# Patient Record
Sex: FEMALE | Race: BLACK OR AFRICAN AMERICAN | NOT HISPANIC OR LATINO | Employment: UNEMPLOYED | URBAN - METROPOLITAN AREA
[De-identification: names, ages, dates, MRNs, and addresses within clinical notes are randomized per-mention and may not be internally consistent; named-entity substitution may affect disease eponyms.]

---

## 2017-08-13 ENCOUNTER — HOSPITAL ENCOUNTER (EMERGENCY)
Facility: HOSPITAL | Age: 27
Discharge: HOME | End: 2017-08-13
Attending: EMERGENCY MEDICINE | Admitting: EMERGENCY MEDICINE

## 2017-08-13 VITALS
HEIGHT: 61 IN | WEIGHT: 176 LBS | OXYGEN SATURATION: 100 % | HEART RATE: 107 BPM | BODY MASS INDEX: 33.23 KG/M2 | TEMPERATURE: 98.3 F | SYSTOLIC BLOOD PRESSURE: 120 MMHG | DIASTOLIC BLOOD PRESSURE: 78 MMHG | RESPIRATION RATE: 22 BRPM

## 2017-08-13 DIAGNOSIS — N30.90 CYSTITIS: ICD-10-CM

## 2017-08-13 DIAGNOSIS — Z34.90 PREGNANCY, UNSPECIFIED GESTATIONAL AGE: Primary | ICD-10-CM

## 2017-08-13 LAB
B-HCG UR QL: POSITIVE
BACTERIA UR QL AUTO: ABNORMAL /HPF
BILIRUB UR QL STRIP: NEGATIVE
CLARITY UR: ABNORMAL
COD CRY URNS QL: ABNORMAL /HPF
COLOR UR: YELLOW
GLUCOSE UR STRIP-MCNC: NEGATIVE MG/DL
HGB UR QL STRIP.AUTO: NEGATIVE
HYALINE CASTS UR QL AUTO: ABNORMAL /LPF
INTERNAL NEGATIVE CONTROL: NEGATIVE
INTERNAL POSITIVE CONTROL: POSITIVE
KETONES UR QL STRIP: ABNORMAL
LEUKOCYTE ESTERASE UR QL STRIP.AUTO: ABNORMAL
Lab: ABNORMAL
NITRITE UR QL STRIP: NEGATIVE
PH UR STRIP.AUTO: 5.5 [PH] (ref 5–8)
PROT UR QL STRIP: NEGATIVE
RBC # UR: ABNORMAL /HPF
REF LAB TEST METHOD: ABNORMAL
SP GR UR STRIP: 1.03 (ref 1–1.03)
SQUAMOUS #/AREA URNS HPF: ABNORMAL /HPF
UROBILINOGEN UR QL STRIP: ABNORMAL
WBC UR QL AUTO: ABNORMAL /HPF

## 2017-08-13 PROCEDURE — 87086 URINE CULTURE/COLONY COUNT: CPT | Performed by: NURSE PRACTITIONER

## 2017-08-13 PROCEDURE — 81001 URINALYSIS AUTO W/SCOPE: CPT | Performed by: NURSE PRACTITIONER

## 2017-08-13 PROCEDURE — 99283 EMERGENCY DEPT VISIT LOW MDM: CPT

## 2017-08-13 RX ORDER — CEFDINIR 300 MG/1
300 CAPSULE ORAL 2 TIMES DAILY
Qty: 20 CAPSULE | Refills: 0 | Status: ON HOLD | OUTPATIENT
Start: 2017-08-13 | End: 2017-09-18

## 2017-08-14 NOTE — ED PROVIDER NOTES
Subjective   HPI Comments: Nauseated. Pos upt at home. 2 days later for her period. Wants a repeat test to make sure.    No abd pain. No vaginal bleeding.    Patient is a 27 y.o. female presenting with general illness.   Illness   Severity:  Mild  Progression:  Resolved  Chronicity:  New  Associated symptoms: nausea    Associated symptoms: no abdominal pain, no chest pain, no fever, no headaches and no loss of consciousness        Review of Systems   Constitutional: Negative for fever.   Cardiovascular: Negative for chest pain.   Gastrointestinal: Positive for nausea. Negative for abdominal pain.   Neurological: Negative for loss of consciousness and headaches.   All other systems reviewed and are negative.      Past Medical History:   Diagnosis Date   • Asthma    • Depression        No Known Allergies    Past Surgical History:   Procedure Laterality Date   • CYST REMOVAL     • DENTAL PROCEDURE     • KNEE SURGERY         History reviewed. No pertinent family history.    Social History     Social History   • Marital status: Single     Spouse name: N/A   • Number of children: N/A   • Years of education: N/A     Social History Main Topics   • Smoking status: Never Smoker   • Smokeless tobacco: None   • Alcohol use Yes      Comment: OCCASIONAL   • Drug use: No   • Sexual activity: Yes     Partners: Male     Other Topics Concern   • None     Social History Narrative   • None           Objective   Physical Exam   Constitutional: She is oriented to person, place, and time. She appears well-developed and well-nourished.   HENT:   Head: Normocephalic and atraumatic.   Right Ear: External ear normal.   Left Ear: External ear normal.   Nose: Nose normal.   Mouth/Throat: Oropharynx is clear and moist.   Eyes: Conjunctivae and EOM are normal. Pupils are equal, round, and reactive to light.   Neck: Normal range of motion. Neck supple.   Cardiovascular: Normal rate, regular rhythm, normal heart sounds and intact distal pulses.     Pulmonary/Chest: Effort normal and breath sounds normal.   Abdominal: Soft. Bowel sounds are normal.   Musculoskeletal: Normal range of motion.   Neurological: She is alert and oriented to person, place, and time.   Skin: Skin is warm and dry.   Psychiatric: She has a normal mood and affect. Her behavior is normal. Judgment and thought content normal.       Procedures         ED Course  ED Course   Comment By Time   Pt pain free. No vaginal bleeding. Will give obgyn fu. All thankful and agreeable. Well aware of the ss of worsening condition. MOLLY Juares 08/13 5943                  [facility]    Final diagnoses:   Pregnancy, unspecified gestational age   Cystitis            MOLLY Juares  08/13/17 2722

## 2017-08-15 LAB — BACTERIA SPEC AEROBE CULT: NORMAL

## 2017-09-18 ENCOUNTER — HOSPITAL ENCOUNTER (OUTPATIENT)
Facility: HOSPITAL | Age: 27
Setting detail: HOSPITAL OUTPATIENT SURGERY
Discharge: HOME | End: 2017-09-18
Attending: OBSTETRICS & GYNECOLOGY | Admitting: OBSTETRICS & GYNECOLOGY

## 2017-09-18 ENCOUNTER — ANESTHESIA (OUTPATIENT)
Dept: PERIOP | Facility: HOSPITAL | Age: 27
End: 2017-09-18

## 2017-09-18 ENCOUNTER — ANESTHESIA EVENT (OUTPATIENT)
Dept: PERIOP | Facility: HOSPITAL | Age: 27
End: 2017-09-18

## 2017-09-18 VITALS
HEART RATE: 94 BPM | DIASTOLIC BLOOD PRESSURE: 67 MMHG | RESPIRATION RATE: 18 BRPM | SYSTOLIC BLOOD PRESSURE: 105 MMHG | OXYGEN SATURATION: 100 % | TEMPERATURE: 97.4 F | BODY MASS INDEX: 33.23 KG/M2 | HEIGHT: 61 IN | WEIGHT: 176 LBS

## 2017-09-18 DIAGNOSIS — O02.1 MISSED AB: ICD-10-CM

## 2017-09-18 PROCEDURE — 88305 TISSUE EXAM BY PATHOLOGIST: CPT | Performed by: OBSTETRICS & GYNECOLOGY

## 2017-09-18 PROCEDURE — 25010000002 ONDANSETRON PER 1 MG: Performed by: NURSE ANESTHETIST, CERTIFIED REGISTERED

## 2017-09-18 PROCEDURE — 25010000002 HYDROMORPHONE PER 4 MG: Performed by: NURSE ANESTHETIST, CERTIFIED REGISTERED

## 2017-09-18 PROCEDURE — 25010000002 DEXAMETHASONE PER 1 MG: Performed by: NURSE ANESTHETIST, CERTIFIED REGISTERED

## 2017-09-18 PROCEDURE — 25010000002 FENTANYL CITRATE (PF) 100 MCG/2ML SOLUTION: Performed by: NURSE ANESTHETIST, CERTIFIED REGISTERED

## 2017-09-18 PROCEDURE — 25010000002 KETOROLAC TROMETHAMINE PER 15 MG: Performed by: NURSE ANESTHETIST, CERTIFIED REGISTERED

## 2017-09-18 PROCEDURE — 25010000002 PROPOFOL 10 MG/ML EMULSION: Performed by: NURSE ANESTHETIST, CERTIFIED REGISTERED

## 2017-09-18 RX ORDER — ONDANSETRON 2 MG/ML
4 INJECTION INTRAMUSCULAR; INTRAVENOUS ONCE AS NEEDED
Status: DISCONTINUED | OUTPATIENT
Start: 2017-09-18 | End: 2017-09-18 | Stop reason: HOSPADM

## 2017-09-18 RX ORDER — ONDANSETRON 2 MG/ML
4 INJECTION INTRAMUSCULAR; INTRAVENOUS EVERY 6 HOURS PRN
Status: CANCELLED | OUTPATIENT
Start: 2017-09-18

## 2017-09-18 RX ORDER — FENTANYL CITRATE 50 UG/ML
INJECTION, SOLUTION INTRAMUSCULAR; INTRAVENOUS AS NEEDED
Status: DISCONTINUED | OUTPATIENT
Start: 2017-09-18 | End: 2017-09-18 | Stop reason: SURG

## 2017-09-18 RX ORDER — DEXAMETHASONE SODIUM PHOSPHATE 4 MG/ML
INJECTION, SOLUTION INTRA-ARTICULAR; INTRALESIONAL; INTRAMUSCULAR; INTRAVENOUS; SOFT TISSUE AS NEEDED
Status: DISCONTINUED | OUTPATIENT
Start: 2017-09-18 | End: 2017-09-18 | Stop reason: SURG

## 2017-09-18 RX ORDER — PROMETHAZINE HYDROCHLORIDE 25 MG/ML
6.25 INJECTION, SOLUTION INTRAMUSCULAR; INTRAVENOUS ONCE AS NEEDED
Status: DISCONTINUED | OUTPATIENT
Start: 2017-09-18 | End: 2017-09-18 | Stop reason: HOSPADM

## 2017-09-18 RX ORDER — PROMETHAZINE HYDROCHLORIDE 25 MG/1
25 TABLET ORAL ONCE AS NEEDED
Status: DISCONTINUED | OUTPATIENT
Start: 2017-09-18 | End: 2017-09-18 | Stop reason: HOSPADM

## 2017-09-18 RX ORDER — MEPERIDINE HYDROCHLORIDE 25 MG/ML
12.5 INJECTION INTRAMUSCULAR; INTRAVENOUS; SUBCUTANEOUS
Status: DISCONTINUED | OUTPATIENT
Start: 2017-09-18 | End: 2017-09-18 | Stop reason: HOSPADM

## 2017-09-18 RX ORDER — OXYTOCIN 10 [USP'U]/ML
INJECTION, SOLUTION INTRAMUSCULAR; INTRAVENOUS AS NEEDED
Status: DISCONTINUED | OUTPATIENT
Start: 2017-09-18 | End: 2017-09-18 | Stop reason: SURG

## 2017-09-18 RX ORDER — MAGNESIUM HYDROXIDE 1200 MG/15ML
LIQUID ORAL AS NEEDED
Status: DISCONTINUED | OUTPATIENT
Start: 2017-09-18 | End: 2017-09-18 | Stop reason: HOSPADM

## 2017-09-18 RX ORDER — ONDANSETRON 2 MG/ML
INJECTION INTRAMUSCULAR; INTRAVENOUS AS NEEDED
Status: DISCONTINUED | OUTPATIENT
Start: 2017-09-18 | End: 2017-09-18 | Stop reason: SURG

## 2017-09-18 RX ORDER — FAMOTIDINE 20 MG/1
20 TABLET, FILM COATED ORAL ONCE
Status: COMPLETED | OUTPATIENT
Start: 2017-09-18 | End: 2017-09-18

## 2017-09-18 RX ORDER — ACETAMINOPHEN 325 MG/1
650 TABLET ORAL EVERY 4 HOURS PRN
Status: CANCELLED | OUTPATIENT
Start: 2017-09-18

## 2017-09-18 RX ORDER — PROMETHAZINE HYDROCHLORIDE 25 MG/1
25 SUPPOSITORY RECTAL ONCE AS NEEDED
Status: DISCONTINUED | OUTPATIENT
Start: 2017-09-18 | End: 2017-09-18 | Stop reason: HOSPADM

## 2017-09-18 RX ORDER — HYDROCODONE BITARTRATE AND ACETAMINOPHEN 5; 325 MG/1; MG/1
1 TABLET ORAL EVERY 4 HOURS PRN
Status: CANCELLED | OUTPATIENT
Start: 2017-09-18 | End: 2017-09-28

## 2017-09-18 RX ORDER — PROPOFOL 10 MG/ML
VIAL (ML) INTRAVENOUS AS NEEDED
Status: DISCONTINUED | OUTPATIENT
Start: 2017-09-18 | End: 2017-09-18 | Stop reason: SURG

## 2017-09-18 RX ORDER — IBUPROFEN 800 MG/1
800 TABLET ORAL EVERY 6 HOURS PRN
Qty: 30 TABLET | Refills: 1 | Status: SHIPPED | OUTPATIENT
Start: 2017-09-18 | End: 2017-10-18

## 2017-09-18 RX ORDER — LIDOCAINE HYDROCHLORIDE 10 MG/ML
INJECTION, SOLUTION EPIDURAL; INFILTRATION; INTRACAUDAL; PERINEURAL AS NEEDED
Status: DISCONTINUED | OUTPATIENT
Start: 2017-09-18 | End: 2017-09-18 | Stop reason: SURG

## 2017-09-18 RX ORDER — FENTANYL CITRATE 50 UG/ML
50 INJECTION, SOLUTION INTRAMUSCULAR; INTRAVENOUS
Status: DISCONTINUED | OUTPATIENT
Start: 2017-09-18 | End: 2017-09-18 | Stop reason: HOSPADM

## 2017-09-18 RX ORDER — SODIUM CHLORIDE 0.9 % (FLUSH) 0.9 %
1-10 SYRINGE (ML) INJECTION AS NEEDED
Status: DISCONTINUED | OUTPATIENT
Start: 2017-09-18 | End: 2017-09-18 | Stop reason: HOSPADM

## 2017-09-18 RX ORDER — IBUPROFEN 400 MG/1
400 TABLET ORAL EVERY 6 HOURS PRN
Status: CANCELLED | OUTPATIENT
Start: 2017-09-18

## 2017-09-18 RX ORDER — PYRAZINAMIDE 500 MG/1
1 TABLET ORAL EVERY 4 HOURS PRN
Qty: 12 TABLET | Refills: 0 | Status: SHIPPED | OUTPATIENT
Start: 2017-09-18 | End: 2018-09-18

## 2017-09-18 RX ORDER — FAMOTIDINE 10 MG/ML
20 INJECTION, SOLUTION INTRAVENOUS ONCE
Status: CANCELLED | OUTPATIENT
Start: 2017-09-18 | End: 2017-09-18

## 2017-09-18 RX ORDER — SODIUM CHLORIDE, SODIUM LACTATE, POTASSIUM CHLORIDE, CALCIUM CHLORIDE 600; 310; 30; 20 MG/100ML; MG/100ML; MG/100ML; MG/100ML
9 INJECTION, SOLUTION INTRAVENOUS CONTINUOUS
Status: DISCONTINUED | OUTPATIENT
Start: 2017-09-18 | End: 2017-09-18 | Stop reason: HOSPADM

## 2017-09-18 RX ORDER — HYDROMORPHONE HYDROCHLORIDE 1 MG/ML
0.5 INJECTION, SOLUTION INTRAMUSCULAR; INTRAVENOUS; SUBCUTANEOUS
Status: DISCONTINUED | OUTPATIENT
Start: 2017-09-18 | End: 2017-09-18 | Stop reason: HOSPADM

## 2017-09-18 RX ORDER — LIDOCAINE HYDROCHLORIDE 10 MG/ML
0.5 INJECTION, SOLUTION EPIDURAL; INFILTRATION; INTRACAUDAL; PERINEURAL ONCE AS NEEDED
Status: COMPLETED | OUTPATIENT
Start: 2017-09-18 | End: 2017-09-18

## 2017-09-18 RX ORDER — ONDANSETRON 4 MG/1
4 TABLET, FILM COATED ORAL EVERY 6 HOURS PRN
Status: CANCELLED | OUTPATIENT
Start: 2017-09-18

## 2017-09-18 RX ORDER — KETOROLAC TROMETHAMINE 30 MG/ML
INJECTION, SOLUTION INTRAMUSCULAR; INTRAVENOUS AS NEEDED
Status: DISCONTINUED | OUTPATIENT
Start: 2017-09-18 | End: 2017-09-18 | Stop reason: SURG

## 2017-09-18 RX ORDER — VENLAFAXINE 37.5 MG/1
37.5 TABLET ORAL DAILY
COMMUNITY
End: 2019-05-07

## 2017-09-18 RX ORDER — ALBUTEROL SULFATE 90 UG/1
2 AEROSOL, METERED RESPIRATORY (INHALATION) AS NEEDED
COMMUNITY
End: 2019-05-07

## 2017-09-18 RX ADMIN — SODIUM CHLORIDE, POTASSIUM CHLORIDE, SODIUM LACTATE AND CALCIUM CHLORIDE 9 ML/HR: 600; 310; 30; 20 INJECTION, SOLUTION INTRAVENOUS at 10:35

## 2017-09-18 RX ADMIN — OXYTOCIN 20 UNITS: 10 INJECTION, SOLUTION INTRAMUSCULAR; INTRAVENOUS at 13:40

## 2017-09-18 RX ADMIN — DEXAMETHASONE SODIUM PHOSPHATE 8 MG: 4 INJECTION, SOLUTION INTRAMUSCULAR; INTRAVENOUS at 13:31

## 2017-09-18 RX ADMIN — FAMOTIDINE 20 MG: 20 TABLET ORAL at 10:35

## 2017-09-18 RX ADMIN — PROPOFOL 150 MG: 10 INJECTION, EMULSION INTRAVENOUS at 13:28

## 2017-09-18 RX ADMIN — HYDROMORPHONE HYDROCHLORIDE 0.5 MG: 1 INJECTION, SOLUTION INTRAMUSCULAR; INTRAVENOUS; SUBCUTANEOUS at 14:21

## 2017-09-18 RX ADMIN — HYDROMORPHONE HYDROCHLORIDE 0.5 MG: 1 INJECTION, SOLUTION INTRAMUSCULAR; INTRAVENOUS; SUBCUTANEOUS at 14:36

## 2017-09-18 RX ADMIN — FENTANYL CITRATE 50 MCG: 50 INJECTION, SOLUTION INTRAMUSCULAR; INTRAVENOUS at 13:28

## 2017-09-18 RX ADMIN — ONDANSETRON 4 MG: 2 INJECTION INTRAMUSCULAR; INTRAVENOUS at 13:39

## 2017-09-18 RX ADMIN — FENTANYL CITRATE 50 MCG: 50 INJECTION, SOLUTION INTRAMUSCULAR; INTRAVENOUS at 13:25

## 2017-09-18 RX ADMIN — FENTANYL CITRATE 50 MCG: 50 INJECTION, SOLUTION INTRAMUSCULAR; INTRAVENOUS at 14:10

## 2017-09-18 RX ADMIN — FENTANYL CITRATE 50 MCG: 50 INJECTION, SOLUTION INTRAMUSCULAR; INTRAVENOUS at 14:00

## 2017-09-18 RX ADMIN — KETOROLAC TROMETHAMINE 30 MG: 30 INJECTION, SOLUTION INTRAMUSCULAR at 13:40

## 2017-09-18 RX ADMIN — LIDOCAINE HYDROCHLORIDE 50 MG: 10 INJECTION, SOLUTION EPIDURAL; INFILTRATION; INTRACAUDAL; PERINEURAL at 13:28

## 2017-09-18 RX ADMIN — LIDOCAINE HYDROCHLORIDE 0.2 ML: 10 INJECTION, SOLUTION EPIDURAL; INFILTRATION; INTRACAUDAL; PERINEURAL at 10:35

## 2017-09-18 NOTE — ANESTHESIA POSTPROCEDURE EVALUATION
Patient: Padma Younger    Procedure Summary     Date Anesthesia Start Anesthesia Stop Room / Location    09/18/17 1325  [facility]       Procedure Diagnosis Surgeon Provider    DILATATION AND CURETTAGE WITH SUCTION (N/A Vagina) No diagnosis on file. DO Gildardo Hoyt MD          Anesthesia Type: general  Last vitals  BP 87/47       Temp 98.1       Pulse    84   Resp     20   SpO2     99%     Post Anesthesia Care and Evaluation    Patient location during evaluation: PACU  Patient participation: complete - patient participated  Level of consciousness: obtunded/minimal responses  Pain score: 0  Pain management: adequate  Airway patency: patent  Anesthetic complications: No anesthetic complications  PONV Status: none  Cardiovascular status: hemodynamically stable and acceptable  Respiratory status: nonlabored ventilation, acceptable and nasal cannula  Hydration status: acceptable

## 2017-09-18 NOTE — ANESTHESIA PROCEDURE NOTES
Airway  Urgency: elective    Date/Time: 9/18/2017 1:28 PM  End Time:9/18/2017 1:29 PM  Airway not difficult    General Information and Staff    Patient location during procedure: OR  Anesthesiologist: RODRIGUEZ DILLON  CRNA: GAUTAM SRIVASTAVA    Indications and Patient Condition  Indications for airway management: airway protection    Preoxygenated: yes  Mask difficulty assessment: 1 - vent by mask    Final Airway Details  Final airway type: supraglottic airway      Successful airway: classic  Size 4  Cuff Pressure (cm H2O): 20  Airway Seal Pressure (cm H2O): 8    Number of attempts at approach: 1    Additional Comments  LMA placed without difficulty, ventilation with assist, equal breath sounds and symmetric chest rise and fall

## 2017-09-18 NOTE — OP NOTE
Suction D & C Operative Report      Pre-operative Diagnosis: Blighted Ovum    Post-operative Diagnosis: Same    Operation: Suction Dilatation and Curretage    Surgeon: Kelley Dodge DO    Assistants: Tech    Anesthesia: General LMA    Findings:   8 Week size uterus with moderate amount of curretings    Estimated Blood Loss: Minimal    Specimens: products of conception    Complications:  None    Disposition: PACU - hemodynamically stable.      Procedure Details    The patient was seen in the Holding Room. The risks, benefits, complications, treatment options, and expected outcomes were discussed with the patient. The patient concurred with the proposed plan, giving informed consent. The patient was identified as Padma Younger and the procedure verified as a Suction D & C. A Time Out was held and the above information confirmed.    After induction of anesthesia, the patient was draped and prepped in the usual sterile manner. A weighted speculum was placed in the vagina. The cervix was than grasped with a single tooth tenaculum.  The cervix was slightly dilated. The uterus sounded to 8 cm.  The cervix was then dilated.  An  8 mm curved suction currette was then introduced into the endometrial cavity.  The suction device was activated and rotated to clean the uterus of all products of conception.      The tenaculum was then removed and there was good hemostasis noted.  All instruments were removed from the patients vagina. The counts were correct x 2. Patient was awakened and taken to recover in stable condition.       Kelley Dodge DO  09/18/17  1:56 PM

## 2017-09-18 NOTE — INTERVAL H&P NOTE
26yo  w/ MAB @ 8w3d w/ blighted ovum, gestational sac measuring 6w4d desiring surgical management. No cramping or vaginal bleeding. No change since last seen in clinic.   PMH: asthma, anxiety  PSH: partial mensicus tear    AFVSS    Plan to proceed with suction D&C. Rhogam not indicated at pt is O+.

## 2017-09-18 NOTE — ANESTHESIA PREPROCEDURE EVALUATION
Anesthesia Evaluation     NPO Solid Status: > 8 hours  NPO Liquid Status: > 8 hours     Airway   Mallampati: II  TM distance: >3 FB  Neck ROM: full  no difficulty expected  Dental      Pulmonary - normal exam   (-) not a smokerAsthma: uses inhaler daily, lungs good today, no ER visits.  Cardiovascular     Rhythm: regular  Rate: normal    (-) hypertension, angina, murmur      Neuro/Psych  GI/Hepatic/Renal/Endo    (-) liver disease, no renal disease, diabetes    Musculoskeletal     Abdominal    Substance History      OB/GYN          Other        ROS/Med Hx Other: Miscarriage at 8 weeks per patient                            Anesthesia Plan    ASA 2     general   (GA)  intravenous induction   Anesthetic plan and risks discussed with patient.    Plan discussed with CRNA.

## 2017-09-19 LAB
CYTO UR: NORMAL
LAB AP CASE REPORT: NORMAL
LAB AP CLINICAL INFORMATION: NORMAL
Lab: NORMAL
PATH REPORT.FINAL DX SPEC: NORMAL
PATH REPORT.GROSS SPEC: NORMAL

## 2017-09-21 ENCOUNTER — APPOINTMENT (OUTPATIENT)
Dept: CT IMAGING | Facility: HOSPITAL | Age: 27
End: 2017-09-21

## 2017-09-21 ENCOUNTER — HOSPITAL ENCOUNTER (EMERGENCY)
Facility: HOSPITAL | Age: 27
Discharge: HOME | End: 2017-09-22
Attending: EMERGENCY MEDICINE | Admitting: EMERGENCY MEDICINE

## 2017-09-21 DIAGNOSIS — N30.90 CYSTITIS: ICD-10-CM

## 2017-09-21 DIAGNOSIS — R10.2 PELVIC PAIN: Primary | ICD-10-CM

## 2017-09-21 DIAGNOSIS — G89.18 POST-OP PAIN: ICD-10-CM

## 2017-09-21 LAB
ALBUMIN SERPL-MCNC: 4.2 G/DL (ref 3.2–4.8)
ALBUMIN/GLOB SERPL: 1.4 G/DL (ref 1.5–2.5)
ALP SERPL-CCNC: 98 U/L (ref 25–100)
ALT SERPL W P-5'-P-CCNC: 16 U/L (ref 7–40)
ANION GAP SERPL CALCULATED.3IONS-SCNC: 6 MMOL/L (ref 3–11)
AST SERPL-CCNC: 19 U/L (ref 0–33)
BASOPHILS # BLD AUTO: 0.02 10*3/MM3 (ref 0–0.2)
BASOPHILS NFR BLD AUTO: 0.2 % (ref 0–1)
BILIRUB SERPL-MCNC: 0.4 MG/DL (ref 0.3–1.2)
BUN BLD-MCNC: 9 MG/DL (ref 9–23)
BUN/CREAT SERPL: 11.3 (ref 7–25)
CALCIUM SPEC-SCNC: 9.4 MG/DL (ref 8.7–10.4)
CHLORIDE SERPL-SCNC: 102 MMOL/L (ref 99–109)
CO2 SERPL-SCNC: 26 MMOL/L (ref 20–31)
CREAT BLD-MCNC: 0.8 MG/DL (ref 0.6–1.3)
DEPRECATED RDW RBC AUTO: 44.6 FL (ref 37–54)
EOSINOPHIL # BLD AUTO: 0.1 10*3/MM3 (ref 0–0.3)
EOSINOPHIL NFR BLD AUTO: 1.1 % (ref 0–3)
ERYTHROCYTE [DISTWIDTH] IN BLOOD BY AUTOMATED COUNT: 13.7 % (ref 11.3–14.5)
GFR SERPL CREATININE-BSD FRML MDRD: 104 ML/MIN/1.73
GLOBULIN UR ELPH-MCNC: 3 GM/DL
GLUCOSE BLD-MCNC: 87 MG/DL (ref 70–100)
HCT VFR BLD AUTO: 36.6 % (ref 34.5–44)
HGB BLD-MCNC: 11.9 G/DL (ref 11.5–15.5)
IMM GRANULOCYTES # BLD: 0.01 10*3/MM3 (ref 0–0.03)
IMM GRANULOCYTES NFR BLD: 0.1 % (ref 0–0.6)
LIPASE SERPL-CCNC: 29 U/L (ref 6–51)
LYMPHOCYTES # BLD AUTO: 2.51 10*3/MM3 (ref 0.6–4.8)
LYMPHOCYTES NFR BLD AUTO: 28.1 % (ref 24–44)
MCH RBC QN AUTO: 28.8 PG (ref 27–31)
MCHC RBC AUTO-ENTMCNC: 32.5 G/DL (ref 32–36)
MCV RBC AUTO: 88.6 FL (ref 80–99)
MONOCYTES # BLD AUTO: 0.48 10*3/MM3 (ref 0–1)
MONOCYTES NFR BLD AUTO: 5.4 % (ref 0–12)
NEUTROPHILS # BLD AUTO: 5.81 10*3/MM3 (ref 1.5–8.3)
NEUTROPHILS NFR BLD AUTO: 65.1 % (ref 41–71)
PLATELET # BLD AUTO: 317 10*3/MM3 (ref 150–450)
PMV BLD AUTO: 9.7 FL (ref 6–12)
POTASSIUM BLD-SCNC: 3.5 MMOL/L (ref 3.5–5.5)
PROT SERPL-MCNC: 7.2 G/DL (ref 5.7–8.2)
RBC # BLD AUTO: 4.13 10*6/MM3 (ref 3.89–5.14)
SODIUM BLD-SCNC: 134 MMOL/L (ref 132–146)
WBC NRBC COR # BLD: 8.93 10*3/MM3 (ref 3.5–10.8)

## 2017-09-21 PROCEDURE — 83690 ASSAY OF LIPASE: CPT | Performed by: EMERGENCY MEDICINE

## 2017-09-21 PROCEDURE — 81001 URINALYSIS AUTO W/SCOPE: CPT | Performed by: EMERGENCY MEDICINE

## 2017-09-21 PROCEDURE — 99284 EMERGENCY DEPT VISIT MOD MDM: CPT

## 2017-09-21 PROCEDURE — 0 IOPAMIDOL 61 % SOLUTION: Performed by: EMERGENCY MEDICINE

## 2017-09-21 PROCEDURE — 74177 CT ABD & PELVIS W/CONTRAST: CPT

## 2017-09-21 PROCEDURE — 87077 CULTURE AEROBIC IDENTIFY: CPT | Performed by: EMERGENCY MEDICINE

## 2017-09-21 PROCEDURE — 25010000002 MORPHINE PER 10 MG: Performed by: EMERGENCY MEDICINE

## 2017-09-21 PROCEDURE — 87186 SC STD MICRODIL/AGAR DIL: CPT | Performed by: EMERGENCY MEDICINE

## 2017-09-21 PROCEDURE — 80053 COMPREHEN METABOLIC PANEL: CPT | Performed by: EMERGENCY MEDICINE

## 2017-09-21 PROCEDURE — 85025 COMPLETE CBC W/AUTO DIFF WBC: CPT | Performed by: EMERGENCY MEDICINE

## 2017-09-21 PROCEDURE — 25010000002 ONDANSETRON PER 1 MG: Performed by: EMERGENCY MEDICINE

## 2017-09-21 PROCEDURE — 87086 URINE CULTURE/COLONY COUNT: CPT | Performed by: EMERGENCY MEDICINE

## 2017-09-21 PROCEDURE — 96375 TX/PRO/DX INJ NEW DRUG ADDON: CPT

## 2017-09-21 RX ORDER — KETOROLAC TROMETHAMINE 15 MG/ML
7.5 INJECTION, SOLUTION INTRAMUSCULAR; INTRAVENOUS ONCE
Status: COMPLETED | OUTPATIENT
Start: 2017-09-21 | End: 2017-09-22

## 2017-09-21 RX ORDER — ONDANSETRON 2 MG/ML
4 INJECTION INTRAMUSCULAR; INTRAVENOUS ONCE
Status: COMPLETED | OUTPATIENT
Start: 2017-09-21 | End: 2017-09-21

## 2017-09-21 RX ORDER — MORPHINE SULFATE 4 MG/ML
4 INJECTION, SOLUTION INTRAMUSCULAR; INTRAVENOUS ONCE
Status: COMPLETED | OUTPATIENT
Start: 2017-09-21 | End: 2017-09-21

## 2017-09-21 RX ORDER — SODIUM CHLORIDE 0.9 % (FLUSH) 0.9 %
10 SYRINGE (ML) INJECTION AS NEEDED
Status: DISCONTINUED | OUTPATIENT
Start: 2017-09-21 | End: 2017-09-22 | Stop reason: HOSPADM

## 2017-09-21 RX ADMIN — ONDANSETRON 4 MG: 2 INJECTION INTRAMUSCULAR; INTRAVENOUS at 23:59

## 2017-09-21 RX ADMIN — MORPHINE SULFATE 4 MG: 4 INJECTION, SOLUTION INTRAMUSCULAR; INTRAVENOUS at 23:56

## 2017-09-21 RX ADMIN — IOPAMIDOL 97 ML: 612 INJECTION, SOLUTION INTRAVENOUS at 23:39

## 2017-09-22 VITALS
OXYGEN SATURATION: 100 % | SYSTOLIC BLOOD PRESSURE: 111 MMHG | DIASTOLIC BLOOD PRESSURE: 71 MMHG | BODY MASS INDEX: 33.23 KG/M2 | RESPIRATION RATE: 18 BRPM | HEIGHT: 61 IN | TEMPERATURE: 97.8 F | WEIGHT: 176 LBS | HEART RATE: 78 BPM

## 2017-09-22 LAB
BACTERIA UR QL AUTO: ABNORMAL /HPF
BILIRUB UR QL STRIP: NEGATIVE
CLARITY UR: ABNORMAL
COLOR UR: YELLOW
GLUCOSE UR STRIP-MCNC: NEGATIVE MG/DL
HGB UR QL STRIP.AUTO: NEGATIVE
HOLD SPECIMEN: NORMAL
HOLD SPECIMEN: NORMAL
HYALINE CASTS UR QL AUTO: ABNORMAL /LPF
KETONES UR QL STRIP: NEGATIVE
LEUKOCYTE ESTERASE UR QL STRIP.AUTO: ABNORMAL
NITRITE UR QL STRIP: NEGATIVE
PH UR STRIP.AUTO: <=5 [PH] (ref 5–8)
PROT UR QL STRIP: NEGATIVE
RBC # UR: ABNORMAL /HPF
REF LAB TEST METHOD: ABNORMAL
SP GR UR STRIP: 1.02 (ref 1–1.03)
SQUAMOUS #/AREA URNS HPF: ABNORMAL /HPF
UROBILINOGEN UR QL STRIP: ABNORMAL
WBC UR QL AUTO: ABNORMAL /HPF
WHOLE BLOOD HOLD SPECIMEN: NORMAL
WHOLE BLOOD HOLD SPECIMEN: NORMAL

## 2017-09-22 PROCEDURE — 25010000002 KETOROLAC TROMETHAMINE PER 15 MG: Performed by: EMERGENCY MEDICINE

## 2017-09-22 PROCEDURE — 96365 THER/PROPH/DIAG IV INF INIT: CPT

## 2017-09-22 PROCEDURE — 25010000002 CEFTRIAXONE PER 250 MG: Performed by: EMERGENCY MEDICINE

## 2017-09-22 PROCEDURE — 96375 TX/PRO/DX INJ NEW DRUG ADDON: CPT

## 2017-09-22 RX ORDER — CEFTRIAXONE SODIUM 1 G/50ML
1 INJECTION, SOLUTION INTRAVENOUS ONCE
Status: COMPLETED | OUTPATIENT
Start: 2017-09-22 | End: 2017-09-22

## 2017-09-22 RX ORDER — CEFDINIR 300 MG/1
300 CAPSULE ORAL 2 TIMES DAILY
Qty: 14 CAPSULE | Refills: 0 | OUTPATIENT
Start: 2017-09-22 | End: 2019-05-07

## 2017-09-22 RX ADMIN — CEFTRIAXONE SODIUM 1 G: 1 INJECTION, SOLUTION INTRAVENOUS at 00:59

## 2017-09-22 RX ADMIN — KETOROLAC TROMETHAMINE 7.5 MG: 15 INJECTION, SOLUTION INTRAMUSCULAR; INTRAVENOUS at 00:00

## 2017-09-22 NOTE — ED PROVIDER NOTES
Subjective   HPI Comments: Padma Younger is a 27 y.o.female who presents to the ED with c/o nausea, diarrhea after taking a laxative, and lower abdominal pain that has persisted over the last three days. She reports that she had a DNC on Monday afternoon. That evening, she began experiencing increasingly severe pain throughout her abdomen, especially in the RUQ and diffusely throughout the left side. She was seen by her OBGYN, Dr. Dodge, again on Tuesday. She denies any heavy lifting and has been following all of the post-surgical instructions. She has tried taking her prescribed pain medications without relief. Other than some spotting, she has no other acute complaints at this time including vomiting, vaginal pain, or vaginal discharge.    Patient is a 27 y.o. female presenting with abdominal pain.   History provided by:  Patient  Abdominal Pain   Pain location:  LLQ, RUQ and LUQ  Pain quality: aching    Pain radiates to:  Does not radiate  Pain severity:  Severe  Onset quality:  Gradual  Duration:  3 days  Timing:  Constant  Progression:  Unchanged  Chronicity:  New  Context comment:  DNC on Monday  Relieved by:  Nothing  Worsened by:  Nothing  Ineffective treatments: prescribed pain medications.  Associated symptoms: diarrhea and nausea    Associated symptoms: no chest pain, no chills, no cough, no fever, no shortness of breath, no sore throat and no vomiting    Diarrhea:     Severity:  Moderate    Duration:  3 days    Timing:  Intermittent    Progression:  Unchanged  Nausea:     Severity:  Moderate    Onset quality:  Gradual    Duration:  3 days    Timing:  Constant    Progression:  Unchanged      Review of Systems   Constitutional: Negative for chills and fever.   HENT: Negative for congestion, rhinorrhea, sore throat and trouble swallowing.    Respiratory: Negative for cough and shortness of breath.    Cardiovascular: Negative for chest pain.   Gastrointestinal: Positive for abdominal pain, diarrhea and  nausea. Negative for vomiting.   Musculoskeletal: Negative for back pain and neck pain.   Neurological: Negative for dizziness, weakness and headaches.   All other systems reviewed and are negative.      Past Medical History:   Diagnosis Date   • Abnormal Pap smear of cervix    • Anxiety    • Asthma    • Chlamydia contact, treated    • Depression    • Trichomonas vaginitis        No Known Allergies    Past Surgical History:   Procedure Laterality Date   • CYST REMOVAL     • D&C WITH SUCTION N/A 9/18/2017    Procedure: DILATATION AND CURETTAGE WITH SUCTION;  Surgeon: Kelley Dodge DO;  Location: [facility] OR;  Service:    • DENTAL PROCEDURE     • DILATATION AND CURETTAGE  09/18/2017   • KNEE SURGERY         History reviewed. No pertinent family history.    Social History     Social History   • Marital status: Single     Spouse name: N/A   • Number of children: N/A   • Years of education: N/A     Social History Main Topics   • Smoking status: Never Smoker   • Smokeless tobacco: Never Used   • Alcohol use Yes      Comment: OCCASIONAL   • Drug use: No   • Sexual activity: Yes     Partners: Male     Other Topics Concern   • None     Social History Narrative   • None         Objective   Physical Exam   Constitutional: She is oriented to person, place, and time. She appears well-developed and well-nourished. No distress.   HENT:   Head: Normocephalic and atraumatic.   Nose: Nose normal.   Mouth/Throat: Oropharynx is clear and moist.   Eyes: Conjunctivae are normal. No scleral icterus.   Neck: Normal range of motion. Neck supple.   Cardiovascular: Normal rate, regular rhythm and normal heart sounds.    No murmur heard.  Pulmonary/Chest: Effort normal and breath sounds normal. No respiratory distress. She has no wheezes. She has no rales.   Abdominal: Soft. Bowel sounds are normal. She exhibits no mass. There is tenderness. There is no rebound and no guarding.   Tenderness to palpation diffusely throughout the abdomen.  Tenderness to palpation of the flanks bilaterally. CVA tenderness to palpation. Abdomen was full but non-distended.   Musculoskeletal: Normal range of motion. She exhibits no edema.   Neurological: She is alert and oriented to person, place, and time.   Skin: Skin is warm and dry. No erythema.   Psychiatric: She has a normal mood and affect. Her behavior is normal.   Nursing note and vitals reviewed.      Procedures         ED Course  ED Course   Value Comment By Time   WBC, UA: (!) 0-2 (Reviewed) Sim Beal MD 09/22 0045   Leuk Esterase, UA: (!) Small (1+) (Reviewed) Sim Beal MD 09/22 0045   Appearance, UA: (!) Cloudy (Reviewed) Sim Beal MD 09/22 0045    Dr. Beal is at the bedside reevaluating the patient. Crissy Knight 09/22 0046    Patient is resting comfortably and reports feeling much better.  Abdomen is nonsurgical.  Findings consistent with mild cystitis postoperative changes.  We'll discharge her home to follow-up with Dr. Dodge.  She voices understanding and is happy with the plan.  She is to return here for any concerns. Sim Beal MD 09/22 0048     Recent Results (from the past 24 hour(s))   Comprehensive Metabolic Panel    Collection Time: 09/21/17 10:44 PM   Result Value Ref Range    Glucose 87 70 - 100 mg/dL    BUN 9 9 - 23 mg/dL    Creatinine 0.80 0.60 - 1.30 mg/dL    Sodium 134 132 - 146 mmol/L    Potassium 3.5 3.5 - 5.5 mmol/L    Chloride 102 99 - 109 mmol/L    CO2 26.0 20.0 - 31.0 mmol/L    Calcium 9.4 8.7 - 10.4 mg/dL    Total Protein 7.2 5.7 - 8.2 g/dL    Albumin 4.20 3.20 - 4.80 g/dL    ALT (SGPT) 16 7 - 40 U/L    AST (SGOT) 19 0 - 33 U/L    Alkaline Phosphatase 98 25 - 100 U/L    Total Bilirubin 0.4 0.3 - 1.2 mg/dL    eGFR  African Amer 104 >60 mL/min/1.73    Globulin 3.0 gm/dL    A/G Ratio 1.4 (L) 1.5 - 2.5 g/dL    BUN/Creatinine Ratio 11.3 7.0 - 25.0    Anion Gap 6.0 3.0 - 11.0 mmol/L   Lipase    Collection Time: 09/21/17 10:44 PM   Result  Value Ref Range    Lipase 29 6 - 51 U/L   Light Blue Top    Collection Time: 09/21/17 10:44 PM   Result Value Ref Range    Extra Tube hold for add-on    Green Top (Gel)    Collection Time: 09/21/17 10:44 PM   Result Value Ref Range    Extra Tube Hold for add-ons.    Lavender Top    Collection Time: 09/21/17 10:44 PM   Result Value Ref Range    Extra Tube hold for add-on    Gold Top - SST    Collection Time: 09/21/17 10:44 PM   Result Value Ref Range    Extra Tube Hold for add-ons.    CBC Auto Differential    Collection Time: 09/21/17 10:44 PM   Result Value Ref Range    WBC 8.93 3.50 - 10.80 10*3/mm3    RBC 4.13 3.89 - 5.14 10*6/mm3    Hemoglobin 11.9 11.5 - 15.5 g/dL    Hematocrit 36.6 34.5 - 44.0 %    MCV 88.6 80.0 - 99.0 fL    MCH 28.8 27.0 - 31.0 pg    MCHC 32.5 32.0 - 36.0 g/dL    RDW 13.7 11.3 - 14.5 %    RDW-SD 44.6 37.0 - 54.0 fl    MPV 9.7 6.0 - 12.0 fL    Platelets 317 150 - 450 10*3/mm3    Neutrophil % 65.1 41.0 - 71.0 %    Lymphocyte % 28.1 24.0 - 44.0 %    Monocyte % 5.4 0.0 - 12.0 %    Eosinophil % 1.1 0.0 - 3.0 %    Basophil % 0.2 0.0 - 1.0 %    Immature Grans % 0.1 0.0 - 0.6 %    Neutrophils, Absolute 5.81 1.50 - 8.30 10*3/mm3    Lymphocytes, Absolute 2.51 0.60 - 4.80 10*3/mm3    Monocytes, Absolute 0.48 0.00 - 1.00 10*3/mm3    Eosinophils, Absolute 0.10 0.00 - 0.30 10*3/mm3    Basophils, Absolute 0.02 0.00 - 0.20 10*3/mm3    Immature Grans, Absolute 0.01 0.00 - 0.03 10*3/mm3   Urinalysis With / Culture If Indicated    Collection Time: 09/21/17 11:58 PM   Result Value Ref Range    Color, UA Yellow Yellow, Straw    Appearance, UA Cloudy (A) Clear    pH, UA <=5.0 5.0 - 8.0    Specific Gravity, UA 1.017 1.001 - 1.030    Glucose, UA Negative Negative    Ketones, UA Negative Negative    Bilirubin, UA Negative Negative    Blood, UA Negative Negative    Protein, UA Negative Negative    Leuk Esterase, UA Small (1+) (A) Negative    Nitrite, UA Negative Negative    Urobilinogen, UA 0.2 E.U./dL 0.2 - 1.0  "E.U./dL   Urinalysis, Microscopic Only    Collection Time: 09/21/17 11:58 PM   Result Value Ref Range    RBC, UA None Seen None Seen, 0-2 /HPF    WBC, UA 0-2 (A) None Seen /HPF    Bacteria, UA None Seen None Seen, Trace /HPF    Squamous Epithelial Cells, UA 0-2 None Seen, 0-2 /HPF    Hyaline Casts, UA None Seen 0 - 6 /LPF    Methodology Automated Microscopy      Note: In addition to lab results from this visit, the labs listed above may include labs taken at another facility or during a different encounter within the last 24 hours. Please correlate lab times with ED admission and discharge times for further clarification of the services performed during this visit.    CT Abdomen Pelvis With Contrast   Final Result   Abnormal     Mild wall thickening of partially distended urinary bladder suspicious for    cystitis/UTI versus artifact due to underdistention.  Recommend correlation    with urinary analysis.         Ovarian cysts/follicles likely physiologic/normal for the patient's age.     Followup with sonography may be considered if clinically indicated.        Other nonemergent findings as described.       THIS DOCUMENT HAS BEEN ELECTRONICALLY SIGNED BY LESLIE MORENO MD        Vitals:    09/21/17 2147 09/22/17 0000   BP: 107/68 118/72   BP Location: Left arm Right arm   Patient Position: Sitting Lying   Pulse: 84 74   Resp: 24    Temp: 97.8 °F (36.6 °C)    TempSrc: Oral    SpO2: 100% 100%   Weight: 176 lb (79.8 kg)    Height: 61\" (154.9 cm)      Medications   sodium chloride 0.9 % flush 10 mL (not administered)   cefTRIAXone (ROCEPHIN) IVPB 1 g (1 g Intravenous New Bag 9/22/17 0059)   ketorolac (TORADOL) injection 7.5 mg (7.5 mg Intravenous Given 9/22/17 0000)   Morphine sulfate (PF) injection 4 mg (4 mg Intravenous Given 9/21/17 2356)   ondansetron (ZOFRAN) injection 4 mg (4 mg Intravenous Given 9/21/17 2359)   iopamidol (ISOVUE-300) 61 % injection 100 mL (97 mL Intravenous Given 9/21/17 2339)     ECG/EMG Results " (last 24 hours)     ** No results found for the last 24 hours. **                       MDM  Number of Diagnoses or Management Options     Amount and/or Complexity of Data Reviewed  Clinical lab tests: reviewed  Tests in the radiology section of CPT®: reviewed  Review and summarize past medical records: yes  Independent visualization of images, tracings, or specimens: yes        Final diagnoses:   Pelvic pain   Post-op pain   Cystitis       Documentation assistance provided by maral Knight.  Information recorded by the scribe was done at my direction and has been verified and validated by me.     Crissy Knight  09/21/17 6609       Sim Beal MD  09/22/17 2974

## 2017-09-24 LAB
BACTERIA SPEC AEROBE CULT: ABNORMAL
BACTERIA SPEC AEROBE CULT: ABNORMAL

## 2019-04-24 ENCOUNTER — TRANSCRIBE ORDERS (OUTPATIENT)
Dept: LAB | Facility: HOSPITAL | Age: 29
End: 2019-04-24

## 2019-04-24 ENCOUNTER — LAB (OUTPATIENT)
Dept: LAB | Facility: HOSPITAL | Age: 29
End: 2019-04-24

## 2019-04-24 DIAGNOSIS — Z3A.08 8 WEEKS GESTATION OF PREGNANCY: ICD-10-CM

## 2019-04-24 DIAGNOSIS — Z3A.08 8 WEEKS GESTATION OF PREGNANCY: Primary | ICD-10-CM

## 2019-04-24 DIAGNOSIS — Z34.01 ENCOUNTER FOR SUPERVISION OF NORMAL FIRST PREGNANCY IN FIRST TRIMESTER: ICD-10-CM

## 2019-04-24 LAB
ABO GROUP BLD: NORMAL
BASOPHILS # BLD AUTO: 0.03 10*3/MM3 (ref 0–0.2)
BASOPHILS NFR BLD AUTO: 0.3 % (ref 0–1.5)
BLD GP AB SCN SERPL QL: NEGATIVE
DEPRECATED RDW RBC AUTO: 43.9 FL (ref 37–54)
EOSINOPHIL # BLD AUTO: 0.02 10*3/MM3 (ref 0–0.4)
EOSINOPHIL NFR BLD AUTO: 0.2 % (ref 0.3–6.2)
ERYTHROCYTE [DISTWIDTH] IN BLOOD BY AUTOMATED COUNT: 13.2 % (ref 12.3–15.4)
GLUCOSE BLD-MCNC: 77 MG/DL (ref 65–99)
HBV SURFACE AG SERPL QL IA: NORMAL
HCT VFR BLD AUTO: 42.7 % (ref 34–46.6)
HCV AB SER DONR QL: NORMAL
HGB BLD-MCNC: 13.5 G/DL (ref 12–15.9)
HIV1+2 AB SER QL: NORMAL
IMM GRANULOCYTES # BLD AUTO: 0.02 10*3/MM3 (ref 0–0.05)
IMM GRANULOCYTES NFR BLD AUTO: 0.2 % (ref 0–0.5)
LYMPHOCYTES # BLD AUTO: 1.69 10*3/MM3 (ref 0.7–3.1)
LYMPHOCYTES NFR BLD AUTO: 19.1 % (ref 19.6–45.3)
MCH RBC QN AUTO: 28.7 PG (ref 26.6–33)
MCHC RBC AUTO-ENTMCNC: 31.6 G/DL (ref 31.5–35.7)
MCV RBC AUTO: 90.9 FL (ref 79–97)
MONOCYTES # BLD AUTO: 0.47 10*3/MM3 (ref 0.1–0.9)
MONOCYTES NFR BLD AUTO: 5.3 % (ref 5–12)
NEUTROPHILS # BLD AUTO: 6.61 10*3/MM3 (ref 1.7–7)
NEUTROPHILS NFR BLD AUTO: 74.9 % (ref 42.7–76)
NRBC BLD AUTO-RTO: 0 /100 WBC (ref 0–0.2)
PLATELET # BLD AUTO: 323 10*3/MM3 (ref 140–450)
PMV BLD AUTO: 11.4 FL (ref 6–12)
RBC # BLD AUTO: 4.7 10*6/MM3 (ref 3.77–5.28)
RH BLD: POSITIVE
WBC NRBC COR # BLD: 8.84 10*3/MM3 (ref 3.4–10.8)

## 2019-04-24 PROCEDURE — 82947 ASSAY GLUCOSE BLOOD QUANT: CPT

## 2019-04-24 PROCEDURE — 86900 BLOOD TYPING SEROLOGIC ABO: CPT

## 2019-04-24 PROCEDURE — 80081 OBSTETRIC PANEL INC HIV TSTG: CPT

## 2019-04-24 PROCEDURE — 86901 BLOOD TYPING SEROLOGIC RH(D): CPT

## 2019-04-24 PROCEDURE — 86850 RBC ANTIBODY SCREEN: CPT

## 2019-04-24 PROCEDURE — 86803 HEPATITIS C AB TEST: CPT

## 2019-04-24 PROCEDURE — 36415 COLL VENOUS BLD VENIPUNCTURE: CPT

## 2019-04-25 LAB
RPR SER QL: NORMAL
RUBV IGG SERPL IA-ACNC: POSITIVE

## 2019-04-26 ENCOUNTER — LAB (OUTPATIENT)
Dept: LAB | Facility: HOSPITAL | Age: 29
End: 2019-04-26

## 2019-04-26 DIAGNOSIS — Z3A.08 8 WEEKS GESTATION OF PREGNANCY: Primary | ICD-10-CM

## 2019-04-26 LAB
AMPHET+METHAMPHET UR QL: NEGATIVE
AMPHETAMINES UR QL: NEGATIVE
BARBITURATES UR QL SCN: NEGATIVE
BENZODIAZ UR QL SCN: NEGATIVE
BUPRENORPHINE SERPL-MCNC: NEGATIVE NG/ML
CANNABINOIDS SERPL QL: NEGATIVE
COCAINE UR QL: NEGATIVE
METHADONE UR QL SCN: NEGATIVE
OPIATES UR QL: NEGATIVE
OXYCODONE UR QL SCN: NEGATIVE
PCP UR QL SCN: NEGATIVE
PROPOXYPH UR QL: NEGATIVE
TRICYCLICS UR QL SCN: NEGATIVE

## 2019-04-26 PROCEDURE — 81001 URINALYSIS AUTO W/SCOPE: CPT

## 2019-04-26 PROCEDURE — 80306 DRUG TEST PRSMV INSTRMNT: CPT

## 2019-04-27 LAB
BACTERIA UR QL AUTO: ABNORMAL /HPF
BILIRUB UR QL STRIP: NEGATIVE
CLARITY UR: ABNORMAL
COD CRY URNS QL: ABNORMAL /HPF
COLOR UR: YELLOW
GLUCOSE UR STRIP-MCNC: NEGATIVE MG/DL
HGB UR QL STRIP.AUTO: NEGATIVE
HYALINE CASTS UR QL AUTO: ABNORMAL /LPF
KETONES UR QL STRIP: NEGATIVE
LEUKOCYTE ESTERASE UR QL STRIP.AUTO: ABNORMAL
NITRITE UR QL STRIP: NEGATIVE
PH UR STRIP.AUTO: 8 [PH] (ref 5–8)
PROT UR QL STRIP: ABNORMAL
RBC # UR: ABNORMAL /HPF
REF LAB TEST METHOD: ABNORMAL
SP GR UR STRIP: 1.03 (ref 1–1.03)
SQUAMOUS #/AREA URNS HPF: ABNORMAL /HPF
UNIDENT CRYS URNS QL MICRO: ABNORMAL /HPF
UROBILINOGEN UR QL STRIP: ABNORMAL
WBC UR QL AUTO: ABNORMAL /HPF

## 2019-05-07 ENCOUNTER — HOSPITAL ENCOUNTER (EMERGENCY)
Facility: HOSPITAL | Age: 29
Discharge: HOME | End: 2019-05-07
Attending: EMERGENCY MEDICINE | Admitting: EMERGENCY MEDICINE

## 2019-05-07 ENCOUNTER — APPOINTMENT (OUTPATIENT)
Dept: ULTRASOUND IMAGING | Facility: HOSPITAL | Age: 29
End: 2019-05-07

## 2019-05-07 VITALS
HEART RATE: 91 BPM | DIASTOLIC BLOOD PRESSURE: 62 MMHG | HEIGHT: 61 IN | RESPIRATION RATE: 16 BRPM | BODY MASS INDEX: 34.55 KG/M2 | OXYGEN SATURATION: 100 % | SYSTOLIC BLOOD PRESSURE: 111 MMHG | TEMPERATURE: 98.2 F | WEIGHT: 183 LBS

## 2019-05-07 DIAGNOSIS — N39.0 BACTERIAL UTI: ICD-10-CM

## 2019-05-07 DIAGNOSIS — N83.202 LEFT OVARIAN CYST: ICD-10-CM

## 2019-05-07 DIAGNOSIS — R10.9 LEFT SIDED ABDOMINAL PAIN: Primary | ICD-10-CM

## 2019-05-07 DIAGNOSIS — A49.9 BACTERIAL UTI: ICD-10-CM

## 2019-05-07 DIAGNOSIS — Z34.91 FIRST TRIMESTER PREGNANCY: ICD-10-CM

## 2019-05-07 LAB
ALBUMIN SERPL-MCNC: 4.4 G/DL (ref 3.5–5.2)
ALBUMIN/GLOB SERPL: 1.2 G/DL
ALP SERPL-CCNC: 66 U/L (ref 39–117)
ALT SERPL W P-5'-P-CCNC: 7 U/L (ref 1–33)
ANION GAP SERPL CALCULATED.3IONS-SCNC: 13 MMOL/L
AST SERPL-CCNC: 15 U/L (ref 1–32)
BACTERIA UR QL AUTO: ABNORMAL /HPF
BASOPHILS # BLD AUTO: 0.01 10*3/MM3 (ref 0–0.2)
BASOPHILS NFR BLD AUTO: 0.1 % (ref 0–1.5)
BILIRUB SERPL-MCNC: 0.4 MG/DL (ref 0.2–1.2)
BILIRUB UR QL STRIP: NEGATIVE
BUN BLD-MCNC: 9 MG/DL (ref 6–20)
BUN/CREAT SERPL: 14.3 (ref 7–25)
CALCIUM SPEC-SCNC: 9.8 MG/DL (ref 8.6–10.5)
CHLORIDE SERPL-SCNC: 98 MMOL/L (ref 98–107)
CLARITY UR: ABNORMAL
CO2 SERPL-SCNC: 23 MMOL/L (ref 22–29)
COLOR UR: YELLOW
CREAT BLD-MCNC: 0.63 MG/DL (ref 0.57–1)
DEPRECATED RDW RBC AUTO: 41.9 FL (ref 37–54)
EOSINOPHIL # BLD AUTO: 0.05 10*3/MM3 (ref 0–0.4)
EOSINOPHIL NFR BLD AUTO: 0.5 % (ref 0.3–6.2)
ERYTHROCYTE [DISTWIDTH] IN BLOOD BY AUTOMATED COUNT: 13 % (ref 12.3–15.4)
GFR SERPL CREATININE-BSD FRML MDRD: 134 ML/MIN/1.73
GLOBULIN UR ELPH-MCNC: 3.8 GM/DL
GLUCOSE BLD-MCNC: 79 MG/DL (ref 65–99)
GLUCOSE UR STRIP-MCNC: NEGATIVE MG/DL
HCT VFR BLD AUTO: 40.8 % (ref 34–46.6)
HGB BLD-MCNC: 13.4 G/DL (ref 12–15.9)
HGB UR QL STRIP.AUTO: NEGATIVE
HYALINE CASTS UR QL AUTO: ABNORMAL /LPF
IMM GRANULOCYTES # BLD AUTO: 0.01 10*3/MM3 (ref 0–0.05)
IMM GRANULOCYTES NFR BLD AUTO: 0.1 % (ref 0–0.5)
KETONES UR QL STRIP: NEGATIVE
LEUKOCYTE ESTERASE UR QL STRIP.AUTO: ABNORMAL
LIPASE SERPL-CCNC: 23 U/L (ref 13–60)
LYMPHOCYTES # BLD AUTO: 1.77 10*3/MM3 (ref 0.7–3.1)
LYMPHOCYTES NFR BLD AUTO: 18.2 % (ref 19.6–45.3)
MCH RBC QN AUTO: 29.3 PG (ref 26.6–33)
MCHC RBC AUTO-ENTMCNC: 32.8 G/DL (ref 31.5–35.7)
MCV RBC AUTO: 89.1 FL (ref 79–97)
MONOCYTES # BLD AUTO: 0.52 10*3/MM3 (ref 0.1–0.9)
MONOCYTES NFR BLD AUTO: 5.3 % (ref 5–12)
NEUTROPHILS # BLD AUTO: 7.38 10*3/MM3 (ref 1.7–7)
NEUTROPHILS NFR BLD AUTO: 75.9 % (ref 42.7–76)
NITRITE UR QL STRIP: NEGATIVE
PH UR STRIP.AUTO: 5.5 [PH] (ref 5–8)
PLATELET # BLD AUTO: 278 10*3/MM3 (ref 140–450)
PMV BLD AUTO: 10.2 FL (ref 6–12)
POTASSIUM BLD-SCNC: 3.8 MMOL/L (ref 3.5–5.2)
PROT SERPL-MCNC: 8.2 G/DL (ref 6–8.5)
PROT UR QL STRIP: NEGATIVE
RBC # BLD AUTO: 4.58 10*6/MM3 (ref 3.77–5.28)
RBC # UR: ABNORMAL /HPF
REF LAB TEST METHOD: ABNORMAL
SODIUM BLD-SCNC: 134 MMOL/L (ref 136–145)
SP GR UR STRIP: 1.03 (ref 1–1.03)
SQUAMOUS #/AREA URNS HPF: ABNORMAL /HPF
UROBILINOGEN UR QL STRIP: ABNORMAL
WBC NRBC COR # BLD: 9.73 10*3/MM3 (ref 3.4–10.8)
WBC UR QL AUTO: ABNORMAL /HPF

## 2019-05-07 PROCEDURE — 80053 COMPREHEN METABOLIC PANEL: CPT | Performed by: EMERGENCY MEDICINE

## 2019-05-07 PROCEDURE — 36415 COLL VENOUS BLD VENIPUNCTURE: CPT

## 2019-05-07 PROCEDURE — 87086 URINE CULTURE/COLONY COUNT: CPT | Performed by: EMERGENCY MEDICINE

## 2019-05-07 PROCEDURE — 81001 URINALYSIS AUTO W/SCOPE: CPT | Performed by: EMERGENCY MEDICINE

## 2019-05-07 PROCEDURE — 85025 COMPLETE CBC W/AUTO DIFF WBC: CPT | Performed by: EMERGENCY MEDICINE

## 2019-05-07 PROCEDURE — 76817 TRANSVAGINAL US OBSTETRIC: CPT

## 2019-05-07 PROCEDURE — 99284 EMERGENCY DEPT VISIT MOD MDM: CPT

## 2019-05-07 PROCEDURE — 83690 ASSAY OF LIPASE: CPT | Performed by: EMERGENCY MEDICINE

## 2019-05-07 RX ORDER — CEFUROXIME AXETIL 500 MG/1
500 TABLET ORAL 2 TIMES DAILY
Qty: 20 TABLET | Refills: 0 | Status: SHIPPED | OUTPATIENT
Start: 2019-05-07 | End: 2019-07-27 | Stop reason: HOSPADM

## 2019-05-08 LAB — BACTERIA SPEC AEROBE CULT: NORMAL

## 2019-05-22 ENCOUNTER — LAB REQUISITION (OUTPATIENT)
Dept: LAB | Facility: HOSPITAL | Age: 29
End: 2019-05-22

## 2019-05-22 DIAGNOSIS — Z00.00 ROUTINE GENERAL MEDICAL EXAMINATION AT A HEALTH CARE FACILITY: ICD-10-CM

## 2019-05-22 PROCEDURE — 36415 COLL VENOUS BLD VENIPUNCTURE: CPT

## 2019-07-25 ENCOUNTER — HOSPITAL ENCOUNTER (OUTPATIENT)
Facility: HOSPITAL | Age: 29
Discharge: HOME | End: 2019-07-26
Attending: OBSTETRICS & GYNECOLOGY | Admitting: OBSTETRICS & GYNECOLOGY

## 2019-07-25 LAB
BACTERIA UR QL AUTO: ABNORMAL /HPF
BILIRUB UR QL STRIP: NEGATIVE
CLARITY UR: ABNORMAL
COLOR UR: YELLOW
GLUCOSE UR STRIP-MCNC: NEGATIVE MG/DL
HGB UR QL STRIP.AUTO: NEGATIVE
HYALINE CASTS UR QL AUTO: ABNORMAL /LPF
KETONES UR QL STRIP: NEGATIVE
LEUKOCYTE ESTERASE UR QL STRIP.AUTO: ABNORMAL
NITRITE UR QL STRIP: NEGATIVE
PH UR STRIP.AUTO: 5.5 [PH] (ref 5–8)
PROT UR QL STRIP: ABNORMAL
RBC # UR: ABNORMAL /HPF
REF LAB TEST METHOD: ABNORMAL
SP GR UR STRIP: >=1.03 (ref 1–1.03)
SQUAMOUS #/AREA URNS HPF: ABNORMAL /HPF
UROBILINOGEN UR QL STRIP: ABNORMAL
WBC UR QL AUTO: ABNORMAL /HPF

## 2019-07-25 PROCEDURE — 81001 URINALYSIS AUTO W/SCOPE: CPT | Performed by: OBSTETRICS & GYNECOLOGY

## 2019-07-25 PROCEDURE — G0463 HOSPITAL OUTPT CLINIC VISIT: HCPCS

## 2019-07-25 PROCEDURE — 99203 OFFICE O/P NEW LOW 30 MIN: CPT | Performed by: OBSTETRICS & GYNECOLOGY

## 2019-07-25 RX ORDER — SODIUM CHLORIDE 0.9 % (FLUSH) 0.9 %
3 SYRINGE (ML) INJECTION EVERY 12 HOURS SCHEDULED
Status: DISCONTINUED | OUTPATIENT
Start: 2019-07-25 | End: 2019-07-26 | Stop reason: HOSPADM

## 2019-07-25 RX ORDER — BUTALBITAL, ACETAMINOPHEN AND CAFFEINE 50; 325; 40 MG/1; MG/1; MG/1
2 TABLET ORAL EVERY 4 HOURS PRN
Status: DISCONTINUED | OUTPATIENT
Start: 2019-07-25 | End: 2019-07-26 | Stop reason: HOSPADM

## 2019-07-25 RX ORDER — SODIUM CHLORIDE 0.9 % (FLUSH) 0.9 %
5 SYRINGE (ML) INJECTION AS NEEDED
Status: DISCONTINUED | OUTPATIENT
Start: 2019-07-25 | End: 2019-07-26 | Stop reason: HOSPADM

## 2019-07-25 RX ADMIN — SODIUM CHLORIDE, POTASSIUM CHLORIDE, SODIUM LACTATE AND CALCIUM CHLORIDE 1000 ML: 600; 310; 30; 20 INJECTION, SOLUTION INTRAVENOUS at 21:54

## 2019-07-25 RX ADMIN — BUTALBITAL, ACETAMINOPHEN AND CAFFEINE 2 TABLET: 50; 325; 40 TABLET ORAL at 20:50

## 2019-07-26 VITALS
HEART RATE: 78 BPM | DIASTOLIC BLOOD PRESSURE: 61 MMHG | RESPIRATION RATE: 16 BRPM | WEIGHT: 192 LBS | TEMPERATURE: 98.3 F | SYSTOLIC BLOOD PRESSURE: 112 MMHG | BODY MASS INDEX: 36.25 KG/M2 | HEIGHT: 61 IN

## 2019-07-26 NOTE — H&P
MIMI Alvarado  Obstetric History and Physical    CC:  Headache and toe cramping    HPI:    Patient is a 29 y.o. female  currently at 21w3d, who presents after calling in to the office reporting that she has had a headache, left leg cramp, and left toe pain since last .   She said she took Tylenol this morning without relief.   She has no other neurological signs symptoms. Denies vision changes and RUQ  Pain.  The leg pain does not cause any limb weakness or numbness.  She dines cramping, vaginal bleeding or vaginal leaking. She admits this is her first pregnancy to get this far and she is just nervous        The following portions of the patients history were reviewed and updated as appropriate: current medications, allergies, past medical history, past surgical history, past family history, past social history and problem list .       Prenatal Information:   Maternal Prenatal Labs  Blood Type No results found for: ABO   Rh Status No results found for: RH   Antibody Screen No results found for: ABSCRN   Gonnorhea No results found for: GCCX   Chlamydia No results found for: CLAMYDCU   RPR No results found for: RPR   Syphilis Antibody No results found for: SYPHILIS   Rubella No results found for: RUBELLAIGGIN   Hepatitis B Surface Antigen No results found for: HEPBSAG   HIV-1 Antibody No results found for: LABHIV1   Hepatitis C Antibody No results found for: HEPCAB   Rapid Urin Drug Screen No results found for: AMPMETHU, BARBITSCNUR, LABBENZSCN, LABMETHSCN, LABOPIASCN, THCURSCR, COCAINEUR, AMPHETSCREEN, PROPOXSCN, BUPRENORSCNU, METAMPSCNUR, OXYCODONESCN, TRICYCLICSCN   Group B Strep Culture No results found for: GBSANTIGEN           External Prenatal Results     Pregnancy Outside Results - Transcribed From Office Records - See Scanned Records For Details     Test Value Date Time    Hgb 13.4 g/dL 19 1711    Hct 40.8 % 19 1711    ABO O  19 1131    Rh Positive  19 1131    Antibody  Screen Negative  19 1131    Glucose Fasting GTT       Glucose Tolerance Test 1 hour       Glucose Tolerance Test 3 hour       Gonorrhea (discrete)       Chlamydia (discrete)       RPR Non-Reactive  19 1131    VDRL       Syphilis Antibody       Rubella Positive  19 1131    HBsAg Non-Reactive  19 1131    Herpes Simplex Virus PCR       Herpes Simplex VIrus Culture       HIV Non-Reactive  19 1131    Hep C RNA Quant PCR       Hep C Antibody Non-Reactive  19 1131    AFP       Group B Strep       GBS Susceptibility to Clindamycin       GBS Susceptibility to Erythromycin       Fetal Fibronectin       Genetic Testing, Maternal Blood             Drug Screening     Test Value Date Time    Urine Drug Screen       Amphetamine Screen Negative  19 1156    Barbiturate Screen Negative  19 1156    Benzodiazepine Screen Negative  19 1156    Methadone Screen Negative  19 1156    Phencyclidine Screen Negative  19 1156    Opiates Screen Negative  19 1156    THC Screen Negative  19 1156    Cocaine Screen       Propoxyphene Screen Negative  19 1156    Buprenorphine Screen Negative  19 1156    Methamphetamine Screen       Oxycodone Screen Negative  19 1156    Tricyclic Antidepressants Screen Negative  19 1156                  Past OB History:     Obstetric History       T0      L0     SAB0   TAB0   Ectopic0   Molar0   Multiple0   Live Births0       # Outcome Date GA Lbr Conrad/2nd Weight Sex Delivery Anes PTL Lv   2 Current            1 2017 9w0d   U              Past Medical History: Past Medical History:   Diagnosis Date   • Anxiety    • Asthma    • Chlamydia contact, treated    • Depression       Past Surgical History Past Surgical History:   Procedure Laterality Date   • CYST REMOVAL     • D&C WITH SUCTION N/A 2017    Procedure: DILATATION AND CURETTAGE WITH SUCTION;  Surgeon: Kelley Dodge DO;  Location: [facility] OR;   Service:    • DENTAL PROCEDURE     • DILATATION AND CURETTAGE  09/18/2017   • EYE SURGERY  12/2018    Intact Surgery   • KNEE SURGERY        Family History: History reviewed. No pertinent family history.   Social History:  reports that she has never smoked. She has never used smokeless tobacco.   reports that she does not drink alcohol.   reports that she does not use drugs.        Review of Systems  Denies fever, HA, CP, Shortness of air, muscle weakness, and rashes      Objective     Vital Signs Range for the last 24 hours  Temperature: Temp:  [97.7 °F (36.5 °C)-98.4 °F (36.9 °C)] 98.4 °F (36.9 °C)   Temp Source: Temp src: Oral   BP: BP: (102-113)/(57-59) 102/57   Pulse: Heart Rate:  [83-88] 88   Respirations: Resp:  [16] 16   SPO2:     O2 Amount (l/min):     O2 Devices     Weight: Weight:  [87.1 kg (192 lb)] 87.1 kg (192 lb)     Physical Examination: General appearance - alert, well appearing, certainly with no distress nor does she appear to be in any discomfort.   Chest - clear to auscultation, no wheezes, rales or rhonchi, symmetric air entry  Heart - S1 and S2 normal, no murmurs noted  Abdomen - soft, nontender, nondistended, no masses or organomegaly  no rebound tenderness noted  bowel sounds normal  No guarding, No RUQ pain  Extremities - pedal edema  - +1                          Fetal Heart Rate Assessment   Method: Fetal HR Assessment Method: intermittent auscultation, using Doppler   Beats/min: Fetal HR (beats/min): 140   Baseline:     Varibility:     Accels:     Decels:     Tracing Category:       Uterine Assessment   Method:     Frequency (min):  None   Ctx Count in 10 min:     Duration:     Intensity:     Intensity by IUPC:     Resting Tone:     Resting Tone by IUPC:           Laboratory Results:   Lab Results   Component Value Date    SQUAMEPIUA 3-6 (A) 07/25/2019    SPECGRAVUR >=1.030 07/25/2019    KETONESU Negative 07/25/2019    BLOODU Negative 07/25/2019    LEUKOCYTESUR Trace (A) 07/25/2019     NITRITEU Negative 07/25/2019    RBCUA 0-2 07/25/2019    WBCUA 6-12 (A) 07/25/2019    BACTERIA None Seen 07/25/2019             Assessment:  1. Intrauterine pregnancy at 21w3d weeks gestation with reassuring fetal status  2.  HA -  3.  Leg pain    Plan:  1. Reassuring fetal status  2. HA - improved with IV hydration- (she was quite dehydrated) and Fioricet  3.  Leg pain - Sciatic with no other concerning findings  4.  Given reassurance and education as to expectations of pregnancy  5.  Questions answered  6.  D/C home      Sterling Lewis MD  7/25/2019  11:49 PM

## 2019-07-26 NOTE — NURSING NOTE
Discharge instructions reviewed with pt and significant other. Pt instructed to keep next scheduled OB appointment and return to the unit for any leaking of fluid, vaginal bleeding, decreased fetal movement, and/or contractions that increase in frequency/intensity. Pt verbalized understanding with no further questions. Pt and significant other ambulated off unit.

## 2019-07-27 ENCOUNTER — HOSPITAL ENCOUNTER (OUTPATIENT)
Facility: HOSPITAL | Age: 29
Discharge: HOME | End: 2019-07-27
Attending: OBSTETRICS & GYNECOLOGY | Admitting: ADVANCED PRACTICE MIDWIFE

## 2019-07-27 VITALS
WEIGHT: 192 LBS | DIASTOLIC BLOOD PRESSURE: 57 MMHG | HEART RATE: 82 BPM | BODY MASS INDEX: 36.25 KG/M2 | TEMPERATURE: 98.2 F | RESPIRATION RATE: 16 BRPM | HEIGHT: 61 IN | SYSTOLIC BLOOD PRESSURE: 101 MMHG

## 2019-07-27 PROBLEM — O99.891 BACK PAIN AFFECTING PREGNANCY IN SECOND TRIMESTER: Status: ACTIVE | Noted: 2019-07-27

## 2019-07-27 PROBLEM — Z3A.21 21 WEEKS GESTATION OF PREGNANCY: Status: ACTIVE | Noted: 2019-07-27

## 2019-07-27 PROBLEM — M54.9 BACK PAIN AFFECTING PREGNANCY IN SECOND TRIMESTER: Status: ACTIVE | Noted: 2019-07-27

## 2019-07-27 LAB
ALBUMIN SERPL-MCNC: 3.5 G/DL (ref 3.5–5.2)
ALBUMIN/GLOB SERPL: 1.1 G/DL
ALP SERPL-CCNC: 67 U/L (ref 39–117)
ALP SERPL-CCNC: 67 U/L (ref 39–117)
ALT SERPL W P-5'-P-CCNC: 46 U/L (ref 1–33)
ALT SERPL W P-5'-P-CCNC: 51 U/L (ref 1–33)
ANION GAP SERPL CALCULATED.3IONS-SCNC: 11 MMOL/L (ref 5–15)
AST SERPL-CCNC: 39 U/L (ref 1–32)
AST SERPL-CCNC: 40 U/L (ref 1–32)
BACTERIA UR QL AUTO: ABNORMAL /HPF
BASOPHILS # BLD MANUAL: 0 10*3/MM3 (ref 0–0.2)
BASOPHILS NFR BLD AUTO: 0 % (ref 0–1.5)
BILIRUB SERPL-MCNC: 0.3 MG/DL (ref 0.2–1.2)
BILIRUB SERPL-MCNC: 0.4 MG/DL (ref 0.2–1.2)
BILIRUB UR QL STRIP: NEGATIVE
BILIRUB UR QL STRIP: NEGATIVE
BUN BLD-MCNC: 4 MG/DL (ref 6–20)
BUN/CREAT SERPL: 5.9 (ref 7–25)
CALCIUM SPEC-SCNC: 9.4 MG/DL (ref 8.6–10.5)
CHLORIDE SERPL-SCNC: 104 MMOL/L (ref 98–107)
CLARITY UR: ABNORMAL
CLARITY UR: CLEAR
CO2 SERPL-SCNC: 22 MMOL/L (ref 22–29)
COLOR UR: YELLOW
COLOR UR: YELLOW
CREAT BLD-MCNC: 0.67 MG/DL (ref 0.57–1)
CREAT BLD-MCNC: 0.68 MG/DL (ref 0.57–1)
DEPRECATED RDW RBC AUTO: 41.6 FL (ref 37–54)
EOSINOPHIL # BLD MANUAL: 0.18 10*3/MM3 (ref 0–0.4)
EOSINOPHIL NFR BLD MANUAL: 2 % (ref 0.3–6.2)
ERYTHROCYTE [DISTWIDTH] IN BLOOD BY AUTOMATED COUNT: 12.7 % (ref 12.3–15.4)
GFR SERPL CREATININE-BSD FRML MDRD: 123 ML/MIN/1.73
GLOBULIN UR ELPH-MCNC: 3.1 GM/DL
GLUCOSE BLD-MCNC: 92 MG/DL (ref 65–99)
GLUCOSE UR STRIP-MCNC: NEGATIVE MG/DL
GLUCOSE UR STRIP-MCNC: NEGATIVE MG/DL
HAV IGM SERPL QL IA: NORMAL
HBV CORE IGM SERPL QL IA: NORMAL
HBV SURFACE AG SERPL QL IA: NORMAL
HCT VFR BLD AUTO: 35.5 % (ref 34–46.6)
HCV AB SER DONR QL: NORMAL
HGB BLD-MCNC: 11.7 G/DL (ref 12–15.9)
HGB UR QL STRIP.AUTO: NEGATIVE
HGB UR QL STRIP.AUTO: NEGATIVE
HYALINE CASTS UR QL AUTO: ABNORMAL /LPF
KETONES UR QL STRIP: ABNORMAL
KETONES UR QL STRIP: NEGATIVE
LDH SERPL-CCNC: 144 U/L (ref 135–214)
LEUKOCYTE ESTERASE UR QL STRIP.AUTO: ABNORMAL
LEUKOCYTE ESTERASE UR QL STRIP.AUTO: NEGATIVE
LYMPHOCYTES # BLD MANUAL: 1.88 10*3/MM3 (ref 0.7–3.1)
LYMPHOCYTES NFR BLD MANUAL: 21 % (ref 19.6–45.3)
LYMPHOCYTES NFR BLD MANUAL: 3 % (ref 5–12)
MCH RBC QN AUTO: 29.5 PG (ref 26.6–33)
MCHC RBC AUTO-ENTMCNC: 33 G/DL (ref 31.5–35.7)
MCV RBC AUTO: 89.6 FL (ref 79–97)
MONOCYTES # BLD AUTO: 0.27 10*3/MM3 (ref 0.1–0.9)
NEUTROPHILS # BLD AUTO: 6.64 10*3/MM3 (ref 1.7–7)
NEUTROPHILS NFR BLD MANUAL: 74 % (ref 42.7–76)
NITRITE UR QL STRIP: NEGATIVE
NITRITE UR QL STRIP: NEGATIVE
PH UR STRIP.AUTO: 6 [PH] (ref 5–8)
PH UR STRIP.AUTO: 7 [PH] (ref 5–8)
PLAT MORPH BLD: NORMAL
PLATELET # BLD AUTO: 226 10*3/MM3 (ref 140–450)
PMV BLD AUTO: 10.5 FL (ref 6–12)
POTASSIUM BLD-SCNC: 3.4 MMOL/L (ref 3.5–5.2)
PROT SERPL-MCNC: 6.6 G/DL (ref 6–8.5)
PROT UR QL STRIP: NEGATIVE
PROT UR QL STRIP: NEGATIVE
RBC # BLD AUTO: 3.96 10*6/MM3 (ref 3.77–5.28)
RBC # UR: ABNORMAL /HPF
RBC MORPH BLD: NORMAL
REF LAB TEST METHOD: ABNORMAL
SODIUM BLD-SCNC: 137 MMOL/L (ref 136–145)
SP GR UR STRIP: 1.01 (ref 1–1.03)
SP GR UR STRIP: 1.02 (ref 1–1.03)
SQUAMOUS #/AREA URNS HPF: ABNORMAL /HPF
URATE SERPL-MCNC: 4.5 MG/DL (ref 2.4–5.7)
UROBILINOGEN UR QL STRIP: ABNORMAL
UROBILINOGEN UR QL STRIP: NORMAL
WBC MORPH BLD: NORMAL
WBC NRBC COR # BLD: 8.97 10*3/MM3 (ref 3.4–10.8)
WBC UR QL AUTO: ABNORMAL /HPF

## 2019-07-27 PROCEDURE — 59025 FETAL NON-STRESS TEST: CPT

## 2019-07-27 PROCEDURE — 84450 TRANSFERASE (AST) (SGOT): CPT | Performed by: ADVANCED PRACTICE MIDWIFE

## 2019-07-27 PROCEDURE — 85007 BL SMEAR W/DIFF WBC COUNT: CPT | Performed by: ADVANCED PRACTICE MIDWIFE

## 2019-07-27 PROCEDURE — 81003 URINALYSIS AUTO W/O SCOPE: CPT | Performed by: ADVANCED PRACTICE MIDWIFE

## 2019-07-27 PROCEDURE — 83615 LACTATE (LD) (LDH) ENZYME: CPT | Performed by: ADVANCED PRACTICE MIDWIFE

## 2019-07-27 PROCEDURE — 84550 ASSAY OF BLOOD/URIC ACID: CPT | Performed by: ADVANCED PRACTICE MIDWIFE

## 2019-07-27 PROCEDURE — 84075 ASSAY ALKALINE PHOSPHATASE: CPT | Performed by: ADVANCED PRACTICE MIDWIFE

## 2019-07-27 PROCEDURE — 80074 ACUTE HEPATITIS PANEL: CPT | Performed by: OBSTETRICS & GYNECOLOGY

## 2019-07-27 PROCEDURE — 82247 BILIRUBIN TOTAL: CPT | Performed by: ADVANCED PRACTICE MIDWIFE

## 2019-07-27 PROCEDURE — G0463 HOSPITAL OUTPT CLINIC VISIT: HCPCS

## 2019-07-27 PROCEDURE — 81001 URINALYSIS AUTO W/SCOPE: CPT | Performed by: ADVANCED PRACTICE MIDWIFE

## 2019-07-27 PROCEDURE — P9612 CATHETERIZE FOR URINE SPEC: HCPCS

## 2019-07-27 PROCEDURE — 82565 ASSAY OF CREATININE: CPT | Performed by: ADVANCED PRACTICE MIDWIFE

## 2019-07-27 PROCEDURE — 87340 HEPATITIS B SURFACE AG IA: CPT | Performed by: OBSTETRICS & GYNECOLOGY

## 2019-07-27 PROCEDURE — 87086 URINE CULTURE/COLONY COUNT: CPT | Performed by: ADVANCED PRACTICE MIDWIFE

## 2019-07-27 PROCEDURE — 80053 COMPREHEN METABOLIC PANEL: CPT | Performed by: ADVANCED PRACTICE MIDWIFE

## 2019-07-27 PROCEDURE — 84460 ALANINE AMINO (ALT) (SGPT): CPT | Performed by: ADVANCED PRACTICE MIDWIFE

## 2019-07-27 PROCEDURE — 85027 COMPLETE CBC AUTOMATED: CPT | Performed by: ADVANCED PRACTICE MIDWIFE

## 2019-07-27 RX ORDER — POTASSIUM CHLORIDE 750 MG/1
40 CAPSULE, EXTENDED RELEASE ORAL DAILY
Status: DISCONTINUED | OUTPATIENT
Start: 2019-07-27 | End: 2019-07-27 | Stop reason: HOSPADM

## 2019-07-27 RX ORDER — SODIUM CHLORIDE 0.9 % (FLUSH) 0.9 %
1-10 SYRINGE (ML) INJECTION AS NEEDED
Status: DISCONTINUED | OUTPATIENT
Start: 2019-07-27 | End: 2019-07-27 | Stop reason: HOSPADM

## 2019-07-27 RX ORDER — SODIUM CHLORIDE, SODIUM LACTATE, POTASSIUM CHLORIDE, CALCIUM CHLORIDE 600; 310; 30; 20 MG/100ML; MG/100ML; MG/100ML; MG/100ML
1000 INJECTION, SOLUTION INTRAVENOUS CONTINUOUS
Status: ACTIVE | OUTPATIENT
Start: 2019-07-27 | End: 2019-07-27

## 2019-07-27 RX ORDER — SODIUM CHLORIDE 0.9 % (FLUSH) 0.9 %
3 SYRINGE (ML) INJECTION EVERY 12 HOURS SCHEDULED
Status: DISCONTINUED | OUTPATIENT
Start: 2019-07-27 | End: 2019-07-27 | Stop reason: HOSPADM

## 2019-07-27 RX ADMIN — POTASSIUM CHLORIDE 40 MEQ: 750 CAPSULE, EXTENDED RELEASE ORAL at 15:02

## 2019-07-27 RX ADMIN — SODIUM CHLORIDE, POTASSIUM CHLORIDE, SODIUM LACTATE AND CALCIUM CHLORIDE 1000 ML/HR: 600; 310; 30; 20 INJECTION, SOLUTION INTRAVENOUS at 15:25

## 2019-07-27 NOTE — DISCHARGE SUMMARY
[facility]  Antepartum discharge summary      Patient: Padma Younger      MR#:[id number]  Admission  Diagnosis:   Patient Active Problem List   Diagnosis   • 21 weeks gestation of pregnancy   • Back pain affecting pregnancy in second trimester     Discharge Diagnosis: Back and leg pain affecting pregnancy.      Date of Admission: 7/27/2019  Date of Discharge:  7/27/2019        Service:  Obstetrics    Hospital Course:  Patient presented with c/o back and leg pain.  During her hospitalization, her pain improved with rest and hydration. She remained afebrile and hemodynamically stable.  On the day of discharge, she was eating, ambulating and voiding without difficulty.      Labs:    Lab Results   Component Value Date    WBC 8.97 07/27/2019    HGB 11.7 (L) 07/27/2019    HCT 35.5 07/27/2019    MCV 89.6 07/27/2019     07/27/2019    URICACID 4.5 07/27/2019    AST 40 (H) 07/27/2019    AST 39 (H) 07/27/2019    ALT 46 (H) 07/27/2019    ALT 51 (H) 07/27/2019     07/27/2019           Discharge Medications     Discharge Medications      Continue These Medications      Instructions Start Date   PRENATAL PO   Oral      sertraline 50 MG tablet  Commonly known as:  ZOLOFT   50 mg, Oral, Daily         Stop These Medications    cefuroxime 500 MG tablet  Commonly known as:  CEFTIN            Discharge Disposition:  To Home    Discharge Condition:  Stable. Probable sciatica affecting pregnancy.     Discharge Diet: Regular    Activity at Discharge: Increase hydration and rest periods. May use heating pad to back on low heat. Alternate ice packs for comfort. May take OTC Tylenol PO as needed.     Follow-up Appointments  Follow up with LW in 3-4 days.     Yamile Matson CNM  07/27/19  5:30 PM

## 2019-07-27 NOTE — H&P
[facility]  Obstetric History and Physical    Chief Complaint   Patient presents with   • Abdominal Cramping       Subjective     Patient is a 29 y.o. female  currently at 21w5d, who presents  with c/o back pain that radiates down both legs. She states she got up this morning and found that she was having difficulty walking. She states she has felt sharp pain in her groin as well. .    Her prenatal care is benign.  Her previous obstetric/gynecological history is non-contributory. .    The following portions of the patients history were reviewed and updated as appropriate: current medications, allergies, past medical history, past surgical history, past family history, past social history and problem list .       Prenatal Information:   Maternal Prenatal Labs  Blood Type No results found for: ABO   Rh Status No results found for: RH   Antibody Screen No results found for: ABSCRN   Gonnorhea No results found for: GCCX   Chlamydia No results found for: CLAMYDCU   RPR No results found for: RPR   Syphilis Antibody No results found for: SYPHILIS   Rubella No results found for: RUBELLAIGGIN   Hepatitis B Surface Antigen No results found for: HEPBSAG   HIV-1 Antibody No results found for: LABHIV1   Hepatitis C Antibody No results found for: HEPCAB   Rapid Urin Drug Screen No results found for: AMPMETHU, BARBITSCNUR, LABBENZSCN, LABMETHSCN, LABOPIASCN, THCURSCR, COCAINEUR, AMPHETSCREEN, PROPOXSCN, BUPRENORSCNU, METAMPSCNUR, OXYCODONESCN, TRICYCLICSCN   Group B Strep Culture No results found for: GBSANTIGEN                 Past OB History:       Obstetric History       T0      L0     SAB0   TAB0   Ectopic0   Molar0   Multiple0   Live Births0       # Outcome Date GA Lbr Conrad/2nd Weight Sex Delivery Anes PTL Lv   2 Current            1 AB 2017 9w0d   U              Past Medical History: Past Medical History:   Diagnosis Date   • Anxiety    • Asthma    • Chlamydia contact, treated    • Depression        Past Surgical History Past Surgical History:   Procedure Laterality Date   • CYST REMOVAL     • D&C WITH SUCTION N/A 9/18/2017    Procedure: DILATATION AND CURETTAGE WITH SUCTION;  Surgeon: Kelley Dodge DO;  Location: [facility] OR;  Service:    • DENTAL PROCEDURE     • DILATATION AND CURETTAGE  09/18/2017   • EYE SURGERY  12/2018    Intact Surgery   • KNEE SURGERY        Family History: No family history on file.   Social History:  reports that she has never smoked. She has never used smokeless tobacco.   reports that she does not drink alcohol.   reports that she does not use drugs.        REVIEW OF SYSTEMS             Reports fetal movement is present             Denies leakage of amniotic fluid.             Denies vaginal bleeding             She reports No contractions             All other systems reviewed and are negative    Objective       Vital Signs Range for the last 24 hours  Temperature: Temp:  [98.2 °F (36.8 °C)] 98.2 °F (36.8 °C)   Temp Source: Temp src: Oral   BP: BP: (108)/(57) 108/57   Pulse: Heart Rate:  [82] 82   Respirations: Resp:  [16] 16   SPO2:     O2 Amount (l/min):     O2 Devices     Weight: Weight:  [87.1 kg (192 lb)] 87.1 kg (192 lb)       Presentation:    Cervix: Exam by:     Dilation:     Effacement:     Station:         Fetal Heart Rate Assessment   Method:     Beats/min:     Baseline:     Varibility:     Accels:     Decels:     Tracing Category:      NST: NOT INDICATED     Uterine Assessment   Method:     Frequency (min):     Ctx Count in 10 min:     Duration:     Intensity:     Intensity by IUPC:     Resting Tone:     Resting Tone by IUPC:     [place] Units:             Constitutional:  Well developed, well nourished, no acute distress, well-groomed.   Respiratory:  Lungs are clear to auscultation bilaterally, normal breath sounds.   Cardiovascular:  Normal rate and rhythm, no murmurs.   Gastrointestinal:  Soft, gravid, tender upper quadrants bilaterally.  Uterus: Soft,  nontender. Fundus appropriate for dates.  Neurologic:  Alert & oriented x 3,  no focal deficits noted.   Psychiatric:  Speech and behavior appropriate.   Extremities: no cyanosis, clubbing or edema, pain elicited with Indy's sign bilaterally.  Reflexes 2+, no clonus right leg; 3+, no clonus left leg  CVA tenderness bilaterally        Labs:  Lab Results   Component Value Date    WBC 8.97 07/27/2019    HGB 11.7 (L) 07/27/2019    HCT 35.5 07/27/2019    MCV 89.6 07/27/2019     07/27/2019    URICACID 4.5 07/27/2019    AST 40 (H) 07/27/2019    AST 39 (H) 07/27/2019    ALT 46 (H) 07/27/2019    ALT 51 (H) 07/27/2019     07/27/2019               Assessment/Plan       21 weeks gestation of pregnancy    Back pain affecting pregnancy in second trimester        Assessment:  1.  Intrauterine pregnancy at 21w5d weeks gestation.    2.  Back pain  3.  Leg pain  4.  Obstetrical history significant for is non-contributory.      Plan:  1. Labs: CBC, CMP, uric acid, LDH, urinalysis, Hepatitis panel  2. Plan of care has been reviewed with patient and questions answered.  3. Risks, benefits of treatment plan have been discussed.  4. All questions have been answered.        Yamile Matson CNM  7/27/2019  2:05 PM

## 2019-07-27 NOTE — PROGRESS NOTES
Antepartum Progress Note    Patient name: Padma Younger  YOB: 1990   MRN: [id number]  Admission Date: 2019  Date of Service: 2019    Padma Younger is a 29 y.o.    at 21w5d  admitted on 2019 for back and leg pain.     Hospital day 0    Diagnoses:   Patient Active Problem List    Diagnosis   • 21 weeks gestation of pregnancy [Z3A.21]   • Back pain affecting pregnancy in second trimester [O99.89, M54.9]       Subjective:      Padma states she feels better after rest and fluids.   Patient denies headache.   Patient right upper quadrant pain.   Reports fetal movement is present.  Denies leakage of amniotic fluid.  Denies vaginal bleeding    Objective:     Vital signs:  Temp:  [98.2 °F (36.8 °C)] 98.2 °F (36.8 °C)  Heart Rate:  [82] 82  Resp:  [16] 16  BP: (101-108)/(57) 101/57    Abdomen: soft, slightly tender  Uterus: gravid, nontender  Extremities:  no edema      Medications:    potassium chloride 40 mEq Oral Daily   sodium chloride 3 mL Intravenous Q12H      sodium chloride    Labs:    Lab Results   Component Value Date    WBC 8.97 2019    HGB 11.7 (L) 2019    HCT 35.5 2019    MCV 89.6 2019     2019    URICACID 4.5 2019    AST 40 (H) 2019    AST 39 (H) 2019    ALT 46 (H) 2019    ALT 51 (H) 2019     2019           Assessment/Plan:      Padma is a 29 y.o.    at 21w5d.  1.   Patient Active Problem List    Diagnosis   • 21 weeks gestation of pregnancy [Z3A.21]   • Back pain affecting pregnancy in second trimester [O99.89, M54.9]         Leg pain affecting pregnancy in second trimester        Probable sciatica    Plan:   1. Discharge home  2.  Increase hydration  3.  Apply heat/ice to back for comfort  4.  Stretching exercises  5.  OTC Tylenol PO as needed  6.  Follow up at [facility] in 3-4 days     All questions were answered to the best of my ablity.    Yamile Matson CNM  2019  5:40 PM

## 2019-07-27 NOTE — NURSING NOTE
Discharge instructions reviewed with patient. Notified to follow up with Yamile in the office ~4 days. Patient notified to call office on Monday to make follow up appointment. Notified to return to hospital with any leaking of fluid, vaginal bleeding, or worsening symptoms.Patient instructed to drink plenty of fluids and take tylenol prn for leg pain. Patient verbalizes understanding.    1822 Patient in wheelchair off unit in stable condition.

## 2019-07-28 LAB
BACTERIA SPEC AEROBE CULT: NO GROWTH
BACTERIA SPEC AEROBE CULT: NORMAL

## 2019-08-28 ENCOUNTER — HOSPITAL ENCOUNTER (OUTPATIENT)
Facility: HOSPITAL | Age: 29
Discharge: PSYCHIATRIC OR SUBSTANCE USE FACILITY | End: 2019-08-29
Attending: ADVANCED PRACTICE MIDWIFE | Admitting: OBSTETRICS & GYNECOLOGY

## 2019-08-28 VITALS
WEIGHT: 190 LBS | HEIGHT: 61 IN | DIASTOLIC BLOOD PRESSURE: 78 MMHG | RESPIRATION RATE: 18 BRPM | SYSTOLIC BLOOD PRESSURE: 121 MMHG | BODY MASS INDEX: 35.87 KG/M2 | TEMPERATURE: 98.5 F | HEART RATE: 101 BPM

## 2019-08-28 PROBLEM — Z34.90 PREGNANCY: Status: ACTIVE | Noted: 2019-08-28

## 2019-08-28 LAB
BACTERIA UR QL AUTO: ABNORMAL /HPF
BILIRUB UR QL STRIP: NEGATIVE
CLARITY UR: ABNORMAL
COLOR UR: YELLOW
GLUCOSE UR STRIP-MCNC: NEGATIVE MG/DL
HGB UR QL STRIP.AUTO: NEGATIVE
HYALINE CASTS UR QL AUTO: ABNORMAL /LPF
KETONES UR QL STRIP: ABNORMAL
LEUKOCYTE ESTERASE UR QL STRIP.AUTO: ABNORMAL
MUCOUS THREADS URNS QL MICRO: ABNORMAL /HPF
NITRITE UR QL STRIP: NEGATIVE
PH UR STRIP.AUTO: 5.5 [PH] (ref 5–8)
PROT UR QL STRIP: ABNORMAL
RBC # UR: ABNORMAL /HPF
REF LAB TEST METHOD: ABNORMAL
SP GR UR STRIP: 1.02 (ref 1–1.03)
SQUAMOUS #/AREA URNS HPF: ABNORMAL /HPF
UROBILINOGEN UR QL STRIP: ABNORMAL
WBC UR QL AUTO: ABNORMAL /HPF

## 2019-08-28 PROCEDURE — 59025 FETAL NON-STRESS TEST: CPT

## 2019-08-28 PROCEDURE — G0463 HOSPITAL OUTPT CLINIC VISIT: HCPCS

## 2019-08-28 PROCEDURE — 81001 URINALYSIS AUTO W/SCOPE: CPT | Performed by: OBSTETRICS & GYNECOLOGY

## 2019-08-28 PROCEDURE — G0378 HOSPITAL OBSERVATION PER HR: HCPCS

## 2019-08-28 PROCEDURE — 99218 PR INITIAL OBSERVATION CARE/DAY 30 MINUTES: CPT | Performed by: OBSTETRICS & GYNECOLOGY

## 2019-08-28 RX ORDER — ACETAMINOPHEN 500 MG
1000 TABLET ORAL ONCE
Status: COMPLETED | OUTPATIENT
Start: 2019-08-28 | End: 2019-08-28

## 2019-08-28 RX ADMIN — ACETAMINOPHEN 1000 MG: 500 TABLET, FILM COATED ORAL at 15:43

## 2019-08-29 VITALS
DIASTOLIC BLOOD PRESSURE: 63 MMHG | TEMPERATURE: 98.4 F | WEIGHT: 190 LBS | RESPIRATION RATE: 18 BRPM | SYSTOLIC BLOOD PRESSURE: 103 MMHG | HEART RATE: 97 BPM | BODY MASS INDEX: 35.87 KG/M2 | HEIGHT: 61 IN

## 2019-08-29 PROBLEM — M54.30 SCIATICA: Status: ACTIVE | Noted: 2019-08-29

## 2019-08-29 PROBLEM — Z3A.21 21 WEEKS GESTATION OF PREGNANCY: Status: RESOLVED | Noted: 2019-07-27 | Resolved: 2019-08-29

## 2019-08-29 PROBLEM — Z3A.26 26 WEEKS GESTATION OF PREGNANCY: Status: ACTIVE | Noted: 2019-08-29

## 2019-08-29 PROCEDURE — 59020 FETAL CONTRACT STRESS TEST: CPT

## 2019-08-29 PROCEDURE — G0378 HOSPITAL OBSERVATION PER HR: HCPCS

## 2019-08-29 PROCEDURE — G0463 HOSPITAL OUTPT CLINIC VISIT: HCPCS

## 2019-08-29 RX ORDER — ACETAMINOPHEN 500 MG
1000 TABLET ORAL ONCE
Status: COMPLETED | OUTPATIENT
Start: 2019-08-29 | End: 2019-08-29

## 2019-08-29 RX ADMIN — ACETAMINOPHEN 1000 MG: 500 TABLET, FILM COATED ORAL at 16:19

## 2019-08-29 NOTE — CONSULTS
Continued Stay Note  [facility]     Patient Name: Padma Younger  MRN: [id number]  Today's Date: 8/29/2019    Admit Date: 8/29/2019    Discharge Plan     Row Name 08/29/19 1545       Plan    Plan  MSW available     Plan Comments  Pt brought from [place] for care here. [place] tech also with pt and will transfer her back to [place] after d/c. RN to request sitter for pt moose to moderate suicide risk according to CSSRS.         Discharge Codes    No documentation.             CURT Benavidez

## 2019-08-29 NOTE — H&P
[facility]  Obstetric History and Physical/Discharge Note    Chief Complaint   Patient presents with   • Abdominal Pain     menstrual cramps   • left leg pain     constant-hurts down to toes-toes tingle   • Back Pain     lower back-constant pain       Subjective     Patient is a 29 y.o. female  currently at 26w3d, who presents with c/o continued back and leg pain. She is brought here by a sitter from [facility] where she is being assessed and cared for for suicidal ideation. She states she has been overwhelmed with work, family, and infidelity by the FOB. She states she is no longer having thoughts of suicide. She states she was not using the resources available to her. She states she now has a plan to stay with her mother and use the support of her mom and sisters. She plans to reschedule an appointment with her therapist. She states being at work with coworkers is helping. She states they are being more considerate of her pregnancy and pain. She states her leg and back pain have improved since admission. She states she plans to leave [facility] to go to her mother's house. She states someone can be with her .     Her prenatal care is complicated by depression and anxiety.  Her previous obstetric/gynecological history is non-contributory.    The following portions of the patients history were reviewed and updated as appropriate: current medications, allergies, past medical history, past surgical history, past family history, past social history and problem list .       Prenatal Information:   Maternal Prenatal Labs  Blood Type No results found for: ABO   Rh Status No results found for: RH   Antibody Screen No results found for: ABSCRN   Gonnorhea No results found for: GCCX   Chlamydia No results found for: CLAMYDCU   RPR No results found for: RPR   Syphilis Antibody No results found for: SYPHILIS   Rubella No results found for: RUBELLAIGGIN   Hepatitis B Surface Antigen No results found for: HEPBSAG    HIV-1 Antibody No results found for: LABHIV1   Hepatitis C Antibody No results found for: HEPCAB   Rapid Urin Drug Screen No results found for: AMPMETHU, BARBITSCNUR, LABBENZSCN, LABMETHSCN, LABOPIASCN, THCURSCR, COCAINEUR, AMPHETSCREEN, PROPOXSCN, BUPRENORSCNU, METAMPSCNUR, OXYCODONESCN, TRICYCLICSCN   Group B Strep Culture No results found for: GBSANTIGEN                 Past OB History:       Obstetric History       T0      L0     SAB0   TAB0   Ectopic0   Molar0   Multiple0   Live Births0       # Outcome Date GA Lbr Conrad/2nd Weight Sex Delivery Anes PTL Lv   2 Current            1 AB 2017 9w0d   U              Past Medical History: Past Medical History:   Diagnosis Date   • Abnormal Pap smear of cervix    • Anxiety    • Asthma    • Chlamydia        • Chlamydia contact, treated    • Depression    • Gonorrhea        • Urinary tract infection       Past Surgical History Past Surgical History:   Procedure Laterality Date   • CYST REMOVAL     • D&C WITH SUCTION N/A 2017    Procedure: DILATATION AND CURETTAGE WITH SUCTION;  Surgeon: Kelley Dodge DO;  Location: [facility];  Service:    • DENTAL PROCEDURE     • DILATATION AND CURETTAGE  2017   • EYE SURGERY  2018    Intact Surgery   • KNEE SURGERY        Family History: No family history on file.   Social History:  reports that she has never smoked. She has never used smokeless tobacco.   reports that she does not drink alcohol.   reports that she does not use drugs.        REVIEW OF SYSTEMS             Reports fetal movement is normal             Denies leakage of amniotic fluid.             Denies vaginal bleeding             She reports Some cramping             All other systems reviewed and are negative    Objective       Vital Signs Range for the last 24 hours  Temperature: Temp:  [98.4 °F (36.9 °C)-98.5 °F (36.9 °C)] 98.4 °F (36.9 °C)   Temp Source: Temp src: Oral   BP: BP: (103-121)/(63-78) 103/63   Pulse: Heart Rate:   [] 97   Respirations: Resp:  [18] 18   SPO2:     O2 Amount (l/min):     O2 Devices     Weight: Weight:  [86.2 kg (190 lb)] 86.2 kg (190 lb)       Presentation:    Cervix: Exam by:  deferred   Dilation:     Effacement:     Station:         Fetal Heart Rate Assessment   Method:  external   Beats/min:     Baseline:  145   Varibility:  moderate   Accels:  present   Decels:  absent   Tracing Category:  Category 1    NST: REACTIVE     Uterine Assessment   Method:  external   Frequency (min):  none    Ctx Count in 10 min:     Duration:     Intensity:     Intensity by IUPC:     Resting Tone:  abd soft by palpation   Resting Tone by IUPC:     [place] Units:             Constitutional:  Well developed, well nourished, no acute distress, well-groomed.   Respiratory:  Resp e/e/u, normal breath sounds.   Cardiovascular:  Normal rate and rhythm, no murmurs.   Gastrointestinal:  Soft, gravid, nontender.  Uterus: Soft, nontender. Fundus appropriate for dates.  Neurologic:  Alert & oriented x 3,  no focal deficits noted.   Psychiatric:  Speech and behavior appropriate.   Extremities: no cyanosis, clubbing or edema, no evidence of DVT. Negative Indy's sign. No BLE edema.         Labs:  Lab Results   Component Value Date    WBC 8.97 07/27/2019    HGB 11.7 (L) 07/27/2019    HCT 35.5 07/27/2019    MCV 89.6 07/27/2019     07/27/2019    URICACID 4.5 07/27/2019    AST 40 (H) 07/27/2019    AST 39 (H) 07/27/2019    ALT 46 (H) 07/27/2019    ALT 51 (H) 07/27/2019     07/27/2019               Assessment/Plan       26 weeks gestation of pregnancy    Sciatica        Assessment:  1.  Intrauterine pregnancy at 26w3d weeks gestation with reactive fetal status.    2.   Back and leg pain  3.  Obstetrical history significant for is remarkable for depression and anxiety.    Plan:  1.NST   2. Pelvic floor physical therapy-outpatient  3. Increase Zoloft to 100 mg PO daily  4. Discharge to [facility]  5. Plan of care has been  reviewed with patient and questions answered.  6.  Risks, benefits of treatment plan have been discussed.  7.  All questions have been answered.        Yamile Matson CNM  8/29/2019  5:40 PM

## 2019-09-10 ENCOUNTER — LAB REQUISITION (OUTPATIENT)
Dept: LAB | Facility: HOSPITAL | Age: 29
End: 2019-09-10

## 2019-09-10 DIAGNOSIS — Z00.00 ROUTINE GENERAL MEDICAL EXAMINATION AT A HEALTH CARE FACILITY: ICD-10-CM

## 2019-09-10 PROCEDURE — 36415 COLL VENOUS BLD VENIPUNCTURE: CPT | Performed by: ADVANCED PRACTICE MIDWIFE

## 2019-11-04 ENCOUNTER — HOSPITAL ENCOUNTER (OUTPATIENT)
Facility: HOSPITAL | Age: 29
Setting detail: OBSERVATION
Discharge: HOME | End: 2019-11-04
Attending: ADVANCED PRACTICE MIDWIFE | Admitting: OBSTETRICS & GYNECOLOGY

## 2019-11-04 VITALS
RESPIRATION RATE: 16 BRPM | HEIGHT: 61 IN | WEIGHT: 182 LBS | HEART RATE: 96 BPM | SYSTOLIC BLOOD PRESSURE: 101 MMHG | DIASTOLIC BLOOD PRESSURE: 70 MMHG | BODY MASS INDEX: 34.36 KG/M2 | TEMPERATURE: 97.9 F

## 2019-11-04 PROBLEM — O47.03 PRETERM UTERINE CONTRACTIONS, ANTEPARTUM, THIRD TRIMESTER: Status: ACTIVE | Noted: 2019-11-04

## 2019-11-04 LAB
BACTERIA UR QL AUTO: ABNORMAL /HPF
BILIRUB UR QL STRIP: NEGATIVE
CLARITY UR: ABNORMAL
COD CRY URNS QL: ABNORMAL /HPF
COLOR UR: YELLOW
GLUCOSE UR STRIP-MCNC: NEGATIVE MG/DL
HGB UR QL STRIP.AUTO: NEGATIVE
HYALINE CASTS UR QL AUTO: ABNORMAL /LPF
KETONES UR QL STRIP: ABNORMAL
LEUKOCYTE ESTERASE UR QL STRIP.AUTO: ABNORMAL
MUCOUS THREADS URNS QL MICRO: ABNORMAL /HPF
NITRITE UR QL STRIP: NEGATIVE
PH UR STRIP.AUTO: 5.5 [PH] (ref 5–8)
PROT UR QL STRIP: ABNORMAL
RBC # UR: ABNORMAL /HPF
REF LAB TEST METHOD: ABNORMAL
SP GR UR STRIP: 1.03 (ref 1–1.03)
SQUAMOUS #/AREA URNS HPF: ABNORMAL /HPF
UROBILINOGEN UR QL STRIP: ABNORMAL
WBC UR QL AUTO: ABNORMAL /HPF

## 2019-11-04 PROCEDURE — 99219 PR INITIAL OBSERVATION CARE/DAY 50 MINUTES: CPT | Performed by: OBSTETRICS & GYNECOLOGY

## 2019-11-04 PROCEDURE — G0378 HOSPITAL OBSERVATION PER HR: HCPCS

## 2019-11-04 PROCEDURE — 96360 HYDRATION IV INFUSION INIT: CPT

## 2019-11-04 PROCEDURE — 59025 FETAL NON-STRESS TEST: CPT

## 2019-11-04 PROCEDURE — 59025 FETAL NON-STRESS TEST: CPT | Performed by: OBSTETRICS & GYNECOLOGY

## 2019-11-04 PROCEDURE — 81001 URINALYSIS AUTO W/SCOPE: CPT | Performed by: OBSTETRICS & GYNECOLOGY

## 2019-11-04 RX ORDER — SODIUM CHLORIDE, SODIUM LACTATE, POTASSIUM CHLORIDE, CALCIUM CHLORIDE 600; 310; 30; 20 MG/100ML; MG/100ML; MG/100ML; MG/100ML
999 INJECTION, SOLUTION INTRAVENOUS ONCE
Status: COMPLETED | OUTPATIENT
Start: 2019-11-04 | End: 2019-11-04

## 2019-11-04 RX ORDER — LIDOCAINE HYDROCHLORIDE 10 MG/ML
5 INJECTION, SOLUTION EPIDURAL; INFILTRATION; INTRACAUDAL; PERINEURAL AS NEEDED
Status: DISCONTINUED | OUTPATIENT
Start: 2019-11-04 | End: 2019-11-04 | Stop reason: HOSPADM

## 2019-11-04 RX ORDER — SODIUM CHLORIDE 0.9 % (FLUSH) 0.9 %
10 SYRINGE (ML) INJECTION AS NEEDED
Status: DISCONTINUED | OUTPATIENT
Start: 2019-11-04 | End: 2019-11-04 | Stop reason: HOSPADM

## 2019-11-04 RX ORDER — SODIUM CHLORIDE 0.9 % (FLUSH) 0.9 %
3 SYRINGE (ML) INJECTION EVERY 12 HOURS SCHEDULED
Status: DISCONTINUED | OUTPATIENT
Start: 2019-11-04 | End: 2019-11-04 | Stop reason: HOSPADM

## 2019-11-04 RX ADMIN — SODIUM CHLORIDE, POTASSIUM CHLORIDE, SODIUM LACTATE AND CALCIUM CHLORIDE 999 ML/HR: 600; 310; 30; 20 INJECTION, SOLUTION INTRAVENOUS at 05:48

## 2019-11-04 RX ADMIN — SODIUM CHLORIDE, POTASSIUM CHLORIDE, SODIUM LACTATE AND CALCIUM CHLORIDE 999 ML/HR: 600; 310; 30; 20 INJECTION, SOLUTION INTRAVENOUS at 05:07

## 2019-11-04 NOTE — H&P
"Padma Younger  1990  [id number]  [id number]    CC: contractions  HPI:  Patient is 29 y.o. female   currently at 36w0d presents with c/o uterine contractions, onset ~24 hrs ago, rates 9/10, radiates to back, denies assoc vag bleeding or SROM.  Some N.  Good FM.  BPNC to date     PMH:  Current meds: PNV, zoloft 100mg/d  Illnesses: depression, keratoconus (degen eye dz)  Surgeries: eye surg (rt and lt), rt knee arthroscopy, D and C, oral surg  Allergies: percocet (itches)    Past OB History:       Obstetric History       T0      L0     SAB0   TAB0   Ectopic0   Molar0   Multiple0   Live Births0       # Outcome Date GA Lbr Conrad/2nd Weight Sex Delivery Anes PTL Lv   2 Current            2017 9w0d   U                   SH: tob neg , EtOH neg, drugs neg  FH: heart dz neg , diabetes pos , cancer pos    General ROS: contractions, N.   All other systems reviewed and are negative.      Physical Examination: General appearance - alert, well appearing, and in no distress  Vital signs - /70 (BP Location: Left arm, Patient Position: Lying)   Pulse 96   Temp 97.9 °F (36.6 °C) (Oral)   Resp 16   Ht 154.9 cm (61\")   Wt 82.6 kg (182 lb)   BMI 34.39 kg/m²   HEENT: normocephalic, atraumatic,oropharynx clear, appearance of ears and nose normal  Neck - supple, no significant adenopathy, no thyromegaly  Lymphatics - no palpable lymphadenopathy in the neck or groin, no hepatosplenomegaly  Chest - clear to auscultation, no wheezes, rales or rhonchi, respiratory effort non-labored  Heart - normal rate, regular rhythm, no murmurs, rubs, clicks or gallops, no JVD, no lower extremity edema  Abdomen - soft, nontender, nondistended, no masses, no hepatosplenomegaly  no rebound tenderness noted, bowel sounds normal  Vaginal Exam: 1/60%/-2, no blood in vault,external genitalia normal (per RN)  Extremities - no pedal edema noted, no calf tend  Skin -warm and dry, normal coloration and turgor, no rashes, no " "suspicious skin lesions noted      Fetal monitoring: indication contractions , onset 0452 , offset 0552 , baseline 135 , mod BTB variability , multiple accels (15 X 15), no decels, q6\" contractions, interpretation reactive NST    Radiology     Assessment 1)IUP 36 weeks   2) contractions- no cervical change   3)vol depletion- likely due to decreased intake    Plan 1)observe     2)IV hydration    3)home     4)keep appt tomorrow    Sea Lorenzo MD  2019  5:54 AM                                                                    "

## 2019-11-06 LAB — EXTERNAL GROUP B STREP ANTIGEN: NEGATIVE

## 2019-11-07 ENCOUNTER — HOSPITAL ENCOUNTER (OUTPATIENT)
Facility: HOSPITAL | Age: 29
Setting detail: OBSERVATION
Discharge: HOME | End: 2019-11-07
Attending: ADVANCED PRACTICE MIDWIFE | Admitting: OBSTETRICS & GYNECOLOGY

## 2019-11-07 ENCOUNTER — HOSPITAL ENCOUNTER (OUTPATIENT)
Facility: HOSPITAL | Age: 29
Setting detail: SURGERY ADMIT
End: 2019-11-07
Attending: OBSTETRICS & GYNECOLOGY | Admitting: OBSTETRICS & GYNECOLOGY

## 2019-11-07 VITALS
BODY MASS INDEX: 35.5 KG/M2 | OXYGEN SATURATION: 97 % | HEART RATE: 90 BPM | RESPIRATION RATE: 15 BRPM | DIASTOLIC BLOOD PRESSURE: 64 MMHG | TEMPERATURE: 97.5 F | SYSTOLIC BLOOD PRESSURE: 100 MMHG | WEIGHT: 188 LBS | HEIGHT: 61 IN

## 2019-11-07 PROBLEM — Z3A.26 26 WEEKS GESTATION OF PREGNANCY: Status: RESOLVED | Noted: 2019-08-29 | Resolved: 2019-11-07

## 2019-11-07 PROBLEM — O47.03 FALSE LABOR BEFORE 37 COMPLETED WEEKS OF GESTATION IN THIRD TRIMESTER: Status: ACTIVE | Noted: 2019-11-07

## 2019-11-07 PROCEDURE — 59025 FETAL NON-STRESS TEST: CPT

## 2019-11-07 PROCEDURE — G0378 HOSPITAL OBSERVATION PER HR: HCPCS

## 2019-11-07 PROCEDURE — 59025 FETAL NON-STRESS TEST: CPT | Performed by: OBSTETRICS & GYNECOLOGY

## 2019-11-07 PROCEDURE — 99218 PR INITIAL OBSERVATION CARE/DAY 30 MINUTES: CPT | Performed by: OBSTETRICS & GYNECOLOGY

## 2019-11-07 NOTE — H&P
Padma Aquino Aren  1990  [id number]  [id number]    CC: contractions  HPI:  Patient is 29 y.o. female   currently at 36w3d presents with c/o uterine contractions, onset ~2200, intermittent, severe, radiates to back, denies assoc vag bleeding or SROM.  Good FM.  BPNC to date    PMH:  Current meds: PNV, zoloft 100mg/d  Illnesses: depression, keratoconus (degen eye dz)  Surgeries: eye surg (rt and lt), rt knee arthroscopy, D and C, oral surg  Allergies: percocet (itches)    Past OB History:       Obstetric History       T0      L0     SAB0   TAB0   Ectopic0   Molar0   Multiple0   Live Births0       # Outcome Date GA Lbr Conrad/2nd Weight Sex Delivery Anes PTL Lv   2 Current            2017 9w0d   U                   SH: tob neg , EtOH neg, drugs neg  FH: heart dz neg , diabetes pos , cancer pos    General ROS: contractions.   All other systems reviewed and are negative.      Physical Examination: General appearance - alert, well appearing, and in no distress  Vital signs - There were no vitals taken for this visit.  HEENT: normocephalic, atraumatic,oropharynx clear, appearance of ears and nose normal  Neck - supple, no significant adenopathy, no thyromegaly  Lymphatics - no palpable lymphadenopathy in the neck or groin, no hepatosplenomegaly  Chest - clear to auscultation, no wheezes, rales or rhonchi, respiratory effort non-labored  Heart - normal rate, regular rhythm, no murmurs, rubs, clicks or gallops, no JVD, no lower extremity edema  Abdomen - soft, nontender, nondistended, no masses, no hepatosplenomegaly  no rebound tenderness noted, bowel sounds normal  Vaginal Exam: 1-2/60%/-2, no blood in vault ,external genitalia normal  Extremities - no pedal edema noted, no calf tend  Skin -warm and dry, normal coloration and turgor, no rashes, no suspicious skin lesions noted      Fetal monitoring: indication contractions , onset 0710 , offset 0726 , baseline 145 , mod BTB variability , multiple  accels (15 X 15), no decels, irreg contractions, interpretation reactive NST    Radiology     Assessment 1)IUP 36 3/7 weeks   2) contractions, poss early labor    Plan 1)observe      2)po hydration    Sea Lorenzo MD  2019  7:22 AM

## 2019-11-07 NOTE — DISCHARGE SUMMARY
[facility]  Discharge summary      Patient: Padma Younger      MR#:[id number]  Admission  Diagnosis:   Patient Active Problem List   Diagnosis   • Back pain affecting pregnancy in second trimester   • Pregnancy   • Sciatica   •  uterine contractions, antepartum, third trimester   • False labor before 37 completed weeks of gestation in third trimester     Discharge Diagnosis: False labor contractions.      Date of Admission: 2019  Date of Discharge:  2019    Procedures:  NST reactive           Service:  Obstetrics    Hospital Course:  Patient arrived this morning with c/o uterine contractions. She voices positive fetal movement. She denies vaginal bleeding and leaking of amniotic fluid. She states contractions started at 2230 and continued through the night. Her cervix was unchanged after observation for 1.5 hours. She was afebrile and hemodynamically stable.  On the day of discharge, she was eating, ambulating and voiding without difficulty.      Labs:    Lab Results   Component Value Date    WBC 8.97 2019    HGB 11.7 (L) 2019    HCT 35.5 2019    MCV 89.6 2019     2019    URICACID 4.5 2019    AST 40 (H) 2019    AST 39 (H) 2019    ALT 46 (H) 2019    ALT 51 (H) 2019     2019           Discharge Medications     Discharge Medications      Changes to Medications      Instructions Start Date   sertraline 50 MG tablet  Commonly known as:  ZOLOFT  What changed:  how much to take   100 mg, Oral, Daily         Continue These Medications      Instructions Start Date   PRENATAL PO   Oral             Discharge Disposition:  To Home    Discharge Condition:  Stable    Discharge Diet: Regular    Activity at Discharge: Ad cy. Increase hydration.     Follow-up Appointments  Follow up with [facility] next week as scheduled.     Yamile Matson CNM  19  8:59 AM

## 2019-11-24 ENCOUNTER — HOSPITAL ENCOUNTER (INPATIENT)
Dept: LABOR AND DELIVERY | Facility: HOSPITAL | Age: 29
LOS: 2 days | Discharge: HOME | End: 2019-11-27
Attending: ADVANCED PRACTICE MIDWIFE | Admitting: OBSTETRICS & GYNECOLOGY

## 2019-11-24 LAB
ABO GROUP BLD: NORMAL
BLD GP AB SCN SERPL QL: NEGATIVE
DEPRECATED RDW RBC AUTO: 42.1 FL (ref 37–54)
ERYTHROCYTE [DISTWIDTH] IN BLOOD BY AUTOMATED COUNT: 13 % (ref 12.3–15.4)
HCT VFR BLD AUTO: 37.8 % (ref 34–46.6)
HGB BLD-MCNC: 12.5 G/DL (ref 12–15.9)
MCH RBC QN AUTO: 29.6 PG (ref 26.6–33)
MCHC RBC AUTO-ENTMCNC: 33.1 G/DL (ref 31.5–35.7)
MCV RBC AUTO: 89.4 FL (ref 79–97)
PLATELET # BLD AUTO: 218 10*3/MM3 (ref 140–450)
PMV BLD AUTO: 11.1 FL (ref 6–12)
RBC # BLD AUTO: 4.23 10*6/MM3 (ref 3.77–5.28)
RH BLD: POSITIVE
T&S EXPIRATION DATE: NORMAL
WBC NRBC COR # BLD: 9.23 10*3/MM3 (ref 3.4–10.8)

## 2019-11-24 PROCEDURE — 85027 COMPLETE CBC AUTOMATED: CPT | Performed by: ADVANCED PRACTICE MIDWIFE

## 2019-11-24 PROCEDURE — 59025 FETAL NON-STRESS TEST: CPT

## 2019-11-24 PROCEDURE — 86901 BLOOD TYPING SEROLOGIC RH(D): CPT | Performed by: ADVANCED PRACTICE MIDWIFE

## 2019-11-24 PROCEDURE — 36415 COLL VENOUS BLD VENIPUNCTURE: CPT | Performed by: ADVANCED PRACTICE MIDWIFE

## 2019-11-24 PROCEDURE — 86900 BLOOD TYPING SEROLOGIC ABO: CPT | Performed by: ADVANCED PRACTICE MIDWIFE

## 2019-11-24 PROCEDURE — 86850 RBC ANTIBODY SCREEN: CPT | Performed by: ADVANCED PRACTICE MIDWIFE

## 2019-11-24 RX ORDER — SODIUM CHLORIDE 0.9 % (FLUSH) 0.9 %
3 SYRINGE (ML) INJECTION EVERY 12 HOURS SCHEDULED
Status: DISCONTINUED | OUTPATIENT
Start: 2019-11-24 | End: 2019-11-25 | Stop reason: HOSPADM

## 2019-11-24 RX ORDER — SODIUM CHLORIDE 0.9 % (FLUSH) 0.9 %
10 SYRINGE (ML) INJECTION AS NEEDED
Status: CANCELLED | OUTPATIENT
Start: 2019-11-24

## 2019-11-24 RX ORDER — METHYLERGONOVINE MALEATE 0.2 MG/ML
200 INJECTION INTRAVENOUS ONCE AS NEEDED
Status: CANCELLED | OUTPATIENT
Start: 2019-11-24

## 2019-11-24 RX ORDER — SODIUM CHLORIDE 0.9 % (FLUSH) 0.9 %
3 SYRINGE (ML) INJECTION EVERY 12 HOURS SCHEDULED
Status: CANCELLED | OUTPATIENT
Start: 2019-11-24

## 2019-11-24 RX ORDER — CARBOPROST TROMETHAMINE 250 UG/ML
250 INJECTION, SOLUTION INTRAMUSCULAR AS NEEDED
Status: CANCELLED | OUTPATIENT
Start: 2019-11-24

## 2019-11-24 RX ORDER — LIDOCAINE HYDROCHLORIDE 10 MG/ML
5 INJECTION, SOLUTION EPIDURAL; INFILTRATION; INTRACAUDAL; PERINEURAL AS NEEDED
Status: DISCONTINUED | OUTPATIENT
Start: 2019-11-24 | End: 2019-11-25 | Stop reason: HOSPADM

## 2019-11-24 RX ORDER — IBUPROFEN 600 MG/1
600 TABLET ORAL EVERY 6 HOURS PRN
Status: CANCELLED | OUTPATIENT
Start: 2019-11-24

## 2019-11-24 RX ORDER — SODIUM CHLORIDE, SODIUM LACTATE, POTASSIUM CHLORIDE, CALCIUM CHLORIDE 600; 310; 30; 20 MG/100ML; MG/100ML; MG/100ML; MG/100ML
125 INJECTION, SOLUTION INTRAVENOUS CONTINUOUS
Status: DISCONTINUED | OUTPATIENT
Start: 2019-11-24 | End: 2019-11-25

## 2019-11-24 RX ORDER — OXYTOCIN-SODIUM CHLORIDE 0.9% IV SOLN 30 UNIT/500ML 30-0.9/5 UT/ML-%
2-30 SOLUTION INTRAVENOUS
Status: DISCONTINUED | OUTPATIENT
Start: 2019-11-25 | End: 2019-11-25 | Stop reason: HOSPADM

## 2019-11-24 RX ORDER — MISOPROSTOL 200 UG/1
800 TABLET ORAL AS NEEDED
Status: CANCELLED | OUTPATIENT
Start: 2019-11-24

## 2019-11-24 RX ORDER — SODIUM CHLORIDE 0.9 % (FLUSH) 0.9 %
10 SYRINGE (ML) INJECTION AS NEEDED
Status: DISCONTINUED | OUTPATIENT
Start: 2019-11-24 | End: 2019-11-25 | Stop reason: HOSPADM

## 2019-11-24 RX ORDER — OXYTOCIN-SODIUM CHLORIDE 0.9% IV SOLN 30 UNIT/500ML 30-0.9/5 UT/ML-%
2-30 SOLUTION INTRAVENOUS
Status: CANCELLED | OUTPATIENT
Start: 2019-11-25

## 2019-11-24 RX ORDER — LIDOCAINE HYDROCHLORIDE 10 MG/ML
5 INJECTION, SOLUTION EPIDURAL; INFILTRATION; INTRACAUDAL; PERINEURAL AS NEEDED
Status: CANCELLED | OUTPATIENT
Start: 2019-11-24

## 2019-11-24 RX ORDER — TERBUTALINE SULFATE 1 MG/ML
0.25 INJECTION, SOLUTION SUBCUTANEOUS AS NEEDED
Status: DISCONTINUED | OUTPATIENT
Start: 2019-11-24 | End: 2019-11-25 | Stop reason: HOSPADM

## 2019-11-24 RX ORDER — TERBUTALINE SULFATE 1 MG/ML
0.25 INJECTION, SOLUTION SUBCUTANEOUS AS NEEDED
Status: CANCELLED | OUTPATIENT
Start: 2019-11-24

## 2019-11-24 RX ADMIN — SODIUM CHLORIDE, POTASSIUM CHLORIDE, SODIUM LACTATE AND CALCIUM CHLORIDE 125 ML/HR: 600; 310; 30; 20 INJECTION, SOLUTION INTRAVENOUS at 17:38

## 2019-11-25 ENCOUNTER — ANESTHESIA (OUTPATIENT)
Dept: LABOR AND DELIVERY | Facility: HOSPITAL | Age: 29
End: 2019-11-25

## 2019-11-25 ENCOUNTER — ANESTHESIA EVENT (OUTPATIENT)
Dept: LABOR AND DELIVERY | Facility: HOSPITAL | Age: 29
End: 2019-11-25

## 2019-11-25 PROBLEM — Z3A.39 39 WEEKS GESTATION OF PREGNANCY: Status: ACTIVE | Noted: 2019-11-25

## 2019-11-25 PROBLEM — Z3A.39 39 WEEKS GESTATION OF PREGNANCY: Status: RESOLVED | Noted: 2019-11-25 | Resolved: 2019-11-25

## 2019-11-25 PROBLEM — Z34.90 PREGNANCY: Status: RESOLVED | Noted: 2019-08-28 | Resolved: 2019-11-25

## 2019-11-25 LAB
ATMOSPHERIC PRESS: ABNORMAL MM[HG]
ATMOSPHERIC PRESS: ABNORMAL MM[HG]
BASE EXCESS BLDCOA CALC-SCNC: -4.9 MMOL/L (ref 0–2)
BASE EXCESS BLDCOV CALC-SCNC: -3 MMOL/L (ref 0–2)
BDY SITE: ABNORMAL
BDY SITE: ABNORMAL
BODY TEMPERATURE: 37 C
BODY TEMPERATURE: 37 C
CO2 BLDA-SCNC: 25.1 MMOL/L (ref 22–33)
CO2 BLDA-SCNC: 26 MMOL/L (ref 22–33)
COLLECT TME SMN: ABNORMAL
HCO3 BLDCOA-SCNC: 24.1 MMOL/L (ref 16.9–20.5)
HCO3 BLDCOV-SCNC: 23.7 MMOL/L (ref 18.6–21.4)
HGB BLDA-MCNC: 14.7 G/DL (ref 14–18)
HGB BLDA-MCNC: 14.9 G/DL (ref 14–18)
HOROWITZ INDEX BLD+IHG-RTO: 21 %
HOROWITZ INDEX BLD+IHG-RTO: 21 %
MODALITY: ABNORMAL
MODALITY: ABNORMAL
NOTE: ABNORMAL
NOTE: ABNORMAL
PCO2 BLDCOA: 60.2 MMHG (ref 43.3–54.9)
PCO2 BLDCOV: 47.5 MM HG
PH BLDCOA: 7.21 PH UNITS (ref 7.22–7.3)
PH BLDCOV: 7.31 PH UNITS
PO2 BLDCOA: 23.9 MMHG (ref 11.5–43.3)
PO2 BLDCOV: 29.4 MM HG
SAO2 % BLDCOA: 49.6 %
SAO2 % BLDCOA: ABNORMAL %
SAO2 % BLDCOV: 70.2 %

## 2019-11-25 PROCEDURE — 0HQ9XZZ REPAIR PERINEUM SKIN, EXTERNAL APPROACH: ICD-10-PCS | Performed by: ADVANCED PRACTICE MIDWIFE

## 2019-11-25 PROCEDURE — C1755 CATHETER, INTRASPINAL: HCPCS | Performed by: ANESTHESIOLOGY

## 2019-11-25 PROCEDURE — 10907ZC DRAINAGE OF AMNIOTIC FLUID, THERAPEUTIC FROM PRODUCTS OF CONCEPTION, VIA NATURAL OR ARTIFICIAL OPENING: ICD-10-PCS | Performed by: ADVANCED PRACTICE MIDWIFE

## 2019-11-25 PROCEDURE — 25010000002 ROPIVACAINE PER 1 MG: Performed by: NURSE ANESTHETIST, CERTIFIED REGISTERED

## 2019-11-25 PROCEDURE — 25010000002 FENTANYL CITRATE (PF) 100 MCG/2ML SOLUTION: Performed by: NURSE ANESTHETIST, CERTIFIED REGISTERED

## 2019-11-25 PROCEDURE — 82805 BLOOD GASES W/O2 SATURATION: CPT

## 2019-11-25 PROCEDURE — 59025 FETAL NON-STRESS TEST: CPT

## 2019-11-25 PROCEDURE — 25010000002 ONDANSETRON PER 1 MG: Performed by: NURSE ANESTHETIST, CERTIFIED REGISTERED

## 2019-11-25 PROCEDURE — 59200 INSERT CERVICAL DILATOR: CPT | Performed by: OBSTETRICS & GYNECOLOGY

## 2019-11-25 PROCEDURE — 51703 INSERT BLADDER CATH COMPLEX: CPT

## 2019-11-25 PROCEDURE — C1755 CATHETER, INTRASPINAL: HCPCS

## 2019-11-25 PROCEDURE — 25010000002 BUTORPHANOL PER 1 MG: Performed by: OBSTETRICS & GYNECOLOGY

## 2019-11-25 RX ORDER — BUPIVACAINE HYDROCHLORIDE 2.5 MG/ML
INJECTION, SOLUTION EPIDURAL; INFILTRATION; INTRACAUDAL AS NEEDED
Status: DISCONTINUED | OUTPATIENT
Start: 2019-11-25 | End: 2019-11-25 | Stop reason: SURG

## 2019-11-25 RX ORDER — IBUPROFEN 600 MG/1
600 TABLET ORAL EVERY 6 HOURS PRN
Status: DISCONTINUED | OUTPATIENT
Start: 2019-11-25 | End: 2019-11-27 | Stop reason: HOSPADM

## 2019-11-25 RX ORDER — ROPIVACAINE HYDROCHLORIDE 2 MG/ML
15 INJECTION, SOLUTION EPIDURAL; INFILTRATION; PERINEURAL CONTINUOUS
Status: DISCONTINUED | OUTPATIENT
Start: 2019-11-25 | End: 2019-11-25

## 2019-11-25 RX ORDER — METOCLOPRAMIDE HYDROCHLORIDE 5 MG/ML
10 INJECTION INTRAMUSCULAR; INTRAVENOUS ONCE AS NEEDED
Status: DISCONTINUED | OUTPATIENT
Start: 2019-11-25 | End: 2019-11-25 | Stop reason: HOSPADM

## 2019-11-25 RX ORDER — OXYTOCIN-SODIUM CHLORIDE 0.9% IV SOLN 30 UNIT/500ML 30-0.9/5 UT/ML-%
650 SOLUTION INTRAVENOUS ONCE
Status: COMPLETED | OUTPATIENT
Start: 2019-11-25 | End: 2019-11-25

## 2019-11-25 RX ORDER — ONDANSETRON 2 MG/ML
4 INJECTION INTRAMUSCULAR; INTRAVENOUS ONCE AS NEEDED
Status: COMPLETED | OUTPATIENT
Start: 2019-11-25 | End: 2019-11-25

## 2019-11-25 RX ORDER — IBUPROFEN 600 MG/1
600 TABLET ORAL EVERY 6 HOURS PRN
Status: DISCONTINUED | OUTPATIENT
Start: 2019-11-25 | End: 2019-11-25 | Stop reason: HOSPADM

## 2019-11-25 RX ORDER — FAMOTIDINE 10 MG/ML
20 INJECTION, SOLUTION INTRAVENOUS ONCE AS NEEDED
Status: DISCONTINUED | OUTPATIENT
Start: 2019-11-25 | End: 2019-11-25 | Stop reason: HOSPADM

## 2019-11-25 RX ORDER — MISOPROSTOL 200 UG/1
800 TABLET ORAL AS NEEDED
Status: DISCONTINUED | OUTPATIENT
Start: 2019-11-25 | End: 2019-11-25 | Stop reason: HOSPADM

## 2019-11-25 RX ORDER — LIDOCAINE HYDROCHLORIDE AND EPINEPHRINE 15; 5 MG/ML; UG/ML
INJECTION, SOLUTION EPIDURAL AS NEEDED
Status: DISCONTINUED | OUTPATIENT
Start: 2019-11-25 | End: 2019-11-25 | Stop reason: SURG

## 2019-11-25 RX ORDER — DOCUSATE SODIUM 100 MG/1
100 CAPSULE, LIQUID FILLED ORAL 2 TIMES DAILY PRN
Status: DISCONTINUED | OUTPATIENT
Start: 2019-11-25 | End: 2019-11-27 | Stop reason: HOSPADM

## 2019-11-25 RX ORDER — METHYLERGONOVINE MALEATE 0.2 MG/ML
200 INJECTION INTRAVENOUS ONCE AS NEEDED
Status: DISCONTINUED | OUTPATIENT
Start: 2019-11-25 | End: 2019-11-25 | Stop reason: HOSPADM

## 2019-11-25 RX ORDER — DIPHENHYDRAMINE HYDROCHLORIDE 50 MG/ML
12.5 INJECTION INTRAMUSCULAR; INTRAVENOUS EVERY 8 HOURS PRN
Status: DISCONTINUED | OUTPATIENT
Start: 2019-11-25 | End: 2019-11-25 | Stop reason: HOSPADM

## 2019-11-25 RX ORDER — SODIUM CHLORIDE 0.9 % (FLUSH) 0.9 %
1-10 SYRINGE (ML) INJECTION AS NEEDED
Status: DISCONTINUED | OUTPATIENT
Start: 2019-11-25 | End: 2019-11-27 | Stop reason: HOSPADM

## 2019-11-25 RX ORDER — ONDANSETRON 2 MG/ML
4 INJECTION INTRAMUSCULAR; INTRAVENOUS EVERY 6 HOURS PRN
Status: DISCONTINUED | OUTPATIENT
Start: 2019-11-25 | End: 2019-11-27 | Stop reason: HOSPADM

## 2019-11-25 RX ORDER — EPHEDRINE SULFATE/0.9% NACL/PF 25 MG/5 ML
10 SYRINGE (ML) INTRAVENOUS
Status: DISCONTINUED | OUTPATIENT
Start: 2019-11-25 | End: 2019-11-25 | Stop reason: HOSPADM

## 2019-11-25 RX ORDER — TRISODIUM CITRATE DIHYDRATE AND CITRIC ACID MONOHYDRATE 500; 334 MG/5ML; MG/5ML
30 SOLUTION ORAL ONCE
Status: DISCONTINUED | OUTPATIENT
Start: 2019-11-25 | End: 2019-11-25 | Stop reason: HOSPADM

## 2019-11-25 RX ORDER — ROPIVACAINE HYDROCHLORIDE 5 MG/ML
INJECTION, SOLUTION EPIDURAL; INFILTRATION; PERINEURAL AS NEEDED
Status: DISCONTINUED | OUTPATIENT
Start: 2019-11-25 | End: 2019-11-25 | Stop reason: SURG

## 2019-11-25 RX ORDER — CARBOPROST TROMETHAMINE 250 UG/ML
250 INJECTION, SOLUTION INTRAMUSCULAR AS NEEDED
Status: DISCONTINUED | OUTPATIENT
Start: 2019-11-25 | End: 2019-11-25 | Stop reason: HOSPADM

## 2019-11-25 RX ORDER — FENTANYL CITRATE 50 UG/ML
INJECTION, SOLUTION INTRAMUSCULAR; INTRAVENOUS AS NEEDED
Status: DISCONTINUED | OUTPATIENT
Start: 2019-11-25 | End: 2019-11-25 | Stop reason: SURG

## 2019-11-25 RX ORDER — OXYTOCIN-SODIUM CHLORIDE 0.9% IV SOLN 30 UNIT/500ML 30-0.9/5 UT/ML-%
85 SOLUTION INTRAVENOUS ONCE
Status: COMPLETED | OUTPATIENT
Start: 2019-11-25 | End: 2019-11-25

## 2019-11-25 RX ORDER — PRENATAL VIT/IRON FUM/FOLIC AC 27MG-0.8MG
1 TABLET ORAL DAILY
Status: DISCONTINUED | OUTPATIENT
Start: 2019-11-25 | End: 2019-11-27 | Stop reason: HOSPADM

## 2019-11-25 RX ADMIN — SODIUM CHLORIDE, POTASSIUM CHLORIDE, SODIUM LACTATE AND CALCIUM CHLORIDE 1000 ML: 600; 310; 30; 20 INJECTION, SOLUTION INTRAVENOUS at 08:59

## 2019-11-25 RX ADMIN — SODIUM CHLORIDE, POTASSIUM CHLORIDE, SODIUM LACTATE AND CALCIUM CHLORIDE 125 ML/HR: 600; 310; 30; 20 INJECTION, SOLUTION INTRAVENOUS at 07:54

## 2019-11-25 RX ADMIN — BUTORPHANOL TARTRATE 2 MG: 2 INJECTION, SOLUTION INTRAMUSCULAR; INTRAVENOUS at 02:10

## 2019-11-25 RX ADMIN — OXYTOCIN 650 ML/HR: 10 INJECTION, SOLUTION INTRAMUSCULAR; INTRAVENOUS at 15:15

## 2019-11-25 RX ADMIN — FENTANYL CITRATE 100 MCG: 50 INJECTION, SOLUTION INTRAMUSCULAR; INTRAVENOUS at 09:32

## 2019-11-25 RX ADMIN — BUTORPHANOL TARTRATE 2 MG: 2 INJECTION, SOLUTION INTRAMUSCULAR; INTRAVENOUS at 04:16

## 2019-11-25 RX ADMIN — ROPIVACAINE HYDROCHLORIDE 10 ML: 5 INJECTION, SOLUTION EPIDURAL; INFILTRATION; PERINEURAL at 09:36

## 2019-11-25 RX ADMIN — IBUPROFEN 600 MG: 600 TABLET, FILM COATED ORAL at 21:04

## 2019-11-25 RX ADMIN — ROPIVACAINE HYDROCHLORIDE 7 ML: 5 INJECTION, SOLUTION EPIDURAL; INFILTRATION; PERINEURAL at 13:17

## 2019-11-25 RX ADMIN — Medication 10 MG: at 10:12

## 2019-11-25 RX ADMIN — OXYTOCIN 85 ML/HR: 10 INJECTION, SOLUTION INTRAMUSCULAR; INTRAVENOUS at 16:48

## 2019-11-25 RX ADMIN — SERTRALINE HYDROCHLORIDE 50 MG: 50 TABLET ORAL at 19:27

## 2019-11-25 RX ADMIN — SODIUM CHLORIDE, POTASSIUM CHLORIDE, SODIUM LACTATE AND CALCIUM CHLORIDE 125 ML/HR: 600; 310; 30; 20 INJECTION, SOLUTION INTRAVENOUS at 09:40

## 2019-11-25 RX ADMIN — ONDANSETRON 4 MG: 2 INJECTION INTRAMUSCULAR; INTRAVENOUS at 14:14

## 2019-11-25 RX ADMIN — LIDOCAINE HYDROCHLORIDE AND EPINEPHRINE 2 ML: 15; 5 INJECTION, SOLUTION EPIDURAL at 09:32

## 2019-11-25 RX ADMIN — OXYTOCIN 2 MILLI-UNITS/MIN: 10 INJECTION, SOLUTION INTRAMUSCULAR; INTRAVENOUS at 06:00

## 2019-11-25 RX ADMIN — PRENATAL VITAMINS-IRON FUMARATE 27 MG IRON-FOLIC ACID 0.8 MG TABLET 1 TABLET: at 19:26

## 2019-11-25 RX ADMIN — BUPIVACAINE HYDROCHLORIDE 5 ML: 2.5 INJECTION, SOLUTION EPIDURAL; INFILTRATION; INTRACAUDAL; PERINEURAL at 12:15

## 2019-11-25 RX ADMIN — ROPIVACAINE HYDROCHLORIDE 13 ML: 2 INJECTION, SOLUTION EPIDURAL; INFILTRATION at 09:37

## 2019-11-25 RX ADMIN — SODIUM CHLORIDE, POTASSIUM CHLORIDE, SODIUM LACTATE AND CALCIUM CHLORIDE 125 ML/HR: 600; 310; 30; 20 INJECTION, SOLUTION INTRAVENOUS at 00:15

## 2019-11-25 RX ADMIN — LIDOCAINE HYDROCHLORIDE AND EPINEPHRINE 3 ML: 15; 5 INJECTION, SOLUTION EPIDURAL at 09:29

## 2019-11-25 NOTE — ANESTHESIA PROCEDURE NOTES
Labor Epidural      Patient reassessed immediately prior to procedure    Patient location during procedure: floor  Performed By  Anesthesiologist: Jeferson Mart MD  CRNA: Suzan Calero CRNA  Preanesthetic Checklist  Completed: patient identified, surgical consent, pre-op evaluation, timeout performed, IV checked, risks and benefits discussed and monitors and equipment checked  Prep:  Pt Position:sitting  Sterile Tech:cap, gloves, mask and sterile barrier  Prep:DuraPrep  Monitoring:blood pressure monitoring  Epidural Block Procedure:  Approach:midline  Guidance:landmark technique and palpation technique  Location:L4-L5  Needle Type:Tuohy  Needle Gauge:17 G  Loss of Resistance Medium: air  Loss of Resistance: 5cm  Cath Depth at skin:11 cm  Paresthesia: none  Aspiration:negative  Test Dose:negative  Number of Attempts: 1  Post Assessment:  Dressing:occlusive dressing applied and secured with tape  Pt Tolerance:patient tolerated the procedure well with no apparent complications  Complications:no

## 2019-11-25 NOTE — ANESTHESIA PREPROCEDURE EVALUATION
Anesthesia Evaluation     Patient summary reviewed and Nursing notes reviewed   NPO Solid Status: > 6 hours  NPO Liquid Status: < 2 hours           Airway   Mallampati: II  TM distance: >3 FB  Neck ROM: full  Dental      Pulmonary    (+) asthma,  Cardiovascular - negative cardio ROS        Neuro/Psych- negative ROS  GI/Hepatic/Renal/Endo - negative ROS     Musculoskeletal (-) negative ROS    Abdominal    Substance History - negative use     OB/GYN    (+) Pregnant,         Other - negative ROS                       Anesthesia Plan    ASA 2     epidural       Anesthetic plan, all risks, benefits, and alternatives have been provided, discussed and informed consent has been obtained with: patient.

## 2019-11-26 LAB
BASOPHILS # BLD AUTO: 0.02 10*3/MM3 (ref 0–0.2)
BASOPHILS NFR BLD AUTO: 0.2 % (ref 0–1.5)
DEPRECATED RDW RBC AUTO: 43.4 FL (ref 37–54)
EOSINOPHIL # BLD AUTO: 0.04 10*3/MM3 (ref 0–0.4)
EOSINOPHIL NFR BLD AUTO: 0.3 % (ref 0.3–6.2)
ERYTHROCYTE [DISTWIDTH] IN BLOOD BY AUTOMATED COUNT: 13.3 % (ref 12.3–15.4)
HCT VFR BLD AUTO: 34.9 % (ref 34–46.6)
HGB BLD-MCNC: 10.9 G/DL (ref 12–15.9)
IMM GRANULOCYTES # BLD AUTO: 0.04 10*3/MM3 (ref 0–0.05)
IMM GRANULOCYTES NFR BLD AUTO: 0.3 % (ref 0–0.5)
LYMPHOCYTES # BLD AUTO: 1.97 10*3/MM3 (ref 0.7–3.1)
LYMPHOCYTES NFR BLD AUTO: 16.7 % (ref 19.6–45.3)
MCH RBC QN AUTO: 28.2 PG (ref 26.6–33)
MCHC RBC AUTO-ENTMCNC: 31.2 G/DL (ref 31.5–35.7)
MCV RBC AUTO: 90.2 FL (ref 79–97)
MONOCYTES # BLD AUTO: 0.68 10*3/MM3 (ref 0.1–0.9)
MONOCYTES NFR BLD AUTO: 5.8 % (ref 5–12)
NEUTROPHILS # BLD AUTO: 9.04 10*3/MM3 (ref 1.7–7)
NEUTROPHILS NFR BLD AUTO: 76.7 % (ref 42.7–76)
NRBC BLD AUTO-RTO: 0 /100 WBC (ref 0–0.2)
PLATELET # BLD AUTO: 186 10*3/MM3 (ref 140–450)
PMV BLD AUTO: 11.9 FL (ref 6–12)
RBC # BLD AUTO: 3.87 10*6/MM3 (ref 3.77–5.28)
WBC NRBC COR # BLD: 11.79 10*3/MM3 (ref 3.4–10.8)

## 2019-11-26 PROCEDURE — 85025 COMPLETE CBC W/AUTO DIFF WBC: CPT | Performed by: ADVANCED PRACTICE MIDWIFE

## 2019-11-26 RX ORDER — SERTRALINE HYDROCHLORIDE 100 MG/1
100 TABLET, FILM COATED ORAL DAILY
Status: DISCONTINUED | OUTPATIENT
Start: 2019-11-26 | End: 2019-11-27 | Stop reason: HOSPADM

## 2019-11-26 RX ADMIN — IBUPROFEN 600 MG: 600 TABLET, FILM COATED ORAL at 16:14

## 2019-11-26 RX ADMIN — SERTRALINE HYDROCHLORIDE 100 MG: 100 TABLET ORAL at 08:56

## 2019-11-26 RX ADMIN — IBUPROFEN 600 MG: 600 TABLET, FILM COATED ORAL at 21:53

## 2019-11-26 RX ADMIN — PRENATAL VITAMINS-IRON FUMARATE 27 MG IRON-FOLIC ACID 0.8 MG TABLET 1 TABLET: at 08:56

## 2019-11-26 NOTE — ANESTHESIA POSTPROCEDURE EVALUATION
Patient: Padma Younger    Procedure Summary     Date:  11/25/19 Room / Location:      Anesthesia Start:  0919 Anesthesia Stop:  1511    Procedure:  LABOR ANALGESIA Diagnosis:      Scheduled Providers:   Provider:  Jeferson Mart MD    Anesthesia Type:  epidural ASA Status:  2          Anesthesia Type: epidural  Last vitals  BP   99/55 (11/25/19 2317)   Temp   98.9 °F (37.2 °C) (11/25/19 2317)   Pulse   82 (11/25/19 2317)   Resp   14 (11/25/19 2317)     SpO2         Post Anesthesia Care and Evaluation    Patient location during evaluation: bedside  Patient participation: complete - patient participated  Level of consciousness: awake  Pain score: 0  Pain management: satisfactory to patient  Airway patency: patent  Anesthetic complications: No anesthetic complications  PONV Status: none  Cardiovascular status: acceptable and hemodynamically stable  Respiratory status: acceptable  Hydration status: acceptable  Post Neuraxial Block status: Motor and sensory function returned to baseline and No signs or symptoms of PDPH

## 2019-11-27 VITALS
WEIGHT: 188 LBS | RESPIRATION RATE: 20 BRPM | BODY MASS INDEX: 35.5 KG/M2 | TEMPERATURE: 97.8 F | HEIGHT: 61 IN | HEART RATE: 72 BPM | SYSTOLIC BLOOD PRESSURE: 117 MMHG | DIASTOLIC BLOOD PRESSURE: 59 MMHG

## 2019-11-27 PROBLEM — O99.891 BACK PAIN AFFECTING PREGNANCY IN SECOND TRIMESTER: Status: RESOLVED | Noted: 2019-07-27 | Resolved: 2019-11-27

## 2019-11-27 PROBLEM — O47.03 PRETERM UTERINE CONTRACTIONS, ANTEPARTUM, THIRD TRIMESTER: Status: RESOLVED | Noted: 2019-11-04 | Resolved: 2019-11-27

## 2019-11-27 PROBLEM — O47.03 FALSE LABOR BEFORE 37 COMPLETED WEEKS OF GESTATION IN THIRD TRIMESTER: Status: RESOLVED | Noted: 2019-11-07 | Resolved: 2019-11-27

## 2019-11-27 PROBLEM — M54.9 BACK PAIN AFFECTING PREGNANCY IN SECOND TRIMESTER: Status: RESOLVED | Noted: 2019-07-27 | Resolved: 2019-11-27

## 2019-11-27 PROBLEM — M54.30 SCIATICA: Status: RESOLVED | Noted: 2019-08-29 | Resolved: 2019-11-27

## 2019-11-27 RX ORDER — IBUPROFEN 600 MG/1
600 TABLET ORAL EVERY 6 HOURS PRN
Qty: 60 TABLET | Refills: 0 | Status: SHIPPED | OUTPATIENT
Start: 2019-11-27 | End: 2021-01-05

## 2019-11-27 RX ORDER — DOCUSATE SODIUM 100 MG/1
100 CAPSULE, LIQUID FILLED ORAL 2 TIMES DAILY PRN
Qty: 120 CAPSULE | Refills: 0 | Status: SHIPPED | OUTPATIENT
Start: 2019-11-27 | End: 2021-01-05

## 2019-11-27 RX ADMIN — SERTRALINE HYDROCHLORIDE 100 MG: 100 TABLET ORAL at 08:19

## 2019-11-27 RX ADMIN — IBUPROFEN 600 MG: 600 TABLET, FILM COATED ORAL at 06:16

## 2019-11-27 RX ADMIN — PRENATAL VITAMINS-IRON FUMARATE 27 MG IRON-FOLIC ACID 0.8 MG TABLET 1 TABLET: at 08:19

## 2019-12-03 NOTE — DISCHARGE SUMMARY
[facility]  Delivery Discharge Summary    Primary OB Clinician:     EDC: Estimated Date of Delivery: 19    Gestational Age:39w0d    Date of Admission: 2019    Admission Diagnosis:  labor    Discharge Diagnosis: Same    Date of Delivery: 2019   Time of Delivery: 3:09 PM     Delivered By:  Yamile Matson     Delivery Type: Vaginal, Spontaneous          Baby:@BABYNOHDR(SEX)@ infant;   Apgar:  7   @ 1 minute /   Apgar:  9   @ 5 minutes   Weight: @BABYNOHDR(BIRTHWEIGHT)@   Length: @BABYNOHDR(DELRECITE,B,22820,,,,)@    Anesthesia: Epidural      Laceration: Yes  Laceration Information  Laceration Repaired?   Perineal: 1st  Yes    Periurethral:         Labial:         Sulcus:         Vaginal: No       Cervical: No            Exam:  Normal postpartum exam    Hospital Course:  Hospital course has been stable.  Patient is tolerating po, voiding without difficulty and ready for discharge.  Please see medication reconciliation and lab report for additional details.      Follow Up:  Patient has been given a follow up appointment in our office in 6 weeks. Postpartum depression, mastitis, and lochia precautions reviewed. Pelvic rest for 6 weeks.    Discharge Date: 19; Discharge Time: 0850

## 2020-08-18 PROCEDURE — U0003 INFECTIOUS AGENT DETECTION BY NUCLEIC ACID (DNA OR RNA); SEVERE ACUTE RESPIRATORY SYNDROME CORONAVIRUS 2 (SARS-COV-2) (CORONAVIRUS DISEASE [COVID-19]), AMPLIFIED PROBE TECHNIQUE, MAKING USE OF HIGH THROUGHPUT TECHNOLOGIES AS DESCRIBED BY CMS-2020-01-R: HCPCS | Performed by: PERSONAL EMERGENCY RESPONSE ATTENDANT

## 2020-08-20 ENCOUNTER — TELEPHONE (OUTPATIENT)
Dept: URGENT CARE | Facility: CLINIC | Age: 30
End: 2020-08-20

## 2020-08-20 NOTE — TELEPHONE ENCOUNTER
----- Message from Abilio Garcia MD sent at 8/20/2020  8:18 AM EDT -----  COVID is not detected. Recommend current (10day/24hr) protocol.  Please notify the patient.-Abilio Garcia M.D.      ----- Message -----  From: Lab, Background User  Sent: 8/20/2020  12:07 AM EDT  To: [facility] Covid Results    Pt called and given negative covid result. Pt states she is feeling about the same. Advised pt that CDC recommends a 10 day home quarantine from onset of symptoms. ARMANDO

## 2021-01-05 ENCOUNTER — OFFICE VISIT (OUTPATIENT)
Dept: INTERNAL MEDICINE | Facility: CLINIC | Age: 31
End: 2021-01-05

## 2021-01-05 VITALS
HEART RATE: 74 BPM | HEIGHT: 62 IN | WEIGHT: 188 LBS | TEMPERATURE: 97.1 F | SYSTOLIC BLOOD PRESSURE: 108 MMHG | OXYGEN SATURATION: 98 % | DIASTOLIC BLOOD PRESSURE: 70 MMHG | BODY MASS INDEX: 34.6 KG/M2

## 2021-01-05 DIAGNOSIS — N83.209 CYST OF OVARY, UNSPECIFIED LATERALITY: ICD-10-CM

## 2021-01-05 DIAGNOSIS — M25.531 WRIST PAIN, ACUTE, RIGHT: Primary | ICD-10-CM

## 2021-01-05 DIAGNOSIS — N60.02 CYST (SOLITARY) OF BREAST, LEFT: ICD-10-CM

## 2021-01-05 PROCEDURE — 99213 OFFICE O/P EST LOW 20 MIN: CPT | Performed by: PHYSICIAN ASSISTANT

## 2021-01-05 RX ORDER — ALBUTEROL SULFATE 90 UG/1
2 AEROSOL, METERED RESPIRATORY (INHALATION) EVERY 4 HOURS PRN
COMMUNITY
End: 2023-01-04 | Stop reason: SDUPTHER

## 2021-01-05 NOTE — PROGRESS NOTES
Padma Younger 30 y.o. female presents today to establish care.    Establish Care, Wrist Pain (right wrist pain intermittent last 2 years.  this episoode about 2 months), Ovarian Cyst (ruptured ovarian cyst), and Mass (bump under left breast 1 week)       HPI     Right Wrist pain: has had for 2 years, worse last few months. Had injury in the past where she hurt it while she was moving heavy boxes. She has pain on the top inner part of her wrist. No swelling or redness. She would like to see a specialist for this. Has been using a wrist brace but that has not helped much.     Ovarian Cyst: has had 2 rupture, would like to see gyn for this, might be due for pap as well. Would like a referral.     Cyst under breast: has recurrent abcess there and white stuff comes out. Would like it surgically removed. Has had areas like this surgically removed in the past. It has been recurrent for several months and will shrink back down some but never completely go away. This last week it has been swelling up again and is tender.      Review of Systems   Constitutional: Negative for fatigue and fever.   Respiratory: Negative for cough and shortness of breath.    Cardiovascular: Negative for chest pain.   Gastrointestinal: Negative for abdominal pain, constipation and diarrhea.   Musculoskeletal: Positive for arthralgias. Negative for joint swelling and myalgias.   Skin: Positive for skin lesions.        Past Medical History:   Diagnosis Date   • Abnormal Pap smear of cervix    • Anxiety    • Asthma    • Chlamydia     2013   • Chlamydia contact, treated    • Depression    • Gonorrhea     2013   • Urinary tract infection         Past Surgical History:   Procedure Laterality Date   • CYST REMOVAL     • D&C WITH SUCTION N/A 9/18/2017    Procedure: DILATATION AND CURETTAGE WITH SUCTION;  Surgeon: Kelley Dodge DO;  Location: [facility];  Service:    • DENTAL PROCEDURE     • DILATATION AND CURETTAGE  09/18/2017   • EYE SURGERY  12/2018  "   Intact Surgery   • KNEE SURGERY     • VAGINAL DELIVERY      x1   • WISDOM TOOTH EXTRACTION          Family History   Problem Relation Age of Onset   • Arthritis Mother    • COPD Mother    • Liver disease Maternal Uncle    • Breast cancer Maternal Grandmother    • Diabetes Maternal Grandfather    • Breast cancer Paternal Grandmother    • Ovarian cancer Paternal Grandmother    • Diabetes Paternal Grandmother    • Heart attack Paternal Grandmother    • Hypertension Paternal Grandmother    • Stroke Paternal Grandmother    • Colon cancer Paternal Grandfather         Social History     Socioeconomic History   • Marital status: Single     Spouse name: Not on file   • Number of children: Not on file   • Years of education: Not on file   • Highest education level: Not on file   Tobacco Use   • Smoking status: Never Smoker   • Smokeless tobacco: Never Used   Substance and Sexual Activity   • Alcohol use: Yes     Alcohol/week: 2.0 standard drinks     Types: 2 Glasses of wine per week     Frequency: Never     Comment: OCCASIONAL   • Drug use: No   • Sexual activity: Yes     Partners: Male     Birth control/protection: None        Current Outpatient Medications on File Prior to Visit   Medication Sig Dispense Refill   • albuterol sulfate  (90 Base) MCG/ACT inhaler Inhale 2 puffs Every 4 (Four) Hours As Needed for Wheezing.     • sertraline (ZOLOFT) 50 MG tablet Take 2 tablets by mouth Daily. (Patient taking differently: Take 150 mg by mouth.) 90 tablet 2     No current facility-administered medications on file prior to visit.        Allergies   Allergen Reactions   • Percocet [Oxycodone-Acetaminophen] Itching        Vitals:    01/05/21 1447   BP: 108/70   Pulse: 74   Temp: 97.1 °F (36.2 °C)   SpO2: 98%   Weight: 85.3 kg (188 lb)   Height: 156.5 cm (61.6\")      Body mass index is 34.83 kg/m².     Physical Exam     Diagnoses and all orders for this visit:    1. Wrist pain, acute, right (Primary)  -     Ambulatory " Referral to Orthopedic Surgery    2. Cyst (solitary) of breast, left  -     Ambulatory Referral to General Surgery    3. Cyst of ovary, unspecified laterality  -     Ambulatory Referral to Obstetrics / Gynecology        Return for Annual. and fasting labs    Leidy Faulkner PA-C

## 2021-01-12 ENCOUNTER — TELEPHONE (OUTPATIENT)
Dept: ORTHOPEDIC SURGERY | Facility: CLINIC | Age: 31
End: 2021-01-12

## 2021-01-12 ENCOUNTER — E-VISIT (OUTPATIENT)
Dept: FAMILY MEDICINE CLINIC | Facility: TELEHEALTH | Age: 31
End: 2021-01-12

## 2021-01-12 NOTE — TELEPHONE ENCOUNTER
PT CALLING IN TO RESCHEDULE SAME DAY APPT 1/12/21 @ 120 DUE TO DAUGHTER BEING SICK..  ALREADY GOT RESCHEDULED.

## 2021-10-29 ENCOUNTER — HOSPITAL ENCOUNTER (EMERGENCY)
Facility: HOSPITAL | Age: 31
Discharge: HOME | End: 2021-10-30
Attending: STUDENT IN AN ORGANIZED HEALTH CARE EDUCATION/TRAINING PROGRAM | Admitting: STUDENT IN AN ORGANIZED HEALTH CARE EDUCATION/TRAINING PROGRAM

## 2021-10-29 ENCOUNTER — APPOINTMENT (OUTPATIENT)
Dept: GENERAL RADIOLOGY | Facility: HOSPITAL | Age: 31
End: 2021-10-29

## 2021-10-29 DIAGNOSIS — R07.89 COSTOCHONDRAL CHEST PAIN: Primary | ICD-10-CM

## 2021-10-29 DIAGNOSIS — R07.9 NONSPECIFIC CHEST PAIN: ICD-10-CM

## 2021-10-29 LAB
ALBUMIN SERPL-MCNC: 4.5 G/DL (ref 3.5–5.2)
ALBUMIN/GLOB SERPL: 1.6 G/DL
ALP SERPL-CCNC: 112 U/L (ref 39–117)
ALT SERPL W P-5'-P-CCNC: 11 U/L (ref 1–33)
ANION GAP SERPL CALCULATED.3IONS-SCNC: 13 MMOL/L (ref 5–15)
AST SERPL-CCNC: 19 U/L (ref 1–32)
BASOPHILS # BLD AUTO: 0.02 10*3/MM3 (ref 0–0.2)
BASOPHILS NFR BLD AUTO: 0.2 % (ref 0–1.5)
BILIRUB SERPL-MCNC: 0.7 MG/DL (ref 0–1.2)
BUN SERPL-MCNC: 8 MG/DL (ref 6–20)
BUN/CREAT SERPL: 8.4 (ref 7–25)
CALCIUM SPEC-SCNC: 9.3 MG/DL (ref 8.6–10.5)
CHLORIDE SERPL-SCNC: 102 MMOL/L (ref 98–107)
CO2 SERPL-SCNC: 24 MMOL/L (ref 22–29)
CREAT SERPL-MCNC: 0.95 MG/DL (ref 0.57–1)
DEPRECATED RDW RBC AUTO: 41.8 FL (ref 37–54)
EOSINOPHIL # BLD AUTO: 0.04 10*3/MM3 (ref 0–0.4)
EOSINOPHIL NFR BLD AUTO: 0.4 % (ref 0.3–6.2)
ERYTHROCYTE [DISTWIDTH] IN BLOOD BY AUTOMATED COUNT: 12.8 % (ref 12.3–15.4)
GFR SERPL CREATININE-BSD FRML MDRD: 83 ML/MIN/1.73
GLOBULIN UR ELPH-MCNC: 2.9 GM/DL
GLUCOSE SERPL-MCNC: 89 MG/DL (ref 65–99)
HCT VFR BLD AUTO: 38.3 % (ref 34–46.6)
HGB BLD-MCNC: 12.1 G/DL (ref 12–15.9)
IMM GRANULOCYTES # BLD AUTO: 0.03 10*3/MM3 (ref 0–0.05)
IMM GRANULOCYTES NFR BLD AUTO: 0.3 % (ref 0–0.5)
LIPASE SERPL-CCNC: 28 U/L (ref 13–60)
LYMPHOCYTES # BLD AUTO: 2.15 10*3/MM3 (ref 0.7–3.1)
LYMPHOCYTES NFR BLD AUTO: 22.4 % (ref 19.6–45.3)
MCH RBC QN AUTO: 28.2 PG (ref 26.6–33)
MCHC RBC AUTO-ENTMCNC: 31.6 G/DL (ref 31.5–35.7)
MCV RBC AUTO: 89.3 FL (ref 79–97)
MONOCYTES # BLD AUTO: 0.48 10*3/MM3 (ref 0.1–0.9)
MONOCYTES NFR BLD AUTO: 5 % (ref 5–12)
NEUTROPHILS NFR BLD AUTO: 6.87 10*3/MM3 (ref 1.7–7)
NEUTROPHILS NFR BLD AUTO: 71.7 % (ref 42.7–76)
NRBC BLD AUTO-RTO: 0 /100 WBC (ref 0–0.2)
NT-PROBNP SERPL-MCNC: 8.3 PG/ML (ref 0–450)
PLATELET # BLD AUTO: 273 10*3/MM3 (ref 140–450)
PMV BLD AUTO: 10.2 FL (ref 6–12)
POTASSIUM SERPL-SCNC: 3.5 MMOL/L (ref 3.5–5.2)
PROT SERPL-MCNC: 7.4 G/DL (ref 6–8.5)
RBC # BLD AUTO: 4.29 10*6/MM3 (ref 3.77–5.28)
SODIUM SERPL-SCNC: 139 MMOL/L (ref 136–145)
TROPONIN T SERPL-MCNC: <0.01 NG/ML (ref 0–0.03)
WBC # BLD AUTO: 9.59 10*3/MM3 (ref 3.4–10.8)

## 2021-10-29 PROCEDURE — 96374 THER/PROPH/DIAG INJ IV PUSH: CPT

## 2021-10-29 PROCEDURE — 83690 ASSAY OF LIPASE: CPT | Performed by: STUDENT IN AN ORGANIZED HEALTH CARE EDUCATION/TRAINING PROGRAM

## 2021-10-29 PROCEDURE — 71045 X-RAY EXAM CHEST 1 VIEW: CPT

## 2021-10-29 PROCEDURE — 93005 ELECTROCARDIOGRAM TRACING: CPT | Performed by: STUDENT IN AN ORGANIZED HEALTH CARE EDUCATION/TRAINING PROGRAM

## 2021-10-29 PROCEDURE — 99284 EMERGENCY DEPT VISIT MOD MDM: CPT

## 2021-10-29 PROCEDURE — 85379 FIBRIN DEGRADATION QUANT: CPT | Performed by: PHYSICIAN ASSISTANT

## 2021-10-29 PROCEDURE — 85025 COMPLETE CBC W/AUTO DIFF WBC: CPT | Performed by: STUDENT IN AN ORGANIZED HEALTH CARE EDUCATION/TRAINING PROGRAM

## 2021-10-29 PROCEDURE — 83880 ASSAY OF NATRIURETIC PEPTIDE: CPT | Performed by: STUDENT IN AN ORGANIZED HEALTH CARE EDUCATION/TRAINING PROGRAM

## 2021-10-29 PROCEDURE — 84484 ASSAY OF TROPONIN QUANT: CPT | Performed by: STUDENT IN AN ORGANIZED HEALTH CARE EDUCATION/TRAINING PROGRAM

## 2021-10-29 PROCEDURE — 93005 ELECTROCARDIOGRAM TRACING: CPT | Performed by: EMERGENCY MEDICINE

## 2021-10-29 PROCEDURE — 80053 COMPREHEN METABOLIC PANEL: CPT | Performed by: STUDENT IN AN ORGANIZED HEALTH CARE EDUCATION/TRAINING PROGRAM

## 2021-10-29 PROCEDURE — 96375 TX/PRO/DX INJ NEW DRUG ADDON: CPT

## 2021-10-29 RX ORDER — ASPIRIN 81 MG/1
324 TABLET, CHEWABLE ORAL ONCE
Status: COMPLETED | OUTPATIENT
Start: 2021-10-29 | End: 2021-10-30

## 2021-10-29 RX ORDER — KETOROLAC TROMETHAMINE 15 MG/ML
15 INJECTION, SOLUTION INTRAMUSCULAR; INTRAVENOUS ONCE
Status: COMPLETED | OUTPATIENT
Start: 2021-10-29 | End: 2021-10-30

## 2021-10-29 RX ORDER — SODIUM CHLORIDE 0.9 % (FLUSH) 0.9 %
10 SYRINGE (ML) INJECTION AS NEEDED
Status: DISCONTINUED | OUTPATIENT
Start: 2021-10-29 | End: 2021-10-30 | Stop reason: HOSPADM

## 2021-10-30 VITALS
SYSTOLIC BLOOD PRESSURE: 106 MMHG | TEMPERATURE: 98.4 F | HEIGHT: 61 IN | OXYGEN SATURATION: 96 % | BODY MASS INDEX: 35.5 KG/M2 | WEIGHT: 188 LBS | HEART RATE: 75 BPM | DIASTOLIC BLOOD PRESSURE: 71 MMHG | RESPIRATION RATE: 16 BRPM

## 2021-10-30 LAB
D DIMER PPP FEU-MCNC: <0.27 MCGFEU/ML (ref 0–0.56)
HOLD SPECIMEN: NORMAL
QT INTERVAL: 420 MS
QTC INTERVAL: 443 MS
TROPONIN T SERPL-MCNC: <0.01 NG/ML (ref 0–0.03)
WHOLE BLOOD HOLD SPECIMEN: NORMAL
WHOLE BLOOD HOLD SPECIMEN: NORMAL

## 2021-10-30 PROCEDURE — 84484 ASSAY OF TROPONIN QUANT: CPT | Performed by: STUDENT IN AN ORGANIZED HEALTH CARE EDUCATION/TRAINING PROGRAM

## 2021-10-30 PROCEDURE — 25010000002 MORPHINE PER 10 MG: Performed by: STUDENT IN AN ORGANIZED HEALTH CARE EDUCATION/TRAINING PROGRAM

## 2021-10-30 PROCEDURE — 93005 ELECTROCARDIOGRAM TRACING: CPT | Performed by: STUDENT IN AN ORGANIZED HEALTH CARE EDUCATION/TRAINING PROGRAM

## 2021-10-30 PROCEDURE — 25010000002 KETOROLAC TROMETHAMINE PER 15 MG: Performed by: PHYSICIAN ASSISTANT

## 2021-10-30 RX ORDER — NAPROXEN 500 MG/1
500 TABLET ORAL 2 TIMES DAILY PRN
Qty: 20 TABLET | Refills: 0 | Status: SHIPPED | OUTPATIENT
Start: 2021-10-30 | End: 2021-11-09

## 2021-10-30 RX ORDER — MORPHINE SULFATE 2 MG/ML
2 INJECTION, SOLUTION INTRAMUSCULAR; INTRAVENOUS ONCE
Status: COMPLETED | OUTPATIENT
Start: 2021-10-30 | End: 2021-10-30

## 2021-10-30 RX ADMIN — KETOROLAC TROMETHAMINE 15 MG: 15 INJECTION, SOLUTION INTRAMUSCULAR; INTRAVENOUS at 00:07

## 2021-10-30 RX ADMIN — ASPIRIN 81 MG CHEWABLE TABLET 324 MG: 81 TABLET CHEWABLE at 00:06

## 2021-10-30 RX ADMIN — SODIUM CHLORIDE 1000 ML: 9 INJECTION, SOLUTION INTRAVENOUS at 00:08

## 2021-10-30 RX ADMIN — MORPHINE SULFATE 2 MG: 2 INJECTION, SOLUTION INTRAMUSCULAR; INTRAVENOUS at 02:12

## 2021-10-31 LAB
QT INTERVAL: 386 MS
QTC INTERVAL: 450 MS

## 2022-06-01 ENCOUNTER — PATIENT ROUNDING (BHMG ONLY) (OUTPATIENT)
Dept: FAMILY MEDICINE CLINIC | Facility: CLINIC | Age: 32
End: 2022-06-01

## 2022-06-01 ENCOUNTER — OFFICE VISIT (OUTPATIENT)
Dept: FAMILY MEDICINE CLINIC | Facility: CLINIC | Age: 32
End: 2022-06-01

## 2022-06-01 VITALS
SYSTOLIC BLOOD PRESSURE: 122 MMHG | OXYGEN SATURATION: 96 % | DIASTOLIC BLOOD PRESSURE: 78 MMHG | HEIGHT: 61 IN | BODY MASS INDEX: 37.08 KG/M2 | HEART RATE: 105 BPM | WEIGHT: 196.4 LBS | TEMPERATURE: 98.6 F

## 2022-06-01 DIAGNOSIS — N63.23 MASS OF LOWER OUTER QUADRANT OF LEFT BREAST: ICD-10-CM

## 2022-06-01 DIAGNOSIS — M79.671 FOOT PAIN, BILATERAL: Primary | ICD-10-CM

## 2022-06-01 DIAGNOSIS — M79.672 FOOT PAIN, BILATERAL: Primary | ICD-10-CM

## 2022-06-01 PROCEDURE — 99213 OFFICE O/P EST LOW 20 MIN: CPT | Performed by: NURSE PRACTITIONER

## 2022-06-01 RX ORDER — BUPROPION HYDROCHLORIDE 150 MG/1
150 TABLET ORAL DAILY
COMMUNITY
End: 2022-07-05 | Stop reason: SDUPTHER

## 2022-06-01 RX ORDER — FAMOTIDINE 20 MG/1
TABLET, FILM COATED ORAL
COMMUNITY
Start: 2022-04-18

## 2022-06-01 RX ORDER — MONTELUKAST SODIUM 10 MG/1
TABLET ORAL
COMMUNITY
Start: 2022-04-18 | End: 2022-07-05 | Stop reason: SDUPTHER

## 2022-06-01 NOTE — PROGRESS NOTES
"Chief Complaint  Establish Care, Breast Mass (Left), and Foot Pain (Bilateral)    Subjective        Padma Younger presents to [facility]  Pt is here today to establish care. She has concerns of a lump on her left breast. She noticed this a few days ago. Family history of breast cancer in paternal and maternal grandmothers, both later in life.     She is also experiencing foot pain when she works out. Reports it is like she is stepping on needles. States this has been a problem for about 2 months. Reports pain is in arch of foot.    Breast Mass  This is a recurrent problem. The current episode started in the past 7 days. The problem occurs constantly. The problem has been unchanged. Nothing aggravates the symptoms. She has tried nothing for the symptoms.   Foot Pain  This is a recurrent problem. The current episode started more than 1 month ago. The problem occurs intermittently. The problem has been waxing and waning. The symptoms are aggravated by walking. She has tried nothing for the symptoms.     Objective   Vital Signs:  /78 (BP Location: Left arm, Patient Position: Sitting, Cuff Size: Adult)   Pulse 105   Temp 98.6 °F (37 °C)   Ht 154.9 cm (60.98\")   Wt 89.1 kg (196 lb 6.4 oz)   SpO2 96%   BMI 37.13 kg/m²           Physical Exam  Vitals reviewed. Exam conducted with a chaperone present.   Constitutional:       Appearance: Normal appearance.   HENT:      Nose: Nose normal.      Mouth/Throat:      Mouth: Mucous membranes are moist.   Cardiovascular:      Rate and Rhythm: Normal rate and regular rhythm.      Heart sounds: Normal heart sounds.   Pulmonary:      Effort: Pulmonary effort is normal.      Breath sounds: Normal breath sounds.   Chest:      Chest wall: Mass present.   Breasts:      Left: No swelling, inverted nipple, nipple discharge, skin change or tenderness.        Comments: Left breast at 6 o'clock.  Musculoskeletal:         General: Normal range of motion. "   Skin:     General: Skin is warm.   Neurological:      Mental Status: She is alert and oriented to person, place, and time.   Psychiatric:         Mood and Affect: Mood normal.         Behavior: Behavior normal.        Result Review :                Assessment and Plan   Diagnoses and all orders for this visit:    1. Foot pain, bilateral (Primary)  -     Ambulatory Referral to Podiatry    2. Mass of lower outer quadrant of left breast  -     Mammo Diagnostic Digital Tomosynthesis Left With CAD; Future  -     US Breast Left Complete; Future             Follow Up   No follow-ups on file.  Patient was given instructions and counseling regarding her condition or for health maintenance advice. Please see specific information pulled into the AVS if appropriate.

## 2022-07-05 ENCOUNTER — OFFICE VISIT (OUTPATIENT)
Dept: FAMILY MEDICINE CLINIC | Facility: CLINIC | Age: 32
End: 2022-07-05

## 2022-07-05 VITALS
HEART RATE: 90 BPM | DIASTOLIC BLOOD PRESSURE: 74 MMHG | SYSTOLIC BLOOD PRESSURE: 122 MMHG | BODY MASS INDEX: 36.06 KG/M2 | OXYGEN SATURATION: 98 % | WEIGHT: 191 LBS | HEIGHT: 61 IN

## 2022-07-05 DIAGNOSIS — F41.1 GAD (GENERALIZED ANXIETY DISORDER): ICD-10-CM

## 2022-07-05 DIAGNOSIS — J30.2 SEASONAL ALLERGIES: Primary | ICD-10-CM

## 2022-07-05 PROCEDURE — 99213 OFFICE O/P EST LOW 20 MIN: CPT | Performed by: NURSE PRACTITIONER

## 2022-07-05 RX ORDER — CLINDAMYCIN PHOSPHATE 10 MG/G
1 GEL TOPICAL 2 TIMES DAILY
COMMUNITY

## 2022-07-05 RX ORDER — BUPROPION HYDROCHLORIDE 150 MG/1
150 TABLET ORAL DAILY
Qty: 30 TABLET | Refills: 1 | Status: SHIPPED | OUTPATIENT
Start: 2022-07-05 | End: 2022-08-11 | Stop reason: SDUPTHER

## 2022-07-05 RX ORDER — MONTELUKAST SODIUM 10 MG/1
10 TABLET ORAL NIGHTLY
Qty: 30 TABLET | Refills: 3 | Status: SHIPPED | OUTPATIENT
Start: 2022-07-05 | End: 2023-02-27

## 2022-07-05 RX ORDER — ASCORBIC ACID 500 MG
1000 TABLET ORAL DAILY
COMMUNITY
End: 2023-01-04 | Stop reason: SDUPTHER

## 2022-07-05 RX ORDER — CETIRIZINE HYDROCHLORIDE 10 MG/1
10 TABLET ORAL DAILY
Qty: 30 TABLET | Refills: 3 | Status: SHIPPED | OUTPATIENT
Start: 2022-07-05 | End: 2023-02-27

## 2022-07-05 RX ORDER — TRAZODONE HYDROCHLORIDE 50 MG/1
TABLET ORAL
Status: CANCELLED | OUTPATIENT
Start: 2022-07-05

## 2022-07-05 NOTE — PROGRESS NOTES
"Chief Complaint  Depression (Pt is needing refill in Wellbutrin) and Insomnia (Pt is needing a refill on Trazdone. )    Subjective        Padma Aren presents to [facility]  Pt is here today for medication refills. She is having trouble falling asleep since she stopped taking her trazodone. She stopped taking trazodone about 2 months ago. She is still taking Wellbutrin. She is going to try melatonin. We will also do a behavioral health referral to help manage her meds.     Objective   Vital Signs:  /74   Pulse 90   Ht 154.9 cm (60.98\")   Wt 86.6 kg (191 lb)   SpO2 98%   BMI 36.11 kg/m²   Estimated body mass index is 36.11 kg/m² as calculated from the following:    Height as of this encounter: 154.9 cm (60.98\").    Weight as of this encounter: 86.6 kg (191 lb).        Physical Exam  Vitals reviewed.   Constitutional:       Appearance: Normal appearance.   HENT:      Nose: Nose normal.      Mouth/Throat:      Mouth: Mucous membranes are moist.   Cardiovascular:      Rate and Rhythm: Normal rate and regular rhythm.      Heart sounds: Normal heart sounds.   Pulmonary:      Effort: Pulmonary effort is normal.      Breath sounds: Normal breath sounds.      Comments: Mild cough present on exam.  Skin:     General: Skin is warm.   Neurological:      Mental Status: She is alert and oriented to person, place, and time.   Psychiatric:         Mood and Affect: Mood normal.         Behavior: Behavior normal.        Result Review :                Assessment and Plan   Diagnoses and all orders for this visit:    1. Seasonal allergies (Primary)  -     montelukast (SINGULAIR) 10 MG tablet; Take 1 tablet by mouth Every Night for 120 days.  Dispense: 30 tablet; Refill: 3  -     cetirizine (zyrTEC) 10 MG tablet; Take 1 tablet by mouth Daily for 120 days.  Dispense: 30 tablet; Refill: 3    2. NATHANIEL (generalized anxiety disorder)  -     buPROPion XL (WELLBUTRIN XL) 150 MG 24 hr tablet; Take 1 tablet " by mouth Daily for 60 days.  Dispense: 30 tablet; Refill: 1  -     Ambulatory Referral to Behavioral Health           Follow Up   No follow-ups on file.  Patient was given instructions and counseling regarding her condition or for health maintenance advice. Please see specific information pulled into the AVS if appropriate.

## 2022-07-07 ENCOUNTER — APPOINTMENT (OUTPATIENT)
Dept: MAMMOGRAPHY | Facility: HOSPITAL | Age: 32
End: 2022-07-07

## 2022-07-08 ENCOUNTER — HOSPITAL ENCOUNTER (OUTPATIENT)
Dept: ULTRASOUND IMAGING | Facility: HOSPITAL | Age: 32
Discharge: HOME | End: 2022-07-08

## 2022-07-08 ENCOUNTER — HOSPITAL ENCOUNTER (OUTPATIENT)
Dept: MAMMOGRAPHY | Facility: HOSPITAL | Age: 32
Discharge: HOME | End: 2022-07-08

## 2022-07-08 DIAGNOSIS — N63.23 MASS OF LOWER OUTER QUADRANT OF LEFT BREAST: ICD-10-CM

## 2022-07-08 PROCEDURE — 76642 ULTRASOUND BREAST LIMITED: CPT

## 2022-07-08 PROCEDURE — 77066 DX MAMMO INCL CAD BI: CPT

## 2022-07-08 PROCEDURE — G0279 TOMOSYNTHESIS, MAMMO: HCPCS

## 2022-07-08 PROCEDURE — 77066 DX MAMMO INCL CAD BI: CPT | Performed by: RADIOLOGY

## 2022-07-08 PROCEDURE — 77062 BREAST TOMOSYNTHESIS BI: CPT | Performed by: RADIOLOGY

## 2022-07-08 PROCEDURE — 76642 ULTRASOUND BREAST LIMITED: CPT | Performed by: RADIOLOGY

## 2022-08-11 ENCOUNTER — TELEMEDICINE (OUTPATIENT)
Dept: PSYCHIATRY | Facility: CLINIC | Age: 32
End: 2022-08-11

## 2022-08-11 DIAGNOSIS — F51.04 PSYCHOPHYSIOLOGICAL INSOMNIA: Primary | ICD-10-CM

## 2022-08-11 DIAGNOSIS — F41.1 GAD (GENERALIZED ANXIETY DISORDER): ICD-10-CM

## 2022-08-11 PROCEDURE — 90792 PSYCH DIAG EVAL W/MED SRVCS: CPT | Performed by: NURSE PRACTITIONER

## 2022-08-11 RX ORDER — TRAZODONE HYDROCHLORIDE 50 MG/1
TABLET ORAL
Qty: 60 TABLET | Refills: 6 | Status: SHIPPED | OUTPATIENT
Start: 2022-08-11

## 2022-08-11 RX ORDER — BUPROPION HYDROCHLORIDE 150 MG/1
150 TABLET ORAL DAILY
Qty: 30 TABLET | Refills: 6 | Status: SHIPPED | OUTPATIENT
Start: 2022-08-11 | End: 2023-01-04 | Stop reason: SDUPTHER

## 2022-08-11 NOTE — PROGRESS NOTES
Patient Name: Padma Younger  MRN: [id number]   :  1990     Referring Physician: Lanette Murillo APRN    This provider is located at the [facility] (through [facility]), [address], [address] using a secure [employer] Video Visit through [employer]. Patient is being seen remotely via telehealth at their home address in [place], and stated they are in a secure environment for this session. The patient's condition being diagnosed/treated is appropriate for telemedicine. The provider identified herself as well as her credentials.   The patient, and/or patients guardian, consent to be seen remotely, and when consent is given they understand that the consent allows for patient identifiable information to be sent to a third party as needed.   They may refuse to be seen remotely at any time. The electronic data is encrypted and password protected, and the patient and/or guardian has been advised of the potential risks to privacy not withstanding such measures.    You have chosen to receive care through a telehealth visit.  Do you consent to use a video/audio connection for your medical care today? Yes    Chief Complaint:     ICD-10-CM ICD-9-CM   1. Psychophysiological insomnia  F51.04 307.42   2. NATHANIEL (generalized anxiety disorder)  F41.1 300.02       HPI:   Padma Younger is a 32 y.o. female who is here today for initial evaluation of Anxiety  and Insomnia .  Patient recently got a new primary care provider as she changed jobs.  Patient was previously on trazodone and has been without this for a few months.  States this helps her sleep quite effectively.  Patient has been under a lot of stress as she was unemployed and has a 2-year-old child.  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  Had COVID last month so was not taking her Wellbutrin as she was very sick.  Now it is getting back into her system and she  thinks getting back into a regular routine with work and better sleep her mood will get better as well.  Also loves to walk and exercise as a good stress reliever.    Past Medical History:   Past Medical History:   Diagnosis Date   • Abnormal Pap smear of cervix    • Anxiety    • Asthma    • Chlamydia     2013   • Chlamydia contact, treated    • Depression    • Gonorrhea     2013   • Urinary tract infection    • Visual impairment        Past Surgical History:   Past Surgical History:   Procedure Laterality Date   • CYST REMOVAL     • D & C WITH SUCTION N/A 9/18/2017    Procedure: DILATATION AND CURETTAGE WITH SUCTION;  Surgeon: Kelley Dodge DO;  Location: [facility];  Service:    • DENTAL PROCEDURE     • DILATATION AND CURETTAGE  09/18/2017   • EYE SURGERY  12/2018    Intact Surgery   • KNEE SURGERY     • VAGINAL DELIVERY      x1   • WISDOM TOOTH EXTRACTION         Social History:   Social History     Socioeconomic History   • Marital status: Single   Tobacco Use   • Smoking status: Never Smoker   • Smokeless tobacco: Never Used   Vaping Use   • Vaping Use: Never used   Substance and Sexual Activity   • Alcohol use: Yes     Alcohol/week: 2.0 standard drinks     Types: 2 Glasses of wine per week     Comment: OCCASIONAL   • Drug use: No   • Sexual activity: Yes     Partners: Male     Birth control/protection: Abstinence, Condom       Family History:  Family History   Problem Relation Age of Onset   • Arthritis Mother    • COPD Mother    • Liver disease Maternal Uncle    • Breast cancer Maternal Grandmother    • Cancer Maternal Grandmother    • Diabetes Maternal Grandfather    • Cancer Maternal Grandfather    • Breast cancer Paternal Grandmother    • Ovarian cancer Paternal Grandmother    • Diabetes Paternal Grandmother    • Heart attack Paternal Grandmother    • Hypertension Paternal Grandmother    • Stroke Paternal Grandmother    • Cancer Paternal Grandmother    • Colon cancer Paternal Grandfather         Allergy:  Allergies   Allergen Reactions   • Percocet [Oxycodone-Acetaminophen] Itching       Current Medications:   Current Outpatient Medications   Medication Sig Dispense Refill   • buPROPion XL (WELLBUTRIN XL) 150 MG 24 hr tablet Take 1 tablet by mouth Daily. 30 tablet 6   • traZODone (DESYREL) 50 MG tablet 1-2 po q hs prn insomnia 60 tablet 6   • albuterol sulfate  (90 Base) MCG/ACT inhaler Inhale 2 puffs Every 4 (Four) Hours As Needed for Wheezing.     • ascorbic acid (VITAMIN C) 500 MG tablet Take 1,000 mg by mouth Daily.     • cetirizine (zyrTEC) 10 MG tablet Take 1 tablet by mouth Daily for 120 days. 30 tablet 3   • clindamycin (CLINDAGEL) 1 % gel Apply 1 application topically to the appropriate area as directed 2 (Two) Times a Day.     • famotidine (PEPCID) 20 MG tablet      • montelukast (SINGULAIR) 10 MG tablet Take 1 tablet by mouth Every Night for 120 days. 30 tablet 3   • vitamin D3 125 MCG (5000 UT) capsule capsule Take 5,000 Units by mouth Daily.       No current facility-administered medications for this visit.       Lab Results:   No visits with results within 3 Month(s) from this visit.   Latest known visit with results is:   Admission on 10/29/2021, Discharged on 10/30/2021   Component Date Value Ref Range Status   • QT Interval 10/29/2021 420  ms Final   • QTC Interval 10/29/2021 443  ms Final   • Troponin T 10/29/2021 <0.010  0.000 - 0.030 ng/mL Final   • Troponin T 10/30/2021 <0.010  0.000 - 0.030 ng/mL Final   • Glucose 10/29/2021 89  65 - 99 mg/dL Final   • BUN 10/29/2021 8  6 - 20 mg/dL Final   • Creatinine 10/29/2021 0.95  0.57 - 1.00 mg/dL Final   • Sodium 10/29/2021 139  136 - 145 mmol/L Final   • Potassium 10/29/2021 3.5  3.5 - 5.2 mmol/L Final    Slight hemolysis detected by analyzer. Results may be affected.   • Chloride 10/29/2021 102  98 - 107 mmol/L Final   • CO2 10/29/2021 24.0  22.0 - 29.0 mmol/L Final   • Calcium 10/29/2021 9.3  8.6 - 10.5 mg/dL Final   • Total  Protein 10/29/2021 7.4  6.0 - 8.5 g/dL Final   • Albumin 10/29/2021 4.50  3.50 - 5.20 g/dL Final   • ALT (SGPT) 10/29/2021 11  1 - 33 U/L Final   • AST (SGOT) 10/29/2021 19  1 - 32 U/L Final   • Alkaline Phosphatase 10/29/2021 112  39 - 117 U/L Final   • Total Bilirubin 10/29/2021 0.7  0.0 - 1.2 mg/dL Final   • eGFR   Amer 10/29/2021 83  >60 mL/min/1.73 Final   • Globulin 10/29/2021 2.9  gm/dL Final    Calculated Result   • A/G Ratio 10/29/2021 1.6  g/dL Final   • BUN/Creatinine Ratio 10/29/2021 8.4  7.0 - 25.0 Final   • Anion Gap 10/29/2021 13.0  5.0 - 15.0 mmol/L Final   • Lipase 10/29/2021 28  13 - 60 U/L Final   • proBNP 10/29/2021 8.3  0.0 - 450.0 pg/mL Final   • Extra Tube 10/29/2021 Hold for add-ons.   Final    Auto resulted.   • Extra Tube 10/29/2021 hold for add-on   Final    Auto resulted   • Extra Tube 10/29/2021 Hold for add-ons.   Final    Auto resulted.   • Extra Tube 10/29/2021 Hold for add-ons.   Final    Auto resulted.   • Extra Tube 10/29/2021 hold for add-on   Final    Auto resulted   • WBC 10/29/2021 9.59  3.40 - 10.80 10*3/mm3 Final   • RBC 10/29/2021 4.29  3.77 - 5.28 10*6/mm3 Final   • Hemoglobin 10/29/2021 12.1  12.0 - 15.9 g/dL Final   • Hematocrit 10/29/2021 38.3  34.0 - 46.6 % Final   • MCV 10/29/2021 89.3  79.0 - 97.0 fL Final   • MCH 10/29/2021 28.2  26.6 - 33.0 pg Final   • MCHC 10/29/2021 31.6  31.5 - 35.7 g/dL Final   • RDW 10/29/2021 12.8  12.3 - 15.4 % Final   • RDW-SD 10/29/2021 41.8  37.0 - 54.0 fl Final   • MPV 10/29/2021 10.2  6.0 - 12.0 fL Final   • Platelets 10/29/2021 273  140 - 450 10*3/mm3 Final   • Neutrophil % 10/29/2021 71.7  42.7 - 76.0 % Final   • Lymphocyte % 10/29/2021 22.4  19.6 - 45.3 % Final   • Monocyte % 10/29/2021 5.0  5.0 - 12.0 % Final   • Eosinophil % 10/29/2021 0.4  0.3 - 6.2 % Final   • Basophil % 10/29/2021 0.2  0.0 - 1.5 % Final   • Immature Grans % 10/29/2021 0.3  0.0 - 0.5 % Final   • Neutrophils, Absolute 10/29/2021 6.87  1.70 - 7.00 10*3/mm3  Final   • Lymphocytes, Absolute 10/29/2021 2.15  0.70 - 3.10 10*3/mm3 Final   • Monocytes, Absolute 10/29/2021 0.48  0.10 - 0.90 10*3/mm3 Final   • Eosinophils, Absolute 10/29/2021 0.04  0.00 - 0.40 10*3/mm3 Final   • Basophils, Absolute 10/29/2021 0.02  0.00 - 0.20 10*3/mm3 Final   • Immature Grans, Absolute 10/29/2021 0.03  0.00 - 0.05 10*3/mm3 Final   • nRBC 10/29/2021 0.0  0.0 - 0.2 /100 WBC Final   • D-Dimer, Quantitative 10/29/2021 <0.27  0.00 - 0.56 MCGFEU/mL Final   • QT Interval 10/30/2021 386  ms Final   • QTC Interval 10/30/2021 450  ms Final       Review of Symptoms:   Review of Systems   Constitutional: Negative for activity change, appetite change, fatigue, unexpected weight gain and unexpected weight loss.   Respiratory: Negative for shortness of breath and wheezing.    Gastrointestinal: Negative for constipation, diarrhea, nausea and vomiting.   Musculoskeletal: Negative for gait problem.   Skin: Negative for dry skin and rash.   Neurological: Negative for dizziness, speech difficulty, weakness, light-headedness, headache, memory problem and confusion.   Psychiatric/Behavioral: Positive for sleep disturbance and stress. Negative for agitation, behavioral problems, decreased concentration, dysphoric mood, hallucinations, self-injury, suicidal ideas, negative for hyperactivity and depressed mood. The patient is not nervous/anxious.        Physical Exam:   Physical Exam  Vitals and nursing note reviewed.   Constitutional:       General: She is not in acute distress.     Appearance: She is well-developed. She is not diaphoretic.   HENT:      Head: Normocephalic and atraumatic.   Eyes:      Conjunctiva/sclera: Conjunctivae normal.   Pulmonary:      Effort: Pulmonary effort is normal. No respiratory distress.   Musculoskeletal:         General: Normal range of motion.      Cervical back: Full passive range of motion without pain and normal range of motion.   Neurological:      Mental Status: She is alert  and oriented to person, place, and time.   Psychiatric:         Mood and Affect: Mood is not anxious or depressed. Affect is not labile, blunt, angry or inappropriate.         Speech: Speech is not rapid and pressured or tangential.         Behavior: Behavior normal. Behavior is not agitated, slowed, aggressive, withdrawn, hyperactive or combative. Behavior is cooperative.         Thought Content: Thought content normal. Thought content is not paranoid or delusional. Thought content does not include homicidal or suicidal ideation. Thought content does not include homicidal or suicidal plan.         Judgment: Judgment normal.       not currently breastfeeding.  There is no height or weight on file to calculate BMI.     Mental Status Exam:   Appearance: appropriate  Hygiene:   good  Cooperation:  Cooperative  Eye Contact:  Good  Psychomotor Behavior:  Appropriate  Mood:within normal limits  Affect:  Appropriate  Hopelessness: Denies  Speech:  Normal  Thought Process:  Goal directed  Thought Content:  Normal  Suicidal:  None  Homicidal:  None  Hallucinations:  None  Delusion:  None  Memory:  Intact  Orientation:  Person, Place, Time and Situation  Reliability:  good  Insight:  Good  Judgement:  Good  Impulse Control:  Good  Physical/Medical Issues:  No     PHQ-9 Depression Screening  Little interest or pleasure in doing things? (P) 1   Feeling down, depressed, or hopeless? (P) 3   Trouble falling or staying asleep, or sleeping too much? (P) 3   Feeling tired or having little energy? (P) 2   Poor appetite or overeating? (P) 2   Feeling bad about yourself - or that you are a failure or have let yourself or your family down? (P) 3   Trouble concentrating on things, such as reading the newspaper or watching television? (P) 1   Moving or speaking so slowly that other people could have noticed? Or the opposite - being so fidgety or restless that you have been moving around a lot more than usual? (P) 0   Thoughts that you  would be better off dead, or of hurting yourself in some way? (P) 0   PHQ-9 Total Score (P) 15   If you checked off any problems, how difficult have these problems made it for you to do your work, take care of things at home, or get along with other people? (P) Somewhat difficult      Reviewed CRISTIN # [id number]    Assessment/Plan:   Diagnoses and all orders for this visit:    1. Psychophysiological insomnia (Primary)  -     traZODone (DESYREL) 50 MG tablet; 1-2 po q hs prn insomnia  Dispense: 60 tablet; Refill: 6    2. NATHANIEL (generalized anxiety disorder)  -     buPROPion XL (WELLBUTRIN XL) 150 MG 24 hr tablet; Take 1 tablet by mouth Daily.  Dispense: 30 tablet; Refill: 6    Patient feels once she restarts her trazodone and gets back into a good routine she feels that will help her mood immensely.  We will continue her medications as previously ordered.    A psychological evaluation was conducted in order to assess past and current level of functioning. Areas assessed included, but were not limited to: perception of social support, perception of ability to face and deal with challenges in life (positive functioning), anxiety symptoms, depressive symptoms, perspective on beliefs/belief system, coping skills for stress, intelligence level,  Therapeutic rapport was established. Interventions conducted today were geared towards incorporating medication management along with support for continued therapy. Education was also provided as to the med management with this provider and what to expect in subsequent sessions.    We discussed risks, benefits,goals and side effects of the above medication and the patient was agreeable with the plan.Patient was educated on the importance of compliance with treatment and follow-up appointments. Patient is aware to contact the [facility] with any worsening of symptoms. To call for questions or concerns and return early if necessary. Patent is agreeable to go to the Emergency  Department or call 911 should they begin SI/HI.     Part of this note may be an electronic transcription/translation of spoken language to printed text using the Dragon Dictation System.    Return in about 4 weeks (around 9/8/2022) for Follow Up 30 min, Video visit.    Rocío Alegre, APRN

## 2022-12-28 ENCOUNTER — READMISSION MANAGEMENT (OUTPATIENT)
Dept: CALL CENTER | Facility: HOSPITAL | Age: 32
End: 2022-12-28

## 2022-12-28 ENCOUNTER — TELEPHONE (OUTPATIENT)
Dept: FAMILY MEDICINE CLINIC | Facility: CLINIC | Age: 32
End: 2022-12-28

## 2022-12-28 NOTE — TELEPHONE ENCOUNTER
Caller: Padma Younger    Relationship to patient: Self    Best call back number: [phone]    New or established patient?  [] New  [x] Established    Date of discharge: 12/28/2022    Facility discharged from: [facility]     Diagnosis/Symptoms: ACUTE COUGH AND ACUTE BRONCHIAL SPASEM    Length of stay (If applicable): A FEW HOURS

## 2022-12-28 NOTE — OUTREACH NOTE
Prep Survey    Flowsheet Row Responses   [facility] facility patient discharged from? Non-[facility]   Is LACE score < 7 ? Non- Discharge   Eligibility [facility]   Date of Discharge 12/28/22   Discharge Disposition Home or Self Care   Discharge diagnosis SOA   Does the patient have one of the following disease processes/diagnoses(primary or secondary)? Other   Prep survey completed? Yes          KHUSHBU ZULETA - Registered Nurse

## 2022-12-29 ENCOUNTER — TRANSITIONAL CARE MANAGEMENT TELEPHONE ENCOUNTER (OUTPATIENT)
Dept: CALL CENTER | Facility: HOSPITAL | Age: 32
End: 2022-12-29

## 2022-12-29 NOTE — OUTREACH NOTE
Call Center TCM Note    Flowsheet Row Responses   [facility] patient discharged from? Non-   Does the patient have one of the following disease processes/diagnoses(primary or secondary)? Other   TCM attempt successful? Yes   Call start time 1541   Call end time 1543   Discharge diagnosis SOA   Meds reviewed with patient/caregiver? Yes   Is the patient having any side effects they believe may be caused by any medication additions or changes? No   Does the patient have all medications ordered at discharge? Yes   Is the patient taking all medications as directed (includes completed medication regime)? Yes   Medication comments prednisone, and ABT and inhaler   Comments HOSP DC FU appt 1/4/23 @ 10:15 am.    Does the patient have an appointment with their PCP within 7 days of discharge? Yes   Has home health visited the patient within 72 hours of discharge? N/A   Psychosocial issues? No   Did the patient receive a copy of their discharge instructions? Yes   Nursing interventions Reviewed instructions with patient   What is the patient's perception of their health status since discharge? Improving   Is the patient/caregiver able to teach back signs and symptoms related to disease process for when to call PCP? Yes   Is the patient/caregiver able to teach back signs and symptoms related to disease process for when to call 911? Yes   Is the patient/caregiver able to teach back the hierarchy of who to call/visit for symptoms/problems? PCP, Specialist, Home health nurse, Urgent Care, ED, 911 Yes   TCM call completed? Yes   Wrap up additional comments Pt reports she is doing better, Her caough has improved.    Call end time 1543          Jacqueline Diane RN    12/29/2022, 15:45 EST

## 2022-12-29 NOTE — OUTREACH NOTE
Call Center TCM Note    Flowsheet Row Responses   [facility] patient discharged from? Non-  [[place]]   Does the patient have one of the following disease processes/diagnoses(primary or secondary)? Other   TCM attempt successful? No  [verbal release for Odessa Levi--sister, Lucy Montez--sister and Tasha Kwok--sister]   Unsuccessful attempts Attempt 1   Call Status Left message  [left message per verbal release consent]          Aurora Earl RN    12/29/2022, 13:05 EST

## 2023-01-04 ENCOUNTER — OFFICE VISIT (OUTPATIENT)
Dept: FAMILY MEDICINE CLINIC | Facility: CLINIC | Age: 33
End: 2023-01-04
Payer: MEDICAID

## 2023-01-04 VITALS
HEART RATE: 90 BPM | HEIGHT: 61 IN | WEIGHT: 187.4 LBS | BODY MASS INDEX: 35.38 KG/M2 | SYSTOLIC BLOOD PRESSURE: 122 MMHG | DIASTOLIC BLOOD PRESSURE: 84 MMHG | OXYGEN SATURATION: 99 %

## 2023-01-04 DIAGNOSIS — R63.1 INCREASED THIRST: Primary | ICD-10-CM

## 2023-01-04 DIAGNOSIS — R05.1 ACUTE COUGH: ICD-10-CM

## 2023-01-04 DIAGNOSIS — F41.1 GAD (GENERALIZED ANXIETY DISORDER): ICD-10-CM

## 2023-01-04 DIAGNOSIS — R11.10 VOMITING, UNSPECIFIED VOMITING TYPE, UNSPECIFIED WHETHER NAUSEA PRESENT: ICD-10-CM

## 2023-01-04 LAB — GLUCOSE BLDC GLUCOMTR-MCNC: 88 MG/DL (ref 70–130)

## 2023-01-04 PROCEDURE — 99213 OFFICE O/P EST LOW 20 MIN: CPT | Performed by: NURSE PRACTITIONER

## 2023-01-04 PROCEDURE — 1159F MED LIST DOCD IN RCRD: CPT | Performed by: NURSE PRACTITIONER

## 2023-01-04 PROCEDURE — 1160F RVW MEDS BY RX/DR IN RCRD: CPT | Performed by: NURSE PRACTITIONER

## 2023-01-04 PROCEDURE — 82962 GLUCOSE BLOOD TEST: CPT | Performed by: NURSE PRACTITIONER

## 2023-01-04 RX ORDER — ASCORBIC ACID 500 MG
1000 TABLET ORAL DAILY
Qty: 60 TABLET | Refills: 1 | Status: SHIPPED | OUTPATIENT
Start: 2023-01-04 | End: 2023-03-09

## 2023-01-04 RX ORDER — ALBUTEROL SULFATE 90 UG/1
2 AEROSOL, METERED RESPIRATORY (INHALATION) EVERY 4 HOURS PRN
Qty: 18 G | Refills: 0 | Status: SHIPPED | OUTPATIENT
Start: 2023-01-04

## 2023-01-04 RX ORDER — BUPROPION HYDROCHLORIDE 150 MG/1
150 TABLET ORAL DAILY
Qty: 30 TABLET | Refills: 5 | Status: SHIPPED | OUTPATIENT
Start: 2023-01-04

## 2023-01-04 NOTE — PROGRESS NOTES
Chief Complaint  Vomiting    Subjective        Padma Aren presents to [facility]  History of Present Illness  Pt is here today for a follow up from ER visit. She was seen in ER 12/26 and diagnosed with cough and bronchitis. She states she is feeling better. She has experienced increased thirst the last couple of days. She had episode of vomiting 2 days ago and reports that she has been extremely thirsty since then. She was given steroids at ER; finished those 2 days ago. She was vomiting after taking doxycycline; stopped taking it on day 6.   Vomiting   This is a new problem. The current episode started in the past 7 days. The problem has been rapidly improving. There has been no fever. Pertinent negatives include no abdominal pain, coughing, diarrhea or fever.     Objective   Vital Signs:  /84   Pulse 90   Ht 154.9 cm (60.98\")   Wt 85 kg (187 lb 6.4 oz)   SpO2 99%   BMI 35.43 kg/m²   Estimated body mass index is 35.43 kg/m² as calculated from the following:    Height as of this encounter: 154.9 cm (60.98\").    Weight as of this encounter: 85 kg (187 lb 6.4 oz).          Physical Exam  Vitals reviewed.   Constitutional:       Appearance: Normal appearance.   HENT:      Nose: Nose normal.      Mouth/Throat:      Mouth: Mucous membranes are moist.      Pharynx: Oropharynx is clear.   Eyes:      Conjunctiva/sclera: Conjunctivae normal.   Cardiovascular:      Rate and Rhythm: Normal rate and regular rhythm.      Heart sounds: Normal heart sounds.   Pulmonary:      Effort: Pulmonary effort is normal.      Breath sounds: Normal breath sounds.   Abdominal:      Tenderness: There is no abdominal tenderness.   Musculoskeletal:         General: Normal range of motion.      Cervical back: Normal range of motion and neck supple.   Skin:     General: Skin is warm.   Neurological:      Mental Status: She is alert and oriented to person, place, and time.   Psychiatric:         Mood and  Affect: Mood normal.         Behavior: Behavior normal.         Thought Content: Thought content normal.        Result Review :                Assessment and Plan   Diagnoses and all orders for this visit:    1. Increased thirst (Primary)  -     POC Glucose    2. Vomiting, unspecified vomiting type, unspecified whether nausea present    3. NATHANIEL (generalized anxiety disorder)  -     buPROPion XL (WELLBUTRIN XL) 150 MG 24 hr tablet; Take 1 tablet by mouth Daily.  Dispense: 30 tablet; Refill: 5    4. Acute cough  -     albuterol sulfate  (90 Base) MCG/ACT inhaler; Inhale 2 puffs Every 4 (Four) Hours As Needed for Wheezing.  Dispense: 18 g; Refill: 0    Other orders  -     ascorbic acid (VITAMIN C) 500 MG tablet; Take 2 tablets by mouth Daily.  Dispense: 60 tablet; Refill: 1             Follow Up   No follow-ups on file.  Patient was given instructions and counseling regarding her condition or for health maintenance advice. Please see specific information pulled into the AVS if appropriate.

## 2023-02-22 ENCOUNTER — LAB (OUTPATIENT)
Dept: LAB | Facility: HOSPITAL | Age: 33
End: 2023-02-22
Payer: MEDICAID

## 2023-02-22 PROCEDURE — 88304 TISSUE EXAM BY PATHOLOGIST: CPT

## 2023-02-25 DIAGNOSIS — J30.2 SEASONAL ALLERGIES: ICD-10-CM

## 2023-02-27 RX ORDER — CETIRIZINE HYDROCHLORIDE 10 MG/1
TABLET ORAL
Qty: 30 TABLET | Refills: 5 | Status: SHIPPED | OUTPATIENT
Start: 2023-02-27

## 2023-02-27 RX ORDER — MONTELUKAST SODIUM 10 MG/1
TABLET ORAL
Qty: 30 TABLET | Refills: 5 | Status: SHIPPED | OUTPATIENT
Start: 2023-02-27

## 2023-02-27 NOTE — TELEPHONE ENCOUNTER
Rx Refill Note  Requested Prescriptions     Pending Prescriptions Disp Refills   • montelukast (SINGULAIR) 10 MG tablet [Pharmacy Med Name: MONTELUKAST SOD 10 MG TABLET] 30 tablet 3     Sig: TAKE ONE TABLET BY MOUTH EVERY NIGHT  DAYS   • cetirizine (zyrTEC) 10 MG tablet [Pharmacy Med Name: CETIRIZINE HCL 10 MG TABLET] 30 tablet 3     Sig: TAKE ONE TABLET BY MOUTH DAILY      Last office visit with prescribing clinician: 1/4/2023   Last telemedicine visit with prescribing clinician: Visit date not found   Next office visit with prescribing clinician: Visit date not found                         Would you like a call back once the refill request has been completed: [] Yes [] No    If the office needs to give you a call back, can they leave a voicemail: [] Yes [] No    Amarilys Washington MA  02/27/23, 09:28 EST

## 2023-03-09 RX ORDER — ASCORBIC ACID 500 MG
TABLET ORAL
Qty: 60 TABLET | Refills: 1 | Status: SHIPPED | OUTPATIENT
Start: 2023-03-09

## 2023-03-09 NOTE — TELEPHONE ENCOUNTER
Rx Refill Note  Requested Prescriptions     Pending Prescriptions Disp Refills   • vitamin C (ASCORBIC ACID) 500 MG tablet [Pharmacy Med Name: VITAMIN C 500 MG TABLET] 60 tablet 1     Sig: TAKE TWO TABLETS BY MOUTH DAILY      Last office visit with prescribing clinician: 1/4/2023   Last telemedicine visit with prescribing clinician: Visit date not found   Next office visit with prescribing clinician: Visit date not found                         Would you like a call back once the refill request has been completed: [] Yes [] No    If the office needs to give you a call back, can they leave a voicemail: [] Yes [] No    April KEN Naqvi  03/09/23, 08:55 EST

## 2023-04-27 ENCOUNTER — OFFICE VISIT (OUTPATIENT)
Dept: FAMILY MEDICINE CLINIC | Facility: CLINIC | Age: 33
End: 2023-04-27
Payer: MEDICAID

## 2023-04-27 VITALS
BODY MASS INDEX: 36.21 KG/M2 | WEIGHT: 191.8 LBS | OXYGEN SATURATION: 97 % | SYSTOLIC BLOOD PRESSURE: 120 MMHG | HEIGHT: 61 IN | DIASTOLIC BLOOD PRESSURE: 82 MMHG | HEART RATE: 93 BPM

## 2023-04-27 DIAGNOSIS — N64.4 BREAST PAIN, LEFT: Primary | ICD-10-CM

## 2023-04-27 NOTE — PROGRESS NOTES
"Answers for HPI/ROS submitted by the patient on 4/26/2023  Please describe your symptoms.: Nipple pain in my left breast., Feels like a needle is sliding through it.  Have you had these symptoms before?: No  How long have you been having these symptoms?: 1-4 days  Please describe any probable cause for these symptoms. : I had a mass removed from my left breast in February  What is the primary reason for your visit?: Other    Chief Complaint  Breast Problem (Pt states that she had breast surgery in February and now she states that she is having sharp pain in her left nipple. )    Subjective        Padma Younger presents to [facility]  History of Present Illness  Pt is here today with concerns of nipple pain. She had surgery on the left breast in February. She states she noticed a lump on her left breast about 2 weeks ago. The lump is gone now, but she has started having nipple pain since then. She is experiencing darkening of the skin on the left breast; states left breast has burning sensation. Pain in the left breast has been severe enough that patient is not able to lay on her stomach.     Objective   Vital Signs:  /82   Pulse 93   Ht 154.9 cm (60.98\")   Wt 87 kg (191 lb 12.8 oz)   SpO2 97%   BMI 36.26 kg/m²   Estimated body mass index is 36.26 kg/m² as calculated from the following:    Height as of this encounter: 154.9 cm (60.98\").    Weight as of this encounter: 87 kg (191 lb 12.8 oz).           Physical Exam  Vitals reviewed. Exam conducted with a chaperone present.   Constitutional:       Appearance: Normal appearance.   HENT:      Nose: Nose normal.      Mouth/Throat:      Mouth: Mucous membranes are moist.      Pharynx: Oropharynx is clear.   Eyes:      Conjunctiva/sclera: Conjunctivae normal.   Cardiovascular:      Rate and Rhythm: Normal rate and regular rhythm.      Heart sounds: Normal heart sounds.   Pulmonary:      Effort: Pulmonary effort is normal.      Breath " sounds: Normal breath sounds.   Chest:      Chest wall: Tenderness present.   Breasts:     Left: Tenderness present. No inverted nipple or nipple discharge.      Comments: Generalized tenderness of left breast; worse on outer perimeter of breast.   Musculoskeletal:         General: Normal range of motion.      Cervical back: Normal range of motion.   Skin:     General: Skin is warm.   Neurological:      Mental Status: She is alert and oriented to person, place, and time.   Psychiatric:         Mood and Affect: Mood normal.         Behavior: Behavior normal.         Thought Content: Thought content normal.        Result Review :                 Assessment and Plan   Diagnoses and all orders for this visit:    1. Breast pain, left (Primary)  -     Mammo Diagnostic Digital Tomosynthesis Left With CAD; Future             Follow Up   No follow-ups on file.  Patient was given instructions and counseling regarding her condition or for health maintenance advice. Please see specific information pulled into the AVS if appropriate.

## 2023-05-19 ENCOUNTER — HOSPITAL ENCOUNTER (OUTPATIENT)
Dept: GENERAL RADIOLOGY | Facility: HOSPITAL | Age: 33
Discharge: HOME | End: 2023-05-19
Payer: COMMERCIAL

## 2023-05-19 ENCOUNTER — OFFICE VISIT (OUTPATIENT)
Dept: FAMILY MEDICINE CLINIC | Facility: CLINIC | Age: 33
End: 2023-05-19
Payer: COMMERCIAL

## 2023-05-19 VITALS
HEART RATE: 91 BPM | DIASTOLIC BLOOD PRESSURE: 68 MMHG | WEIGHT: 187 LBS | OXYGEN SATURATION: 97 % | BODY MASS INDEX: 35.3 KG/M2 | HEIGHT: 61 IN | SYSTOLIC BLOOD PRESSURE: 110 MMHG

## 2023-05-19 DIAGNOSIS — M25.512 ACUTE PAIN OF LEFT SHOULDER: ICD-10-CM

## 2023-05-19 DIAGNOSIS — M25.512 ACUTE PAIN OF LEFT SHOULDER: Primary | ICD-10-CM

## 2023-05-19 PROCEDURE — 73030 X-RAY EXAM OF SHOULDER: CPT

## 2023-05-19 RX ORDER — PREDNISONE 20 MG/1
40 TABLET ORAL DAILY
Qty: 6 TABLET | Refills: 0 | Status: SHIPPED | OUTPATIENT
Start: 2023-05-19 | End: 2023-05-22

## 2023-05-19 NOTE — PROGRESS NOTES
"Chief Complaint  Shoulder Pain (Left shoulder still painful burning sensation. Hx of cyst removal)    Subjective        Padma Aren presents to [facility]  History of Present Illness  Pt is here today for concerns of left shoulder pain that radiates to the left elbow. She has been taking tylenol, ibuprofen, aspirin for pain. She got minimal relief from ibuprofen. Shoulder pain started a couple of months ago. Pain is described as throbbing in the shoulder with hot pain that radiates down to the elbow. Pt reports she does carry a heavy bag. Since experiencing shoulder pain she has started alternating shoulders.     Objective   Vital Signs:  /68   Pulse 91   Ht 154.9 cm (60.98\")   Wt 84.8 kg (187 lb)   SpO2 97%   BMI 35.36 kg/m²   Estimated body mass index is 35.36 kg/m² as calculated from the following:    Height as of this encounter: 154.9 cm (60.98\").    Weight as of this encounter: 84.8 kg (187 lb).           Physical Exam  Vitals reviewed.   Constitutional:       Appearance: Normal appearance.   HENT:      Nose: Nose normal.      Mouth/Throat:      Pharynx: Oropharynx is clear.   Eyes:      Conjunctiva/sclera: Conjunctivae normal.   Cardiovascular:      Rate and Rhythm: Normal rate and regular rhythm.      Heart sounds: Normal heart sounds.   Pulmonary:      Effort: Pulmonary effort is normal.      Breath sounds: Normal breath sounds.   Musculoskeletal:         General: Tenderness present.      Cervical back: Normal range of motion.   Skin:     General: Skin is warm.   Neurological:      Mental Status: She is alert and oriented to person, place, and time.   Psychiatric:         Mood and Affect: Mood normal.         Behavior: Behavior normal.         Thought Content: Thought content normal.        Result Review :                 Assessment and Plan   Diagnoses and all orders for this visit:    1. Acute pain of left shoulder (Primary)  -     Ambulatory Referral to Physical " Therapy Evaluate and treat  -     XR Shoulder 2+ View Left; Future  -     predniSONE (DELTASONE) 20 MG tablet; Take 2 tablets by mouth Daily for 3 days.  Dispense: 6 tablet; Refill: 0             Follow Up   No follow-ups on file.  Patient was given instructions and counseling regarding her condition or for health maintenance advice. Please see specific information pulled into the AVS if appropriate.

## 2023-07-25 RX ORDER — ASCORBIC ACID 500 MG
TABLET ORAL
Qty: 60 TABLET | Refills: 1 | Status: SHIPPED | OUTPATIENT
Start: 2023-07-25

## 2023-07-25 NOTE — TELEPHONE ENCOUNTER
Rx Refill Note  Requested Prescriptions     Pending Prescriptions Disp Refills    vitamin C (ASCORBIC ACID) 500 MG tablet [Pharmacy Med Name: VITAMIN C 500 MG TABLET] 60 tablet 1     Sig: TAKE TWO TABLETS BY MOUTH DAILY      Last office visit with prescribing clinician: 5/19/2023   Last telemedicine visit with prescribing clinician: Visit date not found   Next office visit with prescribing clinician: Visit date not found                         Would you like a call back once the refill request has been completed: [] Yes [] No    If the office needs to give you a call back, can they leave a voicemail: [] Yes [] No    Amarilys Washington MA  07/25/23, 13:10 EDT

## 2023-08-01 ENCOUNTER — TRANSCRIBE ORDERS (OUTPATIENT)
Dept: LAB | Facility: HOSPITAL | Age: 33
End: 2023-08-01
Payer: COMMERCIAL

## 2023-08-01 ENCOUNTER — LAB (OUTPATIENT)
Dept: LAB | Facility: HOSPITAL | Age: 33
End: 2023-08-01
Payer: COMMERCIAL

## 2023-08-01 DIAGNOSIS — N92.6 IRREGULAR MENSTRUAL CYCLE: Primary | ICD-10-CM

## 2023-08-01 LAB
HCG INTACT+B SERPL-ACNC: 9869 MIU/ML
PROGEST SERPL-MCNC: 19.9 NG/ML

## 2023-08-01 PROCEDURE — 36415 COLL VENOUS BLD VENIPUNCTURE: CPT | Performed by: ADVANCED PRACTICE MIDWIFE

## 2023-08-01 PROCEDURE — 84702 CHORIONIC GONADOTROPIN TEST: CPT | Performed by: ADVANCED PRACTICE MIDWIFE

## 2023-08-01 PROCEDURE — 84144 ASSAY OF PROGESTERONE: CPT | Performed by: ADVANCED PRACTICE MIDWIFE

## 2023-08-15 ENCOUNTER — HOSPITAL ENCOUNTER (EMERGENCY)
Facility: HOSPITAL | Age: 33
Discharge: HOME | End: 2023-08-15
Attending: EMERGENCY MEDICINE
Payer: COMMERCIAL

## 2023-08-15 ENCOUNTER — APPOINTMENT (OUTPATIENT)
Dept: GENERAL RADIOLOGY | Facility: HOSPITAL | Age: 33
End: 2023-08-15
Payer: COMMERCIAL

## 2023-08-15 VITALS
RESPIRATION RATE: 17 BRPM | HEART RATE: 83 BPM | TEMPERATURE: 98.4 F | WEIGHT: 193 LBS | DIASTOLIC BLOOD PRESSURE: 71 MMHG | SYSTOLIC BLOOD PRESSURE: 118 MMHG | OXYGEN SATURATION: 96 % | BODY MASS INDEX: 36.44 KG/M2 | HEIGHT: 61 IN

## 2023-08-15 DIAGNOSIS — R07.89 ACUTE CHEST WALL PAIN: Primary | ICD-10-CM

## 2023-08-15 DIAGNOSIS — Z34.90 PREGNANCY, UNSPECIFIED GESTATIONAL AGE: ICD-10-CM

## 2023-08-15 LAB
ALBUMIN SERPL-MCNC: 4.1 G/DL (ref 3.5–5.2)
ALBUMIN/GLOB SERPL: 1.2 G/DL
ALP SERPL-CCNC: 73 U/L (ref 39–117)
ALT SERPL W P-5'-P-CCNC: 8 U/L (ref 1–33)
ANION GAP SERPL CALCULATED.3IONS-SCNC: 10 MMOL/L (ref 5–15)
AST SERPL-CCNC: 15 U/L (ref 1–32)
BASOPHILS # BLD AUTO: 0.03 10*3/MM3 (ref 0–0.2)
BASOPHILS NFR BLD AUTO: 0.3 % (ref 0–1.5)
BILIRUB SERPL-MCNC: 0.3 MG/DL (ref 0–1.2)
BUN SERPL-MCNC: 10 MG/DL (ref 6–20)
BUN/CREAT SERPL: 11.5 (ref 7–25)
CALCIUM SPEC-SCNC: 9.8 MG/DL (ref 8.6–10.5)
CHLORIDE SERPL-SCNC: 103 MMOL/L (ref 98–107)
CO2 SERPL-SCNC: 25 MMOL/L (ref 22–29)
CREAT SERPL-MCNC: 0.87 MG/DL (ref 0.57–1)
D DIMER PPP FEU-MCNC: <0.27 MCGFEU/ML (ref 0–0.5)
DEPRECATED RDW RBC AUTO: 41.1 FL (ref 37–54)
EGFRCR SERPLBLD CKD-EPI 2021: 89.8 ML/MIN/1.73
EOSINOPHIL # BLD AUTO: 0.04 10*3/MM3 (ref 0–0.4)
EOSINOPHIL NFR BLD AUTO: 0.4 % (ref 0.3–6.2)
ERYTHROCYTE [DISTWIDTH] IN BLOOD BY AUTOMATED COUNT: 12.3 % (ref 12.3–15.4)
FLUAV RNA RESP QL NAA+PROBE: NOT DETECTED
FLUBV RNA RESP QL NAA+PROBE: NOT DETECTED
GLOBULIN UR ELPH-MCNC: 3.3 GM/DL
GLUCOSE SERPL-MCNC: 86 MG/DL (ref 65–99)
HCT VFR BLD AUTO: 39.6 % (ref 34–46.6)
HGB BLD-MCNC: 12.8 G/DL (ref 12–15.9)
IMM GRANULOCYTES # BLD AUTO: 0.03 10*3/MM3 (ref 0–0.05)
IMM GRANULOCYTES NFR BLD AUTO: 0.3 % (ref 0–0.5)
LYMPHOCYTES # BLD AUTO: 2.25 10*3/MM3 (ref 0.7–3.1)
LYMPHOCYTES NFR BLD AUTO: 24.4 % (ref 19.6–45.3)
MCH RBC QN AUTO: 29.4 PG (ref 26.6–33)
MCHC RBC AUTO-ENTMCNC: 32.3 G/DL (ref 31.5–35.7)
MCV RBC AUTO: 90.8 FL (ref 79–97)
MONOCYTES # BLD AUTO: 0.4 10*3/MM3 (ref 0.1–0.9)
MONOCYTES NFR BLD AUTO: 4.3 % (ref 5–12)
NEUTROPHILS NFR BLD AUTO: 6.48 10*3/MM3 (ref 1.7–7)
NEUTROPHILS NFR BLD AUTO: 70.3 % (ref 42.7–76)
NRBC BLD AUTO-RTO: 0 /100 WBC (ref 0–0.2)
NT-PROBNP SERPL-MCNC: <36 PG/ML (ref 0–450)
PLATELET # BLD AUTO: 290 10*3/MM3 (ref 140–450)
PMV BLD AUTO: 9.9 FL (ref 6–12)
POTASSIUM SERPL-SCNC: 4 MMOL/L (ref 3.5–5.2)
PROT SERPL-MCNC: 7.4 G/DL (ref 6–8.5)
RBC # BLD AUTO: 4.36 10*6/MM3 (ref 3.77–5.28)
SARS-COV-2 RNA RESP QL NAA+PROBE: NOT DETECTED
SODIUM SERPL-SCNC: 138 MMOL/L (ref 136–145)
TROPONIN T SERPL HS-MCNC: <6 NG/L
WBC NRBC COR # BLD: 9.23 10*3/MM3 (ref 3.4–10.8)

## 2023-08-15 PROCEDURE — 85025 COMPLETE CBC W/AUTO DIFF WBC: CPT | Performed by: EMERGENCY MEDICINE

## 2023-08-15 PROCEDURE — 93005 ELECTROCARDIOGRAM TRACING: CPT

## 2023-08-15 PROCEDURE — 99284 EMERGENCY DEPT VISIT MOD MDM: CPT

## 2023-08-15 PROCEDURE — 83880 ASSAY OF NATRIURETIC PEPTIDE: CPT | Performed by: EMERGENCY MEDICINE

## 2023-08-15 PROCEDURE — 36415 COLL VENOUS BLD VENIPUNCTURE: CPT

## 2023-08-15 PROCEDURE — 80053 COMPREHEN METABOLIC PANEL: CPT | Performed by: EMERGENCY MEDICINE

## 2023-08-15 PROCEDURE — 84484 ASSAY OF TROPONIN QUANT: CPT | Performed by: EMERGENCY MEDICINE

## 2023-08-15 PROCEDURE — 87636 SARSCOV2 & INF A&B AMP PRB: CPT | Performed by: EMERGENCY MEDICINE

## 2023-08-15 PROCEDURE — 85379 FIBRIN DEGRADATION QUANT: CPT | Performed by: EMERGENCY MEDICINE

## 2023-08-15 PROCEDURE — 93005 ELECTROCARDIOGRAM TRACING: CPT | Performed by: EMERGENCY MEDICINE

## 2023-08-15 PROCEDURE — 71046 X-RAY EXAM CHEST 2 VIEWS: CPT

## 2023-08-15 NOTE — ED PROVIDER NOTES
[place]    EMERGENCY DEPARTMENT ENCOUNTER      Pt Name: Padma Younger  MRN: [id number]  YOB: 1990  Date of evaluation: 8/15/2023  Provider: Damian Madrid MD    CHIEF COMPLAINT       Chief Complaint   Patient presents with    Dizziness         HISTORY OF PRESENT ILLNESS   Padma Younger is a 33 y.o. female who presents to the emergency department with complaint of cold symtpoms on Thursday. Symptoms improved and then Saturday evdning began having mid back pain w/ dizziness. No chest pain, shortness of breath.       Nursing notes were reviewed.    REVIEW OF SYSTEMS     ROS:  A chief complaint appropriate review of systems was completed and is negative except as noted in the HPI.      PAST MEDICAL HISTORY     Past Medical History:   Diagnosis Date    Abnormal Pap smear of cervix     Anxiety     Asthma     Chlamydia     2013    Chlamydia contact, treated     Depression     Gonorrhea     2013    Urinary tract infection     Visual impairment          SURGICAL HISTORY       Past Surgical History:   Procedure Laterality Date    CYST REMOVAL      D & C WITH SUCTION N/A 9/18/2017    Procedure: DILATATION AND CURETTAGE WITH SUCTION;  Surgeon: Kelley Dodge DO;  Location: [facility];  Service:     DENTAL PROCEDURE      DILATATION AND CURETTAGE  09/18/2017    EYE SURGERY  12/2018    Intact Surgery    KNEE SURGERY      VAGINAL DELIVERY      x1    WISDOM TOOTH EXTRACTION           CURRENT MEDICATIONS     No current facility-administered medications for this encounter.    Current Outpatient Medications:     albuterol sulfate  (90 Base) MCG/ACT inhaler, Inhale 2 puffs Every 4 (Four) Hours As Needed for Wheezing., Disp: 18 g, Rfl: 0    buPROPion XL (WELLBUTRIN XL) 150 MG 24 hr tablet, Take 1 tablet by mouth Daily., Disp: 30 tablet, Rfl: 5    famotidine (PEPCID) 20 MG tablet, , Disp: , Rfl:     Loratadine (Claritin) 10 MG capsule, Take 1 capsule by mouth Daily., Disp: , Rfl:     montelukast (SINGULAIR) 10 MG tablet,  "TAKE ONE TABLET BY MOUTH ONCE NIGHTLY, Disp: 90 tablet, Rfl: 0    vitamin C (ASCORBIC ACID) 500 MG tablet, TAKE TWO TABLETS BY MOUTH DAILY, Disp: 60 tablet, Rfl: 1    vitamin D3 125 MCG (5000 UT) capsule capsule, Take 1 capsule by mouth Daily., Disp: 30 capsule, Rfl: 3    ALLERGIES     Percocet [oxycodone-acetaminophen]    FAMILY HISTORY       Family History   Problem Relation Age of Onset    Arthritis Mother     COPD Mother     Liver disease Maternal Uncle     Breast cancer Maternal Grandmother     Cancer Maternal Grandmother     Diabetes Maternal Grandfather     Cancer Maternal Grandfather     Breast cancer Paternal Grandmother     Ovarian cancer Paternal Grandmother     Diabetes Paternal Grandmother     Heart attack Paternal Grandmother     Hypertension Paternal Grandmother     Stroke Paternal Grandmother     Cancer Paternal Grandmother     Colon cancer Paternal Grandfather           SOCIAL HISTORY       Social History     Socioeconomic History    Marital status: Single   Tobacco Use    Smoking status: Never    Smokeless tobacco: Never   Vaping Use    Vaping Use: Never used   Substance and Sexual Activity    Alcohol use: Yes     Alcohol/week: 2.0 standard drinks     Types: 2 Glasses of wine per week     Comment: OCCASIONAL    Drug use: No    Sexual activity: Yes     Partners: Male     Birth control/protection: Condom, Abstinence         PHYSICAL EXAM    (up to 7 for level 4, 8 or more for level 5)     Vitals:    08/15/23 1231   BP: 118/71   BP Location: Left arm   Patient Position: Sitting   Pulse: 83   Resp: 17   Temp: 98.4 øF (36.9 øC)   TempSrc: Oral   SpO2: 96%   Weight: 87.5 kg (193 lb)   Height: 154.9 cm (61\")       General: Awake, alert, no acute distress.  HEENT: Conjunctivae normal.  Neck: Trachea midline.  Cardiac: Heart regular rate, rhythm, no murmurs, rubs, or gallops  Lungs: Lungs are clear to auscultation, there is no wheezing, rhonchi, or rales. There is no use of accessory muscles.  Chest wall: " There is no tenderness to palpation over the chest wall or over ribs  Abdomen: Abdomen is soft, nontender, nondistended. There are no firm or pulsatile masses, no rebound rigidity or guarding.   Musculoskeletal: No deformity.  Neuro: Alert and oriented x 4.  Dermatology: Skin is warm and dry  Psych: Mentation is grossly normal, cognition is grossly normal. Affect is appropriate.        DIAGNOSTIC RESULTS     EKG: All EKGs are interpreted by the Emergency Department Physician who either signs or Co-signs this chart in the absence of a cardiologist.    ECG 12 Lead Other; MID BACK PAIN/  DIZZINESS   Final Result   Test Reason : Other~   Blood Pressure :   */*   mmHG   Vent. Rate :  84 BPM     Atrial Rate :  84 BPM      P-R Int : 150 ms          QRS Dur :  74 ms       QT Int : 354 ms       P-R-T Axes :  34  20   1 degrees      QTc Int : 418 ms      Normal sinus rhythm   Normal ECG   When compared with ECG of 30-OCT-2021 01:23,   No significant change was found   Confirmed by ISH CINTRON ([id number]) on 8/16/2023 11:18:24 PM      Referred By:            Confirmed By: ISH CINTRON            RADIOLOGY:   [x] Radiologist's Report Reviewed:  XR Chest 2 View   Final Result   No evidence of acute disease in the chest.         Electronically Signed: Perry Sawyer MD     8/15/2023 1:26 PM EDT     Workstation ID: [id number]          I ordered and independently reviewed the above noted radiographic studies.        LABS:    I have reviewed and interpreted all of the currently available lab results from this visit (if applicable):  Results for orders placed or performed in visit on 08/01/23   hCG, Quantitative, Pregnancy    Specimen: Blood   Result Value Ref Range    HCG Quantitative 9,869.00 mIU/mL   Progesterone    Specimen: Blood   Result Value Ref Range    Progesterone 19.90 ng/mL        If labs were ordered, I independently reviewed the results and considered them in treating the patient.      EMERGENCY DEPARTMENT COURSE and  DIFFERENTIAL DIAGNOSIS/MDM:   Vitals:  AS OF 10:50 EDT    BP - 118/71  HR - 83  TEMP - 98.4 øF (36.9 øC) (Oral)  O2 SATS - 96%        Discussion below represents my analysis of pertinent findings related to patient's condition, differential diagnosis, treatment plan and final disposition.      Differential diagnosis:  The differential diagnosis associated with the patient's presentation includes: thoracic strain, pna, ptx, pe      Independent interpretations (ECG/rhythm strip/X-ray/US/CT scan): I independently interpreted the pt cardiac monitor and cxr which shows no infiltrate and NSR      Care significantly affected by Social Determinants of Health (housing and economic circumstances, unemployment)    [] Yes     [x] No   If yes, Patient's care significantly limited by  Social Determinants of Health including:    [] Inadequate housing    [] Low income    [] Alcoholism and drug addiction in family    [] Problems related to primary support group    [] Unemployment    [] Problems related to employment    [] Other Social Determinants of Health:       I had a discussion with the patient/family regarding diagnosis, diagnostic results, treatment plan, and medications.  The patient/family indicated understanding of these instructions.  I spent adequate time at the bedside preceding discharge necessary to personally discuss the aftercare instructions, giving patient education, providing explanations of the results of our evaluations/findings, and my decision making to assure that the patient/family understand the plan of care.  Time was allotted to answer questions at that time and throughout the ED course.  Emphasis was placed on timely follow-up after discharge.  I also discussed the potential for the development of an acute emergent condition requiring further evaluation, admission, or even surgical intervention. I discussed that we found nothing during the visit today indicating the need for further workup, admission,  or the presence of an unstable medical condition.  I encouraged the patient to return to the emergency department immediately for ANY concerns, worsening, new complaints, or if symptoms persist and unable to seek follow-up in a timely fashion.  The patient/family expressed understanding and agreement with this plan.  The patient will follow-up with their PCP in 1-2 days for reevaluation.           PROCEDURES:  Procedures    CRITICAL CARE TIME        FINAL IMPRESSION      1. Acute chest wall pain    2. Pregnancy, unspecified gestational age          DISPOSITION/PLAN     ED Disposition       ED Disposition   Discharge    Condition   Stable    Comment   --                 Comment: Please note this report has been produced using speech recognition software.      Damian Madrid MD  Attending Emergency Physician             Damian Madrid MD  08/30/23 [id number]

## 2023-08-16 LAB
QT INTERVAL: 354 MS
QTC INTERVAL: 418 MS

## 2023-08-22 ENCOUNTER — LAB (OUTPATIENT)
Age: 33
End: 2023-08-22
Payer: COMMERCIAL

## 2023-08-22 ENCOUNTER — OFFICE VISIT (OUTPATIENT)
Age: 33
End: 2023-08-22
Payer: COMMERCIAL

## 2023-08-22 VITALS
WEIGHT: 198 LBS | TEMPERATURE: 97.3 F | DIASTOLIC BLOOD PRESSURE: 78 MMHG | HEART RATE: 100 BPM | SYSTOLIC BLOOD PRESSURE: 130 MMHG | BODY MASS INDEX: 37.41 KG/M2 | OXYGEN SATURATION: 99 %

## 2023-08-22 DIAGNOSIS — R05.1 ACUTE COUGH: ICD-10-CM

## 2023-08-22 DIAGNOSIS — Z11.3 ROUTINE SCREENING FOR STI (SEXUALLY TRANSMITTED INFECTION): ICD-10-CM

## 2023-08-22 DIAGNOSIS — O21.0 MORNING SICKNESS: ICD-10-CM

## 2023-08-22 DIAGNOSIS — Z86.39 HISTORY OF VITAMIN D DEFICIENCY: Primary | ICD-10-CM

## 2023-08-22 LAB — HCV AB SER DONR QL: NORMAL

## 2023-08-22 PROCEDURE — 86803 HEPATITIS C AB TEST: CPT | Performed by: NURSE PRACTITIONER

## 2023-08-22 PROCEDURE — 87340 HEPATITIS B SURFACE AG IA: CPT | Performed by: NURSE PRACTITIONER

## 2023-08-22 PROCEDURE — 86695 HERPES SIMPLEX TYPE 1 TEST: CPT | Performed by: NURSE PRACTITIONER

## 2023-08-22 PROCEDURE — 86704 HEP B CORE ANTIBODY TOTAL: CPT | Performed by: NURSE PRACTITIONER

## 2023-08-22 PROCEDURE — 86696 HERPES SIMPLEX TYPE 2 TEST: CPT | Performed by: NURSE PRACTITIONER

## 2023-08-22 PROCEDURE — 86706 HEP B SURFACE ANTIBODY: CPT | Performed by: NURSE PRACTITIONER

## 2023-08-22 PROCEDURE — G0432 EIA HIV-1/HIV-2 SCREEN: HCPCS | Performed by: NURSE PRACTITIONER

## 2023-08-22 PROCEDURE — 87591 N.GONORRHOEAE DNA AMP PROB: CPT | Performed by: NURSE PRACTITIONER

## 2023-08-22 PROCEDURE — 87491 CHLMYD TRACH DNA AMP PROBE: CPT | Performed by: NURSE PRACTITIONER

## 2023-08-22 PROCEDURE — 87661 TRICHOMONAS VAGINALIS AMPLIF: CPT | Performed by: NURSE PRACTITIONER

## 2023-08-22 PROCEDURE — 99214 OFFICE O/P EST MOD 30 MIN: CPT | Performed by: NURSE PRACTITIONER

## 2023-08-22 RX ORDER — ALBUTEROL SULFATE 90 UG/1
2 AEROSOL, METERED RESPIRATORY (INHALATION) EVERY 4 HOURS PRN
Qty: 18 G | Refills: 0 | Status: SHIPPED | OUTPATIENT
Start: 2023-08-22

## 2023-08-22 RX ORDER — LORATADINE 10 MG/1
10 CAPSULE, LIQUID FILLED ORAL DAILY
COMMUNITY

## 2023-08-22 NOTE — PROGRESS NOTES
"Chief Complaint  Nausea (8 weeks pregnant )    Subjective        Padma Aren presents to [facility]  History of Present Illness  Pt is here today with concerns of nausea due to pregnancy. Nausea is lasting all day. She is eating when she gets up in the morning. She states she is having to eat every 2 hours and is eating full meals that often. Hunger and nausea have both been extreme. We discussed adding ginger chews or ginger ale to help with nausea. She will start the morning with saltine crackers. She also has a lot of stress at this time, which may be contributing to her severity of symptoms. She will contact her OB and see if they are able to get her in sooner due to severity of symptoms.     Objective   Vital Signs:  /78 (BP Location: Right arm, Patient Position: Sitting, Cuff Size: Adult)   Pulse 100   Temp 97.3 øF (36.3 øC) (Temporal)   Wt 89.8 kg (198 lb)   SpO2 99%   BMI 37.41 kg/mý   Estimated body mass index is 37.41 kg/mý as calculated from the following:    Height as of 8/15/23: 154.9 cm (61\").    Weight as of this encounter: 89.8 kg (198 lb).             Physical Exam  Vitals reviewed.   Constitutional:       Appearance: Normal appearance.   HENT:      Nose: Nose normal.      Mouth/Throat:      Pharynx: Oropharynx is clear.   Eyes:      Conjunctiva/sclera: Conjunctivae normal.   Cardiovascular:      Rate and Rhythm: Normal rate and regular rhythm.      Heart sounds: Normal heart sounds.   Pulmonary:      Effort: Pulmonary effort is normal.      Breath sounds: Normal breath sounds.   Musculoskeletal:         General: Normal range of motion.      Cervical back: Normal range of motion.   Skin:     General: Skin is warm.   Neurological:      Mental Status: She is alert and oriented to person, place, and time.   Psychiatric:         Mood and Affect: Mood normal.         Behavior: Behavior normal.         Thought Content: Thought content normal.      Result Review " :                   Assessment and Plan   Diagnoses and all orders for this visit:    1. History of vitamin D deficiency (Primary)  -     vitamin D3 125 MCG (5000 UT) capsule capsule; Take 1 capsule by mouth Daily.  Dispense: 30 capsule; Refill: 3    2. Acute cough  -     albuterol sulfate  (90 Base) MCG/ACT inhaler; Inhale 2 puffs Every 4 (Four) Hours As Needed for Wheezing.  Dispense: 18 g; Refill: 0    3. Routine screening for STI (sexually transmitted infection)  -     Chlamydia trachomatis, Neisseria gonorrhoeae, Trichomonas vaginalis, PCR - Urine, Urine, Clean Catch; Future  -     HIV-1 / O / 2 Ag / Antibody 4th Generation; Future  -     Hepatitis C Antibody; Future  -     Hepatitis B Virus (HBV) Screening and Diagnosis; Future  -     HSV 1 & 2 - Specific Antibody, IgG; Future    4. Morning sickness     - Diet modification; ginger chews. Follow up with OB.          Follow Up   No follow-ups on file.  Patient was given instructions and counseling regarding her condition or for health maintenance advice. Please see specific information pulled into the AVS if appropriate.

## 2023-08-23 LAB — HIV 1+2 AB+HIV1 P24 AG SERPL QL IA: NORMAL

## 2023-08-24 LAB
C TRACH RRNA SPEC QL NAA+PROBE: NEGATIVE
HBV CORE AB SERPL QL IA: NEGATIVE
HBV SURFACE AB SER QL: REACTIVE
HBV SURFACE AG SERPL QL IA: NEGATIVE
HSV1 IGG SER IA-ACNC: 44.4 INDEX (ref 0–0.9)
HSV2 IGG SER IA-ACNC: <0.91 INDEX (ref 0–0.9)
IMP & REVIEW OF LAB RESULTS: NORMAL
LABORATORY COMMENT REPORT: NORMAL
N GONORRHOEA RRNA SPEC QL NAA+PROBE: NEGATIVE
T VAGINALIS RRNA SPEC QL NAA+PROBE: NEGATIVE

## 2023-08-25 DIAGNOSIS — J30.2 SEASONAL ALLERGIES: ICD-10-CM

## 2023-08-25 RX ORDER — MONTELUKAST SODIUM 10 MG/1
TABLET ORAL
Qty: 90 TABLET | Refills: 0 | Status: SHIPPED | OUTPATIENT
Start: 2023-08-25

## 2023-08-25 NOTE — TELEPHONE ENCOUNTER
Rx Refill Note  Requested Prescriptions     Pending Prescriptions Disp Refills    montelukast (SINGULAIR) 10 MG tablet [Pharmacy Med Name: MONTELUKAST SOD 10 MG TABLET] 90 tablet      Sig: TAKE ONE TABLET BY MOUTH ONCE NIGHTLY      Last office visit with prescribing clinician: 8/22/2023   Last telemedicine visit with prescribing clinician: Visit date not found   Next office visit with prescribing clinician: Visit date not found                         Would you like a call back once the refill request has been completed: [] Yes [] No    If the office needs to give you a call back, can they leave a voicemail: [] Yes [] No    Amarilys Washington MA  08/25/23, 13:38 EDT

## 2023-09-07 ENCOUNTER — HOSPITAL ENCOUNTER (EMERGENCY)
Facility: HOSPITAL | Age: 33
Discharge: HOME | End: 2023-09-07
Attending: EMERGENCY MEDICINE
Payer: COMMERCIAL

## 2023-09-07 VITALS
TEMPERATURE: 98.1 F | OXYGEN SATURATION: 98 % | RESPIRATION RATE: 16 BRPM | HEIGHT: 61 IN | HEART RATE: 96 BPM | SYSTOLIC BLOOD PRESSURE: 111 MMHG | WEIGHT: 198 LBS | BODY MASS INDEX: 37.38 KG/M2 | DIASTOLIC BLOOD PRESSURE: 65 MMHG

## 2023-09-07 DIAGNOSIS — K92.0 HEMATEMESIS WITH NAUSEA: Primary | ICD-10-CM

## 2023-09-07 DIAGNOSIS — Z34.91 FIRST TRIMESTER PREGNANCY: ICD-10-CM

## 2023-09-07 DIAGNOSIS — E86.9 VOLUME DEPLETION: ICD-10-CM

## 2023-09-07 LAB
ANION GAP SERPL CALCULATED.3IONS-SCNC: 10 MMOL/L (ref 5–15)
BACTERIA UR QL AUTO: ABNORMAL /HPF
BASOPHILS # BLD AUTO: 0.02 10*3/MM3 (ref 0–0.2)
BASOPHILS NFR BLD AUTO: 0.2 % (ref 0–1.5)
BILIRUB UR QL STRIP: NEGATIVE
BILIRUB UR QL STRIP: NEGATIVE
BUN SERPL-MCNC: 8 MG/DL (ref 6–20)
BUN/CREAT SERPL: 9.9 (ref 7–25)
CALCIUM SPEC-SCNC: 9.5 MG/DL (ref 8.6–10.5)
CHLORIDE SERPL-SCNC: 105 MMOL/L (ref 98–107)
CLARITY UR: ABNORMAL
CLARITY UR: CLEAR
CO2 SERPL-SCNC: 21 MMOL/L (ref 22–29)
COLOR UR: YELLOW
COLOR UR: YELLOW
CREAT SERPL-MCNC: 0.81 MG/DL (ref 0.57–1)
DEPRECATED RDW RBC AUTO: 39.4 FL (ref 37–54)
EGFRCR SERPLBLD CKD-EPI 2021: 97.8 ML/MIN/1.73
EOSINOPHIL # BLD AUTO: 0.03 10*3/MM3 (ref 0–0.4)
EOSINOPHIL NFR BLD AUTO: 0.4 % (ref 0.3–6.2)
ERYTHROCYTE [DISTWIDTH] IN BLOOD BY AUTOMATED COUNT: 12.2 % (ref 12.3–15.4)
GLUCOSE SERPL-MCNC: 103 MG/DL (ref 65–99)
GLUCOSE UR STRIP-MCNC: NEGATIVE MG/DL
GLUCOSE UR STRIP-MCNC: NEGATIVE MG/DL
HCT VFR BLD AUTO: 37.9 % (ref 34–46.6)
HGB BLD-MCNC: 12.4 G/DL (ref 12–15.9)
HGB UR QL STRIP.AUTO: NEGATIVE
HGB UR QL STRIP.AUTO: NEGATIVE
HYALINE CASTS UR QL AUTO: ABNORMAL /LPF
IMM GRANULOCYTES # BLD AUTO: 0.04 10*3/MM3 (ref 0–0.05)
IMM GRANULOCYTES NFR BLD AUTO: 0.5 % (ref 0–0.5)
KETONES UR QL STRIP: ABNORMAL
KETONES UR QL STRIP: ABNORMAL
LEUKOCYTE ESTERASE UR QL STRIP.AUTO: ABNORMAL
LEUKOCYTE ESTERASE UR QL STRIP.AUTO: NEGATIVE
LYMPHOCYTES # BLD AUTO: 1.35 10*3/MM3 (ref 0.7–3.1)
LYMPHOCYTES NFR BLD AUTO: 16.3 % (ref 19.6–45.3)
MCH RBC QN AUTO: 29 PG (ref 26.6–33)
MCHC RBC AUTO-ENTMCNC: 32.7 G/DL (ref 31.5–35.7)
MCV RBC AUTO: 88.8 FL (ref 79–97)
MONOCYTES # BLD AUTO: 0.35 10*3/MM3 (ref 0.1–0.9)
MONOCYTES NFR BLD AUTO: 4.2 % (ref 5–12)
NEUTROPHILS NFR BLD AUTO: 6.47 10*3/MM3 (ref 1.7–7)
NEUTROPHILS NFR BLD AUTO: 78.4 % (ref 42.7–76)
NITRITE UR QL STRIP: NEGATIVE
NITRITE UR QL STRIP: NEGATIVE
NRBC BLD AUTO-RTO: 0 /100 WBC (ref 0–0.2)
PH UR STRIP.AUTO: 5.5 [PH] (ref 5–8)
PH UR STRIP.AUTO: 7 [PH] (ref 5–8)
PLATELET # BLD AUTO: 272 10*3/MM3 (ref 140–450)
PMV BLD AUTO: 10.1 FL (ref 6–12)
POTASSIUM SERPL-SCNC: 3.8 MMOL/L (ref 3.5–5.2)
PROT UR QL STRIP: ABNORMAL
PROT UR QL STRIP: NEGATIVE
RBC # BLD AUTO: 4.27 10*6/MM3 (ref 3.77–5.28)
RBC # UR STRIP: ABNORMAL /HPF
REF LAB TEST METHOD: ABNORMAL
SODIUM SERPL-SCNC: 136 MMOL/L (ref 136–145)
SP GR UR STRIP: 1.02 (ref 1–1.03)
SP GR UR STRIP: 1.03 (ref 1–1.03)
SQUAMOUS #/AREA URNS HPF: ABNORMAL /HPF
UROBILINOGEN UR QL STRIP: ABNORMAL
UROBILINOGEN UR QL STRIP: ABNORMAL
WBC # UR STRIP: ABNORMAL /HPF
WBC NRBC COR # BLD: 8.26 10*3/MM3 (ref 3.4–10.8)

## 2023-09-07 PROCEDURE — 87086 URINE CULTURE/COLONY COUNT: CPT | Performed by: EMERGENCY MEDICINE

## 2023-09-07 PROCEDURE — 81003 URINALYSIS AUTO W/O SCOPE: CPT | Performed by: EMERGENCY MEDICINE

## 2023-09-07 PROCEDURE — 80048 BASIC METABOLIC PNL TOTAL CA: CPT | Performed by: EMERGENCY MEDICINE

## 2023-09-07 PROCEDURE — 99283 EMERGENCY DEPT VISIT LOW MDM: CPT

## 2023-09-07 PROCEDURE — 96361 HYDRATE IV INFUSION ADD-ON: CPT

## 2023-09-07 PROCEDURE — 85025 COMPLETE CBC W/AUTO DIFF WBC: CPT | Performed by: EMERGENCY MEDICINE

## 2023-09-07 PROCEDURE — 25010000002 ONDANSETRON PER 1 MG: Performed by: EMERGENCY MEDICINE

## 2023-09-07 PROCEDURE — 81001 URINALYSIS AUTO W/SCOPE: CPT | Performed by: EMERGENCY MEDICINE

## 2023-09-07 PROCEDURE — 96374 THER/PROPH/DIAG INJ IV PUSH: CPT

## 2023-09-07 PROCEDURE — 96375 TX/PRO/DX INJ NEW DRUG ADDON: CPT

## 2023-09-07 PROCEDURE — P9612 CATHETERIZE FOR URINE SPEC: HCPCS

## 2023-09-07 RX ORDER — PANTOPRAZOLE SODIUM 40 MG/10ML
40 INJECTION, POWDER, LYOPHILIZED, FOR SOLUTION INTRAVENOUS ONCE
Status: COMPLETED | OUTPATIENT
Start: 2023-09-07 | End: 2023-09-07

## 2023-09-07 RX ORDER — ONDANSETRON 2 MG/ML
4 INJECTION INTRAMUSCULAR; INTRAVENOUS ONCE
Status: COMPLETED | OUTPATIENT
Start: 2023-09-07 | End: 2023-09-07

## 2023-09-07 RX ADMIN — PANTOPRAZOLE SODIUM 40 MG: 40 INJECTION, POWDER, FOR SOLUTION INTRAVENOUS at 08:53

## 2023-09-07 RX ADMIN — ONDANSETRON 4 MG: 2 INJECTION INTRAMUSCULAR; INTRAVENOUS at 08:52

## 2023-09-07 RX ADMIN — SODIUM CHLORIDE 2000 ML: 9 INJECTION, SOLUTION INTRAVENOUS at 08:51

## 2023-09-07 NOTE — Clinical Note
[facility] EMERGENCY DEPARTMENT  [address]  [facility] [address]  Phone: [phone]    Padma Younger was seen and treated in our emergency department on 9/7/2023.  She may return to work on 09/09/2023.         Thank you for choosing [facility].    Regino Nielson MD

## 2023-09-07 NOTE — DISCHARGE INSTRUCTIONS
Begin taking Prilosec daily.  For nausea I would recommend vitamin B6 which is also called pyridoxine, this is available over-the-counter.  You may also use Unisom which is available over-the-counter.  Return if you have worsening of the bleeding in your vomit, or any other concerns.  Call your obstetrician to arrange close follow-up.

## 2023-09-07 NOTE — ED PROVIDER NOTES
Subjective   History of Present Illness  Mrs. Younger presents with hematemesis.  She tells me she vomits every day due to her pregnancy.  This morning there was dark red blood in it.  She tells me it seems like a lot of blood.  She denies abdominal pain.  She does acknowledge feeling weak and dizzy although tells me that has been a chronic condition throughout her pregnancy.  She denies any vaginal bleeding or perceived problems with her pregnancy.  She is currently not taking anything for the nausea.  She has had problems with bad heartburn in the past and has been on Pepcid but tells me she has gone off of that.    Review of Systems    Past Medical History:   Diagnosis Date    Abnormal Pap smear of cervix     Anxiety     Asthma     Chlamydia     2013    Chlamydia contact, treated     Depression     Gonorrhea     2013    Urinary tract infection     Visual impairment        Allergies   Allergen Reactions    Percocet [Oxycodone-Acetaminophen] Itching       Past Surgical History:   Procedure Laterality Date    CYST REMOVAL      D & C WITH SUCTION N/A 9/18/2017    Procedure: DILATATION AND CURETTAGE WITH SUCTION;  Surgeon: Kelley Dodge DO;  Location: [facility];  Service:     DENTAL PROCEDURE      DILATATION AND CURETTAGE  09/18/2017    EYE SURGERY  12/2018    Intact Surgery    KNEE SURGERY      VAGINAL DELIVERY      x1    WISDOM TOOTH EXTRACTION         Family History   Problem Relation Age of Onset    Arthritis Mother     COPD Mother     Liver disease Maternal Uncle     Breast cancer Maternal Grandmother     Cancer Maternal Grandmother     Diabetes Maternal Grandfather     Cancer Maternal Grandfather     Breast cancer Paternal Grandmother     Ovarian cancer Paternal Grandmother     Diabetes Paternal Grandmother     Heart attack Paternal Grandmother     Hypertension Paternal Grandmother     Stroke Paternal Grandmother     Cancer Paternal Grandmother     Colon cancer Paternal Grandfather        Social History      Socioeconomic History    Marital status: Single   Tobacco Use    Smoking status: Never    Smokeless tobacco: Never   Vaping Use    Vaping Use: Never used   Substance and Sexual Activity    Alcohol use: Yes     Alcohol/week: 2.0 standard drinks     Types: 2 Glasses of wine per week     Comment: OCCASIONAL    Drug use: No    Sexual activity: Yes     Partners: Male     Birth control/protection: Condom, Abstinence           Objective   Physical Exam  Vitals and nursing note reviewed.   Constitutional:       General: She is not in acute distress.     Appearance: Normal appearance.   HENT:      Head: Normocephalic and atraumatic.      Nose: Nose normal. No congestion or rhinorrhea.   Eyes:      General: No scleral icterus.     Conjunctiva/sclera: Conjunctivae normal.   Neck:      Comments: No JVD   Cardiovascular:      Rate and Rhythm: Normal rate and regular rhythm.      Heart sounds: No murmur heard.    No friction rub.   Pulmonary:      Effort: Pulmonary effort is normal.      Breath sounds: Normal breath sounds. No wheezing or rales.   Abdominal:      General: Bowel sounds are normal.      Palpations: Abdomen is soft. There is mass.      Tenderness: There is no abdominal tenderness. There is no guarding or rebound.   Musculoskeletal:         General: No tenderness.      Cervical back: Normal range of motion and neck supple.      Right lower leg: No edema.      Left lower leg: No edema.   Skin:     General: Skin is warm and dry.      Coloration: Skin is not pale.      Findings: No erythema.   Neurological:      General: No focal deficit present.      Mental Status: She is alert and oriented to person, place, and time.      Motor: No weakness.      Coordination: Coordination normal.   Psychiatric:         Mood and Affect: Mood normal.         Behavior: Behavior normal.         Thought Content: Thought content normal.       Procedures           ED Course  ED Course as of 09/07/23 1448   Thu Sep 07, 2023   1100  Urinalysis shows pyuria and bacteriuria but also appears to be a contaminated specimen.  We will get a catheterized specimen. [DT]   1102 She feels better.  She is almost done with fluids.  I spoke with her about the urinalysis and the need for catheterized specimen.  I talked to her about using pyridoxine and dicyclomine for nausea. [DT]   1216 She continues to be comfortable.  Cath urine is negative. [DT]      ED Course User Index  [DT] Regino Nielson MD                                           Medical Decision Making  Please see course notes.  Initial differential diagnosis would include bleeding peptic ulcer, GI bleeding from other source.  I ordered and interpreted multiple labs showing no evidence of significant blood loss, pancreatitis, or other acute derangement.  I gave IV fluids and IV medications.  Had multiple repeat evaluations.    Problems Addressed:  First trimester pregnancy: chronic illness or injury  Hematemesis with nausea: complicated acute illness or injury that poses a threat to life or bodily functions  Volume depletion: complicated acute illness or injury    Amount and/or Complexity of Data Reviewed  Labs: ordered.    Risk  Prescription drug management.        Final diagnoses:   First trimester pregnancy   Hematemesis with nausea   Volume depletion       ED Disposition  ED Disposition       ED Disposition   Discharge    Condition   Stable    Comment   --               Lanette Gregory, APRN  [address] [place] [address]  [phone]               Medication List        Stop      famotidine 20 MG tablet  Commonly known as: Regino Odell MD  09/07/23 4196

## 2023-09-09 LAB — BACTERIA SPEC AEROBE CULT: NO GROWTH

## 2023-09-14 ENCOUNTER — HOSPITAL ENCOUNTER (EMERGENCY)
Facility: HOSPITAL | Age: 33
Discharge: HOME | End: 2023-09-14
Attending: EMERGENCY MEDICINE
Payer: COMMERCIAL

## 2023-09-14 ENCOUNTER — APPOINTMENT (OUTPATIENT)
Dept: GENERAL RADIOLOGY | Facility: HOSPITAL | Age: 33
End: 2023-09-14
Payer: COMMERCIAL

## 2023-09-14 VITALS
TEMPERATURE: 97.8 F | DIASTOLIC BLOOD PRESSURE: 64 MMHG | HEART RATE: 83 BPM | WEIGHT: 197 LBS | OXYGEN SATURATION: 99 % | RESPIRATION RATE: 18 BRPM | SYSTOLIC BLOOD PRESSURE: 126 MMHG | BODY MASS INDEX: 37.19 KG/M2 | HEIGHT: 61 IN

## 2023-09-14 DIAGNOSIS — Z87.09 HISTORY OF ASTHMA: ICD-10-CM

## 2023-09-14 DIAGNOSIS — R07.89 CHEST WALL PAIN: Primary | ICD-10-CM

## 2023-09-14 DIAGNOSIS — Z86.59 HISTORY OF ANXIETY: ICD-10-CM

## 2023-09-14 DIAGNOSIS — Z86.59 HISTORY OF DEPRESSION: ICD-10-CM

## 2023-09-14 LAB
ALBUMIN SERPL-MCNC: 3.8 G/DL (ref 3.5–5.2)
ALBUMIN/GLOB SERPL: 1.1 G/DL
ALP SERPL-CCNC: 61 U/L (ref 39–117)
ALT SERPL W P-5'-P-CCNC: 7 U/L (ref 1–33)
ANION GAP SERPL CALCULATED.3IONS-SCNC: 13 MMOL/L (ref 5–15)
AST SERPL-CCNC: 19 U/L (ref 1–32)
BASOPHILS # BLD AUTO: 0.03 10*3/MM3 (ref 0–0.2)
BASOPHILS NFR BLD AUTO: 0.3 % (ref 0–1.5)
BILIRUB SERPL-MCNC: 0.5 MG/DL (ref 0–1.2)
BUN SERPL-MCNC: 8 MG/DL (ref 6–20)
BUN/CREAT SERPL: 10.4 (ref 7–25)
CALCIUM SPEC-SCNC: 9.4 MG/DL (ref 8.6–10.5)
CHLORIDE SERPL-SCNC: 103 MMOL/L (ref 98–107)
CO2 SERPL-SCNC: 21 MMOL/L (ref 22–29)
CREAT SERPL-MCNC: 0.77 MG/DL (ref 0.57–1)
DEPRECATED RDW RBC AUTO: 39.6 FL (ref 37–54)
EGFRCR SERPLBLD CKD-EPI 2021: 104 ML/MIN/1.73
EOSINOPHIL # BLD AUTO: 0.02 10*3/MM3 (ref 0–0.4)
EOSINOPHIL NFR BLD AUTO: 0.2 % (ref 0.3–6.2)
ERYTHROCYTE [DISTWIDTH] IN BLOOD BY AUTOMATED COUNT: 12.3 % (ref 12.3–15.4)
GEN 5 2HR TROPONIN T REFLEX: <6 NG/L
GLOBULIN UR ELPH-MCNC: 3.4 GM/DL
GLUCOSE SERPL-MCNC: 93 MG/DL (ref 65–99)
HCT VFR BLD AUTO: 36.9 % (ref 34–46.6)
HGB BLD-MCNC: 12.2 G/DL (ref 12–15.9)
HOLD SPECIMEN: NORMAL
IMM GRANULOCYTES # BLD AUTO: 0.05 10*3/MM3 (ref 0–0.05)
IMM GRANULOCYTES NFR BLD AUTO: 0.5 % (ref 0–0.5)
LIPASE SERPL-CCNC: 20 U/L (ref 13–60)
LYMPHOCYTES # BLD AUTO: 2.36 10*3/MM3 (ref 0.7–3.1)
LYMPHOCYTES NFR BLD AUTO: 21.9 % (ref 19.6–45.3)
MCH RBC QN AUTO: 29.3 PG (ref 26.6–33)
MCHC RBC AUTO-ENTMCNC: 33.1 G/DL (ref 31.5–35.7)
MCV RBC AUTO: 88.7 FL (ref 79–97)
MONOCYTES # BLD AUTO: 0.52 10*3/MM3 (ref 0.1–0.9)
MONOCYTES NFR BLD AUTO: 4.8 % (ref 5–12)
NEUTROPHILS NFR BLD AUTO: 7.81 10*3/MM3 (ref 1.7–7)
NEUTROPHILS NFR BLD AUTO: 72.3 % (ref 42.7–76)
NRBC BLD AUTO-RTO: 0 /100 WBC (ref 0–0.2)
NT-PROBNP SERPL-MCNC: <36 PG/ML (ref 0–450)
PLATELET # BLD AUTO: 277 10*3/MM3 (ref 140–450)
PMV BLD AUTO: 9.9 FL (ref 6–12)
POTASSIUM SERPL-SCNC: 3.8 MMOL/L (ref 3.5–5.2)
PROT SERPL-MCNC: 7.2 G/DL (ref 6–8.5)
QT INTERVAL: 340 MS
QT INTERVAL: 380 MS
QTC INTERVAL: 429 MS
QTC INTERVAL: 430 MS
RBC # BLD AUTO: 4.16 10*6/MM3 (ref 3.77–5.28)
SODIUM SERPL-SCNC: 137 MMOL/L (ref 136–145)
TROPONIN T DELTA: NORMAL
TROPONIN T SERPL HS-MCNC: <6 NG/L
WBC NRBC COR # BLD: 10.79 10*3/MM3 (ref 3.4–10.8)
WHOLE BLOOD HOLD COAG: NORMAL
WHOLE BLOOD HOLD SPECIMEN: NORMAL

## 2023-09-14 PROCEDURE — 36415 COLL VENOUS BLD VENIPUNCTURE: CPT

## 2023-09-14 PROCEDURE — 83690 ASSAY OF LIPASE: CPT

## 2023-09-14 PROCEDURE — 85025 COMPLETE CBC W/AUTO DIFF WBC: CPT

## 2023-09-14 PROCEDURE — 93005 ELECTROCARDIOGRAM TRACING: CPT

## 2023-09-14 PROCEDURE — 96374 THER/PROPH/DIAG INJ IV PUSH: CPT

## 2023-09-14 PROCEDURE — 84484 ASSAY OF TROPONIN QUANT: CPT | Performed by: EMERGENCY MEDICINE

## 2023-09-14 PROCEDURE — 83880 ASSAY OF NATRIURETIC PEPTIDE: CPT

## 2023-09-14 PROCEDURE — 25010000002 ONDANSETRON PER 1 MG: Performed by: PHYSICIAN ASSISTANT

## 2023-09-14 PROCEDURE — 93005 ELECTROCARDIOGRAM TRACING: CPT | Performed by: EMERGENCY MEDICINE

## 2023-09-14 PROCEDURE — 84484 ASSAY OF TROPONIN QUANT: CPT

## 2023-09-14 PROCEDURE — 80053 COMPREHEN METABOLIC PANEL: CPT

## 2023-09-14 PROCEDURE — 99283 EMERGENCY DEPT VISIT LOW MDM: CPT

## 2023-09-14 RX ORDER — ACETAMINOPHEN 500 MG
1000 TABLET ORAL ONCE
Status: COMPLETED | OUTPATIENT
Start: 2023-09-14 | End: 2023-09-14

## 2023-09-14 RX ORDER — SODIUM CHLORIDE 0.9 % (FLUSH) 0.9 %
10 SYRINGE (ML) INJECTION AS NEEDED
Status: DISCONTINUED | OUTPATIENT
Start: 2023-09-14 | End: 2023-09-14 | Stop reason: HOSPADM

## 2023-09-14 RX ORDER — ONDANSETRON 2 MG/ML
4 INJECTION INTRAMUSCULAR; INTRAVENOUS ONCE
Status: COMPLETED | OUTPATIENT
Start: 2023-09-14 | End: 2023-09-14

## 2023-09-14 RX ADMIN — ACETAMINOPHEN 1000 MG: 500 TABLET ORAL at 03:20

## 2023-09-14 RX ADMIN — ONDANSETRON 4 MG: 2 INJECTION INTRAMUSCULAR; INTRAVENOUS at 03:31

## 2023-09-14 RX ADMIN — SODIUM CHLORIDE 500 ML: 9 INJECTION, SOLUTION INTRAVENOUS at 03:32

## 2023-09-14 NOTE — DISCHARGE INSTRUCTIONS
Symptomatic care is recommended. Take all medications as prescribed and instructed. Follow up with OBGYN and primary care as directed or return to Emergency Department with worsening of symptoms.

## 2023-09-19 ENCOUNTER — LAB (OUTPATIENT)
Dept: LAB | Facility: HOSPITAL | Age: 33
End: 2023-09-19
Payer: COMMERCIAL

## 2023-09-19 ENCOUNTER — TRANSCRIBE ORDERS (OUTPATIENT)
Dept: LAB | Facility: HOSPITAL | Age: 33
End: 2023-09-19
Payer: COMMERCIAL

## 2023-09-19 DIAGNOSIS — Z34.90 PREGNANCY, UNSPECIFIED GESTATIONAL AGE: ICD-10-CM

## 2023-09-19 DIAGNOSIS — Z3A.12 12 WEEKS GESTATION OF PREGNANCY: Primary | ICD-10-CM

## 2023-09-19 DIAGNOSIS — F41.1 GAD (GENERALIZED ANXIETY DISORDER): ICD-10-CM

## 2023-09-19 DIAGNOSIS — Z3A.12 12 WEEKS GESTATION OF PREGNANCY: ICD-10-CM

## 2023-09-19 LAB
ABO GROUP BLD: NORMAL
AMPHET+METHAMPHET UR QL: NEGATIVE
AMPHETAMINES UR QL: NEGATIVE
BARBITURATES UR QL SCN: NEGATIVE
BENZODIAZ UR QL SCN: NEGATIVE
BLD GP AB SCN SERPL QL: NEGATIVE
BUPRENORPHINE SERPL-MCNC: NEGATIVE NG/ML
CANNABINOIDS SERPL QL: NEGATIVE
COCAINE UR QL: NEGATIVE
DEPRECATED RDW RBC AUTO: 39.1 FL (ref 37–54)
ERYTHROCYTE [DISTWIDTH] IN BLOOD BY AUTOMATED COUNT: 12.5 % (ref 12.3–15.4)
FENTANYL UR-MCNC: NEGATIVE NG/ML
HCT VFR BLD AUTO: 36.5 % (ref 34–46.6)
HGB BLD-MCNC: 12.4 G/DL (ref 12–15.9)
MCH RBC QN AUTO: 29.5 PG (ref 26.6–33)
MCHC RBC AUTO-ENTMCNC: 34 G/DL (ref 31.5–35.7)
MCV RBC AUTO: 86.9 FL (ref 79–97)
METHADONE UR QL SCN: NEGATIVE
OPIATES UR QL: NEGATIVE
OXYCODONE UR QL SCN: NEGATIVE
PCP UR QL SCN: NEGATIVE
PLATELET # BLD AUTO: 293 10*3/MM3 (ref 140–450)
PMV BLD AUTO: 10.8 FL (ref 6–12)
PROPOXYPH UR QL: NEGATIVE
RBC # BLD AUTO: 4.2 10*6/MM3 (ref 3.77–5.28)
RH BLD: POSITIVE
TRICYCLICS UR QL SCN: NEGATIVE
WBC NRBC COR # BLD: 9.42 10*3/MM3 (ref 3.4–10.8)

## 2023-09-19 PROCEDURE — 87340 HEPATITIS B SURFACE AG IA: CPT

## 2023-09-19 PROCEDURE — 87086 URINE CULTURE/COLONY COUNT: CPT

## 2023-09-19 PROCEDURE — 36415 COLL VENOUS BLD VENIPUNCTURE: CPT

## 2023-09-19 PROCEDURE — 86900 BLOOD TYPING SEROLOGIC ABO: CPT

## 2023-09-19 PROCEDURE — 83036 HEMOGLOBIN GLYCOSYLATED A1C: CPT

## 2023-09-19 PROCEDURE — 85027 COMPLETE CBC AUTOMATED: CPT

## 2023-09-19 PROCEDURE — 86803 HEPATITIS C AB TEST: CPT

## 2023-09-19 PROCEDURE — 86901 BLOOD TYPING SEROLOGIC RH(D): CPT

## 2023-09-19 PROCEDURE — G0432 EIA HIV-1/HIV-2 SCREEN: HCPCS

## 2023-09-19 PROCEDURE — 86850 RBC ANTIBODY SCREEN: CPT

## 2023-09-19 PROCEDURE — 82947 ASSAY GLUCOSE BLOOD QUANT: CPT

## 2023-09-19 PROCEDURE — 86780 TREPONEMA PALLIDUM: CPT

## 2023-09-19 PROCEDURE — 80307 DRUG TEST PRSMV CHEM ANLYZR: CPT

## 2023-09-19 PROCEDURE — 86762 RUBELLA ANTIBODY: CPT

## 2023-09-19 RX ORDER — BUPROPION HYDROCHLORIDE 150 MG/1
TABLET ORAL
Qty: 60 TABLET | OUTPATIENT
Start: 2023-09-19

## 2023-09-19 NOTE — TELEPHONE ENCOUNTER
Called pt, no room in voicemail box    OK FOR HUB TO RELAY MESSAGE AND DOCUMENT  Pt requesting buPROPion XL. Pt will need to schedule an appointment.       Pt needs appt. Can be telehealth.- Lanette Gregory, APRN

## 2023-09-19 NOTE — TELEPHONE ENCOUNTER
Rx Refill Note  Requested Prescriptions     Pending Prescriptions Disp Refills    buPROPion XL (WELLBUTRIN XL) 150 MG 24 hr tablet [Pharmacy Med Name: buPROPion HCL  MG TABLET] 60 tablet      Sig: TAKE ONE TABLET BY MOUTH DAILY      Last office visit with prescribing clinician: 8/22/2023   Last telemedicine visit with prescribing clinician: Visit date not found   Next office visit with prescribing clinician: Visit date not found                         Would you like a call back once the refill request has been completed: [] Yes [] No    If the office needs to give you a call back, can they leave a voicemail: [] Yes [] No    Amarilys Washington MA  09/19/23, 10:57 EDT

## 2023-09-20 LAB
BACTERIA SPEC AEROBE CULT: NORMAL
GLUCOSE SERPL-MCNC: 94 MG/DL (ref 65–99)
HBA1C MFR BLD: 5 % (ref 4.8–5.6)
HBV SURFACE AG SERPL QL IA: NORMAL
HCV AB SER DONR QL: NORMAL
HIV 1+2 AB+HIV1 P24 AG SERPL QL IA: NORMAL
RUBV IGG SERPL IA-ACNC: 3.09 INDEX
TREPONEMA PALLIDUM IGG+IGM AB [PRESENCE] IN SERUM OR PLASMA BY IMMUNOASSAY: NON REACTIVE

## 2023-09-20 NOTE — TELEPHONE ENCOUNTER
Lanette Gregory, APRNYesterday (11:29 AM)     SC  Pt needs appt. Can be telehealth.      Called patient no answer left vm for return call    OK FOR HUB TO RELAY MESSAGE AND SCHEDULE APPOINTMENT

## 2023-09-21 ENCOUNTER — TELEMEDICINE (OUTPATIENT)
Age: 33
End: 2023-09-21
Payer: COMMERCIAL

## 2023-09-21 DIAGNOSIS — F41.1 GAD (GENERALIZED ANXIETY DISORDER): ICD-10-CM

## 2023-09-21 RX ORDER — BUPROPION HYDROCHLORIDE 150 MG/1
150 TABLET ORAL DAILY
Qty: 30 TABLET | Refills: 5 | Status: SHIPPED | OUTPATIENT
Start: 2023-09-21

## 2023-09-21 NOTE — PROGRESS NOTES
"Chief Complaint  Anxiety    Subjective         Padma Aren presents to [facility]  History of Present Illness  Pt is here today for refill of Wellbutrin. She states the medication is working ok for her. She is expecting and has talked to her OB about being on the medication during pregnancy. OB is ok with her taking. I will send refills and she will follow up with me after her initial post-partum period for her annual exam.     Anxiety        Objective   Vital Signs:   There were no vitals taken for this visit.    Estimated body mass index is 37.22 kg/m² as calculated from the following:    Height as of 9/14/23: 154.9 cm (61\").    Weight as of 9/14/23: 89.4 kg (197 lb).     Physical Exam   Pulmonary/Chest: Effort normal.   Musculoskeletal: Normal range of motion.   Neurological: She is alert.   Psychiatric: She has a normal mood and affect.   Result Review :                 Assessment and Plan    Diagnoses and all orders for this visit:    1. NATHANIEL (generalized anxiety disorder)  -     buPROPion XL (WELLBUTRIN XL) 150 MG 24 hr tablet; Take 1 tablet by mouth Daily.  Dispense: 30 tablet; Refill: 5        Follow Up   Return in about 9 months (around 6/21/2024) for Annual.  Patient was given instructions and counseling regarding her condition or for health maintenance advice. Please see specific information pulled into the AVS if appropriate.     Mode of Visit: Video  Location of patient: other: Work  Location of provider: [facility]  You have chosen to receive care through a telehealth visit.  The patient has signed the video visit consent form.  The visit included audio and video interaction. No technical issues occurred during this visit.   "

## 2023-09-21 NOTE — PROGRESS NOTES
"Chief Complaint  No chief complaint on file.    Subjective    {Problem List  Visit Diagnosis   Encounters  Notes  Medications  Labs  Result Review Imaging  Media :23}    Padma Younger presents to [facility]  History of Present Illness  Pt is here today for refill of Wellbutrin. She states the medication is working ok for her. She is expecting and has talked to her OB about being on the medication during pregnancy.     Objective   Vital Signs:  There were no vitals taken for this visit.  Estimated body mass index is 37.22 kg/m² as calculated from the following:    Height as of 9/14/23: 154.9 cm (61\").    Weight as of 9/14/23: 89.4 kg (197 lb).       {Class 2 Severe Obesity (BMI >=35 and <=39.9. (Optional):06224}      Physical Exam   Result Review :{Labs  Result Review  Imaging  Med Tab  Media  Procedures  :23}  {The following data was reviewed by (Optional):07132}  {Ambulatory Labs (Optional):53248}  {Data reviewed (Optional):36528:::1}             Assessment and Plan {CC Problem List  Visit Diagnosis   ROS  Review (Popup)  Health Maintenance  Quality  BestPractice  Medications  SmartSets  SnapShot Encounters  Media :23}  Diagnoses and all orders for this visit:    1. NATHANIEL (generalized anxiety disorder)  -     buPROPion XL (WELLBUTRIN XL) 150 MG 24 hr tablet; Take 1 tablet by mouth Daily.  Dispense: 30 tablet; Refill: 5           {Time Spent (Optional):33093}  Follow Up {Instructions Charge Capture  Follow-up Communications :23}  Return in about 9 months (around 6/21/2024) for Annual.  Patient was given instructions and counseling regarding her condition or for health maintenance advice. Please see specific information pulled into the AVS if appropriate.         "

## 2023-09-25 LAB — REF LAB TEST RESULTS: NORMAL

## 2023-10-21 ENCOUNTER — APPOINTMENT (OUTPATIENT)
Dept: ULTRASOUND IMAGING | Facility: HOSPITAL | Age: 33
End: 2023-10-21
Payer: MEDICAID

## 2023-10-21 ENCOUNTER — APPOINTMENT (OUTPATIENT)
Dept: GENERAL RADIOLOGY | Facility: HOSPITAL | Age: 33
End: 2023-10-21
Payer: MEDICAID

## 2023-10-21 ENCOUNTER — APPOINTMENT (OUTPATIENT)
Dept: CT IMAGING | Facility: HOSPITAL | Age: 33
End: 2023-10-21
Payer: MEDICAID

## 2023-10-21 ENCOUNTER — HOSPITAL ENCOUNTER (INPATIENT)
Facility: HOSPITAL | Age: 33
LOS: 2 days | Discharge: HOME | End: 2023-10-23
Attending: STUDENT IN AN ORGANIZED HEALTH CARE EDUCATION/TRAINING PROGRAM | Admitting: STUDENT IN AN ORGANIZED HEALTH CARE EDUCATION/TRAINING PROGRAM
Payer: MEDICAID

## 2023-10-21 DIAGNOSIS — K80.20 GALLSTONES: ICD-10-CM

## 2023-10-21 DIAGNOSIS — R10.10 UPPER ABDOMINAL PAIN: Primary | ICD-10-CM

## 2023-10-21 DIAGNOSIS — Z3A.17 17 WEEKS GESTATION OF PREGNANCY: ICD-10-CM

## 2023-10-21 DIAGNOSIS — R11.2 NAUSEA AND VOMITING, UNSPECIFIED VOMITING TYPE: ICD-10-CM

## 2023-10-21 PROBLEM — O21.9 NAUSEA AND VOMITING IN PREGNANCY: Status: ACTIVE | Noted: 2023-10-21

## 2023-10-21 LAB
ALBUMIN SERPL-MCNC: 3.6 G/DL (ref 3.5–5.2)
ALBUMIN/GLOB SERPL: 1 G/DL
ALP SERPL-CCNC: 71 U/L (ref 39–117)
ALT SERPL W P-5'-P-CCNC: 11 U/L (ref 1–33)
ANION GAP SERPL CALCULATED.3IONS-SCNC: 11 MMOL/L (ref 5–15)
AST SERPL-CCNC: 17 U/L (ref 1–32)
BACTERIA UR QL AUTO: ABNORMAL /HPF
BASOPHILS # BLD AUTO: 0.02 10*3/MM3 (ref 0–0.2)
BASOPHILS NFR BLD AUTO: 0.2 % (ref 0–1.5)
BILIRUB SERPL-MCNC: 0.5 MG/DL (ref 0–1.2)
BILIRUB UR QL STRIP: NEGATIVE
BUN SERPL-MCNC: 6 MG/DL (ref 6–20)
BUN/CREAT SERPL: 8.2 (ref 7–25)
CALCIUM SPEC-SCNC: 9.4 MG/DL (ref 8.6–10.5)
CHLORIDE SERPL-SCNC: 104 MMOL/L (ref 98–107)
CLARITY UR: ABNORMAL
CO2 SERPL-SCNC: 22 MMOL/L (ref 22–29)
COLOR UR: YELLOW
CREAT SERPL-MCNC: 0.73 MG/DL (ref 0.57–1)
D-LACTATE SERPL-SCNC: 1.5 MMOL/L (ref 0.5–2)
DEPRECATED RDW RBC AUTO: 41 FL (ref 37–54)
DEPRECATED RDW RBC AUTO: 41 FL (ref 37–54)
EGFRCR SERPLBLD CKD-EPI 2021: 110.8 ML/MIN/1.73
EOSINOPHIL # BLD AUTO: 0.01 10*3/MM3 (ref 0–0.4)
EOSINOPHIL NFR BLD AUTO: 0.1 % (ref 0.3–6.2)
ERYTHROCYTE [DISTWIDTH] IN BLOOD BY AUTOMATED COUNT: 12.7 % (ref 12.3–15.4)
ERYTHROCYTE [DISTWIDTH] IN BLOOD BY AUTOMATED COUNT: 12.7 % (ref 12.3–15.4)
GLOBULIN UR ELPH-MCNC: 3.6 GM/DL
GLUCOSE SERPL-MCNC: 104 MG/DL (ref 65–99)
GLUCOSE UR STRIP-MCNC: NEGATIVE MG/DL
HCT VFR BLD AUTO: 33.5 % (ref 34–46.6)
HCT VFR BLD AUTO: 37.7 % (ref 34–46.6)
HGB BLD-MCNC: 11.4 G/DL (ref 12–15.9)
HGB BLD-MCNC: 12.7 G/DL (ref 12–15.9)
HGB UR QL STRIP.AUTO: NEGATIVE
HYALINE CASTS UR QL AUTO: ABNORMAL /LPF
IMM GRANULOCYTES # BLD AUTO: 0.04 10*3/MM3 (ref 0–0.05)
IMM GRANULOCYTES NFR BLD AUTO: 0.4 % (ref 0–0.5)
KETONES UR QL STRIP: ABNORMAL
LEUKOCYTE ESTERASE UR QL STRIP.AUTO: ABNORMAL
LIPASE SERPL-CCNC: 18 U/L (ref 13–60)
LYMPHOCYTES # BLD AUTO: 0.52 10*3/MM3 (ref 0.7–3.1)
LYMPHOCYTES NFR BLD AUTO: 4.7 % (ref 19.6–45.3)
MCH RBC QN AUTO: 30.1 PG (ref 26.6–33)
MCH RBC QN AUTO: 30.1 PG (ref 26.6–33)
MCHC RBC AUTO-ENTMCNC: 33.7 G/DL (ref 31.5–35.7)
MCHC RBC AUTO-ENTMCNC: 34 G/DL (ref 31.5–35.7)
MCV RBC AUTO: 88.4 FL (ref 79–97)
MCV RBC AUTO: 89.3 FL (ref 79–97)
MONOCYTES # BLD AUTO: 0.27 10*3/MM3 (ref 0.1–0.9)
MONOCYTES NFR BLD AUTO: 2.4 % (ref 5–12)
NEUTROPHILS NFR BLD AUTO: 10.17 10*3/MM3 (ref 1.7–7)
NEUTROPHILS NFR BLD AUTO: 92.2 % (ref 42.7–76)
NITRITE UR QL STRIP: NEGATIVE
NRBC BLD AUTO-RTO: 0 /100 WBC (ref 0–0.2)
PH UR STRIP.AUTO: 6.5 [PH] (ref 5–8)
PLATELET # BLD AUTO: 203 10*3/MM3 (ref 140–450)
PLATELET # BLD AUTO: 271 10*3/MM3 (ref 140–450)
PMV BLD AUTO: 10.1 FL (ref 6–12)
PMV BLD AUTO: 10.2 FL (ref 6–12)
POTASSIUM SERPL-SCNC: 4 MMOL/L (ref 3.5–5.2)
PROCALCITONIN SERPL-MCNC: 0.22 NG/ML (ref 0–0.25)
PROT SERPL-MCNC: 7.2 G/DL (ref 6–8.5)
PROT UR QL STRIP: ABNORMAL
RBC # BLD AUTO: 3.79 10*6/MM3 (ref 3.77–5.28)
RBC # BLD AUTO: 4.22 10*6/MM3 (ref 3.77–5.28)
RBC # UR STRIP: ABNORMAL /HPF
REF LAB TEST METHOD: ABNORMAL
SODIUM SERPL-SCNC: 137 MMOL/L (ref 136–145)
SP GR UR STRIP: 1.03 (ref 1–1.03)
SQUAMOUS #/AREA URNS HPF: ABNORMAL /HPF
UROBILINOGEN UR QL STRIP: ABNORMAL
WBC # UR STRIP: ABNORMAL /HPF
WBC NRBC COR # BLD: 11.03 10*3/MM3 (ref 3.4–10.8)
WBC NRBC COR # BLD: 6.76 10*3/MM3 (ref 3.4–10.8)

## 2023-10-21 PROCEDURE — 25010000002 ONDANSETRON PER 1 MG: Performed by: NURSE PRACTITIONER

## 2023-10-21 PROCEDURE — 80053 COMPREHEN METABOLIC PANEL: CPT | Performed by: NURSE PRACTITIONER

## 2023-10-21 PROCEDURE — 71045 X-RAY EXAM CHEST 1 VIEW: CPT

## 2023-10-21 PROCEDURE — 83605 ASSAY OF LACTIC ACID: CPT | Performed by: STUDENT IN AN ORGANIZED HEALTH CARE EDUCATION/TRAINING PROGRAM

## 2023-10-21 PROCEDURE — 85025 COMPLETE CBC W/AUTO DIFF WBC: CPT | Performed by: NURSE PRACTITIONER

## 2023-10-21 PROCEDURE — 99284 EMERGENCY DEPT VISIT MOD MDM: CPT

## 2023-10-21 PROCEDURE — 84145 PROCALCITONIN (PCT): CPT | Performed by: STUDENT IN AN ORGANIZED HEALTH CARE EDUCATION/TRAINING PROGRAM

## 2023-10-21 PROCEDURE — 99222 1ST HOSP IP/OBS MODERATE 55: CPT | Performed by: OBSTETRICS & GYNECOLOGY

## 2023-10-21 PROCEDURE — 76705 ECHO EXAM OF ABDOMEN: CPT

## 2023-10-21 PROCEDURE — 81001 URINALYSIS AUTO W/SCOPE: CPT | Performed by: NURSE PRACTITIONER

## 2023-10-21 PROCEDURE — 25010000002 ONDANSETRON PER 1 MG: Performed by: OBSTETRICS & GYNECOLOGY

## 2023-10-21 PROCEDURE — 25010000002 MORPHINE PER 10 MG: Performed by: OBSTETRICS & GYNECOLOGY

## 2023-10-21 PROCEDURE — 74177 CT ABD & PELVIS W/CONTRAST: CPT

## 2023-10-21 PROCEDURE — 87040 BLOOD CULTURE FOR BACTERIA: CPT | Performed by: STUDENT IN AN ORGANIZED HEALTH CARE EDUCATION/TRAINING PROGRAM

## 2023-10-21 PROCEDURE — 25810000003 SODIUM CHLORIDE 0.9 % SOLUTION: Performed by: NURSE PRACTITIONER

## 2023-10-21 PROCEDURE — 85027 COMPLETE CBC AUTOMATED: CPT | Performed by: STUDENT IN AN ORGANIZED HEALTH CARE EDUCATION/TRAINING PROGRAM

## 2023-10-21 PROCEDURE — 25510000001 IOPAMIDOL PER 1 ML: Performed by: ADVANCED PRACTICE MIDWIFE

## 2023-10-21 PROCEDURE — 83690 ASSAY OF LIPASE: CPT | Performed by: NURSE PRACTITIONER

## 2023-10-21 PROCEDURE — 71275 CT ANGIOGRAPHY CHEST: CPT

## 2023-10-21 RX ORDER — LIDOCAINE HYDROCHLORIDE 10 MG/ML
0.5 INJECTION, SOLUTION EPIDURAL; INFILTRATION; INTRACAUDAL; PERINEURAL ONCE AS NEEDED
Status: DISCONTINUED | OUTPATIENT
Start: 2023-10-21 | End: 2023-10-23 | Stop reason: HOSPADM

## 2023-10-21 RX ORDER — ACETAMINOPHEN 325 MG/1
650 TABLET ORAL EVERY 6 HOURS PRN
Status: DISCONTINUED | OUTPATIENT
Start: 2023-10-21 | End: 2023-10-21

## 2023-10-21 RX ORDER — DOCUSATE SODIUM 100 MG/1
100 CAPSULE, LIQUID FILLED ORAL 2 TIMES DAILY PRN
Status: DISCONTINUED | OUTPATIENT
Start: 2023-10-21 | End: 2023-10-23 | Stop reason: HOSPADM

## 2023-10-21 RX ORDER — ONDANSETRON 2 MG/ML
4 INJECTION INTRAMUSCULAR; INTRAVENOUS ONCE
Status: COMPLETED | OUTPATIENT
Start: 2023-10-21 | End: 2023-10-21

## 2023-10-21 RX ORDER — FAMOTIDINE 10 MG/ML
20 INJECTION, SOLUTION INTRAVENOUS 2 TIMES DAILY
Status: DISCONTINUED | OUTPATIENT
Start: 2023-10-21 | End: 2023-10-23 | Stop reason: HOSPADM

## 2023-10-21 RX ORDER — ACETAMINOPHEN 325 MG/1
650 TABLET ORAL EVERY 4 HOURS PRN
Status: DISCONTINUED | OUTPATIENT
Start: 2023-10-21 | End: 2023-10-23

## 2023-10-21 RX ORDER — DEXTROSE AND SODIUM CHLORIDE 5; .45 G/100ML; G/100ML
100 INJECTION, SOLUTION INTRAVENOUS CONTINUOUS
Status: DISCONTINUED | OUTPATIENT
Start: 2023-10-21 | End: 2023-10-23 | Stop reason: HOSPADM

## 2023-10-21 RX ORDER — ACETAMINOPHEN 500 MG
500 TABLET ORAL ONCE
Status: COMPLETED | OUTPATIENT
Start: 2023-10-21 | End: 2023-10-21

## 2023-10-21 RX ORDER — MORPHINE SULFATE 2 MG/ML
2 INJECTION, SOLUTION INTRAMUSCULAR; INTRAVENOUS
Status: DISCONTINUED | OUTPATIENT
Start: 2023-10-21 | End: 2023-10-23

## 2023-10-21 RX ORDER — HYDROXYZINE HYDROCHLORIDE 25 MG/1
50 TABLET, FILM COATED ORAL NIGHTLY PRN
Status: DISCONTINUED | OUTPATIENT
Start: 2023-10-21 | End: 2023-10-23 | Stop reason: HOSPADM

## 2023-10-21 RX ORDER — ONDANSETRON 2 MG/ML
4 INJECTION INTRAMUSCULAR; INTRAVENOUS EVERY 8 HOURS PRN
Status: DISCONTINUED | OUTPATIENT
Start: 2023-10-21 | End: 2023-10-23 | Stop reason: HOSPADM

## 2023-10-21 RX ORDER — SODIUM CHLORIDE 0.9 % (FLUSH) 0.9 %
10 SYRINGE (ML) INJECTION AS NEEDED
Status: DISCONTINUED | OUTPATIENT
Start: 2023-10-21 | End: 2023-10-23 | Stop reason: HOSPADM

## 2023-10-21 RX ORDER — SODIUM CHLORIDE 9 MG/ML
40 INJECTION, SOLUTION INTRAVENOUS AS NEEDED
Status: DISCONTINUED | OUTPATIENT
Start: 2023-10-21 | End: 2023-10-23 | Stop reason: HOSPADM

## 2023-10-21 RX ORDER — SODIUM CHLORIDE 0.9 % (FLUSH) 0.9 %
10 SYRINGE (ML) INJECTION EVERY 12 HOURS SCHEDULED
Status: DISCONTINUED | OUTPATIENT
Start: 2023-10-21 | End: 2023-10-23 | Stop reason: HOSPADM

## 2023-10-21 RX ORDER — NALOXONE HCL 0.4 MG/ML
0.4 VIAL (ML) INJECTION
Status: DISCONTINUED | OUTPATIENT
Start: 2023-10-21 | End: 2023-10-23

## 2023-10-21 RX ORDER — BISACODYL 10 MG
10 SUPPOSITORY, RECTAL RECTAL DAILY PRN
Status: DISCONTINUED | OUTPATIENT
Start: 2023-10-21 | End: 2023-10-23 | Stop reason: HOSPADM

## 2023-10-21 RX ORDER — ONDANSETRON 4 MG/1
8 TABLET, FILM COATED ORAL EVERY 8 HOURS PRN
Status: DISCONTINUED | OUTPATIENT
Start: 2023-10-21 | End: 2023-10-23 | Stop reason: HOSPADM

## 2023-10-21 RX ORDER — ACETAMINOPHEN 500 MG
1000 TABLET ORAL ONCE
Status: COMPLETED | OUTPATIENT
Start: 2023-10-21 | End: 2023-10-21

## 2023-10-21 RX ADMIN — MORPHINE SULFATE 2 MG: 2 INJECTION, SOLUTION INTRAMUSCULAR; INTRAVENOUS at 23:28

## 2023-10-21 RX ADMIN — IOPAMIDOL 85 ML: 755 INJECTION, SOLUTION INTRAVENOUS at 23:20

## 2023-10-21 RX ADMIN — SODIUM CHLORIDE 1000 ML: 9 INJECTION, SOLUTION INTRAVENOUS at 11:02

## 2023-10-21 RX ADMIN — ONDANSETRON 4 MG: 2 INJECTION INTRAMUSCULAR; INTRAVENOUS at 15:38

## 2023-10-21 RX ADMIN — SODIUM CHLORIDE 1000 ML: 9 INJECTION, SOLUTION INTRAVENOUS at 07:37

## 2023-10-21 RX ADMIN — MORPHINE SULFATE 2 MG: 2 INJECTION, SOLUTION INTRAMUSCULAR; INTRAVENOUS at 20:02

## 2023-10-21 RX ADMIN — DEXTROSE AND SODIUM CHLORIDE 100 ML/HR: 5; 450 INJECTION, SOLUTION INTRAVENOUS at 15:38

## 2023-10-21 RX ADMIN — MORPHINE SULFATE 2 MG: 2 INJECTION, SOLUTION INTRAMUSCULAR; INTRAVENOUS at 15:38

## 2023-10-21 RX ADMIN — FAMOTIDINE 20 MG: 10 INJECTION INTRAVENOUS at 23:27

## 2023-10-21 RX ADMIN — ACETAMINOPHEN 500 MG: 500 TABLET ORAL at 09:35

## 2023-10-21 RX ADMIN — ONDANSETRON 4 MG: 2 INJECTION INTRAMUSCULAR; INTRAVENOUS at 07:39

## 2023-10-21 RX ADMIN — ACETAMINOPHEN 1000 MG: 500 TABLET ORAL at 20:43

## 2023-10-21 NOTE — ED PROVIDER NOTES
Subjective   History of Present Illness  Pleasant patient who presents to the ER with nausea and vomiting sudden onset around midnight.  She was just getting out of the shower and had a fairly normal day when she started developing vomiting.  She has some upper abdominal discomfort.  She denies any vaginal bleeding or diarrhea.  She is around 17 weeks pregnant.  This is her third pregnancy with 1 living child.  She denies any chest pain difficulty breathing fever chills.  She tells me she has had nausea and vomiting the majority of her pregnancy.  She has not had any nausea medications but is okay with taking Zofran.        Review of Systems    Past Medical History:   Diagnosis Date    Abnormal Pap smear of cervix     Anxiety     Asthma     Chlamydia     2013    Chlamydia contact, treated     Depression     Gonorrhea     2013    Urinary tract infection     Visual impairment        Allergies   Allergen Reactions    Oxycodone Itching       Past Surgical History:   Procedure Laterality Date    CYST REMOVAL      D & C WITH SUCTION N/A 9/18/2017    Procedure: DILATATION AND CURETTAGE WITH SUCTION;  Surgeon: Kelley Dodge DO;  Location: [facility];  Service:     DENTAL PROCEDURE      DILATATION AND CURETTAGE  09/18/2017    EYE SURGERY  12/2018    Intact Surgery    KNEE SURGERY      VAGINAL DELIVERY      x1    WISDOM TOOTH EXTRACTION         Family History   Problem Relation Age of Onset    Arthritis Mother     COPD Mother     Liver disease Maternal Uncle     Breast cancer Maternal Grandmother     Cancer Maternal Grandmother     Diabetes Maternal Grandfather     Cancer Maternal Grandfather     Breast cancer Paternal Grandmother     Ovarian cancer Paternal Grandmother     Diabetes Paternal Grandmother     Heart attack Paternal Grandmother     Hypertension Paternal Grandmother     Stroke Paternal Grandmother     Cancer Paternal Grandmother     Colon cancer Paternal Grandfather        Social History     Socioeconomic History     Marital status: Single   Tobacco Use    Smoking status: Never    Smokeless tobacco: Never   Vaping Use    Vaping Use: Never used   Substance and Sexual Activity    Alcohol use: Yes     Alcohol/week: 2.0 standard drinks of alcohol     Types: 2 Glasses of wine per week     Comment: OCCASIONAL    Drug use: No    Sexual activity: Yes     Partners: Male     Birth control/protection: Condom, Abstinence           Objective   Physical Exam  Constitutional:       Appearance: She is well-developed.   HENT:      Head: Normocephalic and atraumatic.      Right Ear: External ear normal.      Left Ear: External ear normal.      Nose: Nose normal.   Eyes:      Conjunctiva/sclera: Conjunctivae normal.      Pupils: Pupils are equal, round, and reactive to light.   Cardiovascular:      Rate and Rhythm: Normal rate and regular rhythm.      Heart sounds: Normal heart sounds.   Pulmonary:      Effort: Pulmonary effort is normal.      Breath sounds: Normal breath sounds.   Abdominal:      General: Bowel sounds are normal.      Palpations: Abdomen is soft.   Musculoskeletal:         General: Normal range of motion.      Cervical back: Normal range of motion and neck supple.   Skin:     General: Skin is warm and dry.   Neurological:      Mental Status: She is alert and oriented to person, place, and time.   Psychiatric:         Behavior: Behavior normal.         Thought Content: Thought content normal.         Judgment: Judgment normal.         Procedures           ED Course  ED Course as of 10/21/23 1318   Sat Oct 21, 2023   0732 I did discuss with her the medication Zofran.  She has no concerns with taking this.  Getting lab work as well as fluids is reasonable.  Differential includes hyperemesis related to pregnancy.  Gallbladder dysfunction.  Gastritis ulcers etc.  She is not toxic.  There is no chest pain there is no difficulty breathing. [JM]   1044 Patient still with some upper abdominal discomfort and low back pain.  I did use  the bedside ultrasound and we are able to visualize the baby as well as heart activity and movement.  Patient still feeling uncomfortable.  I will speak to the on-call OB/GYN. [JM]   6728 Dr. Hayes paged to discuss [JM]   5436 I spoke with Dr. Hayes. She recommends getting a right upper quadrant ultrasound.  After that is completed may send the patient to triage for further evaluation.  Reports we do not need to wait for the official reading. [JM]      ED Course User Index  [JM] Tanner Ponce APRN           US Gallbladder   Final Result   Impression:   1.Cholelithiasis. No evidence of acute cholecystitis based on ultrasound imaging.            Electronically Signed: Regino Ross MD     10/21/2023 11:41 AM CDT     Workstation ID: [id number]                                          Medical Decision Making  Problems Addressed:  17 weeks gestation of pregnancy: complicated acute illness or injury  Gallstones: complicated acute illness or injury  Nausea and vomiting, unspecified vomiting type: complicated acute illness or injury  Upper abdominal pain: complicated acute illness or injury    Amount and/or Complexity of Data Reviewed  Labs: ordered.  Radiology: ordered.    Risk  OTC drugs.  Prescription drug management.        Final diagnoses:   Upper abdominal pain   17 weeks gestation of pregnancy   Nausea and vomiting, unspecified vomiting type   Gallstones       ED Disposition  ED Disposition       ED Disposition   Send to L&D    Condition   --    Comment   Admitting Physician: CARLOS MANUEL HAYES [[id number]]   Unit: [facility] DELIVERY [[id number]]                 No follow-up provider specified.       Medication List      No changes were made to your prescriptions during this visit.            Tanner Ponce, MOLLY  10/21/23 3734

## 2023-10-21 NOTE — H&P
"[facility]  Obstetric History and Physical    Chief Complaint   Patient presents with    Abdominal Pain    Vomiting       Subjective     Patient is a 33 y.o. female  currently at 16w4d, who presents on referral from the emergency room where she was documented to have right upper quadrant abdominal pain related to cholelithiasis without evidence of cholecystitis.  Patient says she began to have symptoms last evening at midnight and has had persistent nausea/vomiting since.  Evaluation in the emergency room confirmed cholelithiasis without evidence of cholecystitis based on gallbladder ultrasound.  Lipase is normal showing no evidence of pancreatitis.  She was treated with 2 L of IV fluid and still persistent having pain.  She will be admitted for hydration and pain control.    Her prenatal care is otherwise benign to date. Her previous obstetric/gynecological history is notable for 1 prior term vaginal delivery and 1 first trimester spontaneous  requiring D&C..    The following portions of the patients history were reviewed and updated as appropriate: current medications, allergies, past medical history, past surgical history, past family history, past social history, and problem list .        Prenatal Information:   Maternal Prenatal Labs  Blood Type No results found for: \"ABO\"   Rh Status No results found for: \"RH\"   Antibody Screen No results found for: \"ABSCRN\"   Gonnorhea No results found for: \"GCCX\"   Chlamydia No results found for: \"CLAMYDCU\"   RPR No results found for: \"RPR\"   Syphilis Antibody No results found for: \"SYPHILIS\"   Rubella No results found for: \"RUBELLAIGGIN\"   Hepatitis B Surface Antigen No results found for: \"HEPBSAG\"   HIV-1 Antibody No results found for: \"LABHIV1\"   Hepatitis C Antibody No results found for: \"HEPCAB\"   Rapid Urin Drug Screen No results found for: \"AMPMETHU\", \"BARBITSCNUR\", \"LABBENZSCN\", \"LABMETHSCN\", \"LABOPIASCN\", \"THCURSCR\", \"COCAINEUR\", \"AMPHETSCREEN\", " "\"PROPOXSCN\", \"BUPRENORSCNU\", \"METAMPSCNUR\", \"OXYCODONESCN\", \"TRICYCLICSCN\"   Group B Strep Culture No results found for: \"GBSANTIGEN\"           External Prenatal Results       Pregnancy Outside Results - Transcribed From Office Records - See Scanned Records For Details       Test Value Date Time    ABO  O  09/19/23 1448    Rh  Positive  09/19/23 1448    Antibody Screen  Negative  09/19/23 1448    Varicella IgG       Rubella  3.09 index 09/19/23 1448    Hgb  12.7 g/dL 10/21/23 0731       12.4 g/dL 09/19/23 1448       12.2 g/dL 09/14/23 0234       12.4 g/dL 09/07/23 0839       12.8 g/dL 08/15/23 1347    Hct  37.7 % 10/21/23 0731       36.5 % 09/19/23 1448       36.9 % 09/14/23 0234       37.9 % 09/07/23 0839       39.6 % 08/15/23 1347    Glucose Fasting GTT       Glucose Tolerance Test 1 hour       Glucose Tolerance Test 3 hour       Gonorrhea (discrete)  Negative  08/22/23 1416    Chlamydia (discrete)  Negative  08/22/23 1416    RPR  Non-Reactive  04/24/19 1131    VDRL       Syphilis Antibody       HBsAg  Non-Reactive  09/19/23 1448       Negative  08/22/23 1422    Herpes Simplex Virus PCR       Herpes Simplex VIrus Culture       HIV  Non-Reactive  09/19/23 1448       Non-Reactive  08/22/23 1422    Hep C RNA Quant PCR       Hep C Antibody  Non-Reactive  09/19/23 1448       Non-Reactive  08/22/23 1422    AFP       Group B Strep ^ Negative  11/06/19     GBS Susceptibility to Clindamycin       GBS Susceptibility to Erythromycin       Fetal Fibronectin       Genetic Testing, Maternal Blood                 Drug Screening       Test Value Date Time    Urine Drug Screen       Amphetamine Screen  Negative  09/19/23 1448    Barbiturate Screen  Negative  09/19/23 1448    Benzodiazepine Screen  Negative  09/19/23 1448    Methadone Screen  Negative  09/19/23 1448    Phencyclidine Screen  Negative  09/19/23 1448    Opiates Screen  Negative  09/19/23 1448    THC Screen  Negative  09/19/23 1448    Cocaine Screen       Propoxyphene " Screen  Negative  23 1448    Buprenorphine Screen  Negative  23 1448    Methamphetamine Screen       Oxycodone Screen  Negative  23 1448    Tricyclic Antidepressants Screen  Negative  23 1448              Legend    ^: Historical                              Past OB History:       OB History    Para Term  AB Living   3 1 1 0 1 1   SAB IAB Ectopic Molar Multiple Live Births   0 0 0 0 0 1      # Outcome Date GA Lbr Conrad/2nd Weight Sex Delivery Anes PTL Lv   3 Current            2 Term 19 39w0d 12:24 :45 2745 g (6 lb 0.8 oz) F Vag-Spont EPI N DAILY      Name: LINDSEY BRO      Apgar1: 7  Apgar5: 9   1 AB 2017 9w0d   U          Birth Comments: D&C       Past Medical History:  Past Medical History:   Diagnosis Date    Abnormal Pap smear of cervix     Anxiety     Asthma     Chlamydia         Chlamydia contact, treated     Depression     Gonorrhea         Urinary tract infection     Visual impairment       Past Surgical History Past Surgical History:   Procedure Laterality Date    CYST REMOVAL      D & C WITH SUCTION N/A 2017    Procedure: DILATATION AND CURETTAGE WITH SUCTION;  Surgeon: Kelley Dodge DO;  Location: [facility] OR;  Service:     DENTAL PROCEDURE      DILATATION AND CURETTAGE  2017    EYE SURGERY  2018    Intact Surgery    KNEE SURGERY      VAGINAL DELIVERY      x1    WISDOM TOOTH EXTRACTION        Family History: Family History   Problem Relation Age of Onset    Arthritis Mother     COPD Mother     Liver disease Maternal Uncle     Breast cancer Maternal Grandmother     Cancer Maternal Grandmother     Diabetes Maternal Grandfather     Cancer Maternal Grandfather     Breast cancer Paternal Grandmother     Ovarian cancer Paternal Grandmother     Diabetes Paternal Grandmother     Heart attack Paternal Grandmother     Hypertension Paternal Grandmother     Stroke Paternal Grandmother     Cancer Paternal Grandmother     Colon cancer Paternal  Grandfather       Social History:  reports that she has never smoked. She has never used smokeless tobacco.   reports current alcohol use of about 2.0 standard drinks of alcohol per week.   reports no history of drug use.   Allergies: Oxycodone  Current Medications:          No current facility-administered medications on file prior to encounter.     Current Outpatient Medications on File Prior to Encounter   Medication Sig Dispense Refill    albuterol sulfate  (90 Base) MCG/ACT inhaler Inhale 2 puffs Every 4 (Four) Hours As Needed for Wheezing. 18 g 0    buPROPion XL (WELLBUTRIN XL) 150 MG 24 hr tablet Take 1 tablet by mouth Daily. 30 tablet 5    Loratadine (Claritin) 10 MG capsule Take 1 capsule by mouth Daily.      montelukast (SINGULAIR) 10 MG tablet TAKE ONE TABLET BY MOUTH ONCE NIGHTLY 90 tablet 0    vitamin C (ASCORBIC ACID) 500 MG tablet TAKE TWO TABLETS BY MOUTH DAILY 60 tablet 1    vitamin D3 125 MCG (5000 UT) capsule capsule Take 1 capsule by mouth Daily. 30 capsule 3       General ROS: Pertinent items are noted in HPI, all other systems reviewed and negative    Objective       Vital Signs Range for the last 24 hours  Temperature: Temp:  [97.3 °F (36.3 °C)-98.7 °F (37.1 °C)] 98.7 °F (37.1 °C)   Temp Source: Temp src: Oral   BP: BP: ()/(51-79) 107/66   Pulse: Heart Rate:  [105-128] 126   Respirations: Resp:  [16] 16   SPO2: SpO2:  [95 %-100 %] 100 %   O2 Amount (l/min):     O2 Devices Device (Oxygen Therapy): room air   Weight: Weight:  [89.4 kg (197 lb)] 89.4 kg (197 lb)     Physical Examination: General appearance - overweight, in mild to moderate distress, and ill-appearing  Mental status - alert, oriented to person, place, and time, normal mood, behavior, speech, dress, motor activity, and thought processes  Eyes - pupils equal and reactive, extraocular eye movements intact, sclera anicteric  Neck - supple, no significant adenopathy, thyroid exam: thyroid is normal in size without nodules  or tenderness  Chest - clear to auscultation, no wheezes, rales or rhonchi, symmetric air entry  Heart - normal rate, regular rhythm, normal S1, S2, no murmurs, rubs, clicks or gallops  Abdomen-tenderness reported in patient's right upper quadrant.  The abdomen remains soft without guarding or rebound.  Gravid uterus.  Neurological - alert, oriented, normal speech, no focal findings or movement disorder noted, DTR's normal and symmetric  Extremities - no pedal edema noted    Fetal Heart Rate Assessment: Fetal heart tones confirmed with Doppler.      Laboratory Results:   Lab Results   Component Value Date    ALKPHOS 71 10/21/2023    ALT 11 10/21/2023    AST 17 10/21/2023    CREATININE 0.73 10/21/2023    BILITOT 0.5 10/21/2023     07/27/2019    URICACID 4.5 07/27/2019       WBC   Date Value Ref Range Status   10/21/2023 11.03 (H) 3.40 - 10.80 10*3/mm3 Final     RBC   Date Value Ref Range Status   10/21/2023 4.22 3.77 - 5.28 10*6/mm3 Final     Hemoglobin   Date Value Ref Range Status   10/21/2023 12.7 12.0 - 15.9 g/dL Final     Hematocrit   Date Value Ref Range Status   10/21/2023 37.7 34.0 - 46.6 % Final     MCV   Date Value Ref Range Status   10/21/2023 89.3 79.0 - 97.0 fL Final     MCH   Date Value Ref Range Status   10/21/2023 30.1 26.6 - 33.0 pg Final     MCHC   Date Value Ref Range Status   10/21/2023 33.7 31.5 - 35.7 g/dL Final     RDW   Date Value Ref Range Status   10/21/2023 12.7 12.3 - 15.4 % Final     RDW-SD   Date Value Ref Range Status   10/21/2023 41.0 37.0 - 54.0 fl Final     MPV   Date Value Ref Range Status   10/21/2023 10.1 6.0 - 12.0 fL Final     Platelets   Date Value Ref Range Status   10/21/2023 271 140 - 450 10*3/mm3 Final     Neutrophil %   Date Value Ref Range Status   10/21/2023 92.2 (H) 42.7 - 76.0 % Final     Lymphocyte %   Date Value Ref Range Status   10/21/2023 4.7 (L) 19.6 - 45.3 % Final     Monocyte %   Date Value Ref Range Status   10/21/2023 2.4 (L) 5.0 - 12.0 % Final      Eosinophil %   Date Value Ref Range Status   10/21/2023 0.1 (L) 0.3 - 6.2 % Final     Basophil %   Date Value Ref Range Status   10/21/2023 0.2 0.0 - 1.5 % Final     Immature Grans %   Date Value Ref Range Status   10/21/2023 0.4 0.0 - 0.5 % Final     Neutrophils, Absolute   Date Value Ref Range Status   10/21/2023 10.17 (H) 1.70 - 7.00 10*3/mm3 Final     Lymphocytes, Absolute   Date Value Ref Range Status   10/21/2023 0.52 (L) 0.70 - 3.10 10*3/mm3 Final     Monocytes, Absolute   Date Value Ref Range Status   10/21/2023 0.27 0.10 - 0.90 10*3/mm3 Final     Eosinophils, Absolute   Date Value Ref Range Status   10/21/2023 0.01 0.00 - 0.40 10*3/mm3 Final     Basophils, Absolute   Date Value Ref Range Status   10/21/2023 0.02 0.00 - 0.20 10*3/mm3 Final     Immature Grans, Absolute   Date Value Ref Range Status   10/21/2023 0.04 0.00 - 0.05 10*3/mm3 Final     nRBC   Date Value Ref Range Status   10/21/2023 0.0 0.0 - 0.2 /100 WBC Final             Brief Urine Lab Results  (Last result in the past 365 days)        Color   Clarity   Blood   Leuk Est   Nitrite   Protein   CREAT   Urine HCG        10/21/23 0931 Yellow   Cloudy   Negative   Small (1+)   Negative   30 mg/dL (1+)                     Radiology Review: Ultrasound confirms cholelithiasis without evidence of cholecystitis.  Other Studies: CMP shows no electrolyte disturbances.  Lipase is normal.  Only minimal elevation of WBC.  Marked ketonuria noted.    Assessment & Plan       Nausea and vomiting in pregnancy        Assessment:  Cholelithiasis.  Right upper quadrant pain related to #1.  Persistent nausea/vomiting.    Plan:  Admit for continued hydration, parenteral antiemetics and pain control.     Total time spent today with Padma  was 30-39 minutes (level 4).  Of this time, > 50% was spent face-to-face time coordinating care, answering her questions and counseling regarding pathophysiology of her presenting problem along with plans for any diagnostic work-up and  "treatment.        Elmo Sea \"Gricelda\" PETE Bacon MD  10/21/2023  15:18 EDT    "

## 2023-10-21 NOTE — Clinical Note
Admitting Physician: CARLOS MANUEL NGO [[id number]]   Unit: [facility] LABOR DELIVERY [[id number]]

## 2023-10-21 NOTE — LETTER
October 23, 2023     Patient: Padma Younger   YOB: 1990   Date of Visit: 10/21/2023       To Whom It May Concern:    Padma Younger was admitted to our antepartum unit from 10/21/23 to 10/23/23.           Sincerely,        No name on file    CC:   No Recipients

## 2023-10-22 LAB
BACTERIA UR QL AUTO: ABNORMAL /HPF
BASOPHILS # BLD AUTO: 0 10*3/MM3 (ref 0–0.2)
BASOPHILS NFR BLD AUTO: 0 % (ref 0–1.5)
BILIRUB UR QL STRIP: NEGATIVE
BILIRUB UR QL STRIP: NEGATIVE
CLARITY UR: CLEAR
CLARITY UR: CLEAR
COLOR UR: YELLOW
COLOR UR: YELLOW
DEPRECATED RDW RBC AUTO: 41 FL (ref 37–54)
EOSINOPHIL # BLD AUTO: 0.02 10*3/MM3 (ref 0–0.4)
EOSINOPHIL NFR BLD AUTO: 0.5 % (ref 0.3–6.2)
ERYTHROCYTE [DISTWIDTH] IN BLOOD BY AUTOMATED COUNT: 12.8 % (ref 12.3–15.4)
GLUCOSE UR STRIP-MCNC: NEGATIVE MG/DL
GLUCOSE UR STRIP-MCNC: NEGATIVE MG/DL
HCT VFR BLD AUTO: 30.9 % (ref 34–46.6)
HGB BLD-MCNC: 10.1 G/DL (ref 12–15.9)
HGB UR QL STRIP.AUTO: NEGATIVE
HGB UR QL STRIP.AUTO: NEGATIVE
HYALINE CASTS UR QL AUTO: ABNORMAL /LPF
IMM GRANULOCYTES # BLD AUTO: 0.01 10*3/MM3 (ref 0–0.05)
IMM GRANULOCYTES NFR BLD AUTO: 0.3 % (ref 0–0.5)
KETONES UR QL STRIP: NEGATIVE
KETONES UR QL STRIP: NEGATIVE
LEUKOCYTE ESTERASE UR QL STRIP.AUTO: ABNORMAL
LEUKOCYTE ESTERASE UR QL STRIP.AUTO: NEGATIVE
LYMPHOCYTES # BLD AUTO: 0.6 10*3/MM3 (ref 0.7–3.1)
LYMPHOCYTES NFR BLD AUTO: 16 % (ref 19.6–45.3)
MCH RBC QN AUTO: 29 PG (ref 26.6–33)
MCHC RBC AUTO-ENTMCNC: 32.7 G/DL (ref 31.5–35.7)
MCV RBC AUTO: 88.8 FL (ref 79–97)
MONOCYTES # BLD AUTO: 0.28 10*3/MM3 (ref 0.1–0.9)
MONOCYTES NFR BLD AUTO: 7.5 % (ref 5–12)
NEUTROPHILS NFR BLD AUTO: 2.84 10*3/MM3 (ref 1.7–7)
NEUTROPHILS NFR BLD AUTO: 75.7 % (ref 42.7–76)
NITRITE UR QL STRIP: NEGATIVE
NITRITE UR QL STRIP: NEGATIVE
NRBC BLD AUTO-RTO: 0 /100 WBC (ref 0–0.2)
PH UR STRIP.AUTO: 6.5 [PH] (ref 5–8)
PH UR STRIP.AUTO: 7 [PH] (ref 5–8)
PLATELET # BLD AUTO: 178 10*3/MM3 (ref 140–450)
PMV BLD AUTO: 10.1 FL (ref 6–12)
PROT UR QL STRIP: NEGATIVE
PROT UR QL STRIP: NEGATIVE
RBC # BLD AUTO: 3.48 10*6/MM3 (ref 3.77–5.28)
RBC # UR STRIP: ABNORMAL /HPF
REF LAB TEST METHOD: ABNORMAL
SP GR UR STRIP: 1.01 (ref 1–1.03)
SP GR UR STRIP: 1.01 (ref 1–1.03)
SQUAMOUS #/AREA URNS HPF: ABNORMAL /HPF
UROBILINOGEN UR QL STRIP: ABNORMAL
UROBILINOGEN UR QL STRIP: NORMAL
WBC # UR STRIP: ABNORMAL /HPF
WBC NRBC COR # BLD: 3.75 10*3/MM3 (ref 3.4–10.8)

## 2023-10-22 PROCEDURE — 25010000002 MORPHINE PER 10 MG: Performed by: STUDENT IN AN ORGANIZED HEALTH CARE EDUCATION/TRAINING PROGRAM

## 2023-10-22 PROCEDURE — 81003 URINALYSIS AUTO W/O SCOPE: CPT | Performed by: STUDENT IN AN ORGANIZED HEALTH CARE EDUCATION/TRAINING PROGRAM

## 2023-10-22 PROCEDURE — 25810000003 LACTATED RINGERS PER 1000 ML: Performed by: STUDENT IN AN ORGANIZED HEALTH CARE EDUCATION/TRAINING PROGRAM

## 2023-10-22 PROCEDURE — 85025 COMPLETE CBC W/AUTO DIFF WBC: CPT | Performed by: STUDENT IN AN ORGANIZED HEALTH CARE EDUCATION/TRAINING PROGRAM

## 2023-10-22 PROCEDURE — 81001 URINALYSIS AUTO W/SCOPE: CPT | Performed by: STUDENT IN AN ORGANIZED HEALTH CARE EDUCATION/TRAINING PROGRAM

## 2023-10-22 PROCEDURE — 25010000002 ONDANSETRON PER 1 MG: Performed by: OBSTETRICS & GYNECOLOGY

## 2023-10-22 PROCEDURE — 25010000002 MORPHINE PER 10 MG: Performed by: OBSTETRICS & GYNECOLOGY

## 2023-10-22 RX ORDER — HYDROXYZINE HYDROCHLORIDE 25 MG/1
25 TABLET, FILM COATED ORAL ONCE AS NEEDED
Status: DISCONTINUED | OUTPATIENT
Start: 2023-10-22 | End: 2023-10-22

## 2023-10-22 RX ORDER — SODIUM CHLORIDE, SODIUM LACTATE, POTASSIUM CHLORIDE, CALCIUM CHLORIDE 600; 310; 30; 20 MG/100ML; MG/100ML; MG/100ML; MG/100ML
100 INJECTION, SOLUTION INTRAVENOUS CONTINUOUS
Status: DISCONTINUED | OUTPATIENT
Start: 2023-10-22 | End: 2023-10-23 | Stop reason: HOSPADM

## 2023-10-22 RX ORDER — KETOROLAC TROMETHAMINE 30 MG/ML
30 INJECTION, SOLUTION INTRAMUSCULAR; INTRAVENOUS ONCE AS NEEDED
Status: DISCONTINUED | OUTPATIENT
Start: 2023-10-22 | End: 2023-10-22

## 2023-10-22 RX ADMIN — ONDANSETRON 4 MG: 2 INJECTION INTRAMUSCULAR; INTRAVENOUS at 08:23

## 2023-10-22 RX ADMIN — MORPHINE SULFATE 2 MG: 2 INJECTION, SOLUTION INTRAMUSCULAR; INTRAVENOUS at 14:29

## 2023-10-22 RX ADMIN — FAMOTIDINE 20 MG: 10 INJECTION INTRAVENOUS at 08:23

## 2023-10-22 RX ADMIN — Medication 10 ML: at 10:30

## 2023-10-22 RX ADMIN — MORPHINE SULFATE 2 MG: 2 INJECTION, SOLUTION INTRAMUSCULAR; INTRAVENOUS at 02:33

## 2023-10-22 RX ADMIN — HYDROXYZINE HYDROCHLORIDE 50 MG: 25 TABLET, FILM COATED ORAL at 21:28

## 2023-10-22 RX ADMIN — Medication 10 ML: at 14:30

## 2023-10-22 RX ADMIN — MORPHINE SULFATE 2 MG: 2 INJECTION, SOLUTION INTRAMUSCULAR; INTRAVENOUS at 08:23

## 2023-10-22 RX ADMIN — SODIUM CHLORIDE, POTASSIUM CHLORIDE, SODIUM LACTATE AND CALCIUM CHLORIDE 100 ML/HR: 600; 310; 30; 20 INJECTION, SOLUTION INTRAVENOUS at 12:30

## 2023-10-22 RX ADMIN — Medication 10 ML: at 20:00

## 2023-10-22 RX ADMIN — FAMOTIDINE 20 MG: 10 INJECTION INTRAVENOUS at 21:22

## 2023-10-22 RX ADMIN — MORPHINE SULFATE 2 MG: 2 INJECTION, SOLUTION INTRAMUSCULAR; INTRAVENOUS at 20:00

## 2023-10-22 RX ADMIN — DEXTROSE AND SODIUM CHLORIDE 100 ML/HR: 5; 450 INJECTION, SOLUTION INTRAVENOUS at 02:32

## 2023-10-22 RX ADMIN — SODIUM CHLORIDE, POTASSIUM CHLORIDE, SODIUM LACTATE AND CALCIUM CHLORIDE 100 ML/HR: 600; 310; 30; 20 INJECTION, SOLUTION INTRAVENOUS at 21:23

## 2023-10-22 RX ADMIN — MORPHINE SULFATE 2 MG: 2 INJECTION, SOLUTION INTRAMUSCULAR; INTRAVENOUS at 10:30

## 2023-10-22 RX ADMIN — ACETAMINOPHEN 650 MG: 325 TABLET ORAL at 18:22

## 2023-10-22 RX ADMIN — Medication 10 ML: at 21:23

## 2023-10-22 NOTE — CONSULTS
General Surgery Consultation Note    Date of Service: 10/22/2023  Padma Younger  [id number]  1990      Referring Provider: Yamile Matson CNM    Location of Consult: Inpatient     Reason for Consultation: Cholelithiasis       History of Present Illness:  I am seeing, Padma Younger, in consultation for Yamile Matson CNM regarding cholelithiasis 33-year-old young lady with her second pregnancy whom is approximately 17 weeks presents with a single episode of severe nausea and vomiting last evening then developed epigastric pain radiating through to her back.  She has had persistent discomfort since coming to the hospital.  She has had no symptoms similar to this in the past.  Her vomitus has been nonbilious and nonbloody.  She is passing flatus.  She admits to fever and chills.  Otherwise there are no significant modifying factors nor associated symptoms.     Problems Addressed this Visit    None  Visit Diagnoses       Upper abdominal pain    -  Primary    17 weeks gestation of pregnancy        Nausea and vomiting, unspecified vomiting type        Gallstones              Diagnoses         Codes Comments    Upper abdominal pain    -  Primary ICD-10-CM: R10.10  ICD-9-CM: 789.09     17 weeks gestation of pregnancy     ICD-10-CM: Z3A.17  ICD-9-CM: V22.2     Nausea and vomiting, unspecified vomiting type     ICD-10-CM: R11.2  ICD-9-CM: 787.01     Gallstones     ICD-10-CM: K80.20  ICD-9-CM: 574.20             Past Medical History:   Diagnosis Date    Abnormal Pap smear of cervix     Anxiety     Asthma     Chlamydia     2013    Chlamydia contact, treated     Depression     Gonorrhea     2013    Urinary tract infection     Visual impairment        Past Surgical History:    CYST REMOVAL    D & C WITH SUCTION    Procedure: DILATATION AND CURETTAGE WITH SUCTION;  Surgeon: Kelley Dodge DO;  Location: [facility] OR;  Service:     DENTAL PROCEDURE    DILATATION AND CURETTAGE    EYE SURGERY    Intact Surgery    KNEE SURGERY     VAGINAL DELIVERY    x1    WISDOM TOOTH EXTRACTION       Allergies   Allergen Reactions    Oxycodone Itching       No current facility-administered medications on file prior to encounter.     Current Outpatient Medications on File Prior to Encounter   Medication Sig Dispense Refill    albuterol sulfate  (90 Base) MCG/ACT inhaler Inhale 2 puffs Every 4 (Four) Hours As Needed for Wheezing. 18 g 0    buPROPion XL (WELLBUTRIN XL) 150 MG 24 hr tablet Take 1 tablet by mouth Daily. 30 tablet 5    Loratadine (Claritin) 10 MG capsule Take 1 capsule by mouth Daily.      montelukast (SINGULAIR) 10 MG tablet TAKE ONE TABLET BY MOUTH ONCE NIGHTLY 90 tablet 0    vitamin C (ASCORBIC ACID) 500 MG tablet TAKE TWO TABLETS BY MOUTH DAILY 60 tablet 1    vitamin D3 125 MCG (5000 UT) capsule capsule Take 1 capsule by mouth Daily. 30 capsule 3         Current Facility-Administered Medications:     acetaminophen (TYLENOL) tablet 650 mg, 650 mg, Oral, Q4H PRN, Elmo Bacon III, MD    bisacodyl (DULCOLAX) suppository 10 mg, 10 mg, Rectal, Daily PRN, Elmo Bacon III, MD    dextrose 5 % and sodium chloride 0.45 % infusion, 100 mL/hr, Intravenous, Continuous, Elmo Bacon III, MD, Last Rate: 100 mL/hr at 10/22/23 0232, 100 mL/hr at 10/22/23 0232    docusate sodium (COLACE) capsule 100 mg, 100 mg, Oral, BID PRN, Elmo Bacon III, MD    famotidine (PEPCID) injection 20 mg, 20 mg, Intravenous, BID, Elmo Bacon III, MD, 20 mg at 10/22/23 0823    hydrOXYzine (ATARAX) tablet 50 mg, 50 mg, Oral, Nightly PRN, Elmo Bacon III, MD    ketorolac (TORADOL) injection 30 mg, 30 mg, Intravenous, Once PRN, Janet Hayes DO    lidocaine PF 1% (XYLOCAINE) injection 0.5 mL, 0.5 mL, Intradermal, Once PRN, Elmo Bacon III, MD    morphine injection 2 mg, 2 mg, Intravenous, Q2H PRN, 2 mg at 10/22/23 1030 **AND** naloxone (NARCAN) injection 0.4 mg, 0.4 mg, Intravenous, Q5 Min PRN, Elmo Bacon III, MD     ondansetron (ZOFRAN) tablet 8 mg, 8 mg, Oral, Q8H PRN **OR** ondansetron (ZOFRAN) injection 4 mg, 4 mg, Intravenous, Q8H PRN, Elmo Bacon III, MD, 4 mg at 10/22/23 0823    [COMPLETED] Insert Peripheral IV, , , Once **AND** sodium chloride 0.9 % flush 10 mL, 10 mL, Intravenous, PRN, Tanner Ponce APRN    sodium chloride 0.9 % flush 10 mL, 10 mL, Intravenous, Q12H, Elmo Bacon III, MD    sodium chloride 0.9 % flush 10 mL, 10 mL, Intravenous, PRN, Elmo Bacon III, MD, 10 mL at 10/22/23 1030    sodium chloride 0.9 % infusion 40 mL, 40 mL, Intravenous, PRN, Elmo Bacon III, MD    Family History   Problem Relation Age of Onset    Arthritis Mother     COPD Mother     Liver disease Maternal Uncle     Breast cancer Maternal Grandmother     Cancer Maternal Grandmother     Diabetes Maternal Grandfather     Cancer Maternal Grandfather     Breast cancer Paternal Grandmother     Ovarian cancer Paternal Grandmother     Diabetes Paternal Grandmother     Heart attack Paternal Grandmother     Hypertension Paternal Grandmother     Stroke Paternal Grandmother     Cancer Paternal Grandmother     Colon cancer Paternal Grandfather      Social History     Socioeconomic History    Marital status: Single   Tobacco Use    Smoking status: Never    Smokeless tobacco: Never   Vaping Use    Vaping Use: Never used   Substance and Sexual Activity    Alcohol use: Yes     Alcohol/week: 2.0 standard drinks of alcohol     Types: 2 Glasses of wine per week     Comment: OCCASIONAL    Drug use: No    Sexual activity: Yes     Partners: Male     Birth control/protection: Condom, Abstinence       Review of Systems:  Review of Systems   Constitutional:  Positive for appetite change, chills and fever.   HENT:  Negative for dental problem, hearing loss and postnasal drip.    Eyes:  Negative for photophobia and visual disturbance.   Respiratory:  Negative for cough and chest tightness.    Cardiovascular:  Negative for leg swelling.  "  Gastrointestinal:  Positive for abdominal pain, nausea and vomiting.   Endocrine: Negative for polyphagia and polyuria.   Genitourinary:  Negative for difficulty urinating, frequency and pelvic pain.   Musculoskeletal:  Positive for back pain. Negative for myalgias.   Skin:  Negative for rash.   Allergic/Immunologic: Negative for immunocompromised state.   Neurological:  Negative for seizures and headaches.   Hematological:  Negative for adenopathy.   Psychiatric/Behavioral:  Negative for behavioral problems, hallucinations and self-injury.      Otherwise the 12 point review of systems is negative.    /58 (BP Location: Right arm, Patient Position: Sitting)   Pulse 111   Temp 98.4 °F (36.9 °C) (Oral)   Resp 14   Ht 154.9 cm (61\")   Wt 89.4 kg (197 lb)   LMP  (LMP Unknown)   SpO2 96%   Breastfeeding No   BMI 37.22 kg/m²   Body mass index is 37.22 kg/m².    General: Laying in bed pleasantly conversant  HEENT: PER, no icterus, normal sclerae  Cardiac: regular rhythm, tachycardic,  no audible rubs  Pulmonary: bilateral breath sounds, nonlabored  Abdominal: Soft, gravid, tenderness to deep palpation in the epigastrium  Neurologic: awake, alert, no obvious focal deficits  Extremities: warm, no edema  Skin: no obvious rashes nor worrisome lesions seen     CBC  Results from last 7 days   Lab Units 10/22/23  0947   WBC 10*3/mm3 3.75   HEMOGLOBIN g/dL 10.1*   HEMATOCRIT % 30.9*   PLATELETS 10*3/mm3 178       CMP  Results from last 7 days   Lab Units 10/21/23  0731   SODIUM mmol/L 137   POTASSIUM mmol/L 4.0   CHLORIDE mmol/L 104   CO2 mmol/L 22.0   BUN mg/dL 6   CREATININE mg/dL 0.73   CALCIUM mg/dL 9.4   BILIRUBIN mg/dL 0.5   ALK PHOS U/L 71   ALT (SGPT) U/L 11   AST (SGOT) U/L 17   GLUCOSE mg/dL 104*       Radiology  Imaging Results (Last 72 Hours)       Procedure Component Value Units Date/Time    CT Abdomen Pelvis With Contrast [[id number]] Collected: 10/22/23 0025     Updated: 10/22/23 0035    Narrative:   "    CT ABDOMEN PELVIS W CONTRAST    Date of Exam: 10/21/2023 11:09 PM EDT    Indication: Abdominal pain, acute (Ped 0-17y).    Comparison: CT abdomen pelvis dated September 21, 2017    Technique: Axial CT images were obtained of the abdomen and pelvis following the uneventful intravenous administration of 85 mL Isovue-370. Reconstructed coronal and sagittal images were also obtained. Automated exposure control and iterative   construction methods were used.    Findings:  Lung Bases:     The visualized lung bases and lower mediastinal structures are unremarkable.    Liver:  Liver is normal in size and CT density. No focal lesions.    Biliary/Gallbladder:    The gallbladder is normal without evidence of radiopaque stones. The biliary tree is nondilated.    Spleen:  Spleen is normal in size and CT density.    Pancreas:    Pancreas is normal. There is no evidence of pancreatic mass or peripancreatic fluid.    Kidneys:    Kidneys are normal in size. There are no stones or hydronephrosis.    Adrenals:    Adrenal glands are unremarkable.    Retroperitoneal/Lymph Nodes/Vasculature:    No retroperitoneal adenopathy is identified.    Gastrointestinal/Mesentery:    The bowel loops are non-dilated without wall thickening or mass. The appendix appears within normal limits. No evidence of obstruction. No free air. No mesenteric fluid collections identified.    Bladder:    The bladder is normal.    Genital:     There is a gravid uterus with a posterior placenta and bony structures within the uterus consistent with a fetus.          Bony Structures:     Visualized bony structures are consistent with the patient's age.        Impression:      Impression:  Gravid uterus. Normal appendix. No acute abdominal or pelvic abnormality.        Electronically Signed: Elkin Arenas MD    10/22/2023 12:32 AM EDT    Workstation ID: [id number]    CT Angiogram Chest [[id number]] Collected: 10/22/23 0022     Updated: 10/22/23 0028    Narrative:      CT  ANGIOGRAM CHEST    Date of Exam: 10/21/2023 11:09 PM EDT    Indication: Chest pain, nonspecific  Pulmonary embolism (PE) suspected, pregnant.    Comparison: None available.    Technique: CTA of the chest was performed before and after the uneventful intravenous administration of 85 mL Isovue-370. Reconstructed coronal and sagittal images were also obtained. In addition, a 3-D volume rendered image was created for   interpretation. Automated exposure control and iterative reconstruction methods were used.      Findings:    Pulmonary arteries: Adequate opacification of the pulmonary arteries. No evidence of acute pulmonary embolism.    Lungs and Pleura: There is bilateral basilar atelectasis. There are no pleural fluid collections.    Mediastinum/Francia: No mediastinal or hilar lymphadenopathy.    Lymph nodes: No axillary or supraclavicular adenopathy.    Cardiovascular: The cardiac chambers are within normal limits. The pericardium is normal. The aorta and its arch branch vessels are unremarkable.       Upper Abdomen: The upper abdominal contents are unremarkable.          Bones and Soft Tissue: No suspicious osseous lesion.        Impression:      Impression:  No evidence of pulmonary embolic disease. Mild bilateral basilar atelectasis.        Electronically Signed: Eklin Arenas MD    10/22/2023 12:25 AM EDT    Workstation ID: [id number]    XR Chest 1 View [[id number]] Collected: 10/21/23 2211     Updated: 10/21/23 2215    Narrative:      XR CHEST 1 VW    Date of Exam: 10/21/2023 9:24 PM EDT    Indication: tachycardia and fever    Comparison: Chest radiograph August 15, 2023    Findings:  There are no airspace consolidations. No pleural fluid. No pneumothorax. The pulmonary vasculature appears within normal limits. The cardiac and mediastinal silhouette appear unremarkable. No acute osseous abnormality identified.      Impression:      Impression:  No active disease      Electronically Signed: Elkin Arensa MD     10/21/2023 10:12 PM EDT    Workstation ID: [id number]    US Gallbladder [[id number]] Collected: 10/21/23 1240     Updated: 10/21/23 124    Narrative:      US GALLBLADDER    Date of Exam: 10/21/2023 10:39 AM CDT    Indication: ruq pain..    Comparison: CT abdomen pelvis 2017    Technique: Grayscale and color Doppler ultrasound evaluation of the right upper quadrant was performed.      Findings:  LIVER:  The liver appears normal in echogenicity.No focal lesions identified. Normal flow is present within the hepatic vasculature.     GALLBLADDER: There are a few stones/sludge within the gallbladder. No gallbladder wall thickening or para cholecystic fluid. No sonographic Faulkner sign reported. Common bile duct is normal in caliber.    PANCREAS:  Visualized portions are unremarkable.    AORTA/IVC:  Visualized portions are unremarkable.    RIGHT KIDNEY:  Normal in size with no focal lesions. No evidence of nephrolithiasis or hydronephrosis.          Impression:      Impression:  1.Cholelithiasis. No evidence of acute cholecystitis based on ultrasound imaging.        Electronically Signed: Regino Ross MD    10/21/2023 11:41 AM CDT    Workstation ID: [id number]              Assessment:  17-week intrauterine pregnancy  Cholelithiasis    Plan:  She has had a single episode of nausea vomiting followed by abdominal pain.  She is tachycardic and has had low-grade temperature.  Her white blood cell count is normal, her liver function test upon presentation were within normal limits, her CT imaging demonstrates no inflammatory changes around the gallbladder nor biliary ductal dilatation.  The ultrasound demonstrates a few stones and sludge within the gallbladder but no wall thickening, fluid, negative sonographic Faulkner sign, and a normal common bile duct.  She is not on any antibiotic coverage.  I discussed the risks and benefits associated with cholecystectomy including, but not limited to:  labor, fetal death, bleeding,  infection, injury to adjacent viscera (duodenum, common bile duct, etc.), retained stones, need for ERCP, an open operation in general, bile leak, biloma formation, chronic pain, incisional hernia formation, and medical issues from a cardiopulmonary deep venous thrombosis standpoint. All questions were answered, they understand.  I am going to repeat her liver function tests and ultrasound in the morning.  Will follow along.      Ilya Pereira MD  10/22/23  11:58 EDT

## 2023-10-22 NOTE — PROGRESS NOTES
[facility]  Antepartum Progress Note    Chief Complaint: Abdominal pain, N/V    Subjective     Patient is a 32yo  female at 16w4d admitted to [facility] from ED after initially presenting for abdominal pain and N/V. Abdominal ultrasound revealed cholelithiasis without evidence of cholecystitis. Workup negative for pancreatitis.     Contacted due to patient fever of 100.6. Nursing report of elevated room temperature. Patient did not report signs of fever, chills, nausea, vomiting, shortness of breath. Abdominal pain has been intermittently controlled with medical management. Reported as no worsening. Denies other symptoms including cough, congestion, chest pain. She denied leakage of fluid, vaginal bleeding. Nursing reports that she has been resting well.       Objective     Vital Signs Range for the last 24 hours  Temp:  [97.3 °F (36.3 °C)-100.6 °F (38.1 °C)] 100.6 °F (38.1 °C)   BP: ()/(45-79) 106/58   Heart Rate:  [105-129] 129   Resp:  [16] 16   SpO2:  [95 %-100 %] 100 %       Device (Oxygen Therapy): room air     Fetal Heart Rate Assessment   Method: Fetal HR Assessment Method: intermittent auscultation, using Doppler   Beats/min: Fetal HR (beats/min): 126   Baseline: Fetal HR Baseline: normal range   Varibility:     Accels:     Decels:     Tracing Category:       Uterine Assessment   Method: Method:  (pt denies ctx lof and bleeding)   Frequency (min):     Ctx Count in 10 min:     Duration:     Intensity:     Intensity by [facility]:     Resting Tone:     Resting Tone by [facility]:            Intake/Output last 24 hours:      Intake/Output Summary (Last 24 hours) at 10/21/2023 2117  Last data filed at 10/21/2023 1338  Gross per 24 hour   Intake 2000 ml   Output --   Net 2000 ml       Intake/Output this shift:    No intake/output data recorded.        Laboratory Results  WBC   Date Value Ref Range Status   10/21/2023 6.76 3.40 - 10.80 10*3/mm3 Final     RBC   Date Value Ref Range Status   10/21/2023 3.79 3.77 - 5.28  10*6/mm3 Final     Hemoglobin   Date Value Ref Range Status   10/21/2023 11.4 (L) 12.0 - 15.9 g/dL Final     Hematocrit   Date Value Ref Range Status   10/21/2023 33.5 (L) 34.0 - 46.6 % Final     MCV   Date Value Ref Range Status   10/21/2023 88.4 79.0 - 97.0 fL Final     MCH   Date Value Ref Range Status   10/21/2023 30.1 26.6 - 33.0 pg Final     MCHC   Date Value Ref Range Status   10/21/2023 34.0 31.5 - 35.7 g/dL Final     RDW   Date Value Ref Range Status   10/21/2023 12.7 12.3 - 15.4 % Final     RDW-SD   Date Value Ref Range Status   10/21/2023 41.0 37.0 - 54.0 fl Final     MPV   Date Value Ref Range Status   10/21/2023 10.2 6.0 - 12.0 fL Final     Platelets   Date Value Ref Range Status   10/21/2023 203 140 - 450 10*3/mm3 Final     Neutrophil %   Date Value Ref Range Status   10/21/2023 92.2 (H) 42.7 - 76.0 % Final     Lymphocyte %   Date Value Ref Range Status   10/21/2023 4.7 (L) 19.6 - 45.3 % Final     Monocyte %   Date Value Ref Range Status   10/21/2023 2.4 (L) 5.0 - 12.0 % Final     Eosinophil %   Date Value Ref Range Status   10/21/2023 0.1 (L) 0.3 - 6.2 % Final     Basophil %   Date Value Ref Range Status   10/21/2023 0.2 0.0 - 1.5 % Final     Immature Grans %   Date Value Ref Range Status   10/21/2023 0.4 0.0 - 0.5 % Final     Neutrophils, Absolute   Date Value Ref Range Status   10/21/2023 10.17 (H) 1.70 - 7.00 10*3/mm3 Final     Lymphocytes, Absolute   Date Value Ref Range Status   10/21/2023 0.52 (L) 0.70 - 3.10 10*3/mm3 Final     Monocytes, Absolute   Date Value Ref Range Status   10/21/2023 0.27 0.10 - 0.90 10*3/mm3 Final     Eosinophils, Absolute   Date Value Ref Range Status   10/21/2023 0.01 0.00 - 0.40 10*3/mm3 Final     Basophils, Absolute   Date Value Ref Range Status   10/21/2023 0.02 0.00 - 0.20 10*3/mm3 Final     Immature Grans, Absolute   Date Value Ref Range Status   10/21/2023 0.04 0.00 - 0.05 10*3/mm3 Final     nRBC   Date Value Ref Range Status   10/21/2023 0.0 0.0 - 0.2 /100 WBC  Final         Assessment/Plan  IUP @ 16w4d  Cholelithiasis  Fever of Unknown Origin  Tachycardia     1. Patient has been managed on [facility] with IVF and pain control-improved with tylenol and PRN morphine  2. Nursing report of temp 100.6. Patients vitals noted as tachycardic in 120sbpm with hypotension 80/50s  3. Stat CBC ordered. WBC 11.03>6.76  4. No reported signs or new onset of symptoms indicating infectious etiology  5. Will order lactate, procalcitonin, CXR, blood cultures due to patient symptoms and new onset elevation in temperature  6. Plan for more frequent BP and temp monitoring   7. Pulse ox persistently 97-98%.   8. Advised nursing on decreasing temperature of patient room  9. Will continue to monitor closely      Janet Hayes DO  10/21/2023  21:17 EDT

## 2023-10-22 NOTE — PROGRESS NOTES
Christ[facility]  Antepartum Progress Note    Chief Complaint: Abdominal pain, N/V    Subjective     Patient is a 32yo  female at 16w5d admitted to [facility] from ED after initially presenting for abdominal pain and N/V. Abdominal ultrasound revealed cholelithiasis without evidence of cholecystitis. Workup negative for pancreatitis. Patient experience acute exacerbation of abdominal pain with new onset chest pain and lightheadedness. CT angiogram of chest unremarkable and CT abdomen negative for appendicitis or acute process.     Patient reports this am that she is doing better and that pain is better controlled on current regimen. States that she continues to experience intermittent exacerbation of abdominal pain focalized to RUQ but denies severe. Tolerating bland diet. Reports nausea but denies vomiting overnight. Denies recurrence of chest pain, shortness of breath, and lightheadedness. Denies fever, chills. Denies OB associated complaints including leakage of fluid, vaginal bleeding, contractions.     Objective     Vital Signs Range for the last 24 hours  Temp:  [98 °F (36.7 °C)-100.8 °F (38.2 °C)] 98.4 °F (36.9 °C)   BP: ()/(45-68) 108/58   Heart Rate:  [105-131] 111   Resp:  [14-16] 14   SpO2:  [94 %-100 %] 96 %       Device (Oxygen Therapy): room air      Physical Examination  Constitutional: A&Ox3. Resting  HEENT: Normocephalic, atraumatic.  Neurological: Negative for sensory or motor deficit  Cardiovascular: tachycardic; negative for murmur, rubs, gallops  Respiratory: Clear to auscultation bilaterally; negative for rales, crackles or wheezes  Abdominal: moderately tender to deep palpation in RUQ, minimally tender in other quadrant. No guarding or rebound. No CVA tenderness  Extremities: negative for edema and tenderness, no cords, masses or swelling  Skin: normal turgor; negative for rash or lesions  Psychiatric: normal affect, normal thought process, good insight, good judgment       Fetal Heart Rate  Assessment   Method: Fetal HR Assessment Method: intermittent auscultation, using Doppler   Beats/min: Fetal HR (beats/min): 126   Baseline: Fetal HR Baseline: normal range   Varibility:     Accels:     Decels:     Tracing Category:       Uterine Assessment   Method: Method:  (pt denies ctx lof and bleeding)   Frequency (min):     Ctx Count in 10 min:     Duration:     Intensity:     Intensity by IUPC:     Resting Tone: Uterine Resting Tone: soft by palpation   Resting Tone by IUPC:            Intake/Output last 24 hours:      Intake/Output Summary (Last 24 hours) at 10/22/2023 0906  Last data filed at 10/21/2023 1338  Gross per 24 hour   Intake 2000 ml   Output --   Net 2000 ml       Intake/Output this shift:    No intake/output data recorded.        Laboratory Results  WBC   Date Value Ref Range Status   10/21/2023 6.76 3.40 - 10.80 10*3/mm3 Final     RBC   Date Value Ref Range Status   10/21/2023 3.79 3.77 - 5.28 10*6/mm3 Final     Hemoglobin   Date Value Ref Range Status   10/21/2023 11.4 (L) 12.0 - 15.9 g/dL Final     Hematocrit   Date Value Ref Range Status   10/21/2023 33.5 (L) 34.0 - 46.6 % Final     MCV   Date Value Ref Range Status   10/21/2023 88.4 79.0 - 97.0 fL Final     MCH   Date Value Ref Range Status   10/21/2023 30.1 26.6 - 33.0 pg Final     MCHC   Date Value Ref Range Status   10/21/2023 34.0 31.5 - 35.7 g/dL Final     RDW   Date Value Ref Range Status   10/21/2023 12.7 12.3 - 15.4 % Final     RDW-SD   Date Value Ref Range Status   10/21/2023 41.0 37.0 - 54.0 fl Final     MPV   Date Value Ref Range Status   10/21/2023 10.2 6.0 - 12.0 fL Final     Platelets   Date Value Ref Range Status   10/21/2023 203 140 - 450 10*3/mm3 Final     Neutrophil %   Date Value Ref Range Status   10/21/2023 92.2 (H) 42.7 - 76.0 % Final     Lymphocyte %   Date Value Ref Range Status   10/21/2023 4.7 (L) 19.6 - 45.3 % Final     Monocyte %   Date Value Ref Range Status   10/21/2023 2.4 (L) 5.0 - 12.0 % Final      Eosinophil %   Date Value Ref Range Status   10/21/2023 0.1 (L) 0.3 - 6.2 % Final     Basophil %   Date Value Ref Range Status   10/21/2023 0.2 0.0 - 1.5 % Final     Immature Grans %   Date Value Ref Range Status   10/21/2023 0.4 0.0 - 0.5 % Final     Neutrophils, Absolute   Date Value Ref Range Status   10/21/2023 10.17 (H) 1.70 - 7.00 10*3/mm3 Final     Lymphocytes, Absolute   Date Value Ref Range Status   10/21/2023 0.52 (L) 0.70 - 3.10 10*3/mm3 Final     Monocytes, Absolute   Date Value Ref Range Status   10/21/2023 0.27 0.10 - 0.90 10*3/mm3 Final     Eosinophils, Absolute   Date Value Ref Range Status   10/21/2023 0.01 0.00 - 0.40 10*3/mm3 Final     Basophils, Absolute   Date Value Ref Range Status   10/21/2023 0.02 0.00 - 0.20 10*3/mm3 Final     Immature Grans, Absolute   Date Value Ref Range Status   10/21/2023 0.04 0.00 - 0.05 10*3/mm3 Final     nRBC   Date Value Ref Range Status   10/21/2023 0.0 0.0 - 0.2 /100 WBC Final         Lab 10/21/23  2120   LACTATE 1.5     Procalitonin Results:      Lab 10/21/23  2120   PROCALCITONIN 0.22     XR Chest 1 View    Result Date: 10/21/2023  XR CHEST 1 VW Date of Exam: 10/21/2023 9:24 PM EDT Indication: tachycardia and fever Comparison: Chest radiograph August 15, 2023 Findings: There are no airspace consolidations. No pleural fluid. No pneumothorax. The pulmonary vasculature appears within normal limits. The cardiac and mediastinal silhouette appear unremarkable. No acute osseous abnormality identified.     Impression: Impression: No active disease Electronically Signed: Elkin Arenas MD  10/21/2023 10:12 PM EDT  Workstation ID: [id number]      CT Abdomen Pelvis With Contrast    Result Date: 10/22/2023  CT ABDOMEN PELVIS W CONTRAST Date of Exam: 10/21/2023 11:09 PM EDT Indication: Abdominal pain, acute (Ped 0-17y). Comparison: CT abdomen pelvis dated September 21, 2017 Technique: Axial CT images were obtained of the abdomen and pelvis following the uneventful  intravenous administration of 85 mL Isovue-370. Reconstructed coronal and sagittal images were also obtained. Automated exposure control and iterative construction methods were used. Findings: Lung Bases:   The visualized lung bases and lower mediastinal structures are unremarkable. Liver: Liver is normal in size and CT density. No focal lesions. Biliary/Gallbladder:  The gallbladder is normal without evidence of radiopaque stones. The biliary tree is nondilated. Spleen: Spleen is normal in size and CT density. Pancreas:  Pancreas is normal. There is no evidence of pancreatic mass or peripancreatic fluid. Kidneys:  Kidneys are normal in size. There are no stones or hydronephrosis. Adrenals:  Adrenal glands are unremarkable. Retroperitoneal/Lymph Nodes/Vasculature:  No retroperitoneal adenopathy is identified. Gastrointestinal/Mesentery:  The bowel loops are non-dilated without wall thickening or mass. The appendix appears within normal limits. No evidence of obstruction. No free air. No mesenteric fluid collections identified. Bladder:  The bladder is normal. Genital:   There is a gravid uterus with a posterior placenta and bony structures within the uterus consistent with a fetus.       Bony Structures:   Visualized bony structures are consistent with the patient's age.     Impression: Impression: Gravid uterus. Normal appendix. No acute abdominal or pelvic abnormality. Electronically Signed: Elkin Arenas MD  10/22/2023 12:32 AM EDT  Workstation ID: [id number]       Assessment/Plan  IUP @ 16w4d  Cholelithiasis  Fever of Unknown Origin  Tachycardia  New onset chest pain-resolved     1. Patient has been managed on APU with IVF and pain control for cholelithiasis   -Intermittent exacerbation of symptoms   -Current control with tylenol and IV morphine as needed for severe pain   -Pepcid and Zofran for symptomatic control   -IV Toradol PRN for short course if other method ineffective   2. New onset chest pain and  shortness of breath 10/21 - since resolved    -CT angiogram of chest negative for PE and otherwise unremarkable  -Discussed possibility of onset due to pain vs anxiety   3. New onset of fever of 100.6, 100.8 and tachycardia with hypotension evening 10/21 prompted sepsis protocol workup   -Lactate and pro calcitonin within normal.   -CBC ordered. WBC 11.03>6.76.   -Blood cultures obtained and pending  -Afebrile overnight  -Repeat urinalysis with culture due to dirty catch in ED. No symptoms of UTI or CVA tenderness/flank pain  -Discussed with Internal Medicine and planning on consultation  5. CXR: negative for active disease   6. No reported obstetric related symptoms   -Fetal doppler daily and PRN  7. Plan for general surgery consult today  8. Contact provider with any acute exacerbations      Janet Hayes DO  10/22/2023  09:06 EDT

## 2023-10-22 NOTE — PLAN OF CARE
Goal Outcome Evaluation:  Plan of Care Reviewed With: patient        Progress: improving  S/p r/o gall bladder attack, expected management for gall bladder

## 2023-10-22 NOTE — CONSULTS
[facility] Medicine Services  CONSULT NOTE      Patient Name: Padma Younger  : 1990  MRN: [id number]    Primary Care Physician: Lanette Gregory APRN  Provider requesting consultation: No ref. provider found    Subjective   Subjective     Reason for Consultation:  Fever of unknown origin, abdominal pain in pregnancy    HPI:  Padma Younger is a 33 y.o. female with past medical history significant for asthma, anxiety, depression, and abnormal Pap who is 16 weeks pregnant.  She presented to the ED on 10/21/2023 due to right upper quadrant abdominal pain.  Patient's symptoms started the night prior to admission with abdominal pain and persistent nausea/vomiting.  Ultrasound of the gallbladder in the ED revealed cholelithiasis with no evidence of acute cholecystitis.  Chest x-ray revealed no active disease.  CT angiogram of the chest was negative for PE with evidence of mild bilateral basilar atelectasis.  CT of the abdomen/pelvis revealed gravid uterus with normal appendix and no acute abdominal or pelvic abnormality.  She developed low-grade fever, tachycardia, and hypotension on the evening of 10/21/2023.  She denies previous history of similar abdominal pain.  No history of pancreatitis.  She denies any bright red or dark black stool.  Last bowel movement on 10/21 noted to be small formed stool.  Infectious work-up was started by OB/GYN and is thus far unremarkable.  General surgeon Dr. Pereira was consulted and is following.  Hospital medicine was consulted to assist with medical management.    The patient was seen resting in bed in no apparent distress.  Nursing notes reviewed.  Patient continues to have right upper quadrant abdominal pain that radiates to her right flank.  She has been requiring IV pain medication for this.  She does endorse shortness of breath with deep inspiration due to pain.  She denies any dysuria, urinary frequency, urinary urgency, cough, or diarrhea.  The  patient expresses desire to proceed with cholecystectomy if warranted as she does not want to continue to have to deal with this pain.    Personal History     Past Medical History:   Diagnosis Date    Abnormal Pap smear of cervix     Anxiety     Asthma     Chlamydia     2013    Chlamydia contact, treated     Depression     Gonorrhea     2013    Urinary tract infection     Visual impairment        Past Surgical History:   Procedure Laterality Date    CYST REMOVAL      D & C WITH SUCTION N/A 9/18/2017    Procedure: DILATATION AND CURETTAGE WITH SUCTION;  Surgeon: Kelley Dodge DO;  Location: [facility];  Service:     DENTAL PROCEDURE      DILATATION AND CURETTAGE  09/18/2017    EYE SURGERY  12/2018    Intact Surgery    KNEE SURGERY      VAGINAL DELIVERY      x1    WISDOM TOOTH EXTRACTION         Family History:  family history includes Arthritis in her mother; Breast cancer in her maternal grandmother and paternal grandmother; COPD in her mother; Cancer in her maternal grandfather, maternal grandmother, and paternal grandmother; Colon cancer in her paternal grandfather; Diabetes in her maternal grandfather and paternal grandmother; Heart attack in her paternal grandmother; Hypertension in her paternal grandmother; Liver disease in her maternal uncle; Ovarian cancer in her paternal grandmother; Stroke in her paternal grandmother. Otherwise pertinent FHx was reviewed and unremarkable.     Social History:  reports that she has never smoked. She has never used smokeless tobacco. She reports current alcohol use of about 2.0 standard drinks of alcohol per week. She reports that she does not use drugs.    Medications:  Loratadine, albuterol sulfate HFA, buPROPion XL, montelukast, vitamin C, and vitamin D3    Scheduled Meds:famotidine, 20 mg, Intravenous, BID  sodium chloride, 10 mL, Intravenous, Q12H      Continuous Infusions:dextrose 5 % and sodium chloride 0.45 %, 100 mL/hr, Last Rate: 100 mL/hr (10/22/23 0232)  lactated  ringers, 100 mL/hr, Last Rate: 100 mL/hr (10/22/23 1230)      PRN Meds:.  acetaminophen    bisacodyl    docusate sodium    hydrOXYzine    ketorolac    lidocaine PF 1%    Morphine **AND** naloxone    ondansetron **OR** ondansetron    [COMPLETED] Insert Peripheral IV **AND** sodium chloride    sodium chloride    sodium chloride    Allergies   Allergen Reactions    Oxycodone Itching       Objective   Objective     Vital Signs:   Temp:  [97.7 °F (36.5 °C)-100.8 °F (38.2 °C)] 97.7 °F (36.5 °C)  Heart Rate:  [105-131] 108  Resp:  [14-16] 16  BP: ()/(45-68) 103/56    Physical Exam  Constitutional: Awake, alert, appears to be in pain   Eyes: PERRLA, sclerae anicteric, no conjunctival injection  HENT: NCAT, mucous membranes moist  Neck: Supple, no thyromegaly, no lymphadenopathy, trachea midline  Respiratory: Clear to auscultation bilaterally, nonlabored respirations on RA   Cardiovascular: RRR, no murmurs, rubs, or gallops, palpable pedal pulses bilaterally  Gastrointestinal: Positive bowel sounds, soft, RUQ tender to palpation, no rebound tenderness  Musculoskeletal: No bilateral ankle edema, no clubbing or cyanosis to extremities  Psychiatric: Appropriate affect, cooperative  Neurologic: Oriented x 3, moves all extremities, speech clear  Skin: warm, dry, no visible rash    Result Review:  I have personally reviewed the results from the time of admission and agree with these findings:  [x]  Laboratory  [x]  Radiology  [x]  EKG/Telemetry   []  Pathology  [x]  Old records  []  Other:  Most notable findings include:    LAB RESULTS:      Lab 10/22/23  0947 10/21/23  2120 10/21/23  2043 10/21/23  0731   WBC 3.75  --  6.76 11.03*   HEMOGLOBIN 10.1*  --  11.4* 12.7   HEMATOCRIT 30.9*  --  33.5* 37.7   PLATELETS 178  --  203 271   NEUTROS ABS 2.84  --   --  10.17*   IMMATURE GRANS (ABS) 0.01  --   --  0.04   LYMPHS ABS 0.60*  --   --  0.52*   MONOS ABS 0.28  --   --  0.27   EOS ABS 0.02  --   --  0.01   MCV 88.8  --  88.4  89.3   PROCALCITONIN  --  0.22  --   --    LACTATE  --  1.5  --   --          Lab 10/21/23  0731   SODIUM 137   POTASSIUM 4.0   CHLORIDE 104   CO2 22.0   ANION GAP 11.0   BUN 6   CREATININE 0.73   EGFR 110.8   GLUCOSE 104*   CALCIUM 9.4         Lab 10/21/23  0731   TOTAL PROTEIN 7.2   ALBUMIN 3.6   GLOBULIN 3.6   ALT (SGPT) 11   AST (SGOT) 17   BILIRUBIN 0.5   ALK PHOS 71   LIPASE 18                     Brief Urine Lab Results  (Last result in the past 365 days)        Color   Clarity   Blood   Leuk Est   Nitrite   Protein   CREAT   Urine HCG        10/22/23 1458 Yellow   Clear   Negative   Small (1+)   Negative   Negative                 Microbiology Results (last 10 days)       ** No results found for the last 240 hours. **            CT Abdomen Pelvis With Contrast    Result Date: 10/22/2023  CT ABDOMEN PELVIS W CONTRAST Date of Exam: 10/21/2023 11:09 PM EDT Indication: Abdominal pain, acute (Ped 0-17y). Comparison: CT abdomen pelvis dated September 21, 2017 Technique: Axial CT images were obtained of the abdomen and pelvis following the uneventful intravenous administration of 85 mL Isovue-370. Reconstructed coronal and sagittal images were also obtained. Automated exposure control and iterative construction methods were used. Findings: Lung Bases:   The visualized lung bases and lower mediastinal structures are unremarkable. Liver: Liver is normal in size and CT density. No focal lesions. Biliary/Gallbladder:  The gallbladder is normal without evidence of radiopaque stones. The biliary tree is nondilated. Spleen: Spleen is normal in size and CT density. Pancreas:  Pancreas is normal. There is no evidence of pancreatic mass or peripancreatic fluid. Kidneys:  Kidneys are normal in size. There are no stones or hydronephrosis. Adrenals:  Adrenal glands are unremarkable. Retroperitoneal/Lymph Nodes/Vasculature:  No retroperitoneal adenopathy is identified. Gastrointestinal/Mesentery:  The bowel loops are non-dilated  without wall thickening or mass. The appendix appears within normal limits. No evidence of obstruction. No free air. No mesenteric fluid collections identified. Bladder:  The bladder is normal. Genital:   There is a gravid uterus with a posterior placenta and bony structures within the uterus consistent with a fetus.       Bony Structures:   Visualized bony structures are consistent with the patient's age.     Impression: Impression: Gravid uterus. Normal appendix. No acute abdominal or pelvic abnormality. Electronically Signed: Elkin Arenas MD  10/22/2023 12:32 AM EDT  Workstation ID: [id number]    CT Angiogram Chest    Result Date: 10/22/2023  CT ANGIOGRAM CHEST Date of Exam: 10/21/2023 11:09 PM EDT Indication: Chest pain, nonspecific Pulmonary embolism (PE) suspected, pregnant. Comparison: None available. Technique: CTA of the chest was performed before and after the uneventful intravenous administration of 85 mL Isovue-370. Reconstructed coronal and sagittal images were also obtained. In addition, a 3-D volume rendered image was created for interpretation. Automated exposure control and iterative reconstruction methods were used. Findings: Pulmonary arteries: Adequate opacification of the pulmonary arteries. No evidence of acute pulmonary embolism. Lungs and Pleura: There is bilateral basilar atelectasis. There are no pleural fluid collections. Mediastinum/Francia: No mediastinal or hilar lymphadenopathy. Lymph nodes: No axillary or supraclavicular adenopathy. Cardiovascular: The cardiac chambers are within normal limits. The pericardium is normal. The aorta and its arch branch vessels are unremarkable.   Upper Abdomen: The upper abdominal contents are unremarkable.      Bones and Soft Tissue: No suspicious osseous lesion.     Impression: Impression: No evidence of pulmonary embolic disease. Mild bilateral basilar atelectasis. Electronically Signed: Elkin Arenas MD  10/22/2023 12:25 AM EDT  Workstation ID:  [id number]    XR Chest 1 View    Result Date: 10/21/2023  XR CHEST 1 VW Date of Exam: 10/21/2023 9:24 PM EDT Indication: tachycardia and fever Comparison: Chest radiograph August 15, 2023 Findings: There are no airspace consolidations. No pleural fluid. No pneumothorax. The pulmonary vasculature appears within normal limits. The cardiac and mediastinal silhouette appear unremarkable. No acute osseous abnormality identified.     Impression: Impression: No active disease Electronically Signed: Elkin Arenas MD  10/21/2023 10:12 PM EDT  Workstation ID: [id number]    US Gallbladder    Result Date: 10/21/2023  US GALLBLADDER Date of Exam: 10/21/2023 10:39 AM CDT Indication: ruq pain.. Comparison: CT abdomen pelvis 9/21/2017 Technique: Grayscale and color Doppler ultrasound evaluation of the right upper quadrant was performed. Findings: LIVER:  The liver appears normal in echogenicity.No focal lesions identified. Normal flow is present within the hepatic vasculature. GALLBLADDER: There are a few stones/sludge within the gallbladder. No gallbladder wall thickening or para cholecystic fluid. No sonographic Faulkner sign reported. Common bile duct is normal in caliber. PANCREAS:  Visualized portions are unremarkable. AORTA/IVC:  Visualized portions are unremarkable. RIGHT KIDNEY:  Normal in size with no focal lesions. No evidence of nephrolithiasis or hydronephrosis.     Impression: Impression: 1.Cholelithiasis. No evidence of acute cholecystitis based on ultrasound imaging. Electronically Signed: Regino Ross MD  10/21/2023 11:41 AM CDT  Workstation ID: [id number]         Assessment & Plan   Assessment & Plan     Active Hospital Problems    Diagnosis  POA    **Nausea and vomiting in pregnancy [O21.9]  Yes      Resolved Hospital Problems   No resolved problems to display.     Padma Younger is a 33 y.o. female with past medical history significant for asthma, anxiety, depression, and abnormal Pap who is 16 weeks pregnant.  She presented  to the ED on 10/21/2023 due to right upper quadrant abdominal pain.  Patient's symptoms started the night prior to admission with abdominal pain and persistent nausea/vomiting.  Ultrasound of the gallbladder in the ED revealed cholelithiasis with no evidence of acute cholecystitis.  Chest x-ray revealed no active disease.  CT angiogram of the chest was negative for PE with evidence of mild bilateral basilar atelectasis.  CT of the abdomen/pelvis revealed gravid uterus with normal appendix and no acute abdominal or pelvic abnormality.  She developed low-grade fever, tachycardia, and hypotension on the evening of 10/21/2023.  She denies previous history of similar abdominal pain.  No history of pancreatitis.  She denies any bright red or dark black stool.  Last bowel movement on 10/21 noted to be small formed stool.  Infectious work-up was started by OB/GYN and is thus far unremarkable.  General surgeon Dr. Pereira was consulted and is following.  Hospital medicine was consulted to assist with medical management.    Cholelithiasis   Abdominal pain   Nausea/vomiting  -CT abd/pelvis revealed gravid uterus with normal appendix and no acute abdominal or pelvic abnormality.  -Gallbladder ultrasound revealed cholelithiasis with no evidence of acute cholecystitis  -s/p 2L NS bolus in ED   -Continue IVF  -Tylenol, Toradol, morphine as needed for pain  -Zofran as needed for nausea  --General surgeon Dr. Pereira following  -Plan for repeat LFTs and GB US in AM   -AM labs    Fever unknown origin  Leukocytosis, resolved   -Developed low-grade fever up to 100.8 on evening of 10/21  -WBC 11.03 on presentation, improved to 3.75 this AM   -Procal WNL, Lactate WNL  -Chest x-ray revealed no active disease  -CT abdomen/pelvis revealed no acute abnormality  -CTA negative for PE with mild bilateral basilar atelectasis  -Follow blood cultures  -Initial UA with squamous cells concerning for contamination  -Repeat UA pending  -Encourage use  of incentive spirometer d/t atelectasis   -Currently afebrile    Tachycardia  --130s since admission  -CTA negative for PE  -check EKG now    Intrauterine Pregnancy at 16 weeks   -Management per OB/Gyn    Thank you for allowing [facility] Medicine Service to provide consultative care for your patient, we will continue to follow while clinically appropriate.    Nacho Burleson, APRN  10/22/23

## 2023-10-22 NOTE — PROGRESS NOTES
[facility]  Antepartum Progress Note    Chief Complaint: Abdominal pain, N/V    Subjective     Patient is a 34yo  female at 16w4d admitted to [facility] from ED after initially presenting for abdominal pain and N/V. Abdominal ultrasound revealed cholelithiasis without evidence of cholecystitis. Workup negative for pancreatitis.     Patient reported new onset acute abdominal pain and lower chest pain later in evening. Also noted shortness of air and lightheadedness. CT angiogram of chest and abdomen performed.     Following return from CT patient reporting in some improvement in symptoms. States that she feels abdominal pain has lessened. Reports that she is fatigued and desires to rest. Note no continue shortness of breath    Objective     Vital Signs Range for the last 24 hours  Temp:  [97.3 °F (36.3 °C)-100.8 °F (38.2 °C)] 98 °F (36.7 °C)   BP: ()/(45-79) 116/68   Heart Rate:  [105-131] 112   Resp:  [16] 16   SpO2:  [94 %-100 %] 96 %       Device (Oxygen Therapy): room air      Physical Examination  Constitutional: A&Ox3. Resting  HEENT: Normocephalic, atraumatic.  Neurological: Negative for sensory or motor deficit  Cardiovascular: tachycardic; negative for murmur, rubs, gallops  Respiratory: Clear to auscultation bilaterally; negative for rales, crackles or wheezes  Abdominal: moderately tender to palpation in RUQ,   Extremities: negative for edema and tenderness, no cords, masses or swelling  Skin: warm to touch, diaphoretic, normal turgor; negative for rash or lesions  Psychiatric: normal affect, normal thought process, good insight, good judgment       Fetal Heart Rate Assessment   Method: Fetal HR Assessment Method: intermittent auscultation, using Doppler   Beats/min: Fetal HR (beats/min): 126   Baseline: Fetal HR Baseline: normal range   Varibility:     Accels:     Decels:     Tracing Category:       Uterine Assessment   Method: Method:  (pt denies ctx lof and bleeding)   Frequency (min):     Ctx  Count in 10 min:     Duration:     Intensity:     Intensity by IUPC:     Resting Tone:     Resting Tone by IU[facility]:            Intake/Output last 24 hours:      Intake/Output Summary (Last 24 hours) at 10/22/2023 0045  Last data filed at 10/21/2023 1338  Gross per 24 hour   Intake 2000 ml   Output --   Net 2000 ml       Intake/Output this shift:    No intake/output data recorded.        Laboratory Results  WBC   Date Value Ref Range Status   10/21/2023 6.76 3.40 - 10.80 10*3/mm3 Final     RBC   Date Value Ref Range Status   10/21/2023 3.79 3.77 - 5.28 10*6/mm3 Final     Hemoglobin   Date Value Ref Range Status   10/21/2023 11.4 (L) 12.0 - 15.9 g/dL Final     Hematocrit   Date Value Ref Range Status   10/21/2023 33.5 (L) 34.0 - 46.6 % Final     MCV   Date Value Ref Range Status   10/21/2023 88.4 79.0 - 97.0 fL Final     MCH   Date Value Ref Range Status   10/21/2023 30.1 26.6 - 33.0 pg Final     MCHC   Date Value Ref Range Status   10/21/2023 34.0 31.5 - 35.7 g/dL Final     RDW   Date Value Ref Range Status   10/21/2023 12.7 12.3 - 15.4 % Final     RDW-SD   Date Value Ref Range Status   10/21/2023 41.0 37.0 - 54.0 fl Final     MPV   Date Value Ref Range Status   10/21/2023 10.2 6.0 - 12.0 fL Final     Platelets   Date Value Ref Range Status   10/21/2023 203 140 - 450 10*3/mm3 Final     Neutrophil %   Date Value Ref Range Status   10/21/2023 92.2 (H) 42.7 - 76.0 % Final     Lymphocyte %   Date Value Ref Range Status   10/21/2023 4.7 (L) 19.6 - 45.3 % Final     Monocyte %   Date Value Ref Range Status   10/21/2023 2.4 (L) 5.0 - 12.0 % Final     Eosinophil %   Date Value Ref Range Status   10/21/2023 0.1 (L) 0.3 - 6.2 % Final     Basophil %   Date Value Ref Range Status   10/21/2023 0.2 0.0 - 1.5 % Final     Immature Grans %   Date Value Ref Range Status   10/21/2023 0.4 0.0 - 0.5 % Final     Neutrophils, Absolute   Date Value Ref Range Status   10/21/2023 10.17 (H) 1.70 - 7.00 10*3/mm3 Final     Lymphocytes, Absolute    Date Value Ref Range Status   10/21/2023 0.52 (L) 0.70 - 3.10 10*3/mm3 Final     Monocytes, Absolute   Date Value Ref Range Status   10/21/2023 0.27 0.10 - 0.90 10*3/mm3 Final     Eosinophils, Absolute   Date Value Ref Range Status   10/21/2023 0.01 0.00 - 0.40 10*3/mm3 Final     Basophils, Absolute   Date Value Ref Range Status   10/21/2023 0.02 0.00 - 0.20 10*3/mm3 Final     Immature Grans, Absolute   Date Value Ref Range Status   10/21/2023 0.04 0.00 - 0.05 10*3/mm3 Final     nRBC   Date Value Ref Range Status   10/21/2023 0.0 0.0 - 0.2 /100 WBC Final         Lab 10/21/23  2120   LACTATE 1.5     Procalitonin Results:      Lab 10/21/23  2120   PROCALCITONIN 0.22     XR Chest 1 View    Result Date: 10/21/2023  XR CHEST 1 VW Date of Exam: 10/21/2023 9:24 PM EDT Indication: tachycardia and fever Comparison: Chest radiograph August 15, 2023 Findings: There are no airspace consolidations. No pleural fluid. No pneumothorax. The pulmonary vasculature appears within normal limits. The cardiac and mediastinal silhouette appear unremarkable. No acute osseous abnormality identified.     Impression: Impression: No active disease Electronically Signed: Elkin Arenas MD  10/21/2023 10:12 PM EDT  Workstation ID: [id number]      CT Abdomen Pelvis With Contrast    Result Date: 10/22/2023  CT ABDOMEN PELVIS W CONTRAST Date of Exam: 10/21/2023 11:09 PM EDT Indication: Abdominal pain, acute (Ped 0-17y). Comparison: CT abdomen pelvis dated September 21, 2017 Technique: Axial CT images were obtained of the abdomen and pelvis following the uneventful intravenous administration of 85 mL Isovue-370. Reconstructed coronal and sagittal images were also obtained. Automated exposure control and iterative construction methods were used. Findings: Lung Bases:   The visualized lung bases and lower mediastinal structures are unremarkable. Liver: Liver is normal in size and CT density. No focal lesions. Biliary/Gallbladder:  The gallbladder is  normal without evidence of radiopaque stones. The biliary tree is nondilated. Spleen: Spleen is normal in size and CT density. Pancreas:  Pancreas is normal. There is no evidence of pancreatic mass or peripancreatic fluid. Kidneys:  Kidneys are normal in size. There are no stones or hydronephrosis. Adrenals:  Adrenal glands are unremarkable. Retroperitoneal/Lymph Nodes/Vasculature:  No retroperitoneal adenopathy is identified. Gastrointestinal/Mesentery:  The bowel loops are non-dilated without wall thickening or mass. The appendix appears within normal limits. No evidence of obstruction. No free air. No mesenteric fluid collections identified. Bladder:  The bladder is normal. Genital:   There is a gravid uterus with a posterior placenta and bony structures within the uterus consistent with a fetus.       Bony Structures:   Visualized bony structures are consistent with the patient's age.     Impression: Impression: Gravid uterus. Normal appendix. No acute abdominal or pelvic abnormality. Electronically Signed: Elkin Arenas MD  10/22/2023 12:32 AM EDT  Workstation ID: [id number]       Assessment/Plan  IUP @ 16w4d  Cholelithiasis  Fever of Unknown Origin  Tachycardia  New onset chest pain     1. Patient has been managed on [facility] with IVF and pain control for cholelithiasis  2. New onset chest pain and shortness of breath with increased abdominal pain prompting CT angiogram of chest which was negative for PE and CT abdomen which was negative for acute process including appendicitis  3. New onset of fever of 100.6, 100.8 and tachycardia prompted sepsis protocol workup - lactate and pro calcitonin within normal. CBC ordered. WBC 11.03>6.76. Blood cultures obtained and pending  5. CXR: negative for active disease   6. No reported obstetric related symptoms  7. Plan for general surgery consult in am for evaluation and recommendations for chololithiasis/expectations in second trimester of pregnancy  8. Continue current pain  and bowel regimen with tylenol, zofran, morphine if need. If recurrence of severe pain one time dose of IV Toradol PRN placed due to second trimester and low risk with limited dosing. Hydroxizine for rest and anxiety ordered as needed  9. Contact provider with any acute exacerbations or concern overnight  10. Plan for revaluation in am      Janet Hayes DO  10/22/2023  00:45 EDT

## 2023-10-22 NOTE — PROGRESS NOTES
[facility]  Antepartum Progress Note    Chief Complaint: Abdominal pain, N/V    Subjective     Patient is a 32yo  female at 16w4d admitted to [facility] from ED after initially presenting for abdominal pain and N/V. Abdominal ultrasound revealed cholelithiasis without evidence of cholecystitis. Workup negative for pancreatitis.     Patient reports increased abdominal pain and new onset substernal chest pain. She notes that pain started over the past hour. She reports increased difficulty taking a deep breath. She reports lightheadedness. States that pain radiates through and to middle of back. States that abdominal pain and distension has also intensified. She reports that pain is primary located in RUQ but also present in right lower quadrant. Reports nausea but no vomiting. Denies vaginal bleeding, leakage of fluid. Patient is tearful.      Objective     Vital Signs Range for the last 24 hours  Temp:  [97.3 °F (36.3 °C)-100.8 °F (38.2 °C)] 98.1 °F (36.7 °C)   BP: ()/(45-79) 106/58   Heart Rate:  [105-131] 116   Resp:  [16] 16   SpO2:  [94 %-100 %] 94 %       Device (Oxygen Therapy): room air      Physical Examination  Constitutional: A&Ox3. Distressed. Tearful in pain  HEENT: Normocephalic, atraumatic.  Neurological: Negative for sensory or motor deficit  Cardiovascular: tachycardic; negative for murmur, rubs, gallops  Respiratory: Clear to auscultation bilaterally; negative for rales, crackles or wheezes  Abdominal: moderately tender to palpation in all abdominal quadrants, positive Emory signs, increased tenderness to palpation in RLQ, distended  Extremities: negative for edema and tenderness, no cords, masses or swelling  Skin: warm to touch, diaphoretic, normal turgor; negative for rash or lesions  Psychiatric: normal affect, normal thought process, good insight, good judgment       Fetal Heart Rate Assessment   Method: Fetal HR Assessment Method: intermittent auscultation, using Doppler   Beats/min:  Fetal HR (beats/min): 126   Baseline: Fetal HR Baseline: normal range   Varibility:     Accels:     Decels:     Tracing Category:       Uterine Assessment   Method: Method:  (pt denies ctx lof and bleeding)   Frequency (min):     Ctx Count in 10 min:     Duration:     Intensity:     Intensity by IUPC:     Resting Tone:     Resting Tone by IU[facility]:            Intake/Output last 24 hours:      Intake/Output Summary (Last 24 hours) at 10/21/2023 2244  Last data filed at 10/21/2023 1338  Gross per 24 hour   Intake 2000 ml   Output --   Net 2000 ml       Intake/Output this shift:    No intake/output data recorded.        Laboratory Results  WBC   Date Value Ref Range Status   10/21/2023 6.76 3.40 - 10.80 10*3/mm3 Final     RBC   Date Value Ref Range Status   10/21/2023 3.79 3.77 - 5.28 10*6/mm3 Final     Hemoglobin   Date Value Ref Range Status   10/21/2023 11.4 (L) 12.0 - 15.9 g/dL Final     Hematocrit   Date Value Ref Range Status   10/21/2023 33.5 (L) 34.0 - 46.6 % Final     MCV   Date Value Ref Range Status   10/21/2023 88.4 79.0 - 97.0 fL Final     MCH   Date Value Ref Range Status   10/21/2023 30.1 26.6 - 33.0 pg Final     MCHC   Date Value Ref Range Status   10/21/2023 34.0 31.5 - 35.7 g/dL Final     RDW   Date Value Ref Range Status   10/21/2023 12.7 12.3 - 15.4 % Final     RDW-SD   Date Value Ref Range Status   10/21/2023 41.0 37.0 - 54.0 fl Final     MPV   Date Value Ref Range Status   10/21/2023 10.2 6.0 - 12.0 fL Final     Platelets   Date Value Ref Range Status   10/21/2023 203 140 - 450 10*3/mm3 Final     Neutrophil %   Date Value Ref Range Status   10/21/2023 92.2 (H) 42.7 - 76.0 % Final     Lymphocyte %   Date Value Ref Range Status   10/21/2023 4.7 (L) 19.6 - 45.3 % Final     Monocyte %   Date Value Ref Range Status   10/21/2023 2.4 (L) 5.0 - 12.0 % Final     Eosinophil %   Date Value Ref Range Status   10/21/2023 0.1 (L) 0.3 - 6.2 % Final     Basophil %   Date Value Ref Range Status   10/21/2023 0.2 0.0 -  1.5 % Final     Immature Grans %   Date Value Ref Range Status   10/21/2023 0.4 0.0 - 0.5 % Final     Neutrophils, Absolute   Date Value Ref Range Status   10/21/2023 10.17 (H) 1.70 - 7.00 10*3/mm3 Final     Lymphocytes, Absolute   Date Value Ref Range Status   10/21/2023 0.52 (L) 0.70 - 3.10 10*3/mm3 Final     Monocytes, Absolute   Date Value Ref Range Status   10/21/2023 0.27 0.10 - 0.90 10*3/mm3 Final     Eosinophils, Absolute   Date Value Ref Range Status   10/21/2023 0.01 0.00 - 0.40 10*3/mm3 Final     Basophils, Absolute   Date Value Ref Range Status   10/21/2023 0.02 0.00 - 0.20 10*3/mm3 Final     Immature Grans, Absolute   Date Value Ref Range Status   10/21/2023 0.04 0.00 - 0.05 10*3/mm3 Final     nRBC   Date Value Ref Range Status   10/21/2023 0.0 0.0 - 0.2 /100 WBC Final         Lab 10/21/23  2120   LACTATE 1.5     Procalitonin Results:      Lab 10/21/23  2120   PROCALCITONIN 0.22     XR Chest 1 View    Result Date: 10/21/2023  XR CHEST 1 VW Date of Exam: 10/21/2023 9:24 PM EDT Indication: tachycardia and fever Comparison: Chest radiograph August 15, 2023 Findings: There are no airspace consolidations. No pleural fluid. No pneumothorax. The pulmonary vasculature appears within normal limits. The cardiac and mediastinal silhouette appear unremarkable. No acute osseous abnormality identified.     Impression: Impression: No active disease Electronically Signed: Elkin Arenas MD  10/21/2023 10:12 PM EDT  Workstation ID: [id number]        Assessment/Plan  IUP @ 16w4d  Cholelithiasis  Fever of Unknown Origin  Tachycardia  New onset chest pain     1. Patient has been managed on [facility] with IVF and pain control for cholelithiasis  2. New onset chest pain and shortness of breath. Abdominal tenderness intensified from earlier exam and reported difficulty taking deep breath. Reported radiation of pain to mid back. Lungs clear.  3. Stat CT angiogram of chest and Stat CT of abdomen and pelvis ordered  4. New onset of fever  of 100.6 and tachycardia prompted sepsis protocol workup - lactate and pro calcitonin within normal. CBC ordered. WBC 11.03>6.76. Blood cultures obtained and pending  5. CXR: negative for active disease   6. No reported obstetric related symptoms  7. Consulted with Dr. Lorenzo, agreed with plan of care      Janet Hayes DO  10/21/2023  22:44 EDT

## 2023-10-23 ENCOUNTER — APPOINTMENT (OUTPATIENT)
Dept: ULTRASOUND IMAGING | Facility: HOSPITAL | Age: 33
End: 2023-10-23
Payer: MEDICAID

## 2023-10-23 VITALS
HEIGHT: 61 IN | RESPIRATION RATE: 16 BRPM | SYSTOLIC BLOOD PRESSURE: 110 MMHG | WEIGHT: 197 LBS | BODY MASS INDEX: 37.19 KG/M2 | HEART RATE: 97 BPM | TEMPERATURE: 97.9 F | DIASTOLIC BLOOD PRESSURE: 54 MMHG | OXYGEN SATURATION: 97 %

## 2023-10-23 PROBLEM — O26.899 ABDOMINAL PAIN IN PREGNANCY: Status: ACTIVE | Noted: 2023-10-23

## 2023-10-23 PROBLEM — R10.9 ABDOMINAL PAIN IN PREGNANCY: Status: ACTIVE | Noted: 2023-10-23

## 2023-10-23 LAB
ALBUMIN SERPL-MCNC: 3.1 G/DL (ref 3.5–5.2)
ALP SERPL-CCNC: 51 U/L (ref 39–117)
ALT SERPL W P-5'-P-CCNC: 11 U/L (ref 1–33)
AST SERPL-CCNC: 14 U/L (ref 1–32)
BASOPHILS # BLD AUTO: 0.01 10*3/MM3 (ref 0–0.2)
BASOPHILS NFR BLD AUTO: 0.2 % (ref 0–1.5)
BILIRUB CONJ SERPL-MCNC: <0.2 MG/DL (ref 0–0.3)
BILIRUB INDIRECT SERPL-MCNC: ABNORMAL MG/DL
BILIRUB SERPL-MCNC: 0.3 MG/DL (ref 0–1.2)
DEPRECATED RDW RBC AUTO: 42.6 FL (ref 37–54)
EOSINOPHIL # BLD AUTO: 0.12 10*3/MM3 (ref 0–0.4)
EOSINOPHIL NFR BLD AUTO: 2.6 % (ref 0.3–6.2)
ERYTHROCYTE [DISTWIDTH] IN BLOOD BY AUTOMATED COUNT: 13.1 % (ref 12.3–15.4)
HCT VFR BLD AUTO: 31.8 % (ref 34–46.6)
HGB BLD-MCNC: 10.5 G/DL (ref 12–15.9)
IMM GRANULOCYTES # BLD AUTO: 0.01 10*3/MM3 (ref 0–0.05)
IMM GRANULOCYTES NFR BLD AUTO: 0.2 % (ref 0–0.5)
LYMPHOCYTES # BLD AUTO: 1.12 10*3/MM3 (ref 0.7–3.1)
LYMPHOCYTES NFR BLD AUTO: 24.5 % (ref 19.6–45.3)
MCH RBC QN AUTO: 29.8 PG (ref 26.6–33)
MCHC RBC AUTO-ENTMCNC: 33 G/DL (ref 31.5–35.7)
MCV RBC AUTO: 90.3 FL (ref 79–97)
MONOCYTES # BLD AUTO: 0.29 10*3/MM3 (ref 0.1–0.9)
MONOCYTES NFR BLD AUTO: 6.3 % (ref 5–12)
NEUTROPHILS NFR BLD AUTO: 3.02 10*3/MM3 (ref 1.7–7)
NEUTROPHILS NFR BLD AUTO: 66.2 % (ref 42.7–76)
NRBC BLD AUTO-RTO: 0 /100 WBC (ref 0–0.2)
PLATELET # BLD AUTO: 197 10*3/MM3 (ref 140–450)
PMV BLD AUTO: 10.2 FL (ref 6–12)
PROT SERPL-MCNC: 5.5 G/DL (ref 6–8.5)
RBC # BLD AUTO: 3.52 10*6/MM3 (ref 3.77–5.28)
WBC NRBC COR # BLD: 4.57 10*3/MM3 (ref 3.4–10.8)

## 2023-10-23 PROCEDURE — 80076 HEPATIC FUNCTION PANEL: CPT | Performed by: SURGERY

## 2023-10-23 PROCEDURE — 76705 ECHO EXAM OF ABDOMEN: CPT

## 2023-10-23 PROCEDURE — 85025 COMPLETE CBC W/AUTO DIFF WBC: CPT | Performed by: SURGERY

## 2023-10-23 PROCEDURE — 25010000002 MORPHINE PER 10 MG: Performed by: STUDENT IN AN ORGANIZED HEALTH CARE EDUCATION/TRAINING PROGRAM

## 2023-10-23 PROCEDURE — 25810000003 LACTATED RINGERS PER 1000 ML: Performed by: STUDENT IN AN ORGANIZED HEALTH CARE EDUCATION/TRAINING PROGRAM

## 2023-10-23 RX ORDER — FAMOTIDINE 20 MG/1
20 TABLET, FILM COATED ORAL DAILY
Qty: 30 TABLET | Refills: 3 | Status: SHIPPED | OUTPATIENT
Start: 2023-10-23 | End: 2024-10-22

## 2023-10-23 RX ORDER — OXYCODONE HYDROCHLORIDE 5 MG/1
5 TABLET ORAL EVERY 6 HOURS PRN
Status: DISCONTINUED | OUTPATIENT
Start: 2023-10-23 | End: 2023-10-23 | Stop reason: HOSPADM

## 2023-10-23 RX ORDER — OXYCODONE HYDROCHLORIDE 5 MG/1
5 TABLET ORAL EVERY 6 HOURS PRN
Qty: 10 TABLET | Refills: 0 | Status: SHIPPED | OUTPATIENT
Start: 2023-10-23 | End: 2023-11-02

## 2023-10-23 RX ORDER — PSEUDOEPHEDRINE HCL 30 MG
100 TABLET ORAL 2 TIMES DAILY PRN
Qty: 30 CAPSULE | Refills: 2 | Status: SHIPPED | OUTPATIENT
Start: 2023-10-23

## 2023-10-23 RX ORDER — ACETAMINOPHEN 325 MG/1
650 TABLET ORAL EVERY 4 HOURS
Status: DISCONTINUED | OUTPATIENT
Start: 2023-10-23 | End: 2023-10-23 | Stop reason: HOSPADM

## 2023-10-23 RX ORDER — URSODIOL 300 MG/1
300 CAPSULE ORAL 3 TIMES DAILY
Qty: 90 CAPSULE | Refills: 2 | Status: SHIPPED | OUTPATIENT
Start: 2023-10-23

## 2023-10-23 RX ORDER — ACETAMINOPHEN 325 MG/1
650 TABLET ORAL EVERY 4 HOURS
Qty: 90 TABLET | Refills: 2 | Status: SHIPPED | OUTPATIENT
Start: 2023-10-23

## 2023-10-23 RX ADMIN — MORPHINE SULFATE 2 MG: 2 INJECTION, SOLUTION INTRAMUSCULAR; INTRAVENOUS at 05:47

## 2023-10-23 RX ADMIN — OXYCODONE HYDROCHLORIDE 5 MG: 5 TABLET ORAL at 15:20

## 2023-10-23 RX ADMIN — ACETAMINOPHEN 650 MG: 325 TABLET ORAL at 16:52

## 2023-10-23 RX ADMIN — MORPHINE SULFATE 2 MG: 2 INJECTION, SOLUTION INTRAMUSCULAR; INTRAVENOUS at 03:45

## 2023-10-23 RX ADMIN — ACETAMINOPHEN 650 MG: 325 TABLET ORAL at 08:32

## 2023-10-23 RX ADMIN — SODIUM CHLORIDE, POTASSIUM CHLORIDE, SODIUM LACTATE AND CALCIUM CHLORIDE 100 ML/HR: 600; 310; 30; 20 INJECTION, SOLUTION INTRAVENOUS at 07:39

## 2023-10-23 RX ADMIN — OXYCODONE HYDROCHLORIDE 5 MG: 5 TABLET ORAL at 08:32

## 2023-10-23 RX ADMIN — Medication 10 ML: at 08:14

## 2023-10-23 RX ADMIN — ACETAMINOPHEN 650 MG: 325 TABLET ORAL at 12:20

## 2023-10-23 RX ADMIN — FAMOTIDINE 20 MG: 10 INJECTION INTRAVENOUS at 08:14

## 2023-10-23 NOTE — PAYOR COMM NOTE
"Padma Younger (33 y.o. Female)     Ref#[id number]    Medical Inpatient Admission.    From:Elisabet Bhandari LPN, Utilization Review  Phone #[phone]  Fax #[phone]        Date of Birth   1990    Social Security Number       Address   [address] [place] [address]    Home Phone   [phone]    MRN   [id number]       [employer]ian   Shinto    Marital Status   Single                            Admission Date   10/21/23    Admission Type   Emergency    Admitting Provider   Janet Hayes,     Attending Provider   Yamile Matson CNM    Department, Room/Bed   [facility] ANTEPARTUM, N329/1       Discharge Date       Discharge Disposition       Discharge Destination                                 Attending Provider: Yamile Matson CNM    Allergies: Oxycodone    Isolation: None   Infection: None   Code Status: CPR    Ht: 154.9 cm (61\")   Wt: 89.4 kg (197 lb)    Admission Cmt: None   Principal Problem: Nausea and vomiting in pregnancy [O21.9]                   Active Insurance as of 10/21/2023       Primary Coverage       Payor Plan Insurance Group Employer/Plan Group    [place] [id number]       Payor Plan Address Payor Plan Phone Number Payor Plan Fax Number Effective Dates    [employer] [address] [address] [phone]  6/1/2022 - None Entered    [place] [address]         Subscriber Name Subscriber Birth Date Member ID       PADMA YOUNGER 1990 [id number]                     Emergency Contacts        (Rel.) Home Phone Work Phone Mobile Phone    Lorraine Levi (Sister) [phone] -- --              Insurance Information                  [place] Phone: [phone]    Subscriber: Aren Padma Subscriber#: [id number]    Group#: [id number] Precert#: --             History & Physical        Elmo Bacon III, MD at 10/21/23 [address] [place] [address]  Obstetric History and Physical    Chief Complaint " "  Patient presents with    Abdominal Pain    Vomiting       Subjective    Patient is a 33 y.o. female  currently at 16w4d, who presents on referral from the emergency room where she was documented to have right upper quadrant abdominal pain related to cholelithiasis without evidence of cholecystitis.  Patient says she began to have symptoms last evening at midnight and has had persistent nausea/vomiting since.  Evaluation in the emergency room confirmed cholelithiasis without evidence of cholecystitis based on gallbladder ultrasound.  Lipase is normal showing no evidence of pancreatitis.  She was treated with 2 L of IV fluid and still persistent having pain.  She will be admitted for hydration and pain control.    Her prenatal care is otherwise benign to date. Her previous obstetric/gynecological history is notable for 1 prior term vaginal delivery and 1 first trimester spontaneous  requiring D&C..    The following portions of the patients history were reviewed and updated as appropriate: current medications, allergies, past medical history, past surgical history, past family history, past social history, and problem list .        Prenatal Information:   Maternal Prenatal Labs  Blood Type No results found for: \"ABO\"   Rh Status No results found for: \"RH\"   Antibody Screen No results found for: \"ABSCRN\"   Gonnorhea No results found for: \"GCCX\"   Chlamydia No results found for: \"CLAMYDCU\"   RPR No results found for: \"RPR\"   Syphilis Antibody No results found for: \"SYPHILIS\"   Rubella No results found for: \"RUBELLAIGGIN\"   Hepatitis B Surface Antigen No results found for: \"HEPBSAG\"   HIV-1 Antibody No results found for: \"LABHIV1\"   Hepatitis C Antibody No results found for: \"HEPCAB\"   Rapid Urin Drug Screen No results found for: \"AMPMETHU\", \"BARBITSCNUR\", \"LABBENZSCN\", \"LABMETHSCN\", \"LABOPIASCN\", \"THCURSCR\", \"COCAINEUR\", \"AMPHETSCREEN\", \"PROPOXSCN\", \"BUPRENORSCNU\", \"METAMPSCNUR\", \"OXYCODONESCN\", " "\"TRICYCLICSCN\"   Group B Strep Culture No results found for: \"GBSANTIGEN\"           External Prenatal Results       Pregnancy Outside Results - Transcribed From Office Records - See Scanned Records For Details       Test Value Date Time    ABO  O  09/19/23 1448    Rh  Positive  09/19/23 1448    Antibody Screen  Negative  09/19/23 1448    Varicella IgG       Rubella  3.09 index 09/19/23 1448    Hgb  12.7 g/dL 10/21/23 0731       12.4 g/dL 09/19/23 1448       12.2 g/dL 09/14/23 0234       12.4 g/dL 09/07/23 0839       12.8 g/dL 08/15/23 1347    Hct  37.7 % 10/21/23 0731       36.5 % 09/19/23 1448       36.9 % 09/14/23 0234       37.9 % 09/07/23 0839       39.6 % 08/15/23 1347    Glucose Fasting GTT       Glucose Tolerance Test 1 hour       Glucose Tolerance Test 3 hour       Gonorrhea (discrete)  Negative  08/22/23 1416    Chlamydia (discrete)  Negative  08/22/23 1416    RPR  Non-Reactive  04/24/19 1131    VDRL       Syphilis Antibody       HBsAg  Non-Reactive  09/19/23 1448       Negative  08/22/23 1422    Herpes Simplex Virus PCR       Herpes Simplex VIrus Culture       HIV  Non-Reactive  09/19/23 1448       Non-Reactive  08/22/23 1422    Hep C RNA Quant PCR       Hep C Antibody  Non-Reactive  09/19/23 1448       Non-Reactive  08/22/23 1422    AFP       Group B Strep ^ Negative  11/06/19     GBS Susceptibility to Clindamycin       GBS Susceptibility to Erythromycin       Fetal Fibronectin       Genetic Testing, Maternal Blood                 Drug Screening       Test Value Date Time    Urine Drug Screen       Amphetamine Screen  Negative  09/19/23 1448    Barbiturate Screen  Negative  09/19/23 1448    Benzodiazepine Screen  Negative  09/19/23 1448    Methadone Screen  Negative  09/19/23 1448    Phencyclidine Screen  Negative  09/19/23 1448    Opiates Screen  Negative  09/19/23 1448    THC Screen  Negative  09/19/23 1448    Cocaine Screen       Propoxyphene Screen  Negative  09/19/23 1448    Buprenorphine Screen  " Negative  23 1448    Methamphetamine Screen       Oxycodone Screen  Negative  23 1448    Tricyclic Antidepressants Screen  Negative  23 1448              Legend    ^: Historical                              Past OB History:       OB History    Para Term  AB Living   3 1 1 0 1 1   SAB IAB Ectopic Molar Multiple Live Births   0 0 0 0 0 1      # Outcome Date GA Lbr Conrad/2nd Weight Sex Delivery Anes PTL Lv   3 Current            2 Term 19 39w0d 12:24 :45 2745 g (6 lb 0.8 oz) F Vag-Spont EPI N DAILY      Name: LINDSEY BRO      Apgar1: 7  Apgar5: 9   1 AB 2017 9w0d   U          Birth Comments: D&C       Past Medical History:  Past Medical History:   Diagnosis Date    Abnormal Pap smear of cervix     Anxiety     Asthma     Chlamydia         Chlamydia contact, treated     Depression     Gonorrhea         Urinary tract infection     Visual impairment       Past Surgical History Past Surgical History:   Procedure Laterality Date    CYST REMOVAL      D & C WITH SUCTION N/A 2017    Procedure: DILATATION AND CURETTAGE WITH SUCTION;  Surgeon: Kelley Dodge DO;  Location: [facility];  Service:     DENTAL PROCEDURE      DILATATION AND CURETTAGE  2017    EYE SURGERY  2018    Intact Surgery    KNEE SURGERY      VAGINAL DELIVERY      x1    WISDOM TOOTH EXTRACTION        Family History: Family History   Problem Relation Age of Onset    Arthritis Mother     COPD Mother     Liver disease Maternal Uncle     Breast cancer Maternal Grandmother     Cancer Maternal Grandmother     Diabetes Maternal Grandfather     Cancer Maternal Grandfather     Breast cancer Paternal Grandmother     Ovarian cancer Paternal Grandmother     Diabetes Paternal Grandmother     Heart attack Paternal Grandmother     Hypertension Paternal Grandmother     Stroke Paternal Grandmother     Cancer Paternal Grandmother     Colon cancer Paternal Grandfather       Social History:  reports that she has never  smoked. She has never used smokeless tobacco.   reports current alcohol use of about 2.0 standard drinks of alcohol per week.   reports no history of drug use.   Allergies: Oxycodone  Current Medications:          No current facility-administered medications on file prior to encounter.     Current Outpatient Medications on File Prior to Encounter   Medication Sig Dispense Refill    albuterol sulfate  (90 Base) MCG/ACT inhaler Inhale 2 puffs Every 4 (Four) Hours As Needed for Wheezing. 18 g 0    buPROPion XL (WELLBUTRIN XL) 150 MG 24 hr tablet Take 1 tablet by mouth Daily. 30 tablet 5    Loratadine (Claritin) 10 MG capsule Take 1 capsule by mouth Daily.      montelukast (SINGULAIR) 10 MG tablet TAKE ONE TABLET BY MOUTH ONCE NIGHTLY 90 tablet 0    vitamin C (ASCORBIC ACID) 500 MG tablet TAKE TWO TABLETS BY MOUTH DAILY 60 tablet 1    vitamin D3 125 MCG (5000 UT) capsule capsule Take 1 capsule by mouth Daily. 30 capsule 3       General ROS: Pertinent items are noted in HPI, all other systems reviewed and negative    Objective      Vital Signs Range for the last 24 hours  Temperature: Temp:  [97.3 °F (36.3 °C)-98.7 °F (37.1 °C)] 98.7 °F (37.1 °C)   Temp Source: Temp src: Oral   BP: BP: ()/(51-79) 107/66   Pulse: Heart Rate:  [105-128] 126   Respirations: Resp:  [16] 16   SPO2: SpO2:  [95 %-100 %] 100 %   O2 Amount (l/min):     O2 Devices Device (Oxygen Therapy): room air   Weight: Weight:  [89.4 kg (197 lb)] 89.4 kg (197 lb)     Physical Examination: General appearance - overweight, in mild to moderate distress, and ill-appearing  Mental status - alert, oriented to person, place, and time, normal mood, behavior, speech, dress, motor activity, and thought processes  Eyes - pupils equal and reactive, extraocular eye movements intact, sclera anicteric  Neck - supple, no significant adenopathy, thyroid exam: thyroid is normal in size without nodules or tenderness  Chest - clear to auscultation, no wheezes, rales  or rhonchi, symmetric air entry  Heart - normal rate, regular rhythm, normal S1, S2, no murmurs, rubs, clicks or gallops  Abdomen-tenderness reported in patient's right upper quadrant.  The abdomen remains soft without guarding or rebound.  Gravid uterus.  Neurological - alert, oriented, normal speech, no focal findings or movement disorder noted, DTR's normal and symmetric  Extremities - no pedal edema noted    Fetal Heart Rate Assessment: Fetal heart tones confirmed with Doppler.      Laboratory Results:   Lab Results   Component Value Date    ALKPHOS 71 10/21/2023    ALT 11 10/21/2023    AST 17 10/21/2023    CREATININE 0.73 10/21/2023    BILITOT 0.5 10/21/2023     07/27/2019    URICACID 4.5 07/27/2019       WBC   Date Value Ref Range Status   10/21/2023 11.03 (H) 3.40 - 10.80 10*3/mm3 Final     RBC   Date Value Ref Range Status   10/21/2023 4.22 3.77 - 5.28 10*6/mm3 Final     Hemoglobin   Date Value Ref Range Status   10/21/2023 12.7 12.0 - 15.9 g/dL Final     Hematocrit   Date Value Ref Range Status   10/21/2023 37.7 34.0 - 46.6 % Final     MCV   Date Value Ref Range Status   10/21/2023 89.3 79.0 - 97.0 fL Final     MCH   Date Value Ref Range Status   10/21/2023 30.1 26.6 - 33.0 pg Final     MCHC   Date Value Ref Range Status   10/21/2023 33.7 31.5 - 35.7 g/dL Final     RDW   Date Value Ref Range Status   10/21/2023 12.7 12.3 - 15.4 % Final     RDW-SD   Date Value Ref Range Status   10/21/2023 41.0 37.0 - 54.0 fl Final     MPV   Date Value Ref Range Status   10/21/2023 10.1 6.0 - 12.0 fL Final     Platelets   Date Value Ref Range Status   10/21/2023 271 140 - 450 10*3/mm3 Final     Neutrophil %   Date Value Ref Range Status   10/21/2023 92.2 (H) 42.7 - 76.0 % Final     Lymphocyte %   Date Value Ref Range Status   10/21/2023 4.7 (L) 19.6 - 45.3 % Final     Monocyte %   Date Value Ref Range Status   10/21/2023 2.4 (L) 5.0 - 12.0 % Final     Eosinophil %   Date Value Ref Range Status   10/21/2023 0.1 (L) 0.3  "- 6.2 % Final     Basophil %   Date Value Ref Range Status   10/21/2023 0.2 0.0 - 1.5 % Final     Immature Grans %   Date Value Ref Range Status   10/21/2023 0.4 0.0 - 0.5 % Final     Neutrophils, Absolute   Date Value Ref Range Status   10/21/2023 10.17 (H) 1.70 - 7.00 10*3/mm3 Final     Lymphocytes, Absolute   Date Value Ref Range Status   10/21/2023 0.52 (L) 0.70 - 3.10 10*3/mm3 Final     Monocytes, Absolute   Date Value Ref Range Status   10/21/2023 0.27 0.10 - 0.90 10*3/mm3 Final     Eosinophils, Absolute   Date Value Ref Range Status   10/21/2023 0.01 0.00 - 0.40 10*3/mm3 Final     Basophils, Absolute   Date Value Ref Range Status   10/21/2023 0.02 0.00 - 0.20 10*3/mm3 Final     Immature Grans, Absolute   Date Value Ref Range Status   10/21/2023 0.04 0.00 - 0.05 10*3/mm3 Final     nRBC   Date Value Ref Range Status   10/21/2023 0.0 0.0 - 0.2 /100 WBC Final             Brief Urine Lab Results  (Last result in the past 365 days)        Color   Clarity   Blood   Leuk Est   Nitrite   Protein   CREAT   Urine HCG        10/21/23 0931 Yellow   Cloudy   Negative   Small (1+)   Negative   30 mg/dL (1+)                     Radiology Review: Ultrasound confirms cholelithiasis without evidence of cholecystitis.  Other Studies: CMP shows no electrolyte disturbances.  Lipase is normal.  Only minimal elevation of WBC.  Marked ketonuria noted.    Assessment & Plan      Nausea and vomiting in pregnancy        Assessment:  Cholelithiasis.  Right upper quadrant pain related to #1.  Persistent nausea/vomiting.    Plan:  Admit for continued hydration, parenteral antiemetics and pain control.     Total time spent today with Padma  was 30-39 minutes (level 4).  Of this time, > 50% was spent face-to-face time coordinating care, answering her questions and counseling regarding pathophysiology of her presenting problem along with plans for any diagnostic work-up and treatment.        Elmo Plasencia \"Gricelda\" PETE Bacon MD  10/21/2023  15:18 " EDT      Electronically signed by Elmo Bacon III, MD at 10/21/23 1523       Vital Signs (last 3 days)       Date/Time Temp Temp src Pulse Resp BP Patient Position SpO2    10/23/23 0335 98 (36.7) Oral 92 16 96/51 Lying --    10/22/23 2344 98.3 (36.8) Oral 88 16 105/52 Lying --    10/22/23 2003 98.5 (36.9) Oral 98 16 106/57 Sitting --    10/22/23 1607 98.3 (36.8) Oral 102 16 97/56 Lying --    10/22/23 1552 -- -- -- -- -- -- --    Comment rows:    OBSERV: reviewed plan with md and md requests urine specimen.  clean catch urine obtained but was contaminated notified md and order for straight cath urine obtained.  explained to pt. need to do straight cath for diagnostic reasons, pt,. verbalizes ok to move forward with straight cath specimen which she tolerated well. at 10/22/23 1552    10/22/23 1225 97.7 (36.5) Oral 108 16 103/56 Sitting --    10/22/23 0811 98.4 (36.9) Oral 111 14 108/58 Sitting --    10/22/23 0422 98.1 (36.7) Oral 105 15 95/50 Lying --    10/21/23 2331 98 (36.7) Oral 112 16 116/68 -- --    10/21/23 2244 -- -- 121 -- -- -- 96    10/21/23 2217 98.1 (36.7) Axillary 116 16 -- -- 94    10/21/23 2126 99.1 (37.3) Oral -- -- -- -- --    10/21/23 2125 100.8 (38.2) Axillary 131 16 -- -- 97    10/21/23 2023 -- -- 129 16 106/58 -- --    10/21/23 1958 100.6 (38.1) Oral 129 16 88/45 -- --    10/21/23 1608 -- -- -- -- -- -- --    Comment rows:    OBSERV: dr duffy at bedside; handheld us performed; fetal heart rate 122 at 10/21/23 1608    10/21/23 1523 -- -- 122  -- -- -- --    Pulse: Dr. Bacon notified of R 120-128. no new orders at 10/21/23 1523    10/21/23 1522 -- -- 125 -- -- -- --    10/21/23 1521 -- -- 123 -- -- -- --    10/21/23 1513 -- -- 126 -- -- -- 100    10/21/23 1510 -- -- 125 -- -- -- 99    10/21/23 1509 -- -- 128 -- -- -- 99    10/21/23 1500 -- -- 122 -- -- -- 96    10/21/23 1459 -- -- 123 -- -- -- 96    10/21/23 1458 -- -- 124 -- -- -- 96    10/21/23 1444 -- -- 122 -- -- -- 98    10/21/23 1443  -- -- 122 -- -- -- 99    10/21/23 1442 -- -- 120 -- -- -- 99    10/21/23 1441 -- -- 121 -- -- -- 99    10/21/23 1440 -- -- 121 -- -- -- 98    10/21/23 1439 -- -- 122 -- -- -- 98    10/21/23 1438 -- -- 122 -- -- -- 100    10/21/23 1437 -- -- 121 -- -- -- 98    10/21/23 1436 -- -- 126 -- -- -- 100    10/21/23 1435 -- -- 128 -- -- -- 99    10/21/23 1430 -- -- 128 -- -- -- 97    10/21/23 1429 -- -- 123 -- -- -- 97    10/21/23 1428 -- -- 118 -- -- -- 97    10/21/23 1427 -- -- 126 -- -- -- 96    10/21/23 1419 98.7 (37.1) Oral -- -- 107/66 -- --    10/21/23 1330 -- -- 120 -- 101/61 -- 99    10/21/23 1300 -- -- 118 -- 104/66 -- 100    10/21/23 1230 -- -- 116 -- 96/59 -- 99    10/21/23 1030 -- -- 120 -- 106/65 -- 95    10/21/23 0930 -- -- 114 -- 101/51 -- 97    10/21/23 0900 -- -- 114 -- 118/70 -- 97    10/21/23 0830 -- -- 107 -- 115/70 -- 97    10/21/23 0800 -- -- 105 -- 114/79 -- 99    10/21/23 0730 -- -- 109 -- 113/66 -- 99    10/21/23 0713 -- -- 109 -- -- -- --    10/21/23 0711 97.3 (36.3) Oral -- 16 117/66 Sitting 96          Facility-Administered Medications as of 10/23/2023   Medication Dose Route Frequency Provider Last Rate Last Admin    [COMPLETED] acetaminophen (TYLENOL) tablet 1,000 mg  1,000 mg Oral Once Janet Hayes DO   1,000 mg at 10/21/23 2043    [COMPLETED] acetaminophen (TYLENOL) tablet 500 mg  500 mg Oral Once Tanner Ponce APRN   500 mg at 10/21/23 0935    acetaminophen (TYLENOL) tablet 650 mg  650 mg Oral Q4H PRN Elmo Bacon III, MD   650 mg at 10/22/23 1822    bisacodyl (DULCOLAX) suppository 10 mg  10 mg Rectal Daily PRN Elmo Bacon III, MD        dextrose 5 % and sodium chloride 0.45 % infusion  100 mL/hr Intravenous Continuous Elmo Bacon III,  mL/hr at 10/22/23 0232 100 mL/hr at 10/22/23 0232    docusate sodium (COLACE) capsule 100 mg  100 mg Oral BID PRN Elmo Bacon III, MD        famotidine (PEPCID) injection 20 mg  20 mg Intravenous BID Elmo Bacon III  MD   20 mg at 10/22/23 2122    hydrOXYzine (ATARAX) tablet 50 mg  50 mg Oral Nightly PRN Elmo Bacon III, MD   50 mg at 10/22/23 2128    [COMPLETED] iopamidol (ISOVUE-370) 76 % injection 100 mL  100 mL Intravenous Once in Yamile Miller, CNM   85 mL at 10/21/23 2320    lactated ringers infusion  100 mL/hr Intravenous Continuous Janet Hayes  mL/hr at 10/22/23 2123 100 mL/hr at 10/22/23 2123    lidocaine PF 1% (XYLOCAINE) injection 0.5 mL  0.5 mL Intradermal Once PRN Elmo Bacon III, MD        morphine injection 2 mg  2 mg Intravenous Q2H PRN Janet Hayes DO   2 mg at 10/23/23 0547    And    naloxone (NARCAN) injection 0.4 mg  0.4 mg Intravenous Q5 Min PRN Janet Hayes DO        [COMPLETED] ondansetron (ZOFRAN) injection 4 mg  4 mg Intravenous Once Tanner Ponce APRN   4 mg at 10/21/23 0739    ondansetron (ZOFRAN) tablet 8 mg  8 mg Oral Q8H PRN Elmo Bacon III, MD        Or    ondansetron (ZOFRAN) injection 4 mg  4 mg Intravenous Q8H PRN Elmo Bacon III, MD   4 mg at 10/22/23 0823    [COMPLETED] sodium chloride 0.9 % bolus 1,000 mL  1,000 mL Intravenous Once Tanner Ponce APRN   Stopped at 10/21/23 0911    [COMPLETED] sodium chloride 0.9 % bolus 1,000 mL  1,000 mL Intravenous Once Tanner Ponce APRN   Stopped at 10/21/23 1338    sodium chloride 0.9 % flush 10 mL  10 mL Intravenous PRN Tanner Ponce APRN   10 mL at 10/22/23 1430    sodium chloride 0.9 % flush 10 mL  10 mL Intravenous Q12H Elmo Bacon III, MD   10 mL at 10/22/23 2000    sodium chloride 0.9 % flush 10 mL  10 mL Intravenous PRN Elmo Bacon III, MD   10 mL at 10/22/23 2123    sodium chloride 0.9 % infusion 40 mL  40 mL Intravenous PRN Elmo Bacon III, MD         Lab Results (last 72 hours)       Procedure Component Value Units Date/Time    Urinalysis With Culture If Indicated - Straight Cath [[id number]]  (Normal) Collected: 10/22/23 1549    Specimen: Urine from Straight Cath  Updated: 10/22/23 1602     Color, UA Yellow     Appearance, UA Clear     pH, UA 7.0     Specific Gravity, UA 1.008     Glucose, UA Negative     Ketones, UA Negative     Bilirubin, UA Negative     Blood, UA Negative     Protein, UA Negative     Leuk Esterase, UA Negative     Nitrite, UA Negative     Urobilinogen, UA 1.0 E.U./dL    Narrative:      In absence of clinical symptoms, the presence of pyuria, bacteria, and/or nitrites on the urinalysis result does not correlate with infection.  Urine microscopic not indicated.    Urinalysis With Microscopic If Indicated (No Culture) - Urine, Clean Catch [[id number]]  (Abnormal) Collected: 10/22/23 1458    Specimen: Urine, Clean Catch Updated: 10/22/23 1510     Color, UA Yellow     Appearance, UA Clear     pH, UA 6.5     Specific Gravity, UA 1.009     Glucose, UA Negative     Ketones, UA Negative     Bilirubin, UA Negative     Blood, UA Negative     Protein, UA Negative     Leuk Esterase, UA Small (1+)     Nitrite, UA Negative     Urobilinogen, UA 1.0 E.U./dL    Urinalysis, Microscopic Only - Urine, Clean Catch [[id number]]  (Abnormal) Collected: 10/22/23 1458    Specimen: Urine, Clean Catch Updated: 10/22/23 1510     RBC, UA 0-2 /HPF      WBC, UA 11-20 /HPF      Bacteria, UA 1+ /HPF      Squamous Epithelial Cells, UA 7-12 /HPF      Hyaline Casts, UA 0-6 /LPF      Methodology Automated Microscopy    CBC & Differential [[id number]]  (Abnormal) Collected: 10/22/23 0947    Specimen: Blood Updated: 10/22/23 1013    Narrative:      The following orders were created for panel order CBC & Differential.  Procedure                               Abnormality         Status                     ---------                               -----------         ------                     CBC Auto Differential[[id number]]        Abnormal            Final result                 Please view results for these tests on the individual orders.    CBC Auto Differential [[id number]]  (Abnormal) Collected:  "10/22/23 0947    Specimen: Blood Updated: 10/22/23 1013     WBC 3.75 10*3/mm3      RBC 3.48 10*6/mm3      Hemoglobin 10.1 g/dL      Hematocrit 30.9 %      MCV 88.8 fL      MCH 29.0 pg      MCHC 32.7 g/dL      RDW 12.8 %      RDW-SD 41.0 fl      MPV 10.1 fL      Platelets 178 10*3/mm3      Neutrophil % 75.7 %      Lymphocyte % 16.0 %      Monocyte % 7.5 %      Eosinophil % 0.5 %      Basophil % 0.0 %      Immature Grans % 0.3 %      Neutrophils, Absolute 2.84 10*3/mm3      Lymphocytes, Absolute 0.60 10*3/mm3      Monocytes, Absolute 0.28 10*3/mm3      Eosinophils, Absolute 0.02 10*3/mm3      Basophils, Absolute 0.00 10*3/mm3      Immature Grans, Absolute 0.01 10*3/mm3      nRBC 0.0 /100 WBC     Blood Culture - Blood, Arm, Right [[id number]] Collected: 10/21/23 2155    Specimen: Blood from Arm, Right Updated: 10/22/23 0705    Blood Culture - Blood, Hand, Right [[id number]] Collected: 10/21/23 2200    Specimen: Blood from Hand, Right Updated: 10/22/23 0704    Procalcitonin [[id number]]  (Normal) Collected: 10/21/23 2120    Specimen: Blood Updated: 10/21/23 2153     Procalcitonin 0.22 ng/mL     Narrative:      As a Marker for Sepsis (Non-Neonates):    1. <0.5 ng/mL represents a low risk of severe sepsis and/or septic shock.  2. >2 ng/mL represents a high risk of severe sepsis and/or septic shock.    As a Marker for Lower Respiratory Tract Infections that require antibiotic therapy:    PCT on Admission    Antibiotic Therapy       6-12 Hrs later    >0.5                Strongly Recommended  >0.25 - <0.5        Recommended   0.1 - 0.25          Discouraged              Remeasure/reassess PCT  <0.1                Strongly Discouraged     Remeasure/reassess PCT    As 28 day mortality risk marker: \"Change in Procalcitonin Result\" (>80% or <=80%) if Day 0 (or Day 1) and Day 4 values are available. Refer to http://www.bras-pct-calculator.com    Change in PCT <=80%  A decrease of PCT levels below or equal to 80% defines a positive " change in PCT test result representing a higher risk for 28-day all-cause mortality of patients diagnosed with severe sepsis for septic shock.    Change in PCT >80%  A decrease of PCT levels of more than 80% defines a negative change in PCT result representing a lower risk for 28-day all-cause mortality of patients diagnosed with severe sepsis or septic shock.       Lactic Acid, Plasma [[id number]]  (Normal) Collected: 10/21/23 2120    Specimen: Blood Updated: 10/21/23 2146     Lactate 1.5 mmol/L      Comment: Falsely depressed results may occur on samples drawn from patients receiving N-Acetylcysteine (NAC) or Metamizole.       CBC (No Diff) [[id number]]  (Abnormal) Collected: 10/21/23 2043    Specimen: Blood Updated: 10/21/23 2053     WBC 6.76 10*3/mm3      RBC 3.79 10*6/mm3      Hemoglobin 11.4 g/dL      Hematocrit 33.5 %      MCV 88.4 fL      MCH 30.1 pg      MCHC 34.0 g/dL      RDW 12.7 %      RDW-SD 41.0 fl      MPV 10.2 fL      Platelets 203 10*3/mm3     Urinalysis With Microscopic If Indicated (No Culture) - Urine, Clean Catch [[id number]]  (Abnormal) Collected: 10/21/23 0931    Specimen: Urine, Clean Catch Updated: 10/21/23 0942     Color, UA Yellow     Appearance, UA Cloudy     pH, UA 6.5     Specific Gravity, UA 1.027     Glucose, UA Negative     Ketones, UA 80 mg/dL (3+)     Bilirubin, UA Negative     Blood, UA Negative     Protein, UA 30 mg/dL (1+)     Leuk Esterase, UA Small (1+)     Nitrite, UA Negative     Urobilinogen, UA 1.0 E.U./dL    Urinalysis, Microscopic Only - Urine, Clean Catch [[id number]]  (Abnormal) Collected: 10/21/23 0931    Specimen: Urine, Clean Catch Updated: 10/21/23 0942     RBC, UA 0-2 /HPF      WBC, UA 6-10 /HPF      Bacteria, UA 1+ /HPF      Squamous Epithelial Cells, UA 7-12 /HPF      Hyaline Casts, UA 0-6 /LPF      Methodology Automated Microscopy    Comprehensive Metabolic Panel [[id number]]  (Abnormal) Collected: 10/21/23 0731    Specimen: Blood Updated: 10/21/23 0803     Glucose  104 mg/dL      BUN 6 mg/dL      Creatinine 0.73 mg/dL      Sodium 137 mmol/L      Potassium 4.0 mmol/L      Chloride 104 mmol/L      CO2 22.0 mmol/L      Calcium 9.4 mg/dL      Total Protein 7.2 g/dL      Albumin 3.6 g/dL      ALT (SGPT) 11 U/L      AST (SGOT) 17 U/L      Alkaline Phosphatase 71 U/L      Total Bilirubin 0.5 mg/dL      Globulin 3.6 gm/dL      Comment: Calculated Result        A/G Ratio 1.0 g/dL      BUN/Creatinine Ratio 8.2     Anion Gap 11.0 mmol/L      eGFR 110.8 mL/min/1.73     Narrative:      GFR Normal >60  Chronic Kidney Disease <60  Kidney Failure <15      Lipase [[id number]]  (Normal) Collected: 10/21/23 0731    Specimen: Blood Updated: 10/21/23 0803     Lipase 18 U/L     CBC & Differential [[id number]]  (Abnormal) Collected: 10/21/23 0731    Specimen: Blood Updated: 10/21/23 0741    Narrative:      The following orders were created for panel order CBC & Differential.  Procedure                               Abnormality         Status                     ---------                               -----------         ------                     CBC Auto Differential[[id number]]        Abnormal            Final result                 Please view results for these tests on the individual orders.    CBC Auto Differential [[id number]]  (Abnormal) Collected: 10/21/23 0731    Specimen: Blood Updated: 10/21/23 0741     WBC 11.03 10*3/mm3      RBC 4.22 10*6/mm3      Hemoglobin 12.7 g/dL      Hematocrit 37.7 %      MCV 89.3 fL      MCH 30.1 pg      MCHC 33.7 g/dL      RDW 12.7 %      RDW-SD 41.0 fl      MPV 10.1 fL      Platelets 271 10*3/mm3      Neutrophil % 92.2 %      Lymphocyte % 4.7 %      Monocyte % 2.4 %      Eosinophil % 0.1 %      Basophil % 0.2 %      Immature Grans % 0.4 %      Neutrophils, Absolute 10.17 10*3/mm3      Lymphocytes, Absolute 0.52 10*3/mm3      Monocytes, Absolute 0.27 10*3/mm3      Eosinophils, Absolute 0.01 10*3/mm3      Basophils, Absolute 0.02 10*3/mm3      Immature Grans,  Absolute 0.04 10*3/mm3      nRBC 0.0 /100 WBC           Imaging Results (Last 72 Hours)       Procedure Component Value Units Date/Time    CT Abdomen Pelvis With Contrast [[id number]] Collected: 10/22/23 0025     Updated: 10/22/23 0035    Narrative:      CT ABDOMEN PELVIS W CONTRAST    Date of Exam: 10/21/2023 11:09 PM EDT    Indication: Abdominal pain, acute (Ped 0-17y).    Comparison: CT abdomen pelvis dated September 21, 2017    Technique: Axial CT images were obtained of the abdomen and pelvis following the uneventful intravenous administration of 85 mL Isovue-370. Reconstructed coronal and sagittal images were also obtained. Automated exposure control and iterative   construction methods were used.    Findings:  Lung Bases:     The visualized lung bases and lower mediastinal structures are unremarkable.    Liver:  Liver is normal in size and CT density. No focal lesions.    Biliary/Gallbladder:    The gallbladder is normal without evidence of radiopaque stones. The biliary tree is nondilated.    Spleen:  Spleen is normal in size and CT density.    Pancreas:    Pancreas is normal. There is no evidence of pancreatic mass or peripancreatic fluid.    Kidneys:    Kidneys are normal in size. There are no stones or hydronephrosis.    Adrenals:    Adrenal glands are unremarkable.    Retroperitoneal/Lymph Nodes/Vasculature:    No retroperitoneal adenopathy is identified.    Gastrointestinal/Mesentery:    The bowel loops are non-dilated without wall thickening or mass. The appendix appears within normal limits. No evidence of obstruction. No free air. No mesenteric fluid collections identified.    Bladder:    The bladder is normal.    Genital:     There is a gravid uterus with a posterior placenta and bony structures within the uterus consistent with a fetus.          Bony Structures:     Visualized bony structures are consistent with the patient's age.        Impression:      Impression:  Gravid uterus. Normal appendix.  No acute abdominal or pelvic abnormality.        Electronically Signed: Elkin Arenas MD    10/22/2023 12:32 AM EDT    Workstation ID: [id number]    CT Angiogram Chest [[id number]] Collected: 10/22/23 0022     Updated: 10/22/23 0028    Narrative:      CT ANGIOGRAM CHEST    Date of Exam: 10/21/2023 11:09 PM EDT    Indication: Chest pain, nonspecific  Pulmonary embolism (PE) suspected, pregnant.    Comparison: None available.    Technique: CTA of the chest was performed before and after the uneventful intravenous administration of 85 mL Isovue-370. Reconstructed coronal and sagittal images were also obtained. In addition, a 3-D volume rendered image was created for   interpretation. Automated exposure control and iterative reconstruction methods were used.      Findings:    Pulmonary arteries: Adequate opacification of the pulmonary arteries. No evidence of acute pulmonary embolism.    Lungs and Pleura: There is bilateral basilar atelectasis. There are no pleural fluid collections.    Mediastinum/Francia: No mediastinal or hilar lymphadenopathy.    Lymph nodes: No axillary or supraclavicular adenopathy.    Cardiovascular: The cardiac chambers are within normal limits. The pericardium is normal. The aorta and its arch branch vessels are unremarkable.       Upper Abdomen: The upper abdominal contents are unremarkable.          Bones and Soft Tissue: No suspicious osseous lesion.        Impression:      Impression:  No evidence of pulmonary embolic disease. Mild bilateral basilar atelectasis.        Electronically Signed: Elkin Arenas MD    10/22/2023 12:25 AM EDT    Workstation ID: [id number]    XR Chest 1 View [[id number]] Collected: 10/21/23 2211     Updated: 10/21/23 2215    Narrative:      XR CHEST 1 VW    Date of Exam: 10/21/2023 9:24 PM EDT    Indication: tachycardia and fever    Comparison: Chest radiograph August 15, 2023    Findings:  There are no airspace consolidations. No pleural fluid. No pneumothorax. The pulmonary  vasculature appears within normal limits. The cardiac and mediastinal silhouette appear unremarkable. No acute osseous abnormality identified.      Impression:      Impression:  No active disease      Electronically Signed: Elkin Arenas MD    10/21/2023 10:12 PM EDT    Workstation ID: [id number]    US Gallbladder [[id number]] Collected: 10/21/23 1240     Updated: 10/21/23 1244    Narrative:      US GALLBLADDER    Date of Exam: 10/21/2023 10:39 AM CDT    Indication: ruq pain..    Comparison: CT abdomen pelvis 9/21/2017    Technique: Grayscale and color Doppler ultrasound evaluation of the right upper quadrant was performed.      Findings:  LIVER:  The liver appears normal in echogenicity.No focal lesions identified. Normal flow is present within the hepatic vasculature.     GALLBLADDER: There are a few stones/sludge within the gallbladder. No gallbladder wall thickening or para cholecystic fluid. No sonographic Faulkner sign reported. Common bile duct is normal in caliber.    PANCREAS:  Visualized portions are unremarkable.    AORTA/IVC:  Visualized portions are unremarkable.    RIGHT KIDNEY:  Normal in size with no focal lesions. No evidence of nephrolithiasis or hydronephrosis.          Impression:      Impression:  1.Cholelithiasis. No evidence of acute cholecystitis based on ultrasound imaging.        Electronically Signed: Regino Ross MD    10/21/2023 11:41 AM CDT    Workstation ID: [id number]          Orders (last 72 hrs)        Start     Ordered    10/23/23 1000  US Gallbladder  1 Time Imaging         10/22/23 1206    10/23/23 0600  CBC & Differential  Morning Draw         10/22/23 1206    10/23/23 0600  Hepatic Function Panel  Morning Draw         10/22/23 1206    10/23/23 0600  CBC Auto Differential  PROCEDURE ONCE         10/22/23 2202    10/22/23 1553  ECG 12 Lead Tachycardia  Once         10/22/23 1552    10/22/23 1510  Urinalysis, Microscopic Only - Urine, Clean Catch  Once         10/22/23 1509    10/22/23  1457  Inpatient Internal Medicine Consult  Once        Specialty:  Internal Medicine  Provider:  (Not yet assigned)    10/22/23 1457    10/22/23 1450  Urinalysis With Microscopic If Indicated (No Culture) - Urine, Clean Catch  Once         10/22/23 1450    10/22/23 1450  Urinalysis With Culture If Indicated - Straight Cath  Once        Comments: Please ensure 'Use Existing Specimen' is selected if this order is for urine culture add-on.  If no button appears, please contact the lab for assistance.      10/22/23 1450    10/22/23 1446  Urinalysis With Microscopic If Indicated (No Culture) - Urine, Clean Catch  Once,   Status:  Canceled         10/22/23 1445    10/22/23 1330  lactated ringers infusion  Continuous         10/22/23 1230    10/22/23 0905  Diet: Gastrointestinal Diets; Low Irritant; Texture: Regular Texture (IDDSI 7); Fluid Consistency: Thin (IDDSI 0)  Diet Effective Now         10/22/23 0904    10/22/23 0859  CBC & Differential  Once         10/22/23 0858    10/22/23 0859  CBC Auto Differential  PROCEDURE ONCE         10/22/23 0858    10/22/23 0600  Incentive Spirometry  Every 4 Hours While Awake       10/22/23 0043    10/22/23 0041  hydrOXYzine (ATARAX) tablet 25 mg  Once As Needed,   Status:  Discontinued         10/22/23 0043    10/22/23 0041  ketorolac (TORADOL) injection 30 mg  Once As Needed,   Status:  Discontinued         10/22/23 0043    10/22/23 0015  iopamidol (ISOVUE-370) 76 % injection 100 mL  Once in Imaging         10/21/23 2320    10/21/23 2244  CT Abdomen Pelvis With Contrast  1 Time Imaging         10/21/23 2239    10/21/23 2239  CT Angiogram Chest  1 Time Imaging         10/21/23 2239    10/21/23 2239  CT Abdomen Pelvis Without Contrast  1 Time Imaging,   Status:  Canceled         10/21/23 2239    10/21/23 2122  Blood Culture - Blood, Arm, Right  STAT        See Hyperspace for full Linked Orders Report.    10/21/23 2122    10/21/23 2122  Blood Culture - Blood, Hand, Right  STAT         Comments: From a different site than #1.     See Hyperspace for full Linked Orders Report.    10/21/23 2122    10/21/23 2116  Lactic Acid, Plasma  STAT         10/21/23 2116    10/21/23 2116  Procalcitonin  STAT         10/21/23 2116    10/21/23 2115  acetaminophen (TYLENOL) tablet 1,000 mg  Once         10/21/23 2015    10/21/23 2114  Lactic Acid, Plasma  Once,   Status:  Canceled         10/21/23 2114    10/21/23 2114  Procalcitonin  Once,   Status:  Canceled         10/21/23 2114    10/21/23 2114  XR Chest 1 View  1 Time Imaging         10/21/23 2114    10/21/23 2106  acetaminophen (TYLENOL) tablet 650 mg  Every 6 Hours PRN,   Status:  Discontinued         10/21/23 2106    10/21/23 2100  sodium chloride 0.9 % flush 10 mL  Every 12 Hours Scheduled         10/21/23 1517    10/21/23 2100  famotidine (PEPCID) injection 20 mg  2 Times Daily         10/21/23 1517    10/21/23 2033  Lactic Acid, Plasma  Once,   Status:  Canceled         10/21/23 2032    10/21/23 2033  Procalcitonin  Once,   Status:  Canceled         10/21/23 2032    10/21/23 2016  CBC (No Diff)  STAT         10/21/23 2015    10/21/23 1615  dextrose 5 % and sodium chloride 0.45 % infusion  Continuous         10/21/23 1517    10/21/23 1609  Initiate & Follow Hypercapnic Monitoring Guideline for Opioid Administration via EtCO2 and / or SpO2  Continuous        Comments: Follow Hypercapnic Monitoring Guideline As Outlined in Process Instructions (Open Order Report to View Full Instructions)    10/21/23 1609    10/21/23 1609  Opioid Administration - Document EtCO2 and / or SpO2 With Each Set of Vitals & Any Change in Patient Status  Per Order Details        Comments: With Each Set of Vitals & Any Change in Patient Status    10/21/23 1609    10/21/23 1609  Opioid Administration - Notify Provider Hypercapnic Monitoring  Until Discontinued         10/21/23 1609    10/21/23 1609  Opioid Administration - Continuous Pulse Oximetry (SpO2)  Continuous          10/21/23 1609    10/21/23 1515  hydrOXYzine (ATARAX) tablet 50 mg  Nightly PRN         10/21/23 1517    10/21/23 1515  morphine injection 2 mg  Every 2 Hours PRN        See Hyperspace for full Linked Orders Report.    10/21/23 1517    10/21/23 1515  naloxone (NARCAN) injection 0.4 mg  Every 5 Minutes PRN        See Hyperspace for full Linked Orders Report.    10/21/23 1517    10/21/23 1515  Diet: Liquid Diets; Clear Liquid; Fluid Consistency: Thin (IDDSI 0)  Diet Effective Now,   Status:  Canceled         10/21/23 1517    10/21/23 1513  Admit To Obstetrics Inpatient  Once         10/21/23 1517    10/21/23 1513  Code Status and Medical Interventions:  Continuous         10/21/23 1517    10/21/23 1513  Place Sequential Compression Device  Once         10/21/23 1517    10/21/23 1513  Maintain Sequential Compression Device  Continuous         10/21/23 1517    10/21/23 1513  Vital Signs Per hospital policy  Per Hospital Policy         10/21/23 1517    10/21/23 1513  Antepartum Patients  <24 Weeks - Document Fetal Heart Tones Daily and PRN.  Per Order Details        Comments: For Antepartum Patients Less Than 24 Weeks - Document Fetal Heart Tones Daily & PRN.    10/21/23 1517    10/21/23 1513  Notify Physician (specified)  Until Discontinued         10/21/23 1517    10/21/23 1513  Insert Peripheral IV  Once         10/21/23 1517    10/21/23 1513  Saline Lock & Maintain IV Access  Continuous         10/21/23 1517    10/21/23 1512  sodium chloride 0.9 % flush 10 mL  As Needed         10/21/23 1517    10/21/23 1512  sodium chloride 0.9 % infusion 40 mL  As Needed         10/21/23 1517    10/21/23 1512  lidocaine PF 1% (XYLOCAINE) injection 0.5 mL  Once As Needed         10/21/23 1517    10/21/23 1512  acetaminophen (TYLENOL) tablet 650 mg  Every 4 Hours PRN         10/21/23 1517    10/21/23 1512  ondansetron (ZOFRAN) tablet 8 mg  Every 8 Hours PRN        See Hyperspace for full Linked Orders Report.    10/21/23 6934     10/21/23 1512  ondansetron (ZOFRAN) injection 4 mg  Every 8 Hours PRN        See Hyperspace for full Linked Orders Report.    10/21/23 1517    10/21/23 1512  docusate sodium (COLACE) capsule 100 mg  2 Times Daily PRN         10/21/23 1517    10/21/23 1512  bisacodyl (DULCOLAX) suppository 10 mg  Daily PRN         10/21/23 1517    10/21/23 1332  ED Transfer To L&D  Once         10/21/23 1332    10/21/23 1317  ED Transfer To L&D  Once         10/21/23 1318    10/21/23 1059  sodium chloride 0.9 % bolus 1,000 mL  Once         10/21/23 1044    10/21/23 1057  US Gallbladder  1 Time Imaging         10/21/23 1056    10/21/23 0941  Urinalysis, Microscopic Only - Urine, Clean Catch  Once         10/21/23 0940    10/21/23 0934  acetaminophen (TYLENOL) tablet 500 mg  Once         10/21/23 0919    10/21/23 0744  sodium chloride 0.9 % bolus 1,000 mL  Once         10/21/23 0728    10/21/23 0744  ondansetron (ZOFRAN) injection 4 mg  Once         10/21/23 0728    10/21/23 0728  CBC & Differential  Once         10/21/23 0728    10/21/23 0728  Comprehensive Metabolic Panel  Once         10/21/23 0728    10/21/23 0728  Insert Peripheral IV  Once        See Hyperspace for full Linked Orders Report.    10/21/23 0728    10/21/23 0728  Lipase  Once         10/21/23 0728    10/21/23 0728  Urinalysis With Microscopic If Indicated (No Culture) - Urine, Clean Catch  Once         10/21/23 0728    10/21/23 0728  Monitor fetal heart tones  Once         10/21/23 0728    10/21/23 07  CBC Auto Differential  PROCEDURE ONCE         10/21/23 0728    10/21/23 0727  sodium chloride 0.9 % flush 10 mL  As Needed        See Hyperspace for full Linked Orders Report.    10/21/23 0728                     Physician Progress Notes (last 72 hours)        Janet Hayes,  at 10/22/23 0849           [facility]  Antepartum Progress Note    Chief Complaint: Abdominal pain, N/V    Subjective     Patient is a 34yo  female at 16w5d admitted to [facility] from ED  after initially presenting for abdominal pain and N/V. Abdominal ultrasound revealed cholelithiasis without evidence of cholecystitis. Workup negative for pancreatitis. Patient experience acute exacerbation of abdominal pain with new onset chest pain and lightheadedness. CT angiogram of chest unremarkable and CT abdomen negative for appendicitis or acute process.     Patient reports this am that she is doing better and that pain is better controlled on current regimen. States that she continues to experience intermittent exacerbation of abdominal pain focalized to RUQ but denies severe. Tolerating bland diet. Reports nausea but denies vomiting overnight. Denies recurrence of chest pain, shortness of breath, and lightheadedness. Denies fever, chills. Denies OB associated complaints including leakage of fluid, vaginal bleeding, contractions.     Objective     Vital Signs Range for the last 24 hours  Temp:  [98 °F (36.7 °C)-100.8 °F (38.2 °C)] 98.4 °F (36.9 °C)   BP: ()/(45-68) 108/58   Heart Rate:  [105-131] 111   Resp:  [14-16] 14   SpO2:  [94 %-100 %] 96 %       Device (Oxygen Therapy): room air      Physical Examination  Constitutional: A&Ox3. Resting  HEENT: Normocephalic, atraumatic.  Neurological: Negative for sensory or motor deficit  Cardiovascular: tachycardic; negative for murmur, rubs, gallops  Respiratory: Clear to auscultation bilaterally; negative for rales, crackles or wheezes  Abdominal: moderately tender to deep palpation in RUQ, minimally tender in other quadrant. No guarding or rebound. No CVA tenderness  Extremities: negative for edema and tenderness, no cords, masses or swelling  Skin: normal turgor; negative for rash or lesions  Psychiatric: normal affect, normal thought process, good insight, good judgment       Fetal Heart Rate Assessment   Method: Fetal HR Assessment Method: intermittent auscultation, using Doppler   Beats/min: Fetal HR (beats/min): 126   Baseline: Fetal HR Baseline:  normal range   Varibility:     Accels:     Decels:     Tracing Category:       Uterine Assessment   Method: Method:  (pt denies ctx lof and bleeding)   Frequency (min):     Ctx Count in 10 min:     Duration:     Intensity:     Intensity by [facility]:     Resting Tone: Uterine Resting Tone: soft by palpation   Resting Tone by [facility]:            Intake/Output last 24 hours:      Intake/Output Summary (Last 24 hours) at 10/22/2023 0906  Last data filed at 10/21/2023 1338  Gross per 24 hour   Intake 2000 ml   Output --   Net 2000 ml       Intake/Output this shift:    No intake/output data recorded.        Laboratory Results  WBC   Date Value Ref Range Status   10/21/2023 6.76 3.40 - 10.80 10*3/mm3 Final     RBC   Date Value Ref Range Status   10/21/2023 3.79 3.77 - 5.28 10*6/mm3 Final     Hemoglobin   Date Value Ref Range Status   10/21/2023 11.4 (L) 12.0 - 15.9 g/dL Final     Hematocrit   Date Value Ref Range Status   10/21/2023 33.5 (L) 34.0 - 46.6 % Final     MCV   Date Value Ref Range Status   10/21/2023 88.4 79.0 - 97.0 fL Final     MCH   Date Value Ref Range Status   10/21/2023 30.1 26.6 - 33.0 pg Final     MCHC   Date Value Ref Range Status   10/21/2023 34.0 31.5 - 35.7 g/dL Final     RDW   Date Value Ref Range Status   10/21/2023 12.7 12.3 - 15.4 % Final     RDW-SD   Date Value Ref Range Status   10/21/2023 41.0 37.0 - 54.0 fl Final     MPV   Date Value Ref Range Status   10/21/2023 10.2 6.0 - 12.0 fL Final     Platelets   Date Value Ref Range Status   10/21/2023 203 140 - 450 10*3/mm3 Final     Neutrophil %   Date Value Ref Range Status   10/21/2023 92.2 (H) 42.7 - 76.0 % Final     Lymphocyte %   Date Value Ref Range Status   10/21/2023 4.7 (L) 19.6 - 45.3 % Final     Monocyte %   Date Value Ref Range Status   10/21/2023 2.4 (L) 5.0 - 12.0 % Final     Eosinophil %   Date Value Ref Range Status   10/21/2023 0.1 (L) 0.3 - 6.2 % Final     Basophil %   Date Value Ref Range Status   10/21/2023 0.2 0.0 - 1.5 % Final      Immature Grans %   Date Value Ref Range Status   10/21/2023 0.4 0.0 - 0.5 % Final     Neutrophils, Absolute   Date Value Ref Range Status   10/21/2023 10.17 (H) 1.70 - 7.00 10*3/mm3 Final     Lymphocytes, Absolute   Date Value Ref Range Status   10/21/2023 0.52 (L) 0.70 - 3.10 10*3/mm3 Final     Monocytes, Absolute   Date Value Ref Range Status   10/21/2023 0.27 0.10 - 0.90 10*3/mm3 Final     Eosinophils, Absolute   Date Value Ref Range Status   10/21/2023 0.01 0.00 - 0.40 10*3/mm3 Final     Basophils, Absolute   Date Value Ref Range Status   10/21/2023 0.02 0.00 - 0.20 10*3/mm3 Final     Immature Grans, Absolute   Date Value Ref Range Status   10/21/2023 0.04 0.00 - 0.05 10*3/mm3 Final     nRBC   Date Value Ref Range Status   10/21/2023 0.0 0.0 - 0.2 /100 WBC Final         Lab 10/21/23  2120   LACTATE 1.5     Procalitonin Results:      Lab 10/21/23  2120   PROCALCITONIN 0.22     XR Chest 1 View    Result Date: 10/21/2023  XR CHEST 1 VW Date of Exam: 10/21/2023 9:24 PM EDT Indication: tachycardia and fever Comparison: Chest radiograph August 15, 2023 Findings: There are no airspace consolidations. No pleural fluid. No pneumothorax. The pulmonary vasculature appears within normal limits. The cardiac and mediastinal silhouette appear unremarkable. No acute osseous abnormality identified.     Impression: Impression: No active disease Electronically Signed: Elkin Arenas MD  10/21/2023 10:12 PM EDT  Workstation ID: [id number]      CT Abdomen Pelvis With Contrast    Result Date: 10/22/2023  CT ABDOMEN PELVIS W CONTRAST Date of Exam: 10/21/2023 11:09 PM EDT Indication: Abdominal pain, acute (Ped 0-17y). Comparison: CT abdomen pelvis dated September 21, 2017 Technique: Axial CT images were obtained of the abdomen and pelvis following the uneventful intravenous administration of 85 mL Isovue-370. Reconstructed coronal and sagittal images were also obtained. Automated exposure control and iterative construction methods were  used. Findings: Lung Bases:   The visualized lung bases and lower mediastinal structures are unremarkable. Liver: Liver is normal in size and CT density. No focal lesions. Biliary/Gallbladder:  The gallbladder is normal without evidence of radiopaque stones. The biliary tree is nondilated. Spleen: Spleen is normal in size and CT density. Pancreas:  Pancreas is normal. There is no evidence of pancreatic mass or peripancreatic fluid. Kidneys:  Kidneys are normal in size. There are no stones or hydronephrosis. Adrenals:  Adrenal glands are unremarkable. Retroperitoneal/Lymph Nodes/Vasculature:  No retroperitoneal adenopathy is identified. Gastrointestinal/Mesentery:  The bowel loops are non-dilated without wall thickening or mass. The appendix appears within normal limits. No evidence of obstruction. No free air. No mesenteric fluid collections identified. Bladder:  The bladder is normal. Genital:   There is a gravid uterus with a posterior placenta and bony structures within the uterus consistent with a fetus.       Bony Structures:   Visualized bony structures are consistent with the patient's age.     Impression: Impression: Gravid uterus. Normal appendix. No acute abdominal or pelvic abnormality. Electronically Signed: Elkin Arenas MD  10/22/2023 12:32 AM EDT  Workstation ID: [id number]       Assessment/Plan  [facility] @ 16w4d  Cholelithiasis  Fever of Unknown Origin  Tachycardia  New onset chest pain-resolved     1. Patient has been managed on APU with IVF and pain control for cholelithiasis   -Intermittent exacerbation of symptoms   -Current control with tylenol and IV morphine as needed for severe pain   -Pepcid and Zofran for symptomatic control   -IV Toradol PRN for short course if other method ineffective   2. New onset chest pain and shortness of breath 10/21 - since resolved    -CT angiogram of chest negative for PE and otherwise unremarkable  -Discussed possibility of onset due to pain vs anxiety   3. New onset of  fever of 100.6, 100.8 and tachycardia with hypotension evening 10/21 prompted sepsis protocol workup   -Lactate and pro calcitonin within normal.   -CBC ordered. WBC 11.03>6.76.   -Blood cultures obtained and pending  -Afebrile overnight  -Repeat urinalysis with culture due to dirty catch in ED. No symptoms of UTI or CVA tenderness/flank pain  -Discussed with Internal Medicine and planning on consultation  5. CXR: negative for active disease   6. No reported obstetric related symptoms   -Fetal doppler daily and PRN  7. Plan for general surgery consult today  8. Contact provider with any acute exacerbations      Janet Hayes DO  10/22/2023  09:06 EDT    Electronically signed by Janet Hayes DO at 10/22/23 4066       Janet Hayes DO at 10/22/23 0033           [facility]  Antepartum Progress Note    Chief Complaint: Abdominal pain, N/V    Subjective     Patient is a 34yo  female at 16w4d admitted to APU from ED after initially presenting for abdominal pain and N/V. Abdominal ultrasound revealed cholelithiasis without evidence of cholecystitis. Workup negative for pancreatitis.     Patient reported new onset acute abdominal pain and lower chest pain later in evening. Also noted shortness of air and lightheadedness. CT angiogram of chest and abdomen performed.     Following return from CT patient reporting in some improvement in symptoms. States that she feels abdominal pain has lessened. Reports that she is fatigued and desires to rest. Note no continue shortness of breath    Objective     Vital Signs Range for the last 24 hours  Temp:  [97.3 °F (36.3 °C)-100.8 °F (38.2 °C)] 98 °F (36.7 °C)   BP: ()/(45-79) 116/68   Heart Rate:  [105-131] 112   Resp:  [16] 16   SpO2:  [94 %-100 %] 96 %       Device (Oxygen Therapy): room air      Physical Examination  Constitutional: A&Ox3. Resting  HEENT: Normocephalic, atraumatic.  Neurological: Negative for sensory or motor deficit  Cardiovascular:  tachycardic; negative for murmur, rubs, gallops  Respiratory: Clear to auscultation bilaterally; negative for rales, crackles or wheezes  Abdominal: moderately tender to palpation in RUQ,   Extremities: negative for edema and tenderness, no cords, masses or swelling  Skin: warm to touch, diaphoretic, normal turgor; negative for rash or lesions  Psychiatric: normal affect, normal thought process, good insight, good judgment       Fetal Heart Rate Assessment   Method: Fetal HR Assessment Method: intermittent auscultation, using Doppler   Beats/min: Fetal HR (beats/min): 126   Baseline: Fetal HR Baseline: normal range   Varibility:     Accels:     Decels:     Tracing Category:       Uterine Assessment   Method: Method:  (pt denies ctx lof and bleeding)   Frequency (min):     Ctx Count in 10 min:     Duration:     Intensity:     Intensity by IUPC:     Resting Tone:     Resting Tone by IUPC:            Intake/Output last 24 hours:      Intake/Output Summary (Last 24 hours) at 10/22/2023 0045  Last data filed at 10/21/2023 1338  Gross per 24 hour   Intake 2000 ml   Output --   Net 2000 ml       Intake/Output this shift:    No intake/output data recorded.        Laboratory Results  WBC   Date Value Ref Range Status   10/21/2023 6.76 3.40 - 10.80 10*3/mm3 Final     RBC   Date Value Ref Range Status   10/21/2023 3.79 3.77 - 5.28 10*6/mm3 Final     Hemoglobin   Date Value Ref Range Status   10/21/2023 11.4 (L) 12.0 - 15.9 g/dL Final     Hematocrit   Date Value Ref Range Status   10/21/2023 33.5 (L) 34.0 - 46.6 % Final     MCV   Date Value Ref Range Status   10/21/2023 88.4 79.0 - 97.0 fL Final     MCH   Date Value Ref Range Status   10/21/2023 30.1 26.6 - 33.0 pg Final     MCHC   Date Value Ref Range Status   10/21/2023 34.0 31.5 - 35.7 g/dL Final     RDW   Date Value Ref Range Status   10/21/2023 12.7 12.3 - 15.4 % Final     RDW-SD   Date Value Ref Range Status   10/21/2023 41.0 37.0 - 54.0 fl Final     MPV   Date Value Ref  Range Status   10/21/2023 10.2 6.0 - 12.0 fL Final     Platelets   Date Value Ref Range Status   10/21/2023 203 140 - 450 10*3/mm3 Final     Neutrophil %   Date Value Ref Range Status   10/21/2023 92.2 (H) 42.7 - 76.0 % Final     Lymphocyte %   Date Value Ref Range Status   10/21/2023 4.7 (L) 19.6 - 45.3 % Final     Monocyte %   Date Value Ref Range Status   10/21/2023 2.4 (L) 5.0 - 12.0 % Final     Eosinophil %   Date Value Ref Range Status   10/21/2023 0.1 (L) 0.3 - 6.2 % Final     Basophil %   Date Value Ref Range Status   10/21/2023 0.2 0.0 - 1.5 % Final     Immature Grans %   Date Value Ref Range Status   10/21/2023 0.4 0.0 - 0.5 % Final     Neutrophils, Absolute   Date Value Ref Range Status   10/21/2023 10.17 (H) 1.70 - 7.00 10*3/mm3 Final     Lymphocytes, Absolute   Date Value Ref Range Status   10/21/2023 0.52 (L) 0.70 - 3.10 10*3/mm3 Final     Monocytes, Absolute   Date Value Ref Range Status   10/21/2023 0.27 0.10 - 0.90 10*3/mm3 Final     Eosinophils, Absolute   Date Value Ref Range Status   10/21/2023 0.01 0.00 - 0.40 10*3/mm3 Final     Basophils, Absolute   Date Value Ref Range Status   10/21/2023 0.02 0.00 - 0.20 10*3/mm3 Final     Immature Grans, Absolute   Date Value Ref Range Status   10/21/2023 0.04 0.00 - 0.05 10*3/mm3 Final     nRBC   Date Value Ref Range Status   10/21/2023 0.0 0.0 - 0.2 /100 WBC Final         Lab 10/21/23  2120   LACTATE 1.5     Procalitonin Results:      Lab 10/21/23  2120   PROCALCITONIN 0.22     XR Chest 1 View    Result Date: 10/21/2023  XR CHEST 1 VW Date of Exam: 10/21/2023 9:24 PM EDT Indication: tachycardia and fever Comparison: Chest radiograph August 15, 2023 Findings: There are no airspace consolidations. No pleural fluid. No pneumothorax. The pulmonary vasculature appears within normal limits. The cardiac and mediastinal silhouette appear unremarkable. No acute osseous abnormality identified.     Impression: Impression: No active disease Electronically Signed:  Elkin Arenas MD  10/21/2023 10:12 PM EDT  Workstation ID: [id number]      CT Abdomen Pelvis With Contrast    Result Date: 10/22/2023  CT ABDOMEN PELVIS W CONTRAST Date of Exam: 10/21/2023 11:09 PM EDT Indication: Abdominal pain, acute (Ped 0-17y). Comparison: CT abdomen pelvis dated September 21, 2017 Technique: Axial CT images were obtained of the abdomen and pelvis following the uneventful intravenous administration of 85 mL Isovue-370. Reconstructed coronal and sagittal images were also obtained. Automated exposure control and iterative construction methods were used. Findings: Lung Bases:   The visualized lung bases and lower mediastinal structures are unremarkable. Liver: Liver is normal in size and CT density. No focal lesions. Biliary/Gallbladder:  The gallbladder is normal without evidence of radiopaque stones. The biliary tree is nondilated. Spleen: Spleen is normal in size and CT density. Pancreas:  Pancreas is normal. There is no evidence of pancreatic mass or peripancreatic fluid. Kidneys:  Kidneys are normal in size. There are no stones or hydronephrosis. Adrenals:  Adrenal glands are unremarkable. Retroperitoneal/Lymph Nodes/Vasculature:  No retroperitoneal adenopathy is identified. Gastrointestinal/Mesentery:  The bowel loops are non-dilated without wall thickening or mass. The appendix appears within normal limits. No evidence of obstruction. No free air. No mesenteric fluid collections identified. Bladder:  The bladder is normal. Genital:   There is a gravid uterus with a posterior placenta and bony structures within the uterus consistent with a fetus.       Bony Structures:   Visualized bony structures are consistent with the patient's age.     Impression: Impression: Gravid uterus. Normal appendix. No acute abdominal or pelvic abnormality. Electronically Signed: Elkin Arenas MD  10/22/2023 12:32 AM EDT  Workstation ID: [id number]       Assessment/Plan  IUP @ 16w4d  Cholelithiasis  Fever of Unknown  Origin  Tachycardia  New onset chest pain     1. Patient has been managed on APU with IVF and pain control for cholelithiasis  2. New onset chest pain and shortness of breath with increased abdominal pain prompting CT angiogram of chest which was negative for PE and CT abdomen which was negative for acute process including appendicitis  3. New onset of fever of 100.6, 100.8 and tachycardia prompted sepsis protocol workup - lactate and pro calcitonin within normal. CBC ordered. WBC 11.03>6.76. Blood cultures obtained and pending  5. CXR: negative for active disease   6. No reported obstetric related symptoms  7. Plan for general surgery consult in am for evaluation and recommendations for chololithiasis/expectations in second trimester of pregnancy  8. Continue current pain and bowel regimen with tylenol, zofran, morphine if need. If recurrence of severe pain one time dose of IV Toradol PRN placed due to second trimester and low risk with limited dosing. Hydroxizine for rest and anxiety ordered as needed  9. Contact provider with any acute exacerbations or concern overnight  10. Plan for revaluation in am      Jante Hayes DO  10/22/2023  00:45 EDT    Electronically signed by Janet Hayes DO at 10/22/23 0050       Janet Hayes DO at 10/21/23 5402          [facility]  Antepartum Progress Note    Chief Complaint: Abdominal pain, N/V    Subjective     Patient is a 34yo  female at 16w4d admitted to APU from ED after initially presenting for abdominal pain and N/V. Abdominal ultrasound revealed cholelithiasis without evidence of cholecystitis. Workup negative for pancreatitis.     Patient reports increased abdominal pain and new onset substernal chest pain. She notes that pain started over the past hour. She reports increased difficulty taking a deep breath. She reports lightheadedness. States that pain radiates through and to middle of back. States that abdominal pain and distension has also  intensified. She reports that pain is primary located in RUQ but also present in right lower quadrant. Reports nausea but no vomiting. Denies vaginal bleeding, leakage of fluid. Patient is tearful.      Objective     Vital Signs Range for the last 24 hours  Temp:  [97.3 °F (36.3 °C)-100.8 °F (38.2 °C)] 98.1 °F (36.7 °C)   BP: ()/(45-79) 106/58   Heart Rate:  [105-131] 116   Resp:  [16] 16   SpO2:  [94 %-100 %] 94 %       Device (Oxygen Therapy): room air      Physical Examination  Constitutional: A&Ox3. Distressed. Tearful in pain  HEENT: Normocephalic, atraumatic.  Neurological: Negative for sensory or motor deficit  Cardiovascular: tachycardic; negative for murmur, rubs, gallops  Respiratory: Clear to auscultation bilaterally; negative for rales, crackles or wheezes  Abdominal: moderately tender to palpation in all abdominal quadrants, positive [place] signs, increased tenderness to palpation in RLQ, distended  Extremities: negative for edema and tenderness, no cords, masses or swelling  Skin: warm to touch, diaphoretic, normal turgor; negative for rash or lesions  Psychiatric: normal affect, normal thought process, good insight, good judgment       Fetal Heart Rate Assessment   Method: Fetal HR Assessment Method: intermittent auscultation, using Doppler   Beats/min: Fetal HR (beats/min): 126   Baseline: Fetal HR Baseline: normal range   Varibility:     Accels:     Decels:     Tracing Category:       Uterine Assessment   Method: Method:  (pt denies ctx lof and bleeding)   Frequency (min):     Ctx Count in 10 min:     Duration:     Intensity:     Intensity by IUPC:     Resting Tone:     Resting Tone by IUPC:            Intake/Output last 24 hours:      Intake/Output Summary (Last 24 hours) at 10/21/2023 2244  Last data filed at 10/21/2023 1338  Gross per 24 hour   Intake 2000 ml   Output --   Net 2000 ml       Intake/Output this shift:    No intake/output data recorded.        Laboratory Results  WBC   Date  Value Ref Range Status   10/21/2023 6.76 3.40 - 10.80 10*3/mm3 Final     RBC   Date Value Ref Range Status   10/21/2023 3.79 3.77 - 5.28 10*6/mm3 Final     Hemoglobin   Date Value Ref Range Status   10/21/2023 11.4 (L) 12.0 - 15.9 g/dL Final     Hematocrit   Date Value Ref Range Status   10/21/2023 33.5 (L) 34.0 - 46.6 % Final     MCV   Date Value Ref Range Status   10/21/2023 88.4 79.0 - 97.0 fL Final     MCH   Date Value Ref Range Status   10/21/2023 30.1 26.6 - 33.0 pg Final     MCHC   Date Value Ref Range Status   10/21/2023 34.0 31.5 - 35.7 g/dL Final     RDW   Date Value Ref Range Status   10/21/2023 12.7 12.3 - 15.4 % Final     RDW-SD   Date Value Ref Range Status   10/21/2023 41.0 37.0 - 54.0 fl Final     MPV   Date Value Ref Range Status   10/21/2023 10.2 6.0 - 12.0 fL Final     Platelets   Date Value Ref Range Status   10/21/2023 203 140 - 450 10*3/mm3 Final     Neutrophil %   Date Value Ref Range Status   10/21/2023 92.2 (H) 42.7 - 76.0 % Final     Lymphocyte %   Date Value Ref Range Status   10/21/2023 4.7 (L) 19.6 - 45.3 % Final     Monocyte %   Date Value Ref Range Status   10/21/2023 2.4 (L) 5.0 - 12.0 % Final     Eosinophil %   Date Value Ref Range Status   10/21/2023 0.1 (L) 0.3 - 6.2 % Final     Basophil %   Date Value Ref Range Status   10/21/2023 0.2 0.0 - 1.5 % Final     Immature Grans %   Date Value Ref Range Status   10/21/2023 0.4 0.0 - 0.5 % Final     Neutrophils, Absolute   Date Value Ref Range Status   10/21/2023 10.17 (H) 1.70 - 7.00 10*3/mm3 Final     Lymphocytes, Absolute   Date Value Ref Range Status   10/21/2023 0.52 (L) 0.70 - 3.10 10*3/mm3 Final     Monocytes, Absolute   Date Value Ref Range Status   10/21/2023 0.27 0.10 - 0.90 10*3/mm3 Final     Eosinophils, Absolute   Date Value Ref Range Status   10/21/2023 0.01 0.00 - 0.40 10*3/mm3 Final     Basophils, Absolute   Date Value Ref Range Status   10/21/2023 0.02 0.00 - 0.20 10*3/mm3 Final     Immature Grans, Absolute   Date Value  Ref Range Status   10/21/2023 0.04 0.00 - 0.05 10*3/mm3 Final     nRBC   Date Value Ref Range Status   10/21/2023 0.0 0.0 - 0.2 /100 WBC Final         Lab 10/21/23  2120   LACTATE 1.5     Procalitonin Results:      Lab 10/21/23  2120   PROCALCITONIN 0.22     XR Chest 1 View    Result Date: 10/21/2023  XR CHEST 1 VW Date of Exam: 10/21/2023 9:24 PM EDT Indication: tachycardia and fever Comparison: Chest radiograph August 15, 2023 Findings: There are no airspace consolidations. No pleural fluid. No pneumothorax. The pulmonary vasculature appears within normal limits. The cardiac and mediastinal silhouette appear unremarkable. No acute osseous abnormality identified.     Impression: Impression: No active disease Electronically Signed: Elkin Arenas MD  10/21/2023 10:12 PM EDT  Workstation ID: [id number]        Assessment/Plan  IUP @ 16w4d  Cholelithiasis  Fever of Unknown Origin  Tachycardia  New onset chest pain     1. Patient has been managed on APU with IVF and pain control for cholelithiasis  2. New onset chest pain and shortness of breath. Abdominal tenderness intensified from earlier exam and reported difficulty taking deep breath. Reported radiation of pain to mid back. Lungs clear.  3. Stat CT angiogram of chest and Stat CT of abdomen and pelvis ordered  4. New onset of fever of 100.6 and tachycardia prompted sepsis protocol workup - lactate and pro calcitonin within normal. CBC ordered. WBC 11.03>6.76. Blood cultures obtained and pending  5. CXR: negative for active disease   6. No reported obstetric related symptoms  7. Consulted with Dr. Lorenzo, agreed with plan of care      Janet Hayes DO  10/21/2023  22:44 EDT    Electronically signed by Janet Hayes DO at 10/21/23 2305       Janet Hayes DO at 10/21/23 2114          [facility]  Antepartum Progress Note    Chief Complaint: Abdominal pain, N/V    Subjective     Patient is a 34yo  female at 16w4d admitted to APU from ED after initially  presenting for abdominal pain and N/V. Abdominal ultrasound revealed cholelithiasis without evidence of cholecystitis. Workup negative for pancreatitis.     Contacted due to patient fever of 100.6. Nursing report of elevated room temperature. Patient did not report signs of fever, chills, nausea, vomiting, shortness of breath. Abdominal pain has been intermittently controlled with medical management. Reported as no worsening. Denies other symptoms including cough, congestion, chest pain. She denied leakage of fluid, vaginal bleeding. Nursing reports that she has been resting well.       Objective     Vital Signs Range for the last 24 hours  Temp:  [97.3 °F (36.3 °C)-100.6 °F (38.1 °C)] 100.6 °F (38.1 °C)   BP: ()/(45-79) 106/58   Heart Rate:  [105-129] 129   Resp:  [16] 16   SpO2:  [95 %-100 %] 100 %       Device (Oxygen Therapy): room air     Fetal Heart Rate Assessment   Method: Fetal HR Assessment Method: intermittent auscultation, using Doppler   Beats/min: Fetal HR (beats/min): 126   Baseline: Fetal HR Baseline: normal range   Varibility:     Accels:     Decels:     Tracing Category:       Uterine Assessment   Method: Method:  (pt denies ctx lof and bleeding)   Frequency (min):     Ctx Count in 10 min:     Duration:     Intensity:     Intensity by IUPC:     Resting Tone:     Resting Tone by IUPC:            Intake/Output last 24 hours:      Intake/Output Summary (Last 24 hours) at 10/21/2023 2117  Last data filed at 10/21/2023 1338  Gross per 24 hour   Intake 2000 ml   Output --   Net 2000 ml       Intake/Output this shift:    No intake/output data recorded.        Laboratory Results  WBC   Date Value Ref Range Status   10/21/2023 6.76 3.40 - 10.80 10*3/mm3 Final     RBC   Date Value Ref Range Status   10/21/2023 3.79 3.77 - 5.28 10*6/mm3 Final     Hemoglobin   Date Value Ref Range Status   10/21/2023 11.4 (L) 12.0 - 15.9 g/dL Final     Hematocrit   Date Value Ref Range Status   10/21/2023 33.5 (L) 34.0 -  46.6 % Final     MCV   Date Value Ref Range Status   10/21/2023 88.4 79.0 - 97.0 fL Final     MCH   Date Value Ref Range Status   10/21/2023 30.1 26.6 - 33.0 pg Final     MCHC   Date Value Ref Range Status   10/21/2023 34.0 31.5 - 35.7 g/dL Final     RDW   Date Value Ref Range Status   10/21/2023 12.7 12.3 - 15.4 % Final     RDW-SD   Date Value Ref Range Status   10/21/2023 41.0 37.0 - 54.0 fl Final     MPV   Date Value Ref Range Status   10/21/2023 10.2 6.0 - 12.0 fL Final     Platelets   Date Value Ref Range Status   10/21/2023 203 140 - 450 10*3/mm3 Final     Neutrophil %   Date Value Ref Range Status   10/21/2023 92.2 (H) 42.7 - 76.0 % Final     Lymphocyte %   Date Value Ref Range Status   10/21/2023 4.7 (L) 19.6 - 45.3 % Final     Monocyte %   Date Value Ref Range Status   10/21/2023 2.4 (L) 5.0 - 12.0 % Final     Eosinophil %   Date Value Ref Range Status   10/21/2023 0.1 (L) 0.3 - 6.2 % Final     Basophil %   Date Value Ref Range Status   10/21/2023 0.2 0.0 - 1.5 % Final     Immature Grans %   Date Value Ref Range Status   10/21/2023 0.4 0.0 - 0.5 % Final     Neutrophils, Absolute   Date Value Ref Range Status   10/21/2023 10.17 (H) 1.70 - 7.00 10*3/mm3 Final     Lymphocytes, Absolute   Date Value Ref Range Status   10/21/2023 0.52 (L) 0.70 - 3.10 10*3/mm3 Final     Monocytes, Absolute   Date Value Ref Range Status   10/21/2023 0.27 0.10 - 0.90 10*3/mm3 Final     Eosinophils, Absolute   Date Value Ref Range Status   10/21/2023 0.01 0.00 - 0.40 10*3/mm3 Final     Basophils, Absolute   Date Value Ref Range Status   10/21/2023 0.02 0.00 - 0.20 10*3/mm3 Final     Immature Grans, Absolute   Date Value Ref Range Status   10/21/2023 0.04 0.00 - 0.05 10*3/mm3 Final     nRBC   Date Value Ref Range Status   10/21/2023 0.0 0.0 - 0.2 /100 WBC Final         Assessment/Plan  IUP @ 16w4d  Cholelithiasis  Fever of Unknown Origin  Tachycardia     1. Patient has been managed on APU with IVF and pain control-improved with  tylenol and PRN morphine  2. Nursing report of temp 100.6. Patients vitals noted as tachycardic in 120sbpm with hypotension 80/50s  3. Stat CBC ordered. WBC 11.03>6.76  4. No reported signs or new onset of symptoms indicating infectious etiology  5. Will order lactate, procalcitonin, CXR, blood cultures due to patient symptoms and new onset elevation in temperature  6. Plan for more frequent BP and temp monitoring   7. Pulse ox persistently 97-98%.   8. Advised nursing on decreasing temperature of patient room  9. Will continue to monitor closely      Janet Hayes DO  10/21/2023  21:17 EDT    Electronically signed by Janet Hayes DO at 10/21/23 2125          Consult Notes (last 72 hours)        Nacho Burleson APRN at 10/22/23 1523        Consult Orders    1. Inpatient Internal Medicine Consult [[id number]] ordered by Janet Hayes DO at 10/22/23 1457                      [facility] Medicine Services  CONSULT NOTE      Patient Name: Padma Younger  : 1990  MRN: [id number]    Primary Care Physician: Lanette Gregory APRN  Provider requesting consultation: No ref. provider found    Subjective   Subjective     Reason for Consultation:  Fever of unknown origin, abdominal pain in pregnancy    HPI:  Padma Younger is a 33 y.o. female with past medical history significant for asthma, anxiety, depression, and abnormal Pap who is 16 weeks pregnant.  She presented to the ED on 10/21/2023 due to right upper quadrant abdominal pain.  Patient's symptoms started the night prior to admission with abdominal pain and persistent nausea/vomiting.  Ultrasound of the gallbladder in the ED revealed cholelithiasis with no evidence of acute cholecystitis.  Chest x-ray revealed no active disease.  CT angiogram of the chest was negative for PE with evidence of mild bilateral basilar atelectasis.  CT of the abdomen/pelvis revealed gravid uterus with normal appendix and no acute abdominal or pelvic  abnormality.  She developed low-grade fever, tachycardia, and hypotension on the evening of 10/21/2023.  She denies previous history of similar abdominal pain.  No history of pancreatitis.  She denies any bright red or dark black stool.  Last bowel movement on 10/21 noted to be small formed stool.  Infectious work-up was started by OB/GYN and is thus far unremarkable.  General surgeon Dr. Pereira was consulted and is following.  Hospital medicine was consulted to assist with medical management.    The patient was seen resting in bed in no apparent distress.  Nursing notes reviewed.  Patient continues to have right upper quadrant abdominal pain that radiates to her right flank.  She has been requiring IV pain medication for this.  She does endorse shortness of breath with deep inspiration due to pain.  She denies any dysuria, urinary frequency, urinary urgency, cough, or diarrhea.  The patient expresses desire to proceed with cholecystectomy if warranted as she does not want to continue to have to deal with this pain.    Personal History     Past Medical History:   Diagnosis Date    Abnormal Pap smear of cervix     Anxiety     Asthma     Chlamydia     2013    Chlamydia contact, treated     Depression     Gonorrhea     2013    Urinary tract infection     Visual impairment        Past Surgical History:   Procedure Laterality Date    CYST REMOVAL      D & C WITH SUCTION N/A 9/18/2017    Procedure: DILATATION AND CURETTAGE WITH SUCTION;  Surgeon: Kleley Dodge DO;  Location: [facility] OR;  Service:     DENTAL PROCEDURE      DILATATION AND CURETTAGE  09/18/2017    EYE SURGERY  12/2018    Intact Surgery    KNEE SURGERY      VAGINAL DELIVERY      x1    WISDOM TOOTH EXTRACTION         Family History:  family history includes Arthritis in her mother; Breast cancer in her maternal grandmother and paternal grandmother; COPD in her mother; Cancer in her maternal grandfather, maternal grandmother, and paternal grandmother; Colon  cancer in her paternal grandfather; Diabetes in her maternal grandfather and paternal grandmother; Heart attack in her paternal grandmother; Hypertension in her paternal grandmother; Liver disease in her maternal uncle; Ovarian cancer in her paternal grandmother; Stroke in her paternal grandmother. Otherwise pertinent FHx was reviewed and unremarkable.     Social History:  reports that she has never smoked. She has never used smokeless tobacco. She reports current alcohol use of about 2.0 standard drinks of alcohol per week. She reports that she does not use drugs.    Medications:  Loratadine, albuterol sulfate HFA, buPROPion XL, montelukast, vitamin C, and vitamin D3    Scheduled Meds:famotidine, 20 mg, Intravenous, BID  sodium chloride, 10 mL, Intravenous, Q12H      Continuous Infusions:dextrose 5 % and sodium chloride 0.45 %, 100 mL/hr, Last Rate: 100 mL/hr (10/22/23 0232)  lactated ringers, 100 mL/hr, Last Rate: 100 mL/hr (10/22/23 1230)      PRN Meds:.  acetaminophen    bisacodyl    docusate sodium    hydrOXYzine    ketorolac    lidocaine PF 1%    Morphine **AND** naloxone    ondansetron **OR** ondansetron    [COMPLETED] Insert Peripheral IV **AND** sodium chloride    sodium chloride    sodium chloride    Allergies   Allergen Reactions    Oxycodone Itching       Objective   Objective     Vital Signs:   Temp:  [97.7 °F (36.5 °C)-100.8 °F (38.2 °C)] 97.7 °F (36.5 °C)  Heart Rate:  [105-131] 108  Resp:  [14-16] 16  BP: ()/(45-68) 103/56    Physical Exam  Constitutional: Awake, alert, appears to be in pain   Eyes: PERRLA, sclerae anicteric, no conjunctival injection  HENT: NCAT, mucous membranes moist  Neck: Supple, no thyromegaly, no lymphadenopathy, trachea midline  Respiratory: Clear to auscultation bilaterally, nonlabored respirations on RA   Cardiovascular: RRR, no murmurs, rubs, or gallops, palpable pedal pulses bilaterally  Gastrointestinal: Positive bowel sounds, soft, RUQ tender to palpation, no  rebound tenderness  Musculoskeletal: No bilateral ankle edema, no clubbing or cyanosis to extremities  Psychiatric: Appropriate affect, cooperative  Neurologic: Oriented x 3, moves all extremities, speech clear  Skin: warm, dry, no visible rash    Result Review:  I have personally reviewed the results from the time of admission and agree with these findings:  [x]  Laboratory  [x]  Radiology  [x]  EKG/Telemetry   []  Pathology  [x]  Old records  []  Other:  Most notable findings include:    LAB RESULTS:      Lab 10/22/23  0947 10/21/23  2120 10/21/23  2043 10/21/23  0731   WBC 3.75  --  6.76 11.03*   HEMOGLOBIN 10.1*  --  11.4* 12.7   HEMATOCRIT 30.9*  --  33.5* 37.7   PLATELETS 178  --  203 271   NEUTROS ABS 2.84  --   --  10.17*   IMMATURE GRANS (ABS) 0.01  --   --  0.04   LYMPHS ABS 0.60*  --   --  0.52*   MONOS ABS 0.28  --   --  0.27   EOS ABS 0.02  --   --  0.01   MCV 88.8  --  88.4 89.3   PROCALCITONIN  --  0.22  --   --    LACTATE  --  1.5  --   --          Lab 10/21/23  0731   SODIUM 137   POTASSIUM 4.0   CHLORIDE 104   CO2 22.0   ANION GAP 11.0   BUN 6   CREATININE 0.73   EGFR 110.8   GLUCOSE 104*   CALCIUM 9.4         Lab 10/21/23  0731   TOTAL PROTEIN 7.2   ALBUMIN 3.6   GLOBULIN 3.6   ALT (SGPT) 11   AST (SGOT) 17   BILIRUBIN 0.5   ALK PHOS 71   LIPASE 18                     Brief Urine Lab Results  (Last result in the past 365 days)        Color   Clarity   Blood   Leuk Est   Nitrite   Protein   CREAT   Urine HCG        10/22/23 1458 Yellow   Clear   Negative   Small (1+)   Negative   Negative                 Microbiology Results (last 10 days)       ** No results found for the last 240 hours. **            CT Abdomen Pelvis With Contrast    Result Date: 10/22/2023  CT ABDOMEN PELVIS W CONTRAST Date of Exam: 10/21/2023 11:09 PM EDT Indication: Abdominal pain, acute (Ped 0-17y). Comparison: CT abdomen pelvis dated September 21, 2017 Technique: Axial CT images were obtained of the abdomen and pelvis  following the uneventful intravenous administration of 85 mL Isovue-370. Reconstructed coronal and sagittal images were also obtained. Automated exposure control and iterative construction methods were used. Findings: Lung Bases:   The visualized lung bases and lower mediastinal structures are unremarkable. Liver: Liver is normal in size and CT density. No focal lesions. Biliary/Gallbladder:  The gallbladder is normal without evidence of radiopaque stones. The biliary tree is nondilated. Spleen: Spleen is normal in size and CT density. Pancreas:  Pancreas is normal. There is no evidence of pancreatic mass or peripancreatic fluid. Kidneys:  Kidneys are normal in size. There are no stones or hydronephrosis. Adrenals:  Adrenal glands are unremarkable. Retroperitoneal/Lymph Nodes/Vasculature:  No retroperitoneal adenopathy is identified. Gastrointestinal/Mesentery:  The bowel loops are non-dilated without wall thickening or mass. The appendix appears within normal limits. No evidence of obstruction. No free air. No mesenteric fluid collections identified. Bladder:  The bladder is normal. Genital:   There is a gravid uterus with a posterior placenta and bony structures within the uterus consistent with a fetus.       Bony Structures:   Visualized bony structures are consistent with the patient's age.     Impression: Impression: Gravid uterus. Normal appendix. No acute abdominal or pelvic abnormality. Electronically Signed: Elkin Arenas MD  10/22/2023 12:32 AM EDT  Workstation ID: [id number]    CT Angiogram Chest    Result Date: 10/22/2023  CT ANGIOGRAM CHEST Date of Exam: 10/21/2023 11:09 PM EDT Indication: Chest pain, nonspecific Pulmonary embolism (PE) suspected, pregnant. Comparison: None available. Technique: CTA of the chest was performed before and after the uneventful intravenous administration of 85 mL Isovue-370. Reconstructed coronal and sagittal images were also obtained. In addition, a 3-D volume rendered image  was created for interpretation. Automated exposure control and iterative reconstruction methods were used. Findings: Pulmonary arteries: Adequate opacification of the pulmonary arteries. No evidence of acute pulmonary embolism. Lungs and Pleura: There is bilateral basilar atelectasis. There are no pleural fluid collections. Mediastinum/Francia: No mediastinal or hilar lymphadenopathy. Lymph nodes: No axillary or supraclavicular adenopathy. Cardiovascular: The cardiac chambers are within normal limits. The pericardium is normal. The aorta and its arch branch vessels are unremarkable.   Upper Abdomen: The upper abdominal contents are unremarkable.      Bones and Soft Tissue: No suspicious osseous lesion.     Impression: Impression: No evidence of pulmonary embolic disease. Mild bilateral basilar atelectasis. Electronically Signed: Elkin Arenas MD  10/22/2023 12:25 AM EDT  Workstation ID: [id number]    XR Chest 1 View    Result Date: 10/21/2023  XR CHEST 1 VW Date of Exam: 10/21/2023 9:24 PM EDT Indication: tachycardia and fever Comparison: Chest radiograph August 15, 2023 Findings: There are no airspace consolidations. No pleural fluid. No pneumothorax. The pulmonary vasculature appears within normal limits. The cardiac and mediastinal silhouette appear unremarkable. No acute osseous abnormality identified.     Impression: Impression: No active disease Electronically Signed: Elkin Arenas MD  10/21/2023 10:12 PM EDT  Workstation ID: [id number]    US Gallbladder    Result Date: 10/21/2023  US GALLBLADDER Date of Exam: 10/21/2023 10:39 AM CDT Indication: ruq pain.. Comparison: CT abdomen pelvis 9/21/2017 Technique: Grayscale and color Doppler ultrasound evaluation of the right upper quadrant was performed. Findings: LIVER:  The liver appears normal in echogenicity.No focal lesions identified. Normal flow is present within the hepatic vasculature. GALLBLADDER: There are a few stones/sludge within the gallbladder. No gallbladder  wall thickening or para cholecystic fluid. No sonographic Faulkner sign reported. Common bile duct is normal in caliber. PANCREAS:  Visualized portions are unremarkable. AORTA/IVC:  Visualized portions are unremarkable. RIGHT KIDNEY:  Normal in size with no focal lesions. No evidence of nephrolithiasis or hydronephrosis.     Impression: Impression: 1.Cholelithiasis. No evidence of acute cholecystitis based on ultrasound imaging. Electronically Signed: Regino Ross MD  10/21/2023 11:41 AM CDT  Workstation ID: [id number]         Assessment & Plan   Assessment & Plan     Active Hospital Problems    Diagnosis  POA    **Nausea and vomiting in pregnancy [O21.9]  Yes      Resolved Hospital Problems   No resolved problems to display.     Padma Younger is a 33 y.o. female with past medical history significant for asthma, anxiety, depression, and abnormal Pap who is 16 weeks pregnant.  She presented to the ED on 10/21/2023 due to right upper quadrant abdominal pain.  Patient's symptoms started the night prior to admission with abdominal pain and persistent nausea/vomiting.  Ultrasound of the gallbladder in the ED revealed cholelithiasis with no evidence of acute cholecystitis.  Chest x-ray revealed no active disease.  CT angiogram of the chest was negative for PE with evidence of mild bilateral basilar atelectasis.  CT of the abdomen/pelvis revealed gravid uterus with normal appendix and no acute abdominal or pelvic abnormality.  She developed low-grade fever, tachycardia, and hypotension on the evening of 10/21/2023.  She denies previous history of similar abdominal pain.  No history of pancreatitis.  She denies any bright red or dark black stool.  Last bowel movement on 10/21 noted to be small formed stool.  Infectious work-up was started by OB/GYN and is thus far unremarkable.  General surgeon Dr. Pereira was consulted and is following.  Hospital medicine was consulted to assist with medical management.    Cholelithiasis    Abdominal pain   Nausea/vomiting  -CT abd/pelvis revealed gravid uterus with normal appendix and no acute abdominal or pelvic abnormality.  -Gallbladder ultrasound revealed cholelithiasis with no evidence of acute cholecystitis  -s/p 2L NS bolus in ED   -Continue IVF  -Tylenol, Toradol, morphine as needed for pain  -Zofran as needed for nausea  --General surgeon Dr. Pereira following  -Plan for repeat LFTs and GB US in AM   -AM labs    Fever unknown origin  Leukocytosis, resolved   -Developed low-grade fever up to 100.8 on evening of 10/21  -WBC 11.03 on presentation, improved to 3.75 this AM   -Procal WNL, Lactate WNL  -Chest x-ray revealed no active disease  -CT abdomen/pelvis revealed no acute abnormality  -CTA negative for PE with mild bilateral basilar atelectasis  -Follow blood cultures  -Initial UA with squamous cells concerning for contamination  -Repeat UA pending  -Encourage use of incentive spirometer d/t atelectasis   -Currently afebrile    Tachycardia  --130s since admission  -CTA negative for PE  -check EKG now    Intrauterine Pregnancy at 16 weeks   -Management per OB/Gyn    Thank you for allowing [facility] to provide consultative care for your patient, we will continue to follow while clinically appropriate.    MOLLY Malik  10/22/23            Electronically signed by Nacho Burleson APRN at 10/22/23 3885       Ilya Pereira MD at 10/22/23 1158          General Surgery Consultation Note    Date of Service: 10/22/2023  Padma Younger  [id number]  1990      Referring Provider: Yamile Matson CNM    Location of Consult: Inpatient     Reason for Consultation: Cholelithiasis       History of Present Illness:  I am seeing, Padma Younger, in consultation for Yamile Matson CNM regarding cholelithiasis 33-year-old young lady with her second pregnancy whom is approximately 17 weeks presents with a single episode of severe nausea and vomiting last evening  then developed epigastric pain radiating through to her back.  She has had persistent discomfort since coming to the hospital.  She has had no symptoms similar to this in the past.  Her vomitus has been nonbilious and nonbloody.  She is passing flatus.  She admits to fever and chills.  Otherwise there are no significant modifying factors nor associated symptoms.     Problems Addressed this Visit    None  Visit Diagnoses       Upper abdominal pain    -  Primary    17 weeks gestation of pregnancy        Nausea and vomiting, unspecified vomiting type        Gallstones              Diagnoses         Codes Comments    Upper abdominal pain    -  Primary ICD-10-CM: R10.10  ICD-9-CM: 789.09     17 weeks gestation of pregnancy     ICD-10-CM: Z3A.17  ICD-9-CM: V22.2     Nausea and vomiting, unspecified vomiting type     ICD-10-CM: R11.2  ICD-9-CM: 787.01     Gallstones     ICD-10-CM: K80.20  ICD-9-CM: 574.20             Past Medical History:   Diagnosis Date    Abnormal Pap smear of cervix     Anxiety     Asthma     Chlamydia     2013    Chlamydia contact, treated     Depression     Gonorrhea     2013    Urinary tract infection     Visual impairment        Past Surgical History:    CYST REMOVAL    D & C WITH SUCTION    Procedure: DILATATION AND CURETTAGE WITH SUCTION;  Surgeon: Kelley Dodge DO;  Location: [facility] OR;  Service:     DENTAL PROCEDURE    DILATATION AND CURETTAGE    EYE SURGERY    Intact Surgery    KNEE SURGERY    VAGINAL DELIVERY    x1    WISDOM TOOTH EXTRACTION       Allergies   Allergen Reactions    Oxycodone Itching       No current facility-administered medications on file prior to encounter.     Current Outpatient Medications on File Prior to Encounter   Medication Sig Dispense Refill    albuterol sulfate  (90 Base) MCG/ACT inhaler Inhale 2 puffs Every 4 (Four) Hours As Needed for Wheezing. 18 g 0    buPROPion XL (WELLBUTRIN XL) 150 MG 24 hr tablet Take 1 tablet by mouth Daily. 30 tablet 5     Loratadine (Claritin) 10 MG capsule Take 1 capsule by mouth Daily.      montelukast (SINGULAIR) 10 MG tablet TAKE ONE TABLET BY MOUTH ONCE NIGHTLY 90 tablet 0    vitamin C (ASCORBIC ACID) 500 MG tablet TAKE TWO TABLETS BY MOUTH DAILY 60 tablet 1    vitamin D3 125 MCG (5000 UT) capsule capsule Take 1 capsule by mouth Daily. 30 capsule 3         Current Facility-Administered Medications:     acetaminophen (TYLENOL) tablet 650 mg, 650 mg, Oral, Q4H PRN, Elmo Bacon III, MD    bisacodyl (DULCOLAX) suppository 10 mg, 10 mg, Rectal, Daily PRN, Elmo Bacon III, MD    dextrose 5 % and sodium chloride 0.45 % infusion, 100 mL/hr, Intravenous, Continuous, Elmo Bacon III, MD, Last Rate: 100 mL/hr at 10/22/23 0232, 100 mL/hr at 10/22/23 0232    docusate sodium (COLACE) capsule 100 mg, 100 mg, Oral, BID PRN, Elmo Bacon III, MD    famotidine (PEPCID) injection 20 mg, 20 mg, Intravenous, BID, Elmo Bacon III, MD, 20 mg at 10/22/23 0823    hydrOXYzine (ATARAX) tablet 50 mg, 50 mg, Oral, Nightly PRN, Elmo Bacon III, MD    ketorolac (TORADOL) injection 30 mg, 30 mg, Intravenous, Once PRN, Janet Hayes DO    lidocaine PF 1% (XYLOCAINE) injection 0.5 mL, 0.5 mL, Intradermal, Once PRN, Elmo Bacon III, MD    morphine injection 2 mg, 2 mg, Intravenous, Q2H PRN, 2 mg at 10/22/23 1030 **AND** naloxone (NARCAN) injection 0.4 mg, 0.4 mg, Intravenous, Q5 Min PRN, Elmo Bacon III, MD    ondansetron (ZOFRAN) tablet 8 mg, 8 mg, Oral, Q8H PRN **OR** ondansetron (ZOFRAN) injection 4 mg, 4 mg, Intravenous, Q8H PRN, Elmo Bacon III, MD, 4 mg at 10/22/23 0823    [COMPLETED] Insert Peripheral IV, , , Once **AND** sodium chloride 0.9 % flush 10 mL, 10 mL, Intravenous, PRN, Tanner Ponce APRN    sodium chloride 0.9 % flush 10 mL, 10 mL, Intravenous, Q12H, Elmo Bacon III, MD    sodium chloride 0.9 % flush 10 mL, 10 mL, Intravenous, PRN, Elmo Bacon III, MD, 10 mL at 10/22/23  1030    sodium chloride 0.9 % infusion 40 mL, 40 mL, Intravenous, PRN, Elmo Bacon III, MD    Family History   Problem Relation Age of Onset    Arthritis Mother     COPD Mother     Liver disease Maternal Uncle     Breast cancer Maternal Grandmother     Cancer Maternal Grandmother     Diabetes Maternal Grandfather     Cancer Maternal Grandfather     Breast cancer Paternal Grandmother     Ovarian cancer Paternal Grandmother     Diabetes Paternal Grandmother     Heart attack Paternal Grandmother     Hypertension Paternal Grandmother     Stroke Paternal Grandmother     Cancer Paternal Grandmother     Colon cancer Paternal Grandfather      Social History     Socioeconomic History    Marital status: Single   Tobacco Use    Smoking status: Never    Smokeless tobacco: Never   Vaping Use    Vaping Use: Never used   Substance and Sexual Activity    Alcohol use: Yes     Alcohol/week: 2.0 standard drinks of alcohol     Types: 2 Glasses of wine per week     Comment: OCCASIONAL    Drug use: No    Sexual activity: Yes     Partners: Male     Birth control/protection: Condom, Abstinence       Review of Systems:  Review of Systems   Constitutional:  Positive for appetite change, chills and fever.   HENT:  Negative for dental problem, hearing loss and postnasal drip.    Eyes:  Negative for photophobia and visual disturbance.   Respiratory:  Negative for cough and chest tightness.    Cardiovascular:  Negative for leg swelling.   Gastrointestinal:  Positive for abdominal pain, nausea and vomiting.   Endocrine: Negative for polyphagia and polyuria.   Genitourinary:  Negative for difficulty urinating, frequency and pelvic pain.   Musculoskeletal:  Positive for back pain. Negative for myalgias.   Skin:  Negative for rash.   Allergic/Immunologic: Negative for immunocompromised state.   Neurological:  Negative for seizures and headaches.   Hematological:  Negative for adenopathy.   Psychiatric/Behavioral:  Negative for behavioral  "problems, hallucinations and self-injury.      Otherwise the 12 point review of systems is negative.    /58 (BP Location: Right arm, Patient Position: Sitting)   Pulse 111   Temp 98.4 °F (36.9 °C) (Oral)   Resp 14   Ht 154.9 cm (61\")   Wt 89.4 kg (197 lb)   LMP  (LMP Unknown)   SpO2 96%   Breastfeeding No   BMI 37.22 kg/m²   Body mass index is 37.22 kg/m².    General: Laying in bed pleasantly conversant  HEENT: PER, no icterus, normal sclerae  Cardiac: regular rhythm, tachycardic,  no audible rubs  Pulmonary: bilateral breath sounds, nonlabored  Abdominal: Soft, gravid, tenderness to deep palpation in the epigastrium  Neurologic: awake, alert, no obvious focal deficits  Extremities: warm, no edema  Skin: no obvious rashes nor worrisome lesions seen     CBC  Results from last 7 days   Lab Units 10/22/23  0947   WBC 10*3/mm3 3.75   HEMOGLOBIN g/dL 10.1*   HEMATOCRIT % 30.9*   PLATELETS 10*3/mm3 178       CMP  Results from last 7 days   Lab Units 10/21/23  0731   SODIUM mmol/L 137   POTASSIUM mmol/L 4.0   CHLORIDE mmol/L 104   CO2 mmol/L 22.0   BUN mg/dL 6   CREATININE mg/dL 0.73   CALCIUM mg/dL 9.4   BILIRUBIN mg/dL 0.5   ALK PHOS U/L 71   ALT (SGPT) U/L 11   AST (SGOT) U/L 17   GLUCOSE mg/dL 104*       Radiology  Imaging Results (Last 72 Hours)       Procedure Component Value Units Date/Time    CT Abdomen Pelvis With Contrast [[id number]] Collected: 10/22/23 0025     Updated: 10/22/23 0035    Narrative:      CT ABDOMEN PELVIS W CONTRAST    Date of Exam: 10/21/2023 11:09 PM EDT    Indication: Abdominal pain, acute (Ped 0-17y).    Comparison: CT abdomen pelvis dated September 21, 2017    Technique: Axial CT images were obtained of the abdomen and pelvis following the uneventful intravenous administration of 85 mL Isovue-370. Reconstructed coronal and sagittal images were also obtained. Automated exposure control and iterative   construction methods were used.    Findings:  Lung Bases:     The visualized " lung bases and lower mediastinal structures are unremarkable.    Liver:  Liver is normal in size and CT density. No focal lesions.    Biliary/Gallbladder:    The gallbladder is normal without evidence of radiopaque stones. The biliary tree is nondilated.    Spleen:  Spleen is normal in size and CT density.    Pancreas:    Pancreas is normal. There is no evidence of pancreatic mass or peripancreatic fluid.    Kidneys:    Kidneys are normal in size. There are no stones or hydronephrosis.    Adrenals:    Adrenal glands are unremarkable.    Retroperitoneal/Lymph Nodes/Vasculature:    No retroperitoneal adenopathy is identified.    Gastrointestinal/Mesentery:    The bowel loops are non-dilated without wall thickening or mass. The appendix appears within normal limits. No evidence of obstruction. No free air. No mesenteric fluid collections identified.    Bladder:    The bladder is normal.    Genital:     There is a gravid uterus with a posterior placenta and bony structures within the uterus consistent with a fetus.          Bony Structures:     Visualized bony structures are consistent with the patient's age.        Impression:      Impression:  Gravid uterus. Normal appendix. No acute abdominal or pelvic abnormality.        Electronically Signed: Elkin Arenas MD    10/22/2023 12:32 AM EDT    Workstation ID: [id number]    CT Angiogram Chest [[id number]] Collected: 10/22/23 0022     Updated: 10/22/23 0028    Narrative:      CT ANGIOGRAM CHEST    Date of Exam: 10/21/2023 11:09 PM EDT    Indication: Chest pain, nonspecific  Pulmonary embolism (PE) suspected, pregnant.    Comparison: None available.    Technique: CTA of the chest was performed before and after the uneventful intravenous administration of 85 mL Isovue-370. Reconstructed coronal and sagittal images were also obtained. In addition, a 3-D volume rendered image was created for   interpretation. Automated exposure control and iterative reconstruction methods were  used.      Findings:    Pulmonary arteries: Adequate opacification of the pulmonary arteries. No evidence of acute pulmonary embolism.    Lungs and Pleura: There is bilateral basilar atelectasis. There are no pleural fluid collections.    Mediastinum/Francia: No mediastinal or hilar lymphadenopathy.    Lymph nodes: No axillary or supraclavicular adenopathy.    Cardiovascular: The cardiac chambers are within normal limits. The pericardium is normal. The aorta and its arch branch vessels are unremarkable.       Upper Abdomen: The upper abdominal contents are unremarkable.          Bones and Soft Tissue: No suspicious osseous lesion.        Impression:      Impression:  No evidence of pulmonary embolic disease. Mild bilateral basilar atelectasis.        Electronically Signed: Elkin Arenas MD    10/22/2023 12:25 AM EDT    Workstation ID: [id number]    XR Chest 1 View [[id number]] Collected: 10/21/23 2211     Updated: 10/21/23 2215    Narrative:      XR CHEST 1 VW    Date of Exam: 10/21/2023 9:24 PM EDT    Indication: tachycardia and fever    Comparison: Chest radiograph August 15, 2023    Findings:  There are no airspace consolidations. No pleural fluid. No pneumothorax. The pulmonary vasculature appears within normal limits. The cardiac and mediastinal silhouette appear unremarkable. No acute osseous abnormality identified.      Impression:      Impression:  No active disease      Electronically Signed: Elkin Arenas MD    10/21/2023 10:12 PM EDT    Workstation ID: [id number]    US Gallbladder [[id number]] Collected: 10/21/23 1240     Updated: 10/21/23 1244    Narrative:      US GALLBLADDER    Date of Exam: 10/21/2023 10:39 AM CDT    Indication: ruq pain..    Comparison: CT abdomen pelvis 9/21/2017    Technique: Grayscale and color Doppler ultrasound evaluation of the right upper quadrant was performed.      Findings:  LIVER:  The liver appears normal in echogenicity.No focal lesions identified. Normal flow is present within the  hepatic vasculature.     GALLBLADDER: There are a few stones/sludge within the gallbladder. No gallbladder wall thickening or para cholecystic fluid. No sonographic Faulkner sign reported. Common bile duct is normal in caliber.    PANCREAS:  Visualized portions are unremarkable.    AORTA/IVC:  Visualized portions are unremarkable.    RIGHT KIDNEY:  Normal in size with no focal lesions. No evidence of nephrolithiasis or hydronephrosis.          Impression:      Impression:  1.Cholelithiasis. No evidence of acute cholecystitis based on ultrasound imaging.        Electronically Signed: Regino Ross MD    10/21/2023 11:41 AM CDT    Workstation ID: [id number]              Assessment:  17-week intrauterine pregnancy  Cholelithiasis    Plan:  She has had a single episode of nausea vomiting followed by abdominal pain.  She is tachycardic and has had low-grade temperature.  Her white blood cell count is normal, her liver function test upon presentation were within normal limits, her CT imaging demonstrates no inflammatory changes around the gallbladder nor biliary ductal dilatation.  The ultrasound demonstrates a few stones and sludge within the gallbladder but no wall thickening, fluid, negative sonographic Faulkner sign, and a normal common bile duct.  She is not on any antibiotic coverage.  I discussed the risks and benefits associated with cholecystectomy including, but not limited to:  labor, fetal death, bleeding, infection, injury to adjacent viscera (duodenum, common bile duct, etc.), retained stones, need for ERCP, an open operation in general, bile leak, biloma formation, chronic pain, incisional hernia formation, and medical issues from a cardiopulmonary deep venous thrombosis standpoint. All questions were answered, they understand.  I am going to repeat her liver function tests and ultrasound in the morning.  Will follow along.      Ilya Pereira MD  10/22/23  11:58 EDT       Electronically signed by  Ilya Pereira MD at 10/22/23 1205       FHR (last 3 days)       Date/Time Fetal HR Assessment Method Fetal HR (beats/min) Fetal HR Baseline Fetal HR Variability Fetal HR Accelerations Fetal HR Decelerations Fetal HR Tracing Category    10/22/23 1442 intermittent auscultation, using Doppler 150 normal range -- -- -- --    10/21/23 1425 intermittent auscultation, using Doppler 126 normal range -- -- -- --    10/21/23 08:18:22 -- 152 -- -- -- -- --          Uterine Activity (last 3 days)       Date/Time Method Contraction Frequency (Minutes) Contraction Duration (sec) Contraction Intensity Uterine Resting Tone Contraction Pattern    10/21/23 1953 -- -- -- -- soft by palpation --    10/21/23 1440 --  pt denies ctx lof and bleeding -- -- -- -- --

## 2023-10-23 NOTE — PROGRESS NOTES
[facility]  Antepartum Progress Note    Chief Complaint: Abdominal pain, N/V    Subjective     Patient is a 32yo  female at 16w6d admitted to [facility] from ED after initially presenting for abdominal pain and N/V. Abdominal ultrasound revealed cholelithiasis without evidence of cholecystitis. Workup negative for pancreatitis. Patient experience acute exacerbation of abdominal pain with new onset chest pain and lightheadedness. CT angiogram of chest unremarkable and CT abdomen negative for appendicitis or acute process.    Patient reports this am that she is doing okay this morning. Notes intermittent episodes of pain that are controlled with IV pain medication. Tolerating bland diet. Denies nausea or vomiting overnight. Denies recurrence of chest pain, shortness of breath, and lightheadedness. Denies fever, chills. Denies OB associated complaints including leakage of fluid, vaginal bleeding, contractions.     Objective     Vital Signs Range for the last 24 hours  Temp:  [97.7 °F (36.5 °C)-98.5 °F (36.9 °C)] 97.8 °F (36.6 °C)   BP: ()/(51-57) 92/52   Heart Rate:  [] 98   Resp:  [16] 16   SpO2:  [97 %] 97 %       Device (Oxygen Therapy): room air      Physical Examination  Constitutional: A&Ox3. Resting  HEENT: Normocephalic, atraumatic.  Neurological: Negative for sensory or motor deficit  Cardiovascular: tachycardic; negative for murmur, rubs, gallops  Respiratory: Clear to auscultation bilaterally; negative for rales, crackles or wheezes  Abdominal: moderately tender to deep palpation in RUQ, minimally tender in other quadrant. No guarding or rebound. No CVA tenderness  Extremities: negative for edema and tenderness, no cords, masses or swelling  Skin: normal turgor; negative for rash or lesions  Psychiatric: normal affect, normal thought process, good insight, good judgment       Fetal Heart Rate Assessment   Method: Fetal HR Assessment Method: intermittent auscultation, using Doppler   Beats/min:  Fetal HR (beats/min): 145 (doppler)   Baseline: Fetal HR Baseline: normal range   Varibility:     Accels:     Decels:     Tracing Category:       Uterine Assessment   Method: Method:  (pt denies ctx lof and bleeding)   Frequency (min):     Ctx Count in 10 min:     Duration:     Intensity:     Intensity by [facility]:     Resting Tone: Uterine Resting Tone: soft by palpation   Resting Tone by [facility]:            Intake/Output last 24 hours:    No intake or output data in the 24 hours ending 10/23/23 1041      Intake/Output this shift:    No intake/output data recorded.        Laboratory Results  WBC   Date Value Ref Range Status   10/23/2023 4.57 3.40 - 10.80 10*3/mm3 Final     RBC   Date Value Ref Range Status   10/23/2023 3.52 (L) 3.77 - 5.28 10*6/mm3 Final     Hemoglobin   Date Value Ref Range Status   10/23/2023 10.5 (L) 12.0 - 15.9 g/dL Final     Hematocrit   Date Value Ref Range Status   10/23/2023 31.8 (L) 34.0 - 46.6 % Final     MCV   Date Value Ref Range Status   10/23/2023 90.3 79.0 - 97.0 fL Final     MCH   Date Value Ref Range Status   10/23/2023 29.8 26.6 - 33.0 pg Final     MCHC   Date Value Ref Range Status   10/23/2023 33.0 31.5 - 35.7 g/dL Final     RDW   Date Value Ref Range Status   10/23/2023 13.1 12.3 - 15.4 % Final     RDW-SD   Date Value Ref Range Status   10/23/2023 42.6 37.0 - 54.0 fl Final     MPV   Date Value Ref Range Status   10/23/2023 10.2 6.0 - 12.0 fL Final     Platelets   Date Value Ref Range Status   10/23/2023 197 140 - 450 10*3/mm3 Final     Neutrophil %   Date Value Ref Range Status   10/23/2023 66.2 42.7 - 76.0 % Final     Lymphocyte %   Date Value Ref Range Status   10/23/2023 24.5 19.6 - 45.3 % Final     Monocyte %   Date Value Ref Range Status   10/23/2023 6.3 5.0 - 12.0 % Final     Eosinophil %   Date Value Ref Range Status   10/23/2023 2.6 0.3 - 6.2 % Final     Basophil %   Date Value Ref Range Status   10/23/2023 0.2 0.0 - 1.5 % Final     Immature Grans %   Date Value Ref Range  Status   10/23/2023 0.2 0.0 - 0.5 % Final     Neutrophils, Absolute   Date Value Ref Range Status   10/23/2023 3.02 1.70 - 7.00 10*3/mm3 Final     Lymphocytes, Absolute   Date Value Ref Range Status   10/23/2023 1.12 0.70 - 3.10 10*3/mm3 Final     Monocytes, Absolute   Date Value Ref Range Status   10/23/2023 0.29 0.10 - 0.90 10*3/mm3 Final     Eosinophils, Absolute   Date Value Ref Range Status   10/23/2023 0.12 0.00 - 0.40 10*3/mm3 Final     Basophils, Absolute   Date Value Ref Range Status   10/23/2023 0.01 0.00 - 0.20 10*3/mm3 Final     Immature Grans, Absolute   Date Value Ref Range Status   10/23/2023 0.01 0.00 - 0.05 10*3/mm3 Final     nRBC   Date Value Ref Range Status   10/23/2023 0.0 0.0 - 0.2 /100 WBC Final         Lab 10/21/23  2120   LACTATE 1.5     Procalitonin Results:      Lab 10/21/23  2120   PROCALCITONIN 0.22     XR Chest 1 View    Result Date: 10/21/2023  XR CHEST 1 VW Date of Exam: 10/21/2023 9:24 PM EDT Indication: tachycardia and fever Comparison: Chest radiograph August 15, 2023 Findings: There are no airspace consolidations. No pleural fluid. No pneumothorax. The pulmonary vasculature appears within normal limits. The cardiac and mediastinal silhouette appear unremarkable. No acute osseous abnormality identified.     Impression: Impression: No active disease Electronically Signed: Elkin Arenas MD  10/21/2023 10:12 PM EDT  Workstation ID: [id number]      CT Abdomen Pelvis With Contrast    Result Date: 10/22/2023  CT ABDOMEN PELVIS W CONTRAST Date of Exam: 10/21/2023 11:09 PM EDT Indication: Abdominal pain, acute (Ped 0-17y). Comparison: CT abdomen pelvis dated September 21, 2017 Technique: Axial CT images were obtained of the abdomen and pelvis following the uneventful intravenous administration of 85 mL Isovue-370. Reconstructed coronal and sagittal images were also obtained. Automated exposure control and iterative construction methods were used. Findings: Lung Bases:   The visualized lung  bases and lower mediastinal structures are unremarkable. Liver: Liver is normal in size and CT density. No focal lesions. Biliary/Gallbladder:  The gallbladder is normal without evidence of radiopaque stones. The biliary tree is nondilated. Spleen: Spleen is normal in size and CT density. Pancreas:  Pancreas is normal. There is no evidence of pancreatic mass or peripancreatic fluid. Kidneys:  Kidneys are normal in size. There are no stones or hydronephrosis. Adrenals:  Adrenal glands are unremarkable. Retroperitoneal/Lymph Nodes/Vasculature:  No retroperitoneal adenopathy is identified. Gastrointestinal/Mesentery:  The bowel loops are non-dilated without wall thickening or mass. The appendix appears within normal limits. No evidence of obstruction. No free air. No mesenteric fluid collections identified. Bladder:  The bladder is normal. Genital:   There is a gravid uterus with a posterior placenta and bony structures within the uterus consistent with a fetus.       Bony Structures:   Visualized bony structures are consistent with the patient's age.     Impression: Impression: Gravid uterus. Normal appendix. No acute abdominal or pelvic abnormality. Electronically Signed: Elkin Arenas MD  10/22/2023 12:32 AM EDT  Workstation ID: [id number]       Assessment/Plan  IUP @ 16w6d  Cholelithiasis  Fever of Unknown Origin-resolved  Tachycardia  New onset chest pain-resolved     1. Patient has been managed on [facility] with IVF and pain control for cholelithiasis   -Intermittent exacerbation of symptoms   -Discontinue IV morphine. Plan for tylenol scheduled every 4 hours with roxicodone only as need for severe pain. Will transition to oral regimen for control of symptoms   -Pepcid and Zofran for symptomatic control  2. New onset chest pain and shortness of breath 10/21 - since resolved    -CT angiogram of chest negative for PE and otherwise unremarkable  -Discussed possibility of onset due to pain vs anxiety   -No further  symptoms  3. New onset of fever of 100.6, 100.8 and tachycardia with hypotension evening 10/21 prompted sepsis protocol workup   -Lactate and pro calcitonin within normal.   -CBC ordered. WBC 11.03>6.76.   -Blood cultures obtained and pending  -Afebrile overnight  -Repeat I&O cath urinalysis without evidence of infection  -Discussed with Internal Medicine and planning on consultation  -IM consulted and advised to continue current plan of care  5. CXR: negative for active disease   6. No reported obstetric related symptoms   -Fetal doppler daily and PRN  7. Plan for repeat CMP and gallbladder ultrasound today. If stable will anticipate control on oral regimen and discharge home with close follow up       Janet Hayes DO  10/23/2023  10:41 EDT

## 2023-10-23 NOTE — PROGRESS NOTES
"General Surgery Daily Progress Note    Subjective:  Persistent abdominal pain.    Objective:  /55 (BP Location: Right arm, Patient Position: Sitting)   Pulse 82   Temp 98 °F (36.7 °C) (Oral)   Resp 16   Ht 154.9 cm (61\")   Wt 89.4 kg (197 lb)   LMP  (LMP Unknown)   SpO2 97%   Breastfeeding No   BMI 37.22 kg/m²     General Appearance: Sitting in bed working on her computer, minimal distress  Eyes: Anicteric  Neck: Trachea midline   Cardiovascular:  RRR without murmur nor rub  Lungs:  Bilateral respirations unlabored   Abdomen:  Soft, no peritonitis, some upper abdominal tenderness  Extremities:  No cyanosis or edema   Skin:  No obvious rashes   Neurologic: awake and conversant       Imaging Results (Last 24 Hours)       Procedure Component Value Units Date/Time    US Gallbladder [[id number]] Collected: 10/23/23 1340     Updated: 10/23/23 1347    Narrative:      US GALLBLADDER    Date of Exam: 10/23/2023 12:39 PM EDT    Indication: Upper abdominal pain.    Comparison: 10/21/2023 gallbladder ultrasound and abdominal CT scan.    Technique: Grayscale and color Doppler ultrasound evaluation of the right upper quadrant was performed.      Findings:  Head and body the pancreas appear grossly normal. Pancreatic tail is not well seen. Right and left liver lobes appear normal in morphology and echotexture. There is expected portal vein and hepatic vein waveform and directional flow. No portal vein color   flow filling defects are seen. Gallbladder is distended typical of fasting state and shows no evidence of gallbladder wall edema or thickening. There are small nonshadowing mobile gallstones up to 6 mm in diameter. The common duct is normal in caliber   at approximately 3 mm. Right kidney appears normal and measures 10 cm in length. No right upper quadrant ascites is seen.    .      Impression:      Impression:    1. Small, incompletely formed nonshadowing gallstones. No visible gallbladder wall inflammation or " evidence of biliary ductal dilatation.    2. Otherwise unremarkable gallbladder ultrasound.        Electronically Signed: James Lucia MD    10/23/2023 1:44 PM EDT    Workstation ID: [id number]            CBC:  Results from last 7 days   Lab Units 10/23/23  0747   WBC 10*3/mm3 4.57   HEMOGLOBIN g/dL 10.5*   HEMATOCRIT % 31.8*   PLATELETS 10*3/mm3 197       CMP:  Results from last 7 days   Lab Units 10/23/23  0747 10/21/23  0731   SODIUM mmol/L  --  137   POTASSIUM mmol/L  --  4.0   CHLORIDE mmol/L  --  104   CO2 mmol/L  --  22.0   BUN mg/dL  --  6   CREATININE mg/dL  --  0.73   CALCIUM mg/dL  --  9.4   BILIRUBIN mg/dL 0.3 0.5   ALK PHOS U/L 51 71   ALT (SGPT) U/L 11 11   AST (SGOT) U/L 14 17   GLUCOSE mg/dL  --  104*         Assessment:  Cholelithiasis  17 week pregnancy    Plan:   Patient with continuous abdominal pain.  Repeat ultrasound demonstrates no evidence of inflammatory change regarding the gallbladder.  Normal liver function tests also.  Her initial symptoms of low-grade fever, hypotension, and tachycardia are not consistent with a biliary etiology without acute inflammatory changes.  At this point I would recommend a low-fat diet plus/minus ursodiol.      Ilya Pereira MD - 10/23/2023, 14:47 EDT

## 2023-10-24 NOTE — PAYOR COMM NOTE
"Padma Younger (33 y.o. Female)     Ref#[id number]     Discharged 10/23/23.    Needs approval.    From:Elisabet Bhandari LPN, Utilization Review  Phone #[phone]  Fax #[phone]        Date of Birth   1990    Social Security Number       Address   [address] [place] [address]    Home Phone   [phone]    MRN   [id number]       Yazdanism   Orthodox    Marital Status   Single                            Admission Date   10/21/23    Admission Type   Emergency    Admitting Provider   Janet Hayes DO    Attending Provider       Department, Room/Bed   [facility] ANTEPARTUM, N329/1       Discharge Date   10/23/2023    Discharge Disposition   Home or Self Care    Discharge Destination                                 Attending Provider: (none)   Allergies: Oxycodone    Isolation: None   Infection: None   Code Status: Prior    Ht: 154.9 cm (61\")   Wt: 89.4 kg (197 lb)    Admission Cmt: None   Principal Problem: Nausea and vomiting in pregnancy [O21.9]                   Active Insurance as of 10/21/2023       Primary Coverage       Payor Plan Insurance Group Employer/Plan Group    [place] [id number]       Payor Plan Address Payor Plan Phone Number Payor Plan Fax Number Effective Dates    [employer] [address] [address] [phone]  6/1/2022 - None Entered    [place] [address]         Subscriber Name Subscriber Birth Date Member ID       PADMA YOUNGER 1990 [id number]                     Emergency Contacts        (Rel.) Home Phone Work Phone Mobile Phone    Lorraine Levi (Sister) [phone] -- --              Insurance Information                  [place] Phone: [phone]    Subscriber: Aren Padma Subscriber#: [id number]    Group#: [id number] Precert#: --             History & Physical        Elmo Bacon III, MD at 10/21/23 [facility]8          [facility]  Obstetric History and Physical    Chief " "Complaint   Patient presents with    Abdominal Pain    Vomiting       Subjective    Patient is a 33 y.o. female  currently at 16w4d, who presents on referral from the emergency room where she was documented to have right upper quadrant abdominal pain related to cholelithiasis without evidence of cholecystitis.  Patient says she began to have symptoms last evening at midnight and has had persistent nausea/vomiting since.  Evaluation in the emergency room confirmed cholelithiasis without evidence of cholecystitis based on gallbladder ultrasound.  Lipase is normal showing no evidence of pancreatitis.  She was treated with 2 L of IV fluid and still persistent having pain.  She will be admitted for hydration and pain control.    Her prenatal care is otherwise benign to date. Her previous obstetric/gynecological history is notable for 1 prior term vaginal delivery and 1 first trimester spontaneous  requiring D&C..    The following portions of the patients history were reviewed and updated as appropriate: current medications, allergies, past medical history, past surgical history, past family history, past social history, and problem list .        Prenatal Information:   Maternal Prenatal Labs  Blood Type No results found for: \"ABO\"   Rh Status No results found for: \"RH\"   Antibody Screen No results found for: \"ABSCRN\"   Gonnorhea No results found for: \"GCCX\"   Chlamydia No results found for: \"CLAMYDCU\"   RPR No results found for: \"RPR\"   Syphilis Antibody No results found for: \"SYPHILIS\"   Rubella No results found for: \"RUBELLAIGGIN\"   Hepatitis B Surface Antigen No results found for: \"HEPBSAG\"   HIV-1 Antibody No results found for: \"LABHIV1\"   Hepatitis C Antibody No results found for: \"HEPCAB\"   Rapid Urin Drug Screen No results found for: \"AMPMETHU\", \"BARBITSCNUR\", \"LABBENZSCN\", \"LABMETHSCN\", \"LABOPIASCN\", \"THCURSCR\", \"COCAINEUR\", \"AMPHETSCREEN\", \"PROPOXSCN\", \"BUPRENORSCNU\", \"METAMPSCNUR\", " "\"OXYCODONESCN\", \"TRICYCLICSCN\"   Group B Strep Culture No results found for: \"GBSANTIGEN\"           External Prenatal Results       Pregnancy Outside Results - Transcribed From Office Records - See Scanned Records For Details       Test Value Date Time    ABO  O  09/19/23 1448    Rh  Positive  09/19/23 1448    Antibody Screen  Negative  09/19/23 1448    Varicella IgG       Rubella  3.09 index 09/19/23 1448    Hgb  12.7 g/dL 10/21/23 0731       12.4 g/dL 09/19/23 1448       12.2 g/dL 09/14/23 0234       12.4 g/dL 09/07/23 0839       12.8 g/dL 08/15/23 1347    Hct  37.7 % 10/21/23 0731       36.5 % 09/19/23 1448       36.9 % 09/14/23 0234       37.9 % 09/07/23 0839       39.6 % 08/15/23 1347    Glucose Fasting GTT       Glucose Tolerance Test 1 hour       Glucose Tolerance Test 3 hour       Gonorrhea (discrete)  Negative  08/22/23 1416    Chlamydia (discrete)  Negative  08/22/23 1416    RPR  Non-Reactive  04/24/19 1131    VDRL       Syphilis Antibody       HBsAg  Non-Reactive  09/19/23 1448       Negative  08/22/23 1422    Herpes Simplex Virus PCR       Herpes Simplex VIrus Culture       HIV  Non-Reactive  09/19/23 1448       Non-Reactive  08/22/23 1422    Hep C RNA Quant PCR       Hep C Antibody  Non-Reactive  09/19/23 1448       Non-Reactive  08/22/23 1422    AFP       Group B Strep ^ Negative  11/06/19     GBS Susceptibility to Clindamycin       GBS Susceptibility to Erythromycin       Fetal Fibronectin       Genetic Testing, Maternal Blood                 Drug Screening       Test Value Date Time    Urine Drug Screen       Amphetamine Screen  Negative  09/19/23 1448    Barbiturate Screen  Negative  09/19/23 1448    Benzodiazepine Screen  Negative  09/19/23 1448    Methadone Screen  Negative  09/19/23 1448    Phencyclidine Screen  Negative  09/19/23 1448    Opiates Screen  Negative  09/19/23 1448    THC Screen  Negative  09/19/23 1448    Cocaine Screen       Propoxyphene Screen  Negative  09/19/23 1448    " Buprenorphine Screen  Negative  23 1448    Methamphetamine Screen       Oxycodone Screen  Negative  23 1448    Tricyclic Antidepressants Screen  Negative  23 1448              Legend    ^: Historical                              Past OB History:       OB History    Para Term  AB Living   3 1 1 0 1 1   SAB IAB Ectopic Molar Multiple Live Births   0 0 0 0 0 1      # Outcome Date GA Lbr Conrad/2nd Weight Sex Delivery Anes PTL Lv   3 Current            2 Term 19 39w0d 12:24 :45 2745 g (6 lb 0.8 oz) F Vag-Spont EPI N DAILY      Name: LINDSEY BRO      Apgar1: 7  Apgar5: 9   1 AB 2017 9w0d   U          Birth Comments: D&C       Past Medical History:  Past Medical History:   Diagnosis Date    Abnormal Pap smear of cervix     Anxiety     Asthma     Chlamydia         Chlamydia contact, treated     Depression     Gonorrhea         Urinary tract infection     Visual impairment       Past Surgical History Past Surgical History:   Procedure Laterality Date    CYST REMOVAL      D & C WITH SUCTION N/A 2017    Procedure: DILATATION AND CURETTAGE WITH SUCTION;  Surgeon: Kelley Dodge DO;  Location: [facility];  Service:     DENTAL PROCEDURE      DILATATION AND CURETTAGE  2017    EYE SURGERY  2018    Intact Surgery    KNEE SURGERY      VAGINAL DELIVERY      x1    WISDOM TOOTH EXTRACTION        Family History: Family History   Problem Relation Age of Onset    Arthritis Mother     COPD Mother     Liver disease Maternal Uncle     Breast cancer Maternal Grandmother     Cancer Maternal Grandmother     Diabetes Maternal Grandfather     Cancer Maternal Grandfather     Breast cancer Paternal Grandmother     Ovarian cancer Paternal Grandmother     Diabetes Paternal Grandmother     Heart attack Paternal Grandmother     Hypertension Paternal Grandmother     Stroke Paternal Grandmother     Cancer Paternal Grandmother     Colon cancer Paternal Grandfather       Social History:  reports  that she has never smoked. She has never used smokeless tobacco.   reports current alcohol use of about 2.0 standard drinks of alcohol per week.   reports no history of drug use.   Allergies: Oxycodone  Current Medications:          No current facility-administered medications on file prior to encounter.     Current Outpatient Medications on File Prior to Encounter   Medication Sig Dispense Refill    albuterol sulfate  (90 Base) MCG/ACT inhaler Inhale 2 puffs Every 4 (Four) Hours As Needed for Wheezing. 18 g 0    buPROPion XL (WELLBUTRIN XL) 150 MG 24 hr tablet Take 1 tablet by mouth Daily. 30 tablet 5    Loratadine (Claritin) 10 MG capsule Take 1 capsule by mouth Daily.      montelukast (SINGULAIR) 10 MG tablet TAKE ONE TABLET BY MOUTH ONCE NIGHTLY 90 tablet 0    vitamin C (ASCORBIC ACID) 500 MG tablet TAKE TWO TABLETS BY MOUTH DAILY 60 tablet 1    vitamin D3 125 MCG (5000 UT) capsule capsule Take 1 capsule by mouth Daily. 30 capsule 3       General ROS: Pertinent items are noted in HPI, all other systems reviewed and negative    Objective      Vital Signs Range for the last 24 hours  Temperature: Temp:  [97.3 °F (36.3 °C)-98.7 °F (37.1 °C)] 98.7 °F (37.1 °C)   Temp Source: Temp src: Oral   BP: BP: ()/(51-79) 107/66   Pulse: Heart Rate:  [105-128] 126   Respirations: Resp:  [16] 16   SPO2: SpO2:  [95 %-100 %] 100 %   O2 Amount (l/min):     O2 Devices Device (Oxygen Therapy): room air   Weight: Weight:  [89.4 kg (197 lb)] 89.4 kg (197 lb)     Physical Examination: General appearance - overweight, in mild to moderate distress, and ill-appearing  Mental status - alert, oriented to person, place, and time, normal mood, behavior, speech, dress, motor activity, and thought processes  Eyes - pupils equal and reactive, extraocular eye movements intact, sclera anicteric  Neck - supple, no significant adenopathy, thyroid exam: thyroid is normal in size without nodules or tenderness  Chest - clear to  auscultation, no wheezes, rales or rhonchi, symmetric air entry  Heart - normal rate, regular rhythm, normal S1, S2, no murmurs, rubs, clicks or gallops  Abdomen-tenderness reported in patient's right upper quadrant.  The abdomen remains soft without guarding or rebound.  Gravid uterus.  Neurological - alert, oriented, normal speech, no focal findings or movement disorder noted, DTR's normal and symmetric  Extremities - no pedal edema noted    Fetal Heart Rate Assessment: Fetal heart tones confirmed with Doppler.      Laboratory Results:   Lab Results   Component Value Date    ALKPHOS 71 10/21/2023    ALT 11 10/21/2023    AST 17 10/21/2023    CREATININE 0.73 10/21/2023    BILITOT 0.5 10/21/2023     07/27/2019    URICACID 4.5 07/27/2019       WBC   Date Value Ref Range Status   10/21/2023 11.03 (H) 3.40 - 10.80 10*3/mm3 Final     RBC   Date Value Ref Range Status   10/21/2023 4.22 3.77 - 5.28 10*6/mm3 Final     Hemoglobin   Date Value Ref Range Status   10/21/2023 12.7 12.0 - 15.9 g/dL Final     Hematocrit   Date Value Ref Range Status   10/21/2023 37.7 34.0 - 46.6 % Final     MCV   Date Value Ref Range Status   10/21/2023 89.3 79.0 - 97.0 fL Final     MCH   Date Value Ref Range Status   10/21/2023 30.1 26.6 - 33.0 pg Final     MCHC   Date Value Ref Range Status   10/21/2023 33.7 31.5 - 35.7 g/dL Final     RDW   Date Value Ref Range Status   10/21/2023 12.7 12.3 - 15.4 % Final     RDW-SD   Date Value Ref Range Status   10/21/2023 41.0 37.0 - 54.0 fl Final     MPV   Date Value Ref Range Status   10/21/2023 10.1 6.0 - 12.0 fL Final     Platelets   Date Value Ref Range Status   10/21/2023 271 140 - 450 10*3/mm3 Final     Neutrophil %   Date Value Ref Range Status   10/21/2023 92.2 (H) 42.7 - 76.0 % Final     Lymphocyte %   Date Value Ref Range Status   10/21/2023 4.7 (L) 19.6 - 45.3 % Final     Monocyte %   Date Value Ref Range Status   10/21/2023 2.4 (L) 5.0 - 12.0 % Final     Eosinophil %   Date Value Ref Range  "Status   10/21/2023 0.1 (L) 0.3 - 6.2 % Final     Basophil %   Date Value Ref Range Status   10/21/2023 0.2 0.0 - 1.5 % Final     Immature Grans %   Date Value Ref Range Status   10/21/2023 0.4 0.0 - 0.5 % Final     Neutrophils, Absolute   Date Value Ref Range Status   10/21/2023 10.17 (H) 1.70 - 7.00 10*3/mm3 Final     Lymphocytes, Absolute   Date Value Ref Range Status   10/21/2023 0.52 (L) 0.70 - 3.10 10*3/mm3 Final     Monocytes, Absolute   Date Value Ref Range Status   10/21/2023 0.27 0.10 - 0.90 10*3/mm3 Final     Eosinophils, Absolute   Date Value Ref Range Status   10/21/2023 0.01 0.00 - 0.40 10*3/mm3 Final     Basophils, Absolute   Date Value Ref Range Status   10/21/2023 0.02 0.00 - 0.20 10*3/mm3 Final     Immature Grans, Absolute   Date Value Ref Range Status   10/21/2023 0.04 0.00 - 0.05 10*3/mm3 Final     nRBC   Date Value Ref Range Status   10/21/2023 0.0 0.0 - 0.2 /100 WBC Final             Brief Urine Lab Results  (Last result in the past 365 days)        Color   Clarity   Blood   Leuk Est   Nitrite   Protein   CREAT   Urine HCG        10/21/23 0931 Yellow   Cloudy   Negative   Small (1+)   Negative   30 mg/dL (1+)                     Radiology Review: Ultrasound confirms cholelithiasis without evidence of cholecystitis.  Other Studies: CMP shows no electrolyte disturbances.  Lipase is normal.  Only minimal elevation of WBC.  Marked ketonuria noted.    Assessment & Plan      Nausea and vomiting in pregnancy        Assessment:  Cholelithiasis.  Right upper quadrant pain related to #1.  Persistent nausea/vomiting.    Plan:  Admit for continued hydration, parenteral antiemetics and pain control.     Total time spent today with Padma  was 30-39 minutes (level 4).  Of this time, > 50% was spent face-to-face time coordinating care, answering her questions and counseling regarding pathophysiology of her presenting problem along with plans for any diagnostic work-up and treatment.        Elmo Sea \"[place]\" " PETE Bacon MD  10/21/2023  15:18 EDT      Electronically signed by Elmo Bacon III, MD at 10/21/23 1523       Vital Signs (last 3 days) before discharge       Date/Time Temp Temp src Pulse Resp BP Patient Position SpO2    10/23/23 1657 97.9 (36.6) Oral 97 16 110/54 Sitting --    10/23/23 1154 98 (36.7) Oral 82 16 103/55 Sitting --    10/23/23 0735 97.8 (36.6) Oral 98 16 92/52 Lying 97    10/23/23 0335 98 (36.7) Oral 92 16 96/51 Lying --    10/22/23 2344 98.3 (36.8) Oral 88 16 105/52 Lying --    10/22/23 2003 98.5 (36.9) Oral 98 16 106/57 Sitting --    10/22/23 1607 98.3 (36.8) Oral 102 16 97/56 Lying --    10/22/23 1552 -- -- -- -- -- -- --    Comment rows:    OBSERV: reviewed plan with md and md requests urine specimen.  clean catch urine obtained but was contaminated notified md and order for straight cath urine obtained.  explained to pt. need to do straight cath for diagnostic reasons, pt,. verbalizes ok to move forward with straight cath specimen which she tolerated well. at 10/22/23 1552    10/22/23 1225 97.7 (36.5) Oral 108 16 103/56 Sitting --    10/22/23 0811 98.4 (36.9) Oral 111 14 108/58 Sitting --    10/22/23 0422 98.1 (36.7) Oral 105 15 95/50 Lying --    10/21/23 2331 98 (36.7) Oral 112 16 116/68 -- --    10/21/23 2244 -- -- 121 -- -- -- 96    10/21/23 2217 98.1 (36.7) Axillary 116 16 -- -- 94    10/21/23 2126 99.1 (37.3) Oral -- -- -- -- --    10/21/23 2125 100.8 (38.2) Axillary 131 16 -- -- 97    10/21/23 2023 -- -- 129 16 106/58 -- --    10/21/23 1958 100.6 (38.1) Oral 129 16 88/45 -- --    10/21/23 1608 -- -- -- -- -- -- --    Comment rows:    OBSERV: dr duffy at bedside; handheld us performed; fetal heart rate 122 at 10/21/23 1608    10/21/23 1523 -- -- 122  -- -- -- --    Pulse: Dr. Bacon notified of R 120-128. no new orders at 10/21/23 1523    10/21/23 1522 -- -- 125 -- -- -- --    10/21/23 1521 -- -- 123 -- -- -- --    10/21/23 1513 -- -- 126 -- -- -- 100    10/21/23 1510 -- -- 125 --  -- -- 99    10/21/23 1509 -- -- 128 -- -- -- 99    10/21/23 1500 -- -- 122 -- -- -- 96    10/21/23 1459 -- -- 123 -- -- -- 96    10/21/23 1458 -- -- 124 -- -- -- 96    10/21/23 1444 -- -- 122 -- -- -- 98    10/21/23 1443 -- -- 122 -- -- -- 99    10/21/23 1442 -- -- 120 -- -- -- 99    10/21/23 1441 -- -- 121 -- -- -- 99    10/21/23 1440 -- -- 121 -- -- -- 98    10/21/23 1439 -- -- 122 -- -- -- 98    10/21/23 1438 -- -- 122 -- -- -- 100    10/21/23 1437 -- -- 121 -- -- -- 98    10/21/23 1436 -- -- 126 -- -- -- 100    10/21/23 1435 -- -- 128 -- -- -- 99    10/21/23 1430 -- -- 128 -- -- -- 97    10/21/23 1429 -- -- 123 -- -- -- 97    10/21/23 1428 -- -- 118 -- -- -- 97    10/21/23 1427 -- -- 126 -- -- -- 96    10/21/23 1419 98.7 (37.1) Oral -- -- 107/66 -- --    10/21/23 1330 -- -- 120 -- 101/61 -- 99    10/21/23 1300 -- -- 118 -- 104/66 -- 100    10/21/23 1230 -- -- 116 -- 96/59 -- 99    10/21/23 1030 -- -- 120 -- 106/65 -- 95    10/21/23 0930 -- -- 114 -- 101/51 -- 97    10/21/23 0900 -- -- 114 -- 118/70 -- 97    10/21/23 0830 -- -- 107 -- 115/70 -- 97    10/21/23 0800 -- -- 105 -- 114/79 -- 99    10/21/23 0730 -- -- 109 -- 113/66 -- 99    10/21/23 0713 -- -- 109 -- -- -- --    10/21/23 0711 97.3 (36.3) Oral -- 16 117/66 Sitting 96          Facility-Administered Medications as of 10/23/2023   Medication Dose Route Frequency Provider Last Rate Last Admin    [COMPLETED] acetaminophen (TYLENOL) tablet 1,000 mg  1,000 mg Oral Once Janet Hayes DO   1,000 mg at 10/21/23 2043    [COMPLETED] acetaminophen (TYLENOL) tablet 500 mg  500 mg Oral Once Tanner Ponce APRN   500 mg at 10/21/23 0935    [COMPLETED] iopamidol (ISOVUE-370) 76 % injection 100 mL  100 mL Intravenous Once in imaging Yamile Matson, YANELIS   85 mL at 10/21/23 2320    [COMPLETED] ondansetron (ZOFRAN) injection 4 mg  4 mg Intravenous Once Tanner Ponce APRN   4 mg at 10/21/23 0739    [COMPLETED] sodium chloride 0.9 % bolus 1,000 mL  1,000 mL Intravenous  Once Tanner Ponce APRN   Stopped at 10/21/23 0911    [COMPLETED] sodium chloride 0.9 % bolus 1,000 mL  1,000 mL Intravenous Once Tanner Ponce APRN   Stopped at 10/21/23 1338     Orders (last 72 hrs)        Start     Ordered    10/23/23 1757  Discharge patient  Once         10/23/23 1800    10/23/23 1521  DIET MESSAGE Burger & fries, no cheese but all other toppings, lemonade  Once,   Status:  Canceled        Comments: Burger & fries, no cheese but all other toppings, lemonade    10/23/23 1521    10/23/23 1455  Diet: Gastrointestinal Diets; Low Irritant, Fat-Restricted; Texture: Regular Texture (IDDSI 7); Fluid Consistency: Thin (IDDSI 0)  Diet Effective Now,   Status:  Canceled         10/23/23 1454    10/23/23 1000  US Gallbladder  1 Time Imaging         10/22/23 1206    10/23/23 0830  acetaminophen (TYLENOL) tablet 650 mg  Every 4 Hours,   Status:  Discontinued         10/23/23 0818    10/23/23 0817  oxyCODONE (ROXICODONE) immediate release tablet 5 mg  Every 6 Hours PRN,   Status:  Discontinued         10/23/23 0818    10/23/23 0600  CBC & Differential  Morning Draw         10/22/23 1206    10/23/23 0600  Hepatic Function Panel  Morning Draw         10/22/23 1206    10/23/23 0600  CBC Auto Differential  PROCEDURE ONCE         10/22/23 2202    10/23/23 0000  acetaminophen (TYLENOL) 325 MG tablet  Every 4 Hours         10/23/23 1800    10/23/23 0000  docusate sodium 100 MG capsule  2 Times Daily PRN         10/23/23 1800    10/23/23 0000  oxyCODONE (ROXICODONE) 5 MG immediate release tablet  Every 6 Hours PRN         10/23/23 1800    10/23/23 0000  ursodiol (ACTIGALL) 300 MG capsule  3 times daily         10/23/23 1800    10/23/23 0000  famotidine (Pepcid) 20 MG tablet  Daily         10/23/23 1800    10/22/23 1553  ECG 12 Lead Tachycardia  Once,   Status:  Canceled         10/22/23 1552    10/22/23 1510  Urinalysis, Microscopic Only - Urine, Clean Catch  Once         10/22/23 1509    10/22/23 1457  Inpatient  Internal Medicine Consult  Once        Specialty:  Internal Medicine  Provider:  (Not yet assigned)    10/22/23 1457    10/22/23 1450  Urinalysis With Microscopic If Indicated (No Culture) - Urine, Clean Catch  Once         10/22/23 1450    10/22/23 1450  Urinalysis With Culture If Indicated - Straight Cath  Once        Comments: Please ensure 'Use Existing Specimen' is selected if this order is for urine culture add-on.  If no button appears, please contact the lab for assistance.      10/22/23 1450    10/22/23 1446  Urinalysis With Microscopic If Indicated (No Culture) - Urine, Clean Catch  Once,   Status:  Canceled         10/22/23 1445    10/22/23 1330  lactated ringers infusion  Continuous,   Status:  Discontinued         10/22/23 1230    10/22/23 0905  Diet: Gastrointestinal Diets; Low Irritant; Texture: Regular Texture (IDDSI 7); Fluid Consistency: Thin (IDDSI 0)  Diet Effective Now,   Status:  Canceled         10/22/23 0904    10/22/23 0859  CBC & Differential  Once         10/22/23 0858    10/22/23 0859  CBC Auto Differential  PROCEDURE ONCE         10/22/23 0858    10/22/23 0600  Incentive Spirometry  Every 4 Hours While Awake,   Status:  Canceled       10/22/23 0043    10/22/23 0041  hydrOXYzine (ATARAX) tablet 25 mg  Once As Needed,   Status:  Discontinued         10/22/23 0043    10/22/23 0041  ketorolac (TORADOL) injection 30 mg  Once As Needed,   Status:  Discontinued         10/22/23 0043    10/22/23 0015  iopamidol (ISOVUE-370) 76 % injection 100 mL  Once in Imaging         10/21/23 2320    10/21/23 2244  CT Abdomen Pelvis With Contrast  1 Time Imaging         10/21/23 2239    10/21/23 2239  CT Angiogram Chest  1 Time Imaging         10/21/23 2239    10/21/23 2239  CT Abdomen Pelvis Without Contrast  1 Time Imaging,   Status:  Canceled         10/21/23 2239    10/21/23 2122  Blood Culture - Blood, Arm, Right  STAT        See Hyperspace for full Linked Orders Report.    10/21/23 2122    10/21/23 2122   Blood Culture - Blood, Hand, Right  STAT        Comments: From a different site than #1.     See Hyperspace for full Linked Orders Report.    10/21/23 2122    10/21/23 2116  Lactic Acid, Plasma  STAT         10/21/23 2116    10/21/23 2116  Procalcitonin  STAT         10/21/23 2116    10/21/23 2115  acetaminophen (TYLENOL) tablet 1,000 mg  Once         10/21/23 2015    10/21/23 2114  Lactic Acid, Plasma  Once,   Status:  Canceled         10/21/23 2114    10/21/23 2114  Procalcitonin  Once,   Status:  Canceled         10/21/23 2114    10/21/23 2114  XR Chest 1 View  1 Time Imaging         10/21/23 2114    10/21/23 2106  acetaminophen (TYLENOL) tablet 650 mg  Every 6 Hours PRN,   Status:  Discontinued         10/21/23 2106    10/21/23 2100  sodium chloride 0.9 % flush 10 mL  Every 12 Hours Scheduled,   Status:  Discontinued         10/21/23 1517    10/21/23 2100  famotidine (PEPCID) injection 20 mg  2 Times Daily,   Status:  Discontinued         10/21/23 1517    10/21/23 2033  Lactic Acid, Plasma  Once,   Status:  Canceled         10/21/23 2032    10/21/23 2033  Procalcitonin  Once,   Status:  Canceled         10/21/23 2032    10/21/23 2016  CBC (No Diff)  STAT         10/21/23 2015    10/21/23 1615  dextrose 5 % and sodium chloride 0.45 % infusion  Continuous,   Status:  Discontinued         10/21/23 1517    10/21/23 1609  Initiate & Follow Hypercapnic Monitoring Guideline for Opioid Administration via EtCO2 and / or SpO2  Continuous,   Status:  Canceled        Comments: Follow Hypercapnic Monitoring Guideline As Outlined in Process Instructions (Open Order Report to View Full Instructions)    10/21/23 1609    10/21/23 1609  Opioid Administration - Document EtCO2 and / or SpO2 With Each Set of Vitals & Any Change in Patient Status  Per Order Details,   Status:  Canceled        Comments: With Each Set of Vitals & Any Change in Patient Status    10/21/23 1609    10/21/23 1609  Opioid Administration - Notify Provider  Hypercapnic Monitoring  Until Discontinued,   Status:  Canceled         10/21/23 1609    10/21/23 1609  Opioid Administration - Continuous Pulse Oximetry (SpO2)  Continuous,   Status:  Canceled         10/21/23 1609    10/21/23 1515  hydrOXYzine (ATARAX) tablet 50 mg  Nightly PRN,   Status:  Discontinued         10/21/23 1517    10/21/23 1515  morphine injection 2 mg  Every 2 Hours PRN,   Status:  Discontinued        See Hyperspace for full Linked Orders Report.    10/21/23 1517    10/21/23 1515  naloxone (NARCAN) injection 0.4 mg  Every 5 Minutes PRN,   Status:  Discontinued        See Hyperspace for full Linked Orders Report.    10/21/23 1517    10/21/23 1515  Diet: Liquid Diets; Clear Liquid; Fluid Consistency: Thin (IDDSI 0)  Diet Effective Now,   Status:  Canceled         10/21/23 1517    10/21/23 1513  Admit To Obstetrics Inpatient  Once         10/21/23 1517    10/21/23 1513  Code Status and Medical Interventions:  Continuous,   Status:  Canceled         10/21/23 1517    10/21/23 1513  Place Sequential Compression Device  Once,   Status:  Canceled         10/21/23 1517    10/21/23 1513  Maintain Sequential Compression Device  Continuous,   Status:  Canceled         10/21/23 1517    10/21/23 1513  Vital Signs Per hospital policy  Per Hospital Policy,   Status:  Canceled         10/21/23 1517    10/21/23 1513  Antepartum Patients  <24 Weeks - Document Fetal Heart Tones Daily and PRN.  Per Order Details,   Status:  Canceled        Comments: For Antepartum Patients Less Than 24 Weeks - Document Fetal Heart Tones Daily & PRN.    10/21/23 1517    10/21/23 1513  Notify Physician (specified)  Until Discontinued,   Status:  Canceled         10/21/23 1517    10/21/23 1513  Insert Peripheral IV  Once,   Status:  Canceled         10/21/23 1517    10/21/23 1513  Saline Lock & Maintain IV Access  Continuous,   Status:  Canceled         10/21/23 1517    10/21/23 1512  sodium chloride 0.9 % flush 10 mL  As Needed,   Status:   Discontinued         10/21/23 1517    10/21/23 1512  sodium chloride 0.9 % infusion 40 mL  As Needed,   Status:  Discontinued         10/21/23 1517    10/21/23 1512  lidocaine PF 1% (XYLOCAINE) injection 0.5 mL  Once As Needed,   Status:  Discontinued         10/21/23 1517    10/21/23 1512  acetaminophen (TYLENOL) tablet 650 mg  Every 4 Hours PRN,   Status:  Discontinued         10/21/23 1517    10/21/23 1512  ondansetron (ZOFRAN) tablet 8 mg  Every 8 Hours PRN,   Status:  Discontinued        See Hyperspace for full Linked Orders Report.    10/21/23 1517    10/21/23 1512  ondansetron (ZOFRAN) injection 4 mg  Every 8 Hours PRN,   Status:  Discontinued        See Hyperspace for full Linked Orders Report.    10/21/23 1517    10/21/23 1512  docusate sodium (COLACE) capsule 100 mg  2 Times Daily PRN,   Status:  Discontinued         10/21/23 1517    10/21/23 1512  bisacodyl (DULCOLAX) suppository 10 mg  Daily PRN,   Status:  Discontinued         10/21/23 1517    10/21/23 1332  ED Transfer To L&D  Once         10/21/23 1332    10/21/23 1317  ED Transfer To L&D  Once         10/21/23 1318    10/21/23 1059  sodium chloride 0.9 % bolus 1,000 mL  Once         10/21/23 1044    10/21/23 1057  US Gallbladder  1 Time Imaging         10/21/23 1056    10/21/23 0941  Urinalysis, Microscopic Only - Urine, Clean Catch  Once         10/21/23 0940    10/21/23 0934  acetaminophen (TYLENOL) tablet 500 mg  Once         10/21/23 0919    10/21/23 0744  sodium chloride 0.9 % bolus 1,000 mL  Once         10/21/23 0728    10/21/23 0727  sodium chloride 0.9 % flush 10 mL  As Needed,   Status:  Discontinued        See Hyperspace for full Linked Orders Report.    10/21/23 0728                     Physician Progress Notes (last 72 hours)        Ilya Pereira MD at 10/23/23 1447          General Surgery Daily Progress Note    Subjective:  Persistent abdominal pain.    Objective:  /55 (BP Location: Right arm, Patient Position: Sitting)  "  Pulse 82   Temp 98 °F (36.7 °C) (Oral)   Resp 16   Ht 154.9 cm (61\")   Wt 89.4 kg (197 lb)   LMP  (LMP Unknown)   SpO2 97%   Breastfeeding No   BMI 37.22 kg/m²     General Appearance: Sitting in bed working on her computer, minimal distress  Eyes: Anicteric  Neck: Trachea midline   Cardiovascular:  RRR without murmur nor rub  Lungs:  Bilateral respirations unlabored   Abdomen:  Soft, no peritonitis, some upper abdominal tenderness  Extremities:  No cyanosis or edema   Skin:  No obvious rashes   Neurologic: awake and conversant       Imaging Results (Last 24 Hours)       Procedure Component Value Units Date/Time    US Gallbladder [[id number]] Collected: 10/23/23 1340     Updated: 10/23/23 1347    Narrative:      US GALLBLADDER    Date of Exam: 10/23/2023 12:39 PM EDT    Indication: Upper abdominal pain.    Comparison: 10/21/2023 gallbladder ultrasound and abdominal CT scan.    Technique: Grayscale and color Doppler ultrasound evaluation of the right upper quadrant was performed.      Findings:  Head and body the pancreas appear grossly normal. Pancreatic tail is not well seen. Right and left liver lobes appear normal in morphology and echotexture. There is expected portal vein and hepatic vein waveform and directional flow. No portal vein color   flow filling defects are seen. Gallbladder is distended typical of fasting state and shows no evidence of gallbladder wall edema or thickening. There are small nonshadowing mobile gallstones up to 6 mm in diameter. The common duct is normal in caliber   at approximately 3 mm. Right kidney appears normal and measures 10 cm in length. No right upper quadrant ascites is seen.    .      Impression:      Impression:    1. Small, incompletely formed nonshadowing gallstones. No visible gallbladder wall inflammation or evidence of biliary ductal dilatation.    2. Otherwise unremarkable gallbladder ultrasound.        Electronically Signed: James Lucia MD    10/23/2023 1:44 " PM EDT    Workstation ID: [id number]            CBC:  Results from last 7 days   Lab Units 10/23/23  0747   WBC 10*3/mm3 4.57   HEMOGLOBIN g/dL 10.5*   HEMATOCRIT % 31.8*   PLATELETS 10*3/mm3 197       CMP:  Results from last 7 days   Lab Units 10/23/23  0747 10/21/23  0731   SODIUM mmol/L  --  137   POTASSIUM mmol/L  --  4.0   CHLORIDE mmol/L  --  104   CO2 mmol/L  --  22.0   BUN mg/dL  --  6   CREATININE mg/dL  --  0.73   CALCIUM mg/dL  --  9.4   BILIRUBIN mg/dL 0.3 0.5   ALK PHOS U/L 51 71   ALT (SGPT) U/L 11 11   AST (SGOT) U/L 14 17   GLUCOSE mg/dL  --  104*         Assessment:  Cholelithiasis  17 week pregnancy    Plan:   Patient with continuous abdominal pain.  Repeat ultrasound demonstrates no evidence of inflammatory change regarding the gallbladder.  Normal liver function tests also.  Her initial symptoms of low-grade fever, hypotension, and tachycardia are not consistent with a biliary etiology without acute inflammatory changes.  At this point I would recommend a low-fat diet plus/minus ursodiol.      Ilya Pereira MD - 10/23/2023, 14:47 EDT           Electronically signed by Ilya Pereira MD at 10/23/23 1456       Janet Hayes DO at 10/23/23 1040          [facility]  Antepartum Progress Note    Chief Complaint: Abdominal pain, N/V    Subjective     Patient is a 34yo  female at 16w6d admitted to [facility]U from ED after initially presenting for abdominal pain and N/V. Abdominal ultrasound revealed cholelithiasis without evidence of cholecystitis. Workup negative for pancreatitis. Patient experience acute exacerbation of abdominal pain with new onset chest pain and lightheadedness. CT angiogram of chest unremarkable and CT abdomen negative for appendicitis or acute process.    Patient reports this am that she is doing okay this morning. Notes intermittent episodes of pain that are controlled with IV pain medication. Tolerating bland diet. Denies nausea or vomiting overnight.  Denies recurrence of chest pain, shortness of breath, and lightheadedness. Denies fever, chills. Denies OB associated complaints including leakage of fluid, vaginal bleeding, contractions.     Objective     Vital Signs Range for the last 24 hours  Temp:  [97.7 °F (36.5 °C)-98.5 °F (36.9 °C)] 97.8 °F (36.6 °C)   BP: ()/(51-57) 92/52   Heart Rate:  [] 98   Resp:  [16] 16   SpO2:  [97 %] 97 %       Device (Oxygen Therapy): room air      Physical Examination  Constitutional: A&Ox3. Resting  HEENT: Normocephalic, atraumatic.  Neurological: Negative for sensory or motor deficit  Cardiovascular: tachycardic; negative for murmur, rubs, gallops  Respiratory: Clear to auscultation bilaterally; negative for rales, crackles or wheezes  Abdominal: moderately tender to deep palpation in RUQ, minimally tender in other quadrant. No guarding or rebound. No CVA tenderness  Extremities: negative for edema and tenderness, no cords, masses or swelling  Skin: normal turgor; negative for rash or lesions  Psychiatric: normal affect, normal thought process, good insight, good judgment       Fetal Heart Rate Assessment   Method: Fetal HR Assessment Method: intermittent auscultation, using Doppler   Beats/min: Fetal HR (beats/min): 145 (doppler)   Baseline: Fetal HR Baseline: normal range   Varibility:     Accels:     Decels:     Tracing Category:       Uterine Assessment   Method: Method:  (pt denies ctx lof and bleeding)   Frequency (min):     Ctx Count in 10 min:     Duration:     Intensity:     Intensity by IUPC:     Resting Tone: Uterine Resting Tone: soft by palpation   Resting Tone by IUPC:            Intake/Output last 24 hours:    No intake or output data in the 24 hours ending 10/23/23 1041      Intake/Output this shift:    No intake/output data recorded.        Laboratory Results  WBC   Date Value Ref Range Status   10/23/2023 4.57 3.40 - 10.80 10*3/mm3 Final     RBC   Date Value Ref Range Status   10/23/2023 3.52 (L)  3.77 - 5.28 10*6/mm3 Final     Hemoglobin   Date Value Ref Range Status   10/23/2023 10.5 (L) 12.0 - 15.9 g/dL Final     Hematocrit   Date Value Ref Range Status   10/23/2023 31.8 (L) 34.0 - 46.6 % Final     MCV   Date Value Ref Range Status   10/23/2023 90.3 79.0 - 97.0 fL Final     MCH   Date Value Ref Range Status   10/23/2023 29.8 26.6 - 33.0 pg Final     MCHC   Date Value Ref Range Status   10/23/2023 33.0 31.5 - 35.7 g/dL Final     RDW   Date Value Ref Range Status   10/23/2023 13.1 12.3 - 15.4 % Final     RDW-SD   Date Value Ref Range Status   10/23/2023 42.6 37.0 - 54.0 fl Final     MPV   Date Value Ref Range Status   10/23/2023 10.2 6.0 - 12.0 fL Final     Platelets   Date Value Ref Range Status   10/23/2023 197 140 - 450 10*3/mm3 Final     Neutrophil %   Date Value Ref Range Status   10/23/2023 66.2 42.7 - 76.0 % Final     Lymphocyte %   Date Value Ref Range Status   10/23/2023 24.5 19.6 - 45.3 % Final     Monocyte %   Date Value Ref Range Status   10/23/2023 6.3 5.0 - 12.0 % Final     Eosinophil %   Date Value Ref Range Status   10/23/2023 2.6 0.3 - 6.2 % Final     Basophil %   Date Value Ref Range Status   10/23/2023 0.2 0.0 - 1.5 % Final     Immature Grans %   Date Value Ref Range Status   10/23/2023 0.2 0.0 - 0.5 % Final     Neutrophils, Absolute   Date Value Ref Range Status   10/23/2023 3.02 1.70 - 7.00 10*3/mm3 Final     Lymphocytes, Absolute   Date Value Ref Range Status   10/23/2023 1.12 0.70 - 3.10 10*3/mm3 Final     Monocytes, Absolute   Date Value Ref Range Status   10/23/2023 0.29 0.10 - 0.90 10*3/mm3 Final     Eosinophils, Absolute   Date Value Ref Range Status   10/23/2023 0.12 0.00 - 0.40 10*3/mm3 Final     Basophils, Absolute   Date Value Ref Range Status   10/23/2023 0.01 0.00 - 0.20 10*3/mm3 Final     Immature Grans, Absolute   Date Value Ref Range Status   10/23/2023 0.01 0.00 - 0.05 10*3/mm3 Final     nRBC   Date Value Ref Range Status   10/23/2023 0.0 0.0 - 0.2 /100 WBC Final          Lab 10/21/23  2120   LACTATE 1.5     Procalitonin Results:      Lab 10/21/23  2120   PROCALCITONIN 0.22     XR Chest 1 View    Result Date: 10/21/2023  XR CHEST 1 VW Date of Exam: 10/21/2023 9:24 PM EDT Indication: tachycardia and fever Comparison: Chest radiograph August 15, 2023 Findings: There are no airspace consolidations. No pleural fluid. No pneumothorax. The pulmonary vasculature appears within normal limits. The cardiac and mediastinal silhouette appear unremarkable. No acute osseous abnormality identified.     Impression: Impression: No active disease Electronically Signed: Elkin Arenas MD  10/21/2023 10:12 PM EDT  Workstation ID: [id number]      CT Abdomen Pelvis With Contrast    Result Date: 10/22/2023  CT ABDOMEN PELVIS W CONTRAST Date of Exam: 10/21/2023 11:09 PM EDT Indication: Abdominal pain, acute (Ped 0-17y). Comparison: CT abdomen pelvis dated September 21, 2017 Technique: Axial CT images were obtained of the abdomen and pelvis following the uneventful intravenous administration of 85 mL Isovue-370. Reconstructed coronal and sagittal images were also obtained. Automated exposure control and iterative construction methods were used. Findings: Lung Bases:   The visualized lung bases and lower mediastinal structures are unremarkable. Liver: Liver is normal in size and CT density. No focal lesions. Biliary/Gallbladder:  The gallbladder is normal without evidence of radiopaque stones. The biliary tree is nondilated. Spleen: Spleen is normal in size and CT density. Pancreas:  Pancreas is normal. There is no evidence of pancreatic mass or peripancreatic fluid. Kidneys:  Kidneys are normal in size. There are no stones or hydronephrosis. Adrenals:  Adrenal glands are unremarkable. Retroperitoneal/Lymph Nodes/Vasculature:  No retroperitoneal adenopathy is identified. Gastrointestinal/Mesentery:  The bowel loops are non-dilated without wall thickening or mass. The appendix appears within normal limits. No  evidence of obstruction. No free air. No mesenteric fluid collections identified. Bladder:  The bladder is normal. Genital:   There is a gravid uterus with a posterior placenta and bony structures within the uterus consistent with a fetus.       Bony Structures:   Visualized bony structures are consistent with the patient's age.     Impression: Impression: Gravid uterus. Normal appendix. No acute abdominal or pelvic abnormality. Electronically Signed: Elkin Arenas MD  10/22/2023 12:32 AM EDT  Workstation ID: [id number]       Assessment/Plan  IUP @ 16w6d  Cholelithiasis  Fever of Unknown Origin-resolved  Tachycardia  New onset chest pain-resolved     1. Patient has been managed on APU with IVF and pain control for cholelithiasis   -Intermittent exacerbation of symptoms   -Discontinue IV morphine. Plan for tylenol scheduled every 4 hours with roxicodone only as need for severe pain. Will transition to oral regimen for control of symptoms   -Pepcid and Zofran for symptomatic control  2. New onset chest pain and shortness of breath 10/21 - since resolved    -CT angiogram of chest negative for PE and otherwise unremarkable  -Discussed possibility of onset due to pain vs anxiety   -No further symptoms  3. New onset of fever of 100.6, 100.8 and tachycardia with hypotension evening 10/21 prompted sepsis protocol workup   -Lactate and pro calcitonin within normal.   -CBC ordered. WBC 11.03>6.76.   -Blood cultures obtained and pending  -Afebrile overnight  -Repeat I&O cath urinalysis without evidence of infection  -Discussed with Internal Medicine and planning on consultation  -IM consulted and advised to continue current plan of care  5. CXR: negative for active disease   6. No reported obstetric related symptoms   -Fetal doppler daily and PRN  7. Plan for repeat CMP and gallbladder ultrasound today. If stable will anticipate control on oral regimen and discharge home with close follow up       Janet Hayes,    10/23/2023  10:41 EDT    Electronically signed by Janet Hayes DO at 10/23/23 1045       Janet Hayes DO at 10/22/23 0849          [facility]  Antepartum Progress Note    Chief Complaint: Abdominal pain, N/V    Subjective     Patient is a 32yo  female at 16w5d admitted to [facility] from ED after initially presenting for abdominal pain and N/V. Abdominal ultrasound revealed cholelithiasis without evidence of cholecystitis. Workup negative for pancreatitis. Patient experience acute exacerbation of abdominal pain with new onset chest pain and lightheadedness. CT angiogram of chest unremarkable and CT abdomen negative for appendicitis or acute process.     Patient reports this am that she is doing better and that pain is better controlled on current regimen. States that she continues to experience intermittent exacerbation of abdominal pain focalized to RUQ but denies severe. Tolerating bland diet. Reports nausea but denies vomiting overnight. Denies recurrence of chest pain, shortness of breath, and lightheadedness. Denies fever, chills. Denies OB associated complaints including leakage of fluid, vaginal bleeding, contractions.     Objective     Vital Signs Range for the last 24 hours  Temp:  [98 °F (36.7 °C)-100.8 °F (38.2 °C)] 98.4 °F (36.9 °C)   BP: ()/(45-68) 108/58   Heart Rate:  [105-131] 111   Resp:  [14-16] 14   SpO2:  [94 %-100 %] 96 %       Device (Oxygen Therapy): room air      Physical Examination  Constitutional: A&Ox3. Resting  HEENT: Normocephalic, atraumatic.  Neurological: Negative for sensory or motor deficit  Cardiovascular: tachycardic; negative for murmur, rubs, gallops  Respiratory: Clear to auscultation bilaterally; negative for rales, crackles or wheezes  Abdominal: moderately tender to deep palpation in RUQ, minimally tender in other quadrant. No guarding or rebound. No CVA tenderness  Extremities: negative for edema and tenderness, no cords, masses or swelling  Skin:  normal turgor; negative for rash or lesions  Psychiatric: normal affect, normal thought process, good insight, good judgment       Fetal Heart Rate Assessment   Method: Fetal HR Assessment Method: intermittent auscultation, using Doppler   Beats/min: Fetal HR (beats/min): 126   Baseline: Fetal HR Baseline: normal range   Varibility:     Accels:     Decels:     Tracing Category:       Uterine Assessment   Method: Method:  (pt denies ctx lof and bleeding)   Frequency (min):     Ctx Count in 10 min:     Duration:     Intensity:     Intensity by IUPC:     Resting Tone: Uterine Resting Tone: soft by palpation   Resting Tone by IUPC:            Intake/Output last 24 hours:      Intake/Output Summary (Last 24 hours) at 10/22/2023 0906  Last data filed at 10/21/2023 1338  Gross per 24 hour   Intake 2000 ml   Output --   Net 2000 ml       Intake/Output this shift:    No intake/output data recorded.        Laboratory Results  WBC   Date Value Ref Range Status   10/21/2023 6.76 3.40 - 10.80 10*3/mm3 Final     RBC   Date Value Ref Range Status   10/21/2023 3.79 3.77 - 5.28 10*6/mm3 Final     Hemoglobin   Date Value Ref Range Status   10/21/2023 11.4 (L) 12.0 - 15.9 g/dL Final     Hematocrit   Date Value Ref Range Status   10/21/2023 33.5 (L) 34.0 - 46.6 % Final     MCV   Date Value Ref Range Status   10/21/2023 88.4 79.0 - 97.0 fL Final     MCH   Date Value Ref Range Status   10/21/2023 30.1 26.6 - 33.0 pg Final     MCHC   Date Value Ref Range Status   10/21/2023 34.0 31.5 - 35.7 g/dL Final     RDW   Date Value Ref Range Status   10/21/2023 12.7 12.3 - 15.4 % Final     RDW-SD   Date Value Ref Range Status   10/21/2023 41.0 37.0 - 54.0 fl Final     MPV   Date Value Ref Range Status   10/21/2023 10.2 6.0 - 12.0 fL Final     Platelets   Date Value Ref Range Status   10/21/2023 203 140 - 450 10*3/mm3 Final     Neutrophil %   Date Value Ref Range Status   10/21/2023 92.2 (H) 42.7 - 76.0 % Final     Lymphocyte %   Date Value Ref  Range Status   10/21/2023 4.7 (L) 19.6 - 45.3 % Final     Monocyte %   Date Value Ref Range Status   10/21/2023 2.4 (L) 5.0 - 12.0 % Final     Eosinophil %   Date Value Ref Range Status   10/21/2023 0.1 (L) 0.3 - 6.2 % Final     Basophil %   Date Value Ref Range Status   10/21/2023 0.2 0.0 - 1.5 % Final     Immature Grans %   Date Value Ref Range Status   10/21/2023 0.4 0.0 - 0.5 % Final     Neutrophils, Absolute   Date Value Ref Range Status   10/21/2023 10.17 (H) 1.70 - 7.00 10*3/mm3 Final     Lymphocytes, Absolute   Date Value Ref Range Status   10/21/2023 0.52 (L) 0.70 - 3.10 10*3/mm3 Final     Monocytes, Absolute   Date Value Ref Range Status   10/21/2023 0.27 0.10 - 0.90 10*3/mm3 Final     Eosinophils, Absolute   Date Value Ref Range Status   10/21/2023 0.01 0.00 - 0.40 10*3/mm3 Final     Basophils, Absolute   Date Value Ref Range Status   10/21/2023 0.02 0.00 - 0.20 10*3/mm3 Final     Immature Grans, Absolute   Date Value Ref Range Status   10/21/2023 0.04 0.00 - 0.05 10*3/mm3 Final     nRBC   Date Value Ref Range Status   10/21/2023 0.0 0.0 - 0.2 /100 WBC Final         Lab 10/21/23  2120   LACTATE 1.5     Procalitonin Results:      Lab 10/21/23  2120   PROCALCITONIN 0.22     XR Chest 1 View    Result Date: 10/21/2023  XR CHEST 1 VW Date of Exam: 10/21/2023 9:24 PM EDT Indication: tachycardia and fever Comparison: Chest radiograph August 15, 2023 Findings: There are no airspace consolidations. No pleural fluid. No pneumothorax. The pulmonary vasculature appears within normal limits. The cardiac and mediastinal silhouette appear unremarkable. No acute osseous abnormality identified.     Impression: Impression: No active disease Electronically Signed: Elkin Arenas MD  10/21/2023 10:12 PM EDT  Workstation ID: [id number]      CT Abdomen Pelvis With Contrast    Result Date: 10/22/2023  CT ABDOMEN PELVIS W CONTRAST Date of Exam: 10/21/2023 11:09 PM EDT Indication: Abdominal pain, acute (Ped 0-17y). Comparison: CT  abdomen pelvis dated September 21, 2017 Technique: Axial CT images were obtained of the abdomen and pelvis following the uneventful intravenous administration of 85 mL Isovue-370. Reconstructed coronal and sagittal images were also obtained. Automated exposure control and iterative construction methods were used. Findings: Lung Bases:   The visualized lung bases and lower mediastinal structures are unremarkable. Liver: Liver is normal in size and CT density. No focal lesions. Biliary/Gallbladder:  The gallbladder is normal without evidence of radiopaque stones. The biliary tree is nondilated. Spleen: Spleen is normal in size and CT density. Pancreas:  Pancreas is normal. There is no evidence of pancreatic mass or peripancreatic fluid. Kidneys:  Kidneys are normal in size. There are no stones or hydronephrosis. Adrenals:  Adrenal glands are unremarkable. Retroperitoneal/Lymph Nodes/Vasculature:  No retroperitoneal adenopathy is identified. Gastrointestinal/Mesentery:  The bowel loops are non-dilated without wall thickening or mass. The appendix appears within normal limits. No evidence of obstruction. No free air. No mesenteric fluid collections identified. Bladder:  The bladder is normal. Genital:   There is a gravid uterus with a posterior placenta and bony structures within the uterus consistent with a fetus.       Bony Structures:   Visualized bony structures are consistent with the patient's age.     Impression: Impression: Gravid uterus. Normal appendix. No acute abdominal or pelvic abnormality. Electronically Signed: Elkin Arenas MD  10/22/2023 12:32 AM EDT  Workstation ID: [id number]       Assessment/Plan  IUP @ 16w4d  Cholelithiasis  Fever of Unknown Origin  Tachycardia  New onset chest pain-resolved     1. Patient has been managed on APU with IVF and pain control for cholelithiasis   -Intermittent exacerbation of symptoms   -Current control with tylenol and IV morphine as needed for severe pain   -Pepcid and  Zofran for symptomatic control   -IV Toradol PRN for short course if other method ineffective   2. New onset chest pain and shortness of breath 10/21 - since resolved    -CT angiogram of chest negative for PE and otherwise unremarkable  -Discussed possibility of onset due to pain vs anxiety   3. New onset of fever of 100.6, 100.8 and tachycardia with hypotension evening 10/21 prompted sepsis protocol workup   -Lactate and pro calcitonin within normal.   -CBC ordered. WBC 11.03>6.76.   -Blood cultures obtained and pending  -Afebrile overnight  -Repeat urinalysis with culture due to dirty catch in ED. No symptoms of UTI or CVA tenderness/flank pain  -Discussed with Internal Medicine and planning on consultation  5. CXR: negative for active disease   6. No reported obstetric related symptoms   -Fetal doppler daily and PRN  7. Plan for general surgery consult today  8. Contact provider with any acute exacerbations      Janet Hayes DO  10/22/2023  09:06 EDT    Electronically signed by Janet Hayes DO at 10/22/23 1456       Janet Hayes DO at 10/22/23 0033          [facility]  Antepartum Progress Note    Chief Complaint: Abdominal pain, N/V    Subjective     Patient is a 34yo  female at 16w4d admitted to [facility]U from ED after initially presenting for abdominal pain and N/V. Abdominal ultrasound revealed cholelithiasis without evidence of cholecystitis. Workup negative for pancreatitis.     Patient reported new onset acute abdominal pain and lower chest pain later in evening. Also noted shortness of air and lightheadedness. CT angiogram of chest and abdomen performed.     Following return from CT patient reporting in some improvement in symptoms. States that she feels abdominal pain has lessened. Reports that she is fatigued and desires to rest. Note no continue shortness of breath    Objective     Vital Signs Range for the last 24 hours  Temp:  [97.3 °F (36.3 °C)-100.8 °F (38.2 °C)] 98 °F (36.7 °C)    BP: ()/(45-79) 116/68   Heart Rate:  [105-131] 112   Resp:  [16] 16   SpO2:  [94 %-100 %] 96 %       Device (Oxygen Therapy): room air      Physical Examination  Constitutional: A&Ox3. Resting  HEENT: Normocephalic, atraumatic.  Neurological: Negative for sensory or motor deficit  Cardiovascular: tachycardic; negative for murmur, rubs, gallops  Respiratory: Clear to auscultation bilaterally; negative for rales, crackles or wheezes  Abdominal: moderately tender to palpation in RUQ,   Extremities: negative for edema and tenderness, no cords, masses or swelling  Skin: warm to touch, diaphoretic, normal turgor; negative for rash or lesions  Psychiatric: normal affect, normal thought process, good insight, good judgment       Fetal Heart Rate Assessment   Method: Fetal HR Assessment Method: intermittent auscultation, using Doppler   Beats/min: Fetal HR (beats/min): 126   Baseline: Fetal HR Baseline: normal range   Varibility:     Accels:     Decels:     Tracing Category:       Uterine Assessment   Method: Method:  (pt denies ctx lof and bleeding)   Frequency (min):     Ctx Count in 10 min:     Duration:     Intensity:     Intensity by IUPC:     Resting Tone:     Resting Tone by IUPC:            Intake/Output last 24 hours:      Intake/Output Summary (Last 24 hours) at 10/22/2023 0045  Last data filed at 10/21/2023 1338  Gross per 24 hour   Intake 2000 ml   Output --   Net 2000 ml       Intake/Output this shift:    No intake/output data recorded.        Laboratory Results  WBC   Date Value Ref Range Status   10/21/2023 6.76 3.40 - 10.80 10*3/mm3 Final     RBC   Date Value Ref Range Status   10/21/2023 3.79 3.77 - 5.28 10*6/mm3 Final     Hemoglobin   Date Value Ref Range Status   10/21/2023 11.4 (L) 12.0 - 15.9 g/dL Final     Hematocrit   Date Value Ref Range Status   10/21/2023 33.5 (L) 34.0 - 46.6 % Final     MCV   Date Value Ref Range Status   10/21/2023 88.4 79.0 - 97.0 fL Final     MCH   Date Value Ref Range  Status   10/21/2023 30.1 26.6 - 33.0 pg Final     MCHC   Date Value Ref Range Status   10/21/2023 34.0 31.5 - 35.7 g/dL Final     RDW   Date Value Ref Range Status   10/21/2023 12.7 12.3 - 15.4 % Final     RDW-SD   Date Value Ref Range Status   10/21/2023 41.0 37.0 - 54.0 fl Final     MPV   Date Value Ref Range Status   10/21/2023 10.2 6.0 - 12.0 fL Final     Platelets   Date Value Ref Range Status   10/21/2023 203 140 - 450 10*3/mm3 Final     Neutrophil %   Date Value Ref Range Status   10/21/2023 92.2 (H) 42.7 - 76.0 % Final     Lymphocyte %   Date Value Ref Range Status   10/21/2023 4.7 (L) 19.6 - 45.3 % Final     Monocyte %   Date Value Ref Range Status   10/21/2023 2.4 (L) 5.0 - 12.0 % Final     Eosinophil %   Date Value Ref Range Status   10/21/2023 0.1 (L) 0.3 - 6.2 % Final     Basophil %   Date Value Ref Range Status   10/21/2023 0.2 0.0 - 1.5 % Final     Immature Grans %   Date Value Ref Range Status   10/21/2023 0.4 0.0 - 0.5 % Final     Neutrophils, Absolute   Date Value Ref Range Status   10/21/2023 10.17 (H) 1.70 - 7.00 10*3/mm3 Final     Lymphocytes, Absolute   Date Value Ref Range Status   10/21/2023 0.52 (L) 0.70 - 3.10 10*3/mm3 Final     Monocytes, Absolute   Date Value Ref Range Status   10/21/2023 0.27 0.10 - 0.90 10*3/mm3 Final     Eosinophils, Absolute   Date Value Ref Range Status   10/21/2023 0.01 0.00 - 0.40 10*3/mm3 Final     Basophils, Absolute   Date Value Ref Range Status   10/21/2023 0.02 0.00 - 0.20 10*3/mm3 Final     Immature Grans, Absolute   Date Value Ref Range Status   10/21/2023 0.04 0.00 - 0.05 10*3/mm3 Final     nRBC   Date Value Ref Range Status   10/21/2023 0.0 0.0 - 0.2 /100 WBC Final         Lab 10/21/23  2120   LACTATE 1.5     Procalitonin Results:      Lab 10/21/23  2120   PROCALCITONIN 0.22     XR Chest 1 View    Result Date: 10/21/2023  XR CHEST 1 VW Date of Exam: 10/21/2023 9:24 PM EDT Indication: tachycardia and fever Comparison: Chest radiograph August 15, 2023  Findings: There are no airspace consolidations. No pleural fluid. No pneumothorax. The pulmonary vasculature appears within normal limits. The cardiac and mediastinal silhouette appear unremarkable. No acute osseous abnormality identified.     Impression: Impression: No active disease Electronically Signed: Elkin Arenas MD  10/21/2023 10:12 PM EDT  Workstation ID: [id number]      CT Abdomen Pelvis With Contrast    Result Date: 10/22/2023  CT ABDOMEN PELVIS W CONTRAST Date of Exam: 10/21/2023 11:09 PM EDT Indication: Abdominal pain, acute (Ped 0-17y). Comparison: CT abdomen pelvis dated September 21, 2017 Technique: Axial CT images were obtained of the abdomen and pelvis following the uneventful intravenous administration of 85 mL Isovue-370. Reconstructed coronal and sagittal images were also obtained. Automated exposure control and iterative construction methods were used. Findings: Lung Bases:   The visualized lung bases and lower mediastinal structures are unremarkable. Liver: Liver is normal in size and CT density. No focal lesions. Biliary/Gallbladder:  The gallbladder is normal without evidence of radiopaque stones. The biliary tree is nondilated. Spleen: Spleen is normal in size and CT density. Pancreas:  Pancreas is normal. There is no evidence of pancreatic mass or peripancreatic fluid. Kidneys:  Kidneys are normal in size. There are no stones or hydronephrosis. Adrenals:  Adrenal glands are unremarkable. Retroperitoneal/Lymph Nodes/Vasculature:  No retroperitoneal adenopathy is identified. Gastrointestinal/Mesentery:  The bowel loops are non-dilated without wall thickening or mass. The appendix appears within normal limits. No evidence of obstruction. No free air. No mesenteric fluid collections identified. Bladder:  The bladder is normal. Genital:   There is a gravid uterus with a posterior placenta and bony structures within the uterus consistent with a fetus.       Bony Structures:   Visualized bony  structures are consistent with the patient's age.     Impression: Impression: Gravid uterus. Normal appendix. No acute abdominal or pelvic abnormality. Electronically Signed: Elkin Arenas MD  10/22/2023 12:32 AM EDT  Workstation ID: [id number]       Assessment/Plan  IUP @ 16w4d  Cholelithiasis  Fever of Unknown Origin  Tachycardia  New onset chest pain     1. Patient has been managed on APU with IVF and pain control for cholelithiasis  2. New onset chest pain and shortness of breath with increased abdominal pain prompting CT angiogram of chest which was negative for PE and CT abdomen which was negative for acute process including appendicitis  3. New onset of fever of 100.6, 100.8 and tachycardia prompted sepsis protocol workup - lactate and pro calcitonin within normal. CBC ordered. WBC 11.03>6.76. Blood cultures obtained and pending  5. CXR: negative for active disease   6. No reported obstetric related symptoms  7. Plan for general surgery consult in am for evaluation and recommendations for chololithiasis/expectations in second trimester of pregnancy  8. Continue current pain and bowel regimen with tylenol, zofran, morphine if need. If recurrence of severe pain one time dose of IV Toradol PRN placed due to second trimester and low risk with limited dosing. Hydroxizine for rest and anxiety ordered as needed  9. Contact provider with any acute exacerbations or concern overnight  10. Plan for revaluation in am      Janet Hayes DO  10/22/2023  00:45 EDT    Electronically signed by Janet Hayes DO at 10/22/23 0050       Janet Hayes DO at 10/21/23 2242          [facility]  Antepartum Progress Note    Chief Complaint: Abdominal pain, N/V    Subjective     Patient is a 34yo  female at 16w4d admitted to APU from ED after initially presenting for abdominal pain and N/V. Abdominal ultrasound revealed cholelithiasis without evidence of cholecystitis. Workup negative for pancreatitis.     Patient  reports increased abdominal pain and new onset substernal chest pain. She notes that pain started over the past hour. She reports increased difficulty taking a deep breath. She reports lightheadedness. States that pain radiates through and to middle of back. States that abdominal pain and distension has also intensified. She reports that pain is primary located in RUQ but also present in right lower quadrant. Reports nausea but no vomiting. Denies vaginal bleeding, leakage of fluid. Patient is tearful.      Objective     Vital Signs Range for the last 24 hours  Temp:  [97.3 °F (36.3 °C)-100.8 °F (38.2 °C)] 98.1 °F (36.7 °C)   BP: ()/(45-79) 106/58   Heart Rate:  [105-131] 116   Resp:  [16] 16   SpO2:  [94 %-100 %] 94 %       Device (Oxygen Therapy): room air      Physical Examination  Constitutional: A&Ox3. Distressed. Tearful in pain  HEENT: Normocephalic, atraumatic.  Neurological: Negative for sensory or motor deficit  Cardiovascular: tachycardic; negative for murmur, rubs, gallops  Respiratory: Clear to auscultation bilaterally; negative for rales, crackles or wheezes  Abdominal: moderately tender to palpation in all abdominal quadrants, positive [place] signs, increased tenderness to palpation in RLQ, distended  Extremities: negative for edema and tenderness, no cords, masses or swelling  Skin: warm to touch, diaphoretic, normal turgor; negative for rash or lesions  Psychiatric: normal affect, normal thought process, good insight, good judgment       Fetal Heart Rate Assessment   Method: Fetal HR Assessment Method: intermittent auscultation, using Doppler   Beats/min: Fetal HR (beats/min): 126   Baseline: Fetal HR Baseline: normal range   Varibility:     Accels:     Decels:     Tracing Category:       Uterine Assessment   Method: Method:  (pt denies ctx lof and bleeding)   Frequency (min):     Ctx Count in 10 min:     Duration:     Intensity:     Intensity by IUPC:     Resting Tone:     Resting Tone by  IUPC:            Intake/Output last 24 hours:      Intake/Output Summary (Last 24 hours) at 10/21/2023 2244  Last data filed at 10/21/2023 1338  Gross per 24 hour   Intake 2000 ml   Output --   Net 2000 ml       Intake/Output this shift:    No intake/output data recorded.        Laboratory Results  WBC   Date Value Ref Range Status   10/21/2023 6.76 3.40 - 10.80 10*3/mm3 Final     RBC   Date Value Ref Range Status   10/21/2023 3.79 3.77 - 5.28 10*6/mm3 Final     Hemoglobin   Date Value Ref Range Status   10/21/2023 11.4 (L) 12.0 - 15.9 g/dL Final     Hematocrit   Date Value Ref Range Status   10/21/2023 33.5 (L) 34.0 - 46.6 % Final     MCV   Date Value Ref Range Status   10/21/2023 88.4 79.0 - 97.0 fL Final     MCH   Date Value Ref Range Status   10/21/2023 30.1 26.6 - 33.0 pg Final     MCHC   Date Value Ref Range Status   10/21/2023 34.0 31.5 - 35.7 g/dL Final     RDW   Date Value Ref Range Status   10/21/2023 12.7 12.3 - 15.4 % Final     RDW-SD   Date Value Ref Range Status   10/21/2023 41.0 37.0 - 54.0 fl Final     MPV   Date Value Ref Range Status   10/21/2023 10.2 6.0 - 12.0 fL Final     Platelets   Date Value Ref Range Status   10/21/2023 203 140 - 450 10*3/mm3 Final     Neutrophil %   Date Value Ref Range Status   10/21/2023 92.2 (H) 42.7 - 76.0 % Final     Lymphocyte %   Date Value Ref Range Status   10/21/2023 4.7 (L) 19.6 - 45.3 % Final     Monocyte %   Date Value Ref Range Status   10/21/2023 2.4 (L) 5.0 - 12.0 % Final     Eosinophil %   Date Value Ref Range Status   10/21/2023 0.1 (L) 0.3 - 6.2 % Final     Basophil %   Date Value Ref Range Status   10/21/2023 0.2 0.0 - 1.5 % Final     Immature Grans %   Date Value Ref Range Status   10/21/2023 0.4 0.0 - 0.5 % Final     Neutrophils, Absolute   Date Value Ref Range Status   10/21/2023 10.17 (H) 1.70 - 7.00 10*3/mm3 Final     Lymphocytes, Absolute   Date Value Ref Range Status   10/21/2023 0.52 (L) 0.70 - 3.10 10*3/mm3 Final     Monocytes, Absolute   Date  Value Ref Range Status   10/21/2023 0.27 0.10 - 0.90 10*3/mm3 Final     Eosinophils, Absolute   Date Value Ref Range Status   10/21/2023 0.01 0.00 - 0.40 10*3/mm3 Final     Basophils, Absolute   Date Value Ref Range Status   10/21/2023 0.02 0.00 - 0.20 10*3/mm3 Final     Immature Grans, Absolute   Date Value Ref Range Status   10/21/2023 0.04 0.00 - 0.05 10*3/mm3 Final     nRBC   Date Value Ref Range Status   10/21/2023 0.0 0.0 - 0.2 /100 WBC Final         Lab 10/21/23  2120   LACTATE 1.5     Procalitonin Results:      Lab 10/21/23  2120   PROCALCITONIN 0.22     XR Chest 1 View    Result Date: 10/21/2023  XR CHEST 1 VW Date of Exam: 10/21/2023 9:24 PM EDT Indication: tachycardia and fever Comparison: Chest radiograph August 15, 2023 Findings: There are no airspace consolidations. No pleural fluid. No pneumothorax. The pulmonary vasculature appears within normal limits. The cardiac and mediastinal silhouette appear unremarkable. No acute osseous abnormality identified.     Impression: Impression: No active disease Electronically Signed: Elkin Arenas MD  10/21/2023 10:12 PM EDT  Workstation ID: [id number]        Assessment/Plan  IUP @ 16w4d  Cholelithiasis  Fever of Unknown Origin  Tachycardia  New onset chest pain     1. Patient has been managed on APU with IVF and pain control for cholelithiasis  2. New onset chest pain and shortness of breath. Abdominal tenderness intensified from earlier exam and reported difficulty taking deep breath. Reported radiation of pain to mid back. Lungs clear.  3. Stat CT angiogram of chest and Stat CT of abdomen and pelvis ordered  4. New onset of fever of 100.6 and tachycardia prompted sepsis protocol workup - lactate and pro calcitonin within normal. CBC ordered. WBC 11.03>6.76. Blood cultures obtained and pending  5. CXR: negative for active disease   6. No reported obstetric related symptoms  7. Consulted with Dr. Lorenzo, agreed with plan of care      Janet Hayes,    10/21/2023  22:44 EDT    Electronically signed by Janet Hayes DO at 10/21/23 2305       Janet Hayes DO at 10/21/23 2114          [facility]  Antepartum Progress Note    Chief Complaint: Abdominal pain, N/V    Subjective     Patient is a 34yo  female at 16w4d admitted to APU from ED after initially presenting for abdominal pain and N/V. Abdominal ultrasound revealed cholelithiasis without evidence of cholecystitis. Workup negative for pancreatitis.     Contacted due to patient fever of 100.6. Nursing report of elevated room temperature. Patient did not report signs of fever, chills, nausea, vomiting, shortness of breath. Abdominal pain has been intermittently controlled with medical management. Reported as no worsening. Denies other symptoms including cough, congestion, chest pain. She denied leakage of fluid, vaginal bleeding. Nursing reports that she has been resting well.       Objective     Vital Signs Range for the last 24 hours  Temp:  [97.3 °F (36.3 °C)-100.6 °F (38.1 °C)] 100.6 °F (38.1 °C)   BP: ()/(45-79) 106/58   Heart Rate:  [105-129] 129   Resp:  [16] 16   SpO2:  [95 %-100 %] 100 %       Device (Oxygen Therapy): room air     Fetal Heart Rate Assessment   Method: Fetal HR Assessment Method: intermittent auscultation, using Doppler   Beats/min: Fetal HR (beats/min): 126   Baseline: Fetal HR Baseline: normal range   Varibility:     Accels:     Decels:     Tracing Category:       Uterine Assessment   Method: Method:  (pt denies ctx lof and bleeding)   Frequency (min):     Ctx Count in 10 min:     Duration:     Intensity:     Intensity by IUPC:     Resting Tone:     Resting Tone by IUPC:            Intake/Output last 24 hours:      Intake/Output Summary (Last 24 hours) at 10/21/2023 2117  Last data filed at 10/21/2023 1338  Gross per 24 hour   Intake 2000 ml   Output --   Net 2000 ml       Intake/Output this shift:    No intake/output data recorded.        Laboratory  Results  WBC   Date Value Ref Range Status   10/21/2023 6.76 3.40 - 10.80 10*3/mm3 Final     RBC   Date Value Ref Range Status   10/21/2023 3.79 3.77 - 5.28 10*6/mm3 Final     Hemoglobin   Date Value Ref Range Status   10/21/2023 11.4 (L) 12.0 - 15.9 g/dL Final     Hematocrit   Date Value Ref Range Status   10/21/2023 33.5 (L) 34.0 - 46.6 % Final     MCV   Date Value Ref Range Status   10/21/2023 88.4 79.0 - 97.0 fL Final     MCH   Date Value Ref Range Status   10/21/2023 30.1 26.6 - 33.0 pg Final     MCHC   Date Value Ref Range Status   10/21/2023 34.0 31.5 - 35.7 g/dL Final     RDW   Date Value Ref Range Status   10/21/2023 12.7 12.3 - 15.4 % Final     RDW-SD   Date Value Ref Range Status   10/21/2023 41.0 37.0 - 54.0 fl Final     MPV   Date Value Ref Range Status   10/21/2023 10.2 6.0 - 12.0 fL Final     Platelets   Date Value Ref Range Status   10/21/2023 203 140 - 450 10*3/mm3 Final     Neutrophil %   Date Value Ref Range Status   10/21/2023 92.2 (H) 42.7 - 76.0 % Final     Lymphocyte %   Date Value Ref Range Status   10/21/2023 4.7 (L) 19.6 - 45.3 % Final     Monocyte %   Date Value Ref Range Status   10/21/2023 2.4 (L) 5.0 - 12.0 % Final     Eosinophil %   Date Value Ref Range Status   10/21/2023 0.1 (L) 0.3 - 6.2 % Final     Basophil %   Date Value Ref Range Status   10/21/2023 0.2 0.0 - 1.5 % Final     Immature Grans %   Date Value Ref Range Status   10/21/2023 0.4 0.0 - 0.5 % Final     Neutrophils, Absolute   Date Value Ref Range Status   10/21/2023 10.17 (H) 1.70 - 7.00 10*3/mm3 Final     Lymphocytes, Absolute   Date Value Ref Range Status   10/21/2023 0.52 (L) 0.70 - 3.10 10*3/mm3 Final     Monocytes, Absolute   Date Value Ref Range Status   10/21/2023 0.27 0.10 - 0.90 10*3/mm3 Final     Eosinophils, Absolute   Date Value Ref Range Status   10/21/2023 0.01 0.00 - 0.40 10*3/mm3 Final     Basophils, Absolute   Date Value Ref Range Status   10/21/2023 0.02 0.00 - 0.20 10*3/mm3 Final     Immature Grans,  Absolute   Date Value Ref Range Status   10/21/2023 0.04 0.00 - 0.05 10*3/mm3 Final     nRBC   Date Value Ref Range Status   10/21/2023 0.0 0.0 - 0.2 /100 WBC Final         Assessment/Plan  IUP @ 16w4d  Cholelithiasis  Fever of Unknown Origin  Tachycardia     1. Patient has been managed on APU with IVF and pain control-improved with tylenol and PRN morphine  2. Nursing report of temp 100.6. Patients vitals noted as tachycardic in 120sbpm with hypotension 80/50s  3. Stat CBC ordered. WBC 11.03>6.76  4. No reported signs or new onset of symptoms indicating infectious etiology  5. Will order lactate, procalcitonin, CXR, blood cultures due to patient symptoms and new onset elevation in temperature  6. Plan for more frequent BP and temp monitoring   7. Pulse ox persistently 97-98%.   8. Advised nursing on decreasing temperature of patient room  9. Will continue to monitor closely      Janet Hayes DO  10/21/2023  21:17 EDT    Electronically signed by Janet Hayes DO at 10/21/23 2125          Discharge Summary        Janet Hayes DO at 10/23/23 1801            Date of Discharge:  10/23/2023    Discharge Diagnosis: Gallstones, IUP at 16w6d    Presenting Problem/History of Present Illness  Nausea and vomiting in pregnancy [O21.9]   Pregnancy completed 16w6d   Gallstones    Hospital Course  Patient is a 33 y.o. female at 16w6d admitted for abdominal pain and n/v. She was found to have gallstones without acute evidence of infection or cholecystitis. She underwent extensive workup due to exacerbation of symptoms on 10/21 with fever, hypotension, tachycardia, and chest pain. CT angiogram and CT abdomen was normal and repeat RUQ ultrasound was significant for gallstone but no acute cholecystitis. Labs within normal and blood cultures with no growth in 24 hours. General surgery and internal medicine followed. Recommendations for bland diet and symptom control.     Patient at discharge pain symptoms stable with no  acute exacerbation. Pain controlled on PO regimen since transition from IV. Denies nausea, vomiting. Tolerating PO intake. Ambulating. Denies fever, chills, chest pain, shortness of breath, leakage of fluid, vaginal bleeding, contractions.     Discussed pain control with tylenol and will plan to start ursodiol. Short course of roxicodone prescribed for acute exacerbation of symptoms. Advised that if symptoms of severe pain, nausea, vomiting, fever recur to present to triage. Also discussed if vaginal bleeding or pregnancy related issues contact office.     Procedures Performed  None       Consults:   Consults       Date and Time Order Name Status Description    10/22/2023  2:57 PM Inpatient Internal Medicine Consult Completed             Pertinent Test Results: labs:      Labs:  Lab Results   Component Value Date    WBC 4.57 10/23/2023    HGB 10.5 (L) 10/23/2023    HCT 31.8 (L) 10/23/2023    MCV 90.3 10/23/2023     10/23/2023    GLU 95 12/25/2021    POCGLU 88 01/04/2023    CREATININE 0.73 10/21/2023    URICACID 4.5 07/27/2019    AST 14 10/23/2023    ALT 11 10/23/2023     07/27/2019           Discharge Disposition:  To Home    Condition on Discharge:  Stable    Vital Signs  Temp:  [97.8 °F (36.6 °C)-98.5 °F (36.9 °C)] 97.9 °F (36.6 °C)  Heart Rate:  [82-98] 97  Resp:  [16] 16  BP: ()/(51-57) 110/54      Physical Examination  Constitutional: A&Ox3. No apparent distress. Doing well.  HEENT: Normocephalic, atraumatic.  Neurological: Negative for sensory or motor deficit  Cardiovascular: RRR; negative for murmur, rubs, gallops  Respiratory: Clear to auscultation bilaterally; negative for rales, crackles or wheezes  Abdominal: nontender, soft, negative for guarding, rebound  Extremities: negative for edema and tenderness, negative [place]'s  Skin: normal turgor; negative for rash or lesions  Psychiatric: normal affect, normal thought process, good insight, good judgment      Discharge Disposition  Home  or Self Care    Discharge Medications     Discharge Medications        New Medications        Instructions Start Date   acetaminophen 325 MG tablet  Commonly known as: TYLENOL   650 mg, Oral, Every 4 Hours      docusate sodium 100 MG capsule   100 mg, Oral, 2 Times Daily PRN      famotidine 20 MG tablet  Commonly known as: Pepcid   20 mg, Oral, Daily      oxyCODONE 5 MG immediate release tablet  Commonly known as: ROXICODONE   5 mg, Oral, Every 6 Hours PRN      ursodiol 300 MG capsule  Commonly known as: ACTIGALL   300 mg, Oral, 3 times daily             Continue These Medications        Instructions Start Date   albuterol sulfate  (90 Base) MCG/ACT inhaler  Commonly known as: PROVENTIL HFA;VENTOLIN HFA;PROAIR HFA   2 puffs, Inhalation, Every 4 Hours PRN      buPROPion  MG 24 hr tablet  Commonly known as: WELLBUTRIN XL   150 mg, Oral, Daily      Claritin 10 MG capsule  Generic drug: Loratadine   10 mg, Oral, Daily      montelukast 10 MG tablet  Commonly known as: SINGULAIR   TAKE ONE TABLET BY MOUTH ONCE NIGHTLY      vitamin C 500 MG tablet  Commonly known as: ASCORBIC ACID   TAKE TWO TABLETS BY MOUTH DAILY      vitamin D3 125 MCG (5000 UT) capsule capsule   5,000 Units, Oral, Daily               Discharge Diet: [place], non fatty    Activity at Discharge: Regular    Follow-up Appointments  11/7/23    Test Results Pending at Discharge  Pending Labs       Order Current Status    Blood Culture - Blood, Arm, Right Preliminary result    Blood Culture - Blood, Hand, Right Preliminary result             Janet Hayes DO  10/23/23  18:24 EDT    Time: Discharge 60 min          Electronically signed by Janet Hayes DO at 10/23/23 1997

## 2023-10-27 LAB
BACTERIA SPEC AEROBE CULT: NORMAL
BACTERIA SPEC AEROBE CULT: NORMAL

## 2023-11-20 DIAGNOSIS — J30.2 SEASONAL ALLERGIES: ICD-10-CM

## 2023-11-20 NOTE — TELEPHONE ENCOUNTER
Rx Refill Note  Requested Prescriptions     Pending Prescriptions Disp Refills    montelukast (SINGULAIR) 10 MG tablet [Pharmacy Med Name: MONTELUKAST SOD 10 MG TABLET] 90 tablet 0     Sig: TAKE ONE TABLET BY MOUTH ONCE NIGHTLY    vitamin C (ASCORBIC ACID) 500 MG tablet [Pharmacy Med Name: VITAMIN C 500 MG TABLET] 60 tablet 1     Sig: TAKE 2 TABLETS BY MOUTH DAILY      Last office visit with prescribing clinician: 8/22/2023   Last telemedicine visit with prescribing clinician: 9/21/2023   Next office visit with prescribing clinician: Visit date not found   {    Laura Merida MA  11/20/23, 17:05 EST

## 2023-11-21 DIAGNOSIS — R05.1 ACUTE COUGH: ICD-10-CM

## 2023-11-21 RX ORDER — ASCORBIC ACID 500 MG
1000 TABLET ORAL DAILY
Qty: 60 TABLET | Refills: 1 | Status: SHIPPED | OUTPATIENT
Start: 2023-11-21

## 2023-11-21 RX ORDER — MONTELUKAST SODIUM 10 MG/1
TABLET ORAL
Qty: 90 TABLET | Refills: 0 | Status: SHIPPED | OUTPATIENT
Start: 2023-11-21

## 2023-11-21 NOTE — TELEPHONE ENCOUNTER
Rx Refill Note  Requested Prescriptions     Pending Prescriptions Disp Refills    albuterol sulfate  (90 Base) MCG/ACT inhaler 18 g 0     Sig: Inhale 2 puffs Every 4 (Four) Hours As Needed for Wheezing.      Last office visit with prescribing clinician: 8/22/2023   Last telemedicine visit with prescribing clinician: 9/21/2023     Lesly Kerr MA  11/21/23, 13:58 EST    Last fill: 08/22/2023

## 2023-11-22 RX ORDER — ALBUTEROL SULFATE 90 UG/1
2 AEROSOL, METERED RESPIRATORY (INHALATION) EVERY 4 HOURS PRN
Qty: 18 G | Refills: 0 | OUTPATIENT
Start: 2023-11-22

## 2023-12-19 DIAGNOSIS — R05.1 ACUTE COUGH: ICD-10-CM

## 2023-12-19 NOTE — TELEPHONE ENCOUNTER
Caller: Padma Younger    Relationship: Self    Best call back number:       [phone] (Mobile)     Requested Prescriptions:     albuterol sulfate  (90 Base) MCG/ACT inhaler      Pharmacy where request should be sent:     [facility] [id number] [place] [id number] AT [facility] - [phone] [facility] [phone]     Last office visit with prescribing clinician: 8/22/2023   Last telemedicine visit with prescribing clinician: 9/21/2023   Next office visit with prescribing clinician: Visit date not found     Additional details provided by patient:     PATIENT STATED SHE HAS APPROXIMATELY A (3) DAY SUPPLY LEFT OF INHALER    Does the patient have less than a 3 day supply:  [] Yes  [x] No    Would you like a call back once the refill request has been completed: [] Yes [] No    If the office needs to give you a call back, can they leave a voicemail: [] Yes [] No    Blas Patrick Rep   12/19/23 15:24 EST

## 2023-12-19 NOTE — TELEPHONE ENCOUNTER
Rx Refill Note  Requested Prescriptions     Pending Prescriptions Disp Refills    albuterol sulfate  (90 Base) MCG/ACT inhaler 18 g 0     Sig: Inhale 2 puffs Every 4 (Four) Hours As Needed for Wheezing.      Last office visit with prescribing clinician: 8/22/2023   Last telemedicine visit with prescribing clinician: 9/21/2023   Next office visit with prescribing clinician: Visit date     Laura Merida MA  12/19/23, 15:34 EST   Last fill 11/22/23

## 2023-12-20 RX ORDER — ALBUTEROL SULFATE 90 UG/1
2 AEROSOL, METERED RESPIRATORY (INHALATION) EVERY 4 HOURS PRN
Qty: 18 G | Refills: 0 | OUTPATIENT
Start: 2023-12-20

## 2023-12-21 ENCOUNTER — HOSPITAL ENCOUNTER (OUTPATIENT)
Facility: HOSPITAL | Age: 33
Discharge: HOME | End: 2023-12-21
Attending: ADVANCED PRACTICE MIDWIFE | Admitting: OBSTETRICS & GYNECOLOGY
Payer: MEDICAID

## 2023-12-21 ENCOUNTER — OFFICE VISIT (OUTPATIENT)
Age: 33
End: 2023-12-21
Payer: MEDICAID

## 2023-12-21 VITALS
WEIGHT: 202 LBS | TEMPERATURE: 97.7 F | HEART RATE: 111 BPM | OXYGEN SATURATION: 97 % | SYSTOLIC BLOOD PRESSURE: 100 MMHG | BODY MASS INDEX: 38.17 KG/M2 | DIASTOLIC BLOOD PRESSURE: 58 MMHG

## 2023-12-21 VITALS — HEART RATE: 108 BPM | DIASTOLIC BLOOD PRESSURE: 67 MMHG | OXYGEN SATURATION: 98 % | SYSTOLIC BLOOD PRESSURE: 102 MMHG

## 2023-12-21 DIAGNOSIS — R05.1 ACUTE COUGH: Primary | ICD-10-CM

## 2023-12-21 LAB
BACTERIA UR QL AUTO: ABNORMAL /HPF
BILIRUB UR QL STRIP: NEGATIVE
CLARITY UR: CLEAR
COLOR UR: YELLOW
GLUCOSE UR STRIP-MCNC: NEGATIVE MG/DL
HGB UR QL STRIP.AUTO: NEGATIVE
HYALINE CASTS UR QL AUTO: ABNORMAL /LPF
KETONES UR QL STRIP: NEGATIVE
LEUKOCYTE ESTERASE UR QL STRIP.AUTO: ABNORMAL
NITRITE UR QL STRIP: NEGATIVE
PH UR STRIP.AUTO: 6 [PH] (ref 5–8)
PROT UR QL STRIP: NEGATIVE
RBC # UR STRIP: ABNORMAL /HPF
REF LAB TEST METHOD: ABNORMAL
SP GR UR STRIP: 1.02 (ref 1–1.03)
SQUAMOUS #/AREA URNS HPF: ABNORMAL /HPF
UROBILINOGEN UR QL STRIP: ABNORMAL
WBC # UR STRIP: ABNORMAL /HPF

## 2023-12-21 PROCEDURE — G0463 HOSPITAL OUTPT CLINIC VISIT: HCPCS

## 2023-12-21 PROCEDURE — 99213 OFFICE O/P EST LOW 20 MIN: CPT | Performed by: NURSE PRACTITIONER

## 2023-12-21 PROCEDURE — 59025 FETAL NON-STRESS TEST: CPT

## 2023-12-21 PROCEDURE — 81001 URINALYSIS AUTO W/SCOPE: CPT | Performed by: OBSTETRICS & GYNECOLOGY

## 2023-12-21 RX ORDER — PREDNISONE 5 MG/1
5 TABLET ORAL DAILY
Qty: 3 TABLET | Refills: 0 | Status: SHIPPED | OUTPATIENT
Start: 2023-12-21

## 2023-12-21 RX ORDER — ONDANSETRON 4 MG/1
4 TABLET, ORALLY DISINTEGRATING ORAL
COMMUNITY
Start: 2023-11-20

## 2023-12-21 RX ORDER — ALBUTEROL SULFATE 90 UG/1
2 AEROSOL, METERED RESPIRATORY (INHALATION) EVERY 4 HOURS PRN
Qty: 18 G | Refills: 0 | Status: SHIPPED | OUTPATIENT
Start: 2023-12-21

## 2023-12-21 NOTE — H&P
[facility]  Obstetric History and Physical    Chief Complaint   Patient presents with    Other     experiencing mild back pain, stomach pains, and vaginal pressure.        Subjective     Patient is a 33 y.o. female  currently at 25w2d, who saw her PCP for a new inhaler for her asthma, well-controlled on Albuterol.  She c/o of brown discharge, lower abdominal pain and back pain.  She had called her OB/GYN office yesterday about the same and was told if it gets worse to come on in.  Denies watery discharge, vaginal bleeding, actual contractions or recent intercourse.  Denies constipation or diarrhea or urinary symptoms.    Of note recently patient had cholecystectomy on  at  but has done well postoperatively afterwards so far.    Her prenatal care is otherwise benign.  Her previous obstetric/gynecological history is noted for  previous term  no complications .    The following portions of the patients history were reviewed and updated as appropriate: current medications, allergies, past medical history, past surgical history, past family history, past social history, and problem list .       Prenatal Information:  Prenatal Results       Initial Prenatal Labs       Test Value Reference Range Date Time    Hemoglobin ^ 10.8 g/dL 11.2 - 15.7 23      ^ 11.9 g/dL 11.2 - 15.7 10/27/23 0728       10.5 g/dL 12.0 - 15.9 10/23/23 0747       10.1 g/dL 12.0 - 15.9 10/22/23 0947       11.4 g/dL 12.0 - 15.9 10/21/23 2043       12.7 g/dL 12.0 - 15.9 10/21/23 0731       12.4 g/dL 12.0 - 15.9 23 1448       12.2 g/dL 12.0 - 15.9 23 0234       12.4 g/dL 12.0 - 15.9 23 0839       12.8 g/dL 12.0 - 15.9 08/15/23 1347    Hematocrit ^ 33.1 % 34.0 - 45.0 23      ^ 35.2 % 34.0 - 45.0 10/27/23 0728       31.8 % 34.0 - 46.6 10/23/23 0747       30.9 % 34.0 - 46.6 10/22/23 0947       33.5 % 34.0 - 46.6 10/21/23 2043       37.7 % 34.0 - 46.6 10/21/23 0731       36.5 % 34.0 - 46.6  09/19/23 1448       36.9 % 34.0 - 46.6 09/14/23 0234       37.9 % 34.0 - 46.6 09/07/23 0839       39.6 % 34.0 - 46.6 08/15/23 1347    Platelets ^ 234 10*3/uL 155 - 369 11/27/23 0720      ^ 270 10*3/uL 155 - 369 10/27/23 0728       197 10*3/mm3 140 - 450 10/23/23 0747       178 10*3/mm3 140 - 450 10/22/23 0947       203 10*3/mm3 140 - 450 10/21/23 2043       271 10*3/mm3 140 - 450 10/21/23 0731       293 10*3/mm3 140 - 450 09/19/23 1448       277 10*3/mm3 140 - 450 09/14/23 0234       272 10*3/mm3 140 - 450 09/07/23 0839       290 10*3/mm3 140 - 450 08/15/23 1347    Rubella IgG  3.09 index Immune >0.99 09/19/23 1448    Hepatitis B SAg  Non-Reactive  Non-Reactive 09/19/23 1448       Negative  Negative 08/22/23 1422    Hepatitis C Ab  Non-Reactive  Non-Reactive 09/19/23 1448       Non-Reactive  Non-Reactive 08/22/23 1422    RPR  Non-Reactive  Non-Reactive 04/24/19 1131    T. Pallidum Ab         ABO  O   09/19/23 1448    Rh  Positive   09/19/23 1448    Antibody Screen  Negative   09/19/23 1448    HIV  Non-Reactive  Non-Reactive 09/19/23 1448       Non-Reactive  Non-Reactive 08/22/23 1422    Urine Culture  >100,000 CFU/mL Normal Urogenital Diamond   09/19/23 1448       No growth   09/07/23 1124    Gonorrhea  Negative  Negative 08/22/23 1416    Chlamydia  Negative  Negative 08/22/23 1416    TSH        HgB A1c   5.00 % 4.80 - 5.60 09/19/23 1448    Varicella IgG        HgB Electrophoresis         Cystic fibrosis                   Fetal testing        Test Value Reference Range Date Time    NIPT        MSAFP        AFP-4                  2nd and 3rd Trimester       Test Value Reference Range Date Time    Hemoglobin (repeated) ^ 10.8 g/dL 11.2 - 15.7 11/28/23 0323    Hematocrit (repeated) ^ 33.6 % 34.0 - 45.0 11/28/23 0323    Platelets  ^ 240 10*3/uL 155 - 369 11/28/23 0323      ^ 234 10*3/uL 155 - 369 11/27/23 0720      ^ 270 10*3/uL 155 - 369 10/27/23 0728       197 10*3/mm3 140 - 450 10/23/23 0747       178 10*3/mm3 140 -  450 10/22/23 0947       203 10*3/mm3 140 - 450 10/21/23 2043       271 10*3/mm3 140 - 450 10/21/23 0731       293 10*3/mm3 140 - 450 09/19/23 1448       277 10*3/mm3 140 - 450 09/14/23 0234       272 10*3/mm3 140 - 450 09/07/23 0839       290 10*3/mm3 140 - 450 08/15/23 1347    GCT        Antibody Screen (repeated)  Negative   09/19/23 1448    Third Trimester syphilis screen (repeated)   Non-Reactive  Non-Reactive 04/24/19 1131    GTT Fasting        GTT 1 Hr        GTT 2 Hr        GTT 3 Hr        Group B Strep                  Other testing        Test Value Reference Range Date Time    Parvo IgG         CMV IgG                   Drug Screening       Test Value Reference Range Date Time    Amphetamine Screen  Negative  Negative 09/19/23 1448    Barbiturate Screen  Negative  Negative 09/19/23 1448    Benzodiazepine Screen  Negative  Negative 09/19/23 1448    Methadone Screen  Negative  Negative 09/19/23 1448    Phencyclidine Screen  Negative  Negative 09/19/23 1448    Opiates Screen  Negative  Negative 09/19/23 1448    THC Screen  Negative  Negative 09/19/23 1448    Cocaine Screen  Negative  Negative 09/19/23 1448    Propoxyphene Screen  Negative  Negative 09/19/23 1448    Buprenorphine Screen  Negative  Negative 09/19/23 1448    Methamphetamine Screen  Negative  Negative 09/19/23 1448    Oxycodone Screen  Negative  Negative 09/19/23 1448    Tricyclic Antidepressants Screen  Negative  Negative 09/19/23 1448              Legend    ^: Historical                          External Prenatal Results       Pregnancy Outside Results - Transcribed From Office Records - See Scanned Records For Details       Test Value Date Time    ABO  O  09/19/23 1448    Rh  Positive  09/19/23 1448    Antibody Screen  Negative  09/19/23 1448    Varicella IgG       Rubella  3.09 index 09/19/23 1448    Hgb ^ 10.8 g/dL 11/28/23 0323      ^ 10.8 g/dL 11/27/23 0720      ^ 11.9 g/dL 10/27/23 0728       10.5 g/dL 10/23/23 0747       10.1 g/dL  10/22/23 0947       11.4 g/dL 10/21/23 2043       12.7 g/dL 10/21/23 0731       12.4 g/dL 09/19/23 1448       12.2 g/dL 09/14/23 0234       12.4 g/dL 09/07/23 0839       12.8 g/dL 08/15/23 1347    Hct ^ 33.6 % 11/28/23 0323      ^ 33.1 % 11/27/23 0720      ^ 35.2 % 10/27/23 0728       31.8 % 10/23/23 0747       30.9 % 10/22/23 0947       33.5 % 10/21/23 2043       37.7 % 10/21/23 0731       36.5 % 09/19/23 1448       36.9 % 09/14/23 0234       37.9 % 09/07/23 0839       39.6 % 08/15/23 1347    Glucose Fasting GTT       Glucose Tolerance Test 1 hour       Glucose Tolerance Test 3 hour       Gonorrhea (discrete)  Negative  08/22/23 1416    Chlamydia (discrete)  Negative  08/22/23 1416    RPR  Non-Reactive  04/24/19 1131    VDRL       Syphilis Antibody       HBsAg  Non-Reactive  09/19/23 1448       Negative  08/22/23 1422    Herpes Simplex Virus PCR       Herpes Simplex VIrus Culture       HIV  Non-Reactive  09/19/23 1448       Non-Reactive  08/22/23 1422    Hep C RNA Quant PCR       Hep C Antibody  Non-Reactive  09/19/23 1448       Non-Reactive  08/22/23 1422    AFP       Group B Strep ^ Negative  11/06/19     GBS Susceptibility to Clindamycin       GBS Susceptibility to Erythromycin       Fetal Fibronectin       Genetic Testing, Maternal Blood                 Drug Screening       Test Value Date Time    Urine Drug Screen       Amphetamine Screen  Negative  09/19/23 1448    Barbiturate Screen  Negative  09/19/23 1448    Benzodiazepine Screen  Negative  09/19/23 1448    Methadone Screen  Negative  09/19/23 1448    Phencyclidine Screen  Negative  09/19/23 1448    Opiates Screen  Negative  09/19/23 1448    THC Screen  Negative  09/19/23 1448    Cocaine Screen       Propoxyphene Screen  Negative  09/19/23 1448    Buprenorphine Screen  Negative  09/19/23 1448    Methamphetamine Screen       Oxycodone Screen  Negative  09/19/23 1448    Tricyclic Antidepressants Screen  Negative  09/19/23 1448              Legend    ^:  Historical                             Past OB History:     OB History    Para Term  AB Living   3 1 1 0 1 1   SAB IAB Ectopic Molar Multiple Live Births   0 0 0 0 0 1      # Outcome Date GA Lbr Conrad/2nd Weight Sex Delivery Anes PTL Lv   3 Current            2 Term 19 39w0d 12:24 / 01:45 2745 g (6 lb 0.8 oz) F Vag-Spont EPI N DAILY      Name: LINDSEY BRO      Apgar1: 7  Apgar5: 9   1 AB 2017 9w0d   U          Birth Comments: D&C       Past Medical History: Past Medical History:   Diagnosis Date    Abnormal Pap smear of cervix     Anxiety     Asthma     Chlamydia         Chlamydia contact, treated     Depression     Gonorrhea         Urinary tract infection     Visual impairment     Kertacanis      Past Surgical History Past Surgical History:   Procedure Laterality Date    CHOLECYSTECTOMY  2023    CHOLECYSTECTOMY OPEN      CYST REMOVAL      D & C WITH SUCTION N/A 2017    Procedure: DILATATION AND CURETTAGE WITH SUCTION;  Surgeon: Kelley Dodge DO;  [facility] OR;  Service:     DENTAL PROCEDURE      DILATATION AND CURETTAGE  2017    EYE SURGERY  2018    Intact Surgery    KNEE SURGERY      VAGINAL DELIVERY      x1    WISDOM TOOTH EXTRACTION        Family History: Family History   Problem Relation Age of Onset    Arthritis Mother     COPD Mother     Liver disease Maternal Uncle     Breast cancer Maternal Grandmother     Cancer Maternal Grandmother     Diabetes Maternal Grandfather     Cancer Maternal Grandfather     Breast cancer Paternal Grandmother     Ovarian cancer Paternal Grandmother     Diabetes Paternal Grandmother     Heart attack Paternal Grandmother     Hypertension Paternal Grandmother     Stroke Paternal Grandmother     Cancer Paternal Grandmother     Colon cancer Paternal Grandfather       Social History:  reports that she has never smoked. She has never used smokeless tobacco.   reports current alcohol use of about 2.0 standard drinks of alcohol per  week.   reports no history of drug use.        Review of Systems:    All other systems reviewed and negative    Objective     Vital Signs Range for the last 24 hours  Temperature: Temp:  [97.7 °F (36.5 °C)] 97.7 °F (36.5 °C)   Temp Source:     BP: BP: (100-102)/(58-67) 102/67   Pulse: Heart Rate:  [106-117] 108   Respirations:     SPO2: SpO2:  [97 %-98 %] 98 %   O2 Amount (l/min):     O2 Devices     Weight: Weight:  [91.6 kg (202 lb)] 91.6 kg (202 lb)     Physical Examination: General appearance - alert, well appearing, and in no distress  Neck - supple, no significant adenopathy  Abdomen - soft, nontender, nondistended, no masses or organomegaly  Pelvic - normal external genitalia, vulva, vagina, cervix, uterus and adnexa  Musculoskeletal - no joint tenderness, deformity or swelling  Extremities - peripheral pulses normal, no pedal edema, no clubbing or cyanosis  Skin - normal coloration and turgor, no rashes, no suspicious skin lesions noted    Cervical exam: Closed thick and high, unable to tell presenting part                     Fetal Heart Rate Assessment   150, appropriate for gestational age, no contractions noted on toco      Laboratory Results: Urinalysis with reflex urine culture pending    Assessment & Plan           Assessment & Plan    Assessment:  1.  Intrauterine pregnancy at 25w2d weeks gestation with reassuring fetal status.    2.  Recent cholecystectomy about 4 weeks ago  3.  Lower abdominal cramping and low back pain and vaginal spotting    Plan:  1.  Urinalysis with reflex urine culture -dirty catch but negative. discharged to home in stable condition.  Encouraged hydration  2.   Round ligament pain discussed with patient, no signs of  labor  3.  Plan of care has been reviewed with patient and she voices understanding  4.  All questions have been answered.  5.  Patient has follow-up scheduled  with her primary OB/GYN team      Catina Nash MD  2023  12:21 EST

## 2023-12-21 NOTE — PROGRESS NOTES
"Chief Complaint  Asthma    Subjective        Padma Kenia Younger presents to [facility]  History of Present Illness  Pt was initially scheduled today for a physical. She is 26 weeks pregnant and is following closely with GYN. She had surgery last month for gallbladder removal. She was in the ER for gallbladder and was advised that she had to have surgery. She is doing well following surgery, has fatigue from pregnancy. She states she has had some respiratory issues the last few weeks. She has been afraid to take anything due to pregnancy. She states she has been using her albuterol inhaler about 3 times daily. She states she has to use it in the mornings when she wakes up. She states she has issues when she feels she can't get a good breath. At this point, a low dose short course of prednisone would be beneficial. Lungs are clear on exam. She did use her inhaler this morning. Advised her to clear with her OB prior to starting the prednisone.     She also reports that she is experiencing mild back pain, stomach pains, and vaginal pressure. Advised her to contact OB office as soon as she leaves. She states she will see them today.     Asthma  Her past medical history is significant for asthma.       Objective   Vital Signs:  /58   Pulse 111   Temp 97.7 °F (36.5 °C)   Wt 91.6 kg (202 lb)   SpO2 97%   BMI 38.17 kg/m²   Estimated body mass index is 38.17 kg/m² as calculated from the following:    Height as of 10/21/23: 154.9 cm (61\").    Weight as of this encounter: 91.6 kg (202 lb).               Physical Exam  Vitals reviewed.   Constitutional:       Appearance: Normal appearance.   HENT:      Nose: Nose normal.      Mouth/Throat:      Mouth: Mucous membranes are moist.      Pharynx: Oropharynx is clear.   Eyes:      Conjunctiva/sclera: Conjunctivae normal.   Cardiovascular:      Rate and Rhythm: Normal rate and regular rhythm.      Heart sounds: Normal heart sounds.   Pulmonary: "      Effort: Pulmonary effort is normal.      Breath sounds: Normal breath sounds.   Musculoskeletal:         General: Normal range of motion.      Cervical back: Normal range of motion.   Skin:     General: Skin is warm.   Neurological:      Mental Status: She is alert and oriented to person, place, and time.   Psychiatric:         Mood and Affect: Mood normal.         Behavior: Behavior normal.         Thought Content: Thought content normal.        Result Review :                   Assessment and Plan   Diagnoses and all orders for this visit:    1. Acute cough (Primary)  -     albuterol sulfate  (90 Base) MCG/ACT inhaler; Inhale 2 puffs Every 4 (Four) Hours As Needed for Wheezing.  Dispense: 18 g; Refill: 0  -     predniSONE (DELTASONE) 5 MG tablet; Take 1 tablet by mouth Daily.  Dispense: 3 tablet; Refill: 0             Follow Up   No follow-ups on file.  Patient was given instructions and counseling regarding her condition or for health maintenance advice. Please see specific information pulled into the AVS if appropriate.

## 2023-12-21 NOTE — PROGRESS NOTES
Chief Complaint  Asthma    Subjective     {CC  Problem List  Visit Diagnosis   Encounters  Notes  Medications  Labs  Result Review Imaging  Media :23}     Padma Younger presents to [facility] for   History of Present Illness  Pt was initially scheduled today for a physical. She is 26 weeks pregnant and is following closely with GYN. She had surgery last month for gallbladder removal. She was in the ER for gallbladder and was advised that she had to have surgery. She is doing well following surgery, has fatigue from pregnancy. She states she has had some respiratory issues the last few weeks. She has been afraid to take anything due to pregnancy. She states she has been using her albuterol inhaler about 3 times daily. She states she has to use it in the mornings when she wakes up. She states she has issues when she feels she can't get a good breath. At this point, a low dose short course of prednisone would be beneficial. Lungs are clear on exam. She did use her inhaler this morning. Advised her to clear with her OB prior to starting the prednisone.     She also reports that she is experiencing mild back pain, stomach pains, and vaginal pressure. Advised her to contact OB office as soon as she leaves. She states she will see them today.       Objective   Vital Signs:   Vitals:    12/21/23 1014   BP: 100/58   Pulse: 111   Temp: 97.7 °F (36.5 °C)   SpO2: 97%   Weight: 91.6 kg (202 lb)     Body mass index is 38.17 kg/m².    Physical Exam  Vitals reviewed.   Constitutional:       Appearance: Normal appearance.   HENT:      Nose: Nose normal.      Mouth/Throat:      Mouth: Mucous membranes are moist.      Pharynx: Oropharynx is clear.   Eyes:      Conjunctiva/sclera: Conjunctivae normal.   Cardiovascular:      Rate and Rhythm: Normal rate and regular rhythm.      Heart sounds: Normal heart sounds.   Pulmonary:      Effort: Pulmonary effort is normal.      Breath sounds: Normal  breath sounds.   Musculoskeletal:         General: Normal range of motion.      Cervical back: Normal range of motion.   Skin:     General: Skin is warm.   Neurological:      Mental Status: She is alert and oriented to person, place, and time.   Psychiatric:         Mood and Affect: Mood normal.         Behavior: Behavior normal.         Thought Content: Thought content normal.        Result Review :{ Labs  Result Review  Imaging  Med Tab  Media :23}   {The following data was reviewed by (Optional):28654}  {Ambulatory Labs (Optional):60833}  {Data reviewed (Optional):88214:::1}         Immunization History   Administered Date(s) Administered    31-influenza Vac Quardvalent Preservativ 11/20/2023    COVID-19 (ELLI) 03/08/2021    COVID-19 (PFIZER) Purple Cap Monovalent 11/03/2021    Fluzone (or Fluarix & Flulaval for VFC) >6mos 10/18/2017, 10/28/2020    Hep B, Adolescent or Pediatric 02/23/1999    Influenza, Unspecified 03/21/2017, 11/20/2023    MMR 09/20/2000    Td (TDVAX) 09/20/2000    Tdap 02/10/2009, 01/26/2017, 03/21/2017     Health Maintenance   Topic Date Due    ANNUAL PHYSICAL  Never done    PAP SMEAR  Never done    COVID-19 Vaccine (3 - 2023-24 season) 09/01/2023    BMI FOLLOWUP  05/19/2024    TDAP/TD VACCINES (5 - Td or Tdap) 03/21/2027    HEPATITIS C SCREENING  Completed    INFLUENZA VACCINE  Completed    Pneumococcal Vaccine 0-64  Aged Out        Assessment and Plan {CC Problem List  Visit Diagnosis  ROS  Review (Popup)  Health Maintenance  Quality  BestPractice  Medications  SmartSets  SnapShot Encounters  Media :23}   Diagnoses and all orders for this visit:    1. Annual physical exam (Primary)    2. Acute cough  -     albuterol sulfate  (90 Base) MCG/ACT inhaler; Inhale 2 puffs Every 4 (Four) Hours As Needed for Wheezing.  Dispense: 18 g; Refill: 0  -     predniSONE (DELTASONE) 5 MG tablet; Take 1 tablet by mouth Daily.  Dispense: 3 tablet; Refill:  0        Counseling/anticipatory guidance: Nutrition, physical activity, healthy weight, dental health, mental health, eye exam, immunizations, screenings    {Time Spent (Optional):29673}  Follow Up {Instructions Charge Capture  Follow-up Communications :23}  No follow-ups on file.  Patient was given instructions and counseling regarding her condition or for health maintenance advice. Please see specific information pulled into the AVS if appropriate.

## 2023-12-26 DIAGNOSIS — J30.2 SEASONAL ALLERGIES: ICD-10-CM

## 2023-12-27 RX ORDER — CETIRIZINE HYDROCHLORIDE 10 MG/1
TABLET ORAL
Qty: 30 TABLET | Refills: 5 | Status: SHIPPED | OUTPATIENT
Start: 2023-12-27

## 2023-12-27 RX ORDER — MONTELUKAST SODIUM 10 MG/1
TABLET ORAL
Qty: 90 TABLET | Refills: 0 | Status: SHIPPED | OUTPATIENT
Start: 2023-12-27

## 2023-12-27 NOTE — TELEPHONE ENCOUNTER
Rx Refill Note  Requested Prescriptions     Pending Prescriptions Disp Refills    cetirizine (zyrTEC) 10 MG tablet [Pharmacy Med Name: CETIRIZINE HCL 10 MG TABLET] 30 tablet 5     Sig: TAKE ONE TABLET BY MOUTH DAILY    montelukast (SINGULAIR) 10 MG tablet [Pharmacy Med Name: MONTELUKAST SOD 10 MG TABLET] 90 tablet 0     Sig: TAKE ONE TABLET BY MOUTH ONCE NIGHTLY      Last office visit with prescribing clinician: 12/21/2023   Last telemedicine visit with prescribing clinician: Visit date not found   Next office visit with prescribing clinician: Visit date not found                         Would you like a call back once the refill request has been completed: [] Yes [] No    If the office needs to give you a call back, can they leave a voicemail: [] Yes [] No    Hope Lopez MA  12/27/23, 08:59 EST

## 2024-01-02 ENCOUNTER — TRANSCRIBE ORDERS (OUTPATIENT)
Dept: LAB | Facility: HOSPITAL | Age: 34
End: 2024-01-02
Payer: MEDICAID

## 2024-01-02 DIAGNOSIS — R05.1 ACUTE COUGH: ICD-10-CM

## 2024-01-02 RX ORDER — ALBUTEROL SULFATE 90 UG/1
2 AEROSOL, METERED RESPIRATORY (INHALATION) EVERY 4 HOURS PRN
Qty: 18 G | Refills: 0 | OUTPATIENT
Start: 2024-01-02

## 2024-01-02 NOTE — TELEPHONE ENCOUNTER
Rx Refill Note  Requested Prescriptions     Pending Prescriptions Disp Refills    albuterol sulfate  (90 Base) MCG/ACT inhaler 18 g 0     Sig: Inhale 2 puffs Every 4 (Four) Hours As Needed for Wheezing.      Last office visit with prescribing clinician: 12/21/2023   Last telemedicine visit with prescribing clinician: 9/21/2023   Next office visit with prescribing clinician: Visit date not found     Blas Bose Rep  01/02/24, 10:55 EST

## 2024-01-08 ENCOUNTER — OFFICE VISIT (OUTPATIENT)
Age: 34
End: 2024-01-08
Payer: MEDICAID

## 2024-01-08 VITALS
DIASTOLIC BLOOD PRESSURE: 58 MMHG | SYSTOLIC BLOOD PRESSURE: 100 MMHG | HEART RATE: 117 BPM | OXYGEN SATURATION: 97 % | WEIGHT: 200 LBS | TEMPERATURE: 97.7 F | BODY MASS INDEX: 37.79 KG/M2

## 2024-01-08 DIAGNOSIS — B34.9 VIRAL ILLNESS: Primary | ICD-10-CM

## 2024-01-08 DIAGNOSIS — J02.9 SORE THROAT: ICD-10-CM

## 2024-01-08 LAB
EXPIRATION DATE: NORMAL
EXPIRATION DATE: NORMAL
FLUAV AG UPPER RESP QL IA.RAPID: NOT DETECTED
FLUBV AG UPPER RESP QL IA.RAPID: NOT DETECTED
INTERNAL CONTROL: NORMAL
INTERNAL CONTROL: NORMAL
Lab: NORMAL
Lab: NORMAL
S PYO AG THROAT QL: NEGATIVE
SARS-COV-2 AG UPPER RESP QL IA.RAPID: NOT DETECTED

## 2024-01-08 PROCEDURE — 99213 OFFICE O/P EST LOW 20 MIN: CPT | Performed by: NURSE PRACTITIONER

## 2024-01-08 PROCEDURE — 87880 STREP A ASSAY W/OPTIC: CPT | Performed by: NURSE PRACTITIONER

## 2024-01-08 PROCEDURE — 87428 SARSCOV & INF VIR A&B AG IA: CPT | Performed by: NURSE PRACTITIONER

## 2024-01-08 NOTE — PROGRESS NOTES
"Chief Complaint  Chills (yesterday) and Sore Throat (This morning)    Subjective        Padma Kenia Younger presents to [facility]  History of Present Illness  Pt states yesterday she started feeling bad with chills and fatigue. This morning she woke up with sore throat. She has had a cough on-going since October, but it has improved. No known sick contacts. Pt is 29 weeks gestation. She has not taken over the counter medication due to pregnancy. We discussed that she can take Tylenol. She can also do warm salt water gargles, drink warm tea with honey, eat popsicles to numb the throat. If her symptoms worsen or persist longer than 10 days she should return for evaluation.    Objective   Vital Signs:  /58   Pulse 117   Temp 97.7 °F (36.5 °C)   Wt 90.7 kg (200 lb)   SpO2 97%   BMI 37.79 kg/m²   Estimated body mass index is 37.79 kg/m² as calculated from the following:    Height as of 10/21/23: 154.9 cm (61\").    Weight as of this encounter: 90.7 kg (200 lb).       Physical Exam  Vitals reviewed.   Constitutional:       Appearance: Normal appearance.   HENT:      Right Ear: Tympanic membrane, ear canal and external ear normal.      Left Ear: Tympanic membrane, ear canal and external ear normal.      Nose: Congestion and rhinorrhea present.      Mouth/Throat:      Mouth: Mucous membranes are moist.      Pharynx: Oropharynx is clear. Posterior oropharyngeal erythema present. No oropharyngeal exudate.   Eyes:      Extraocular Movements: Extraocular movements intact.      Conjunctiva/sclera: Conjunctivae normal.   Cardiovascular:      Rate and Rhythm: Normal rate and regular rhythm.      Heart sounds: Normal heart sounds.   Pulmonary:      Effort: Pulmonary effort is normal.      Breath sounds: Normal breath sounds.   Musculoskeletal:         General: Normal range of motion.      Cervical back: Normal range of motion.   Skin:     General: Skin is warm.   Neurological:      Mental " Status: She is alert and oriented to person, place, and time.   Psychiatric:         Mood and Affect: Mood normal.         Behavior: Behavior normal.         Thought Content: Thought content normal.        Result Review :                 Assessment and Plan   Diagnoses and all orders for this visit:    1. Viral illness (Primary)    2. Sore throat  -     POCT SARS-CoV-2 + Flu Antigen TRICIA  -     POC Rapid Strep A     - Tylenol for fevers, body aches. Do NOT take ibuprofen due to pregnancy.   - Warm salt water gargles, popsicles, warm tea with honey to soothe the throat.       Follow Up   No follow-ups on file.  Patient was given instructions and counseling regarding her condition or for health maintenance advice. Please see specific information pulled into the AVS if appropriate.

## 2024-01-25 ENCOUNTER — OFFICE VISIT (OUTPATIENT)
Age: 34
End: 2024-01-25
Payer: MEDICAID

## 2024-01-25 VITALS
WEIGHT: 200.8 LBS | HEART RATE: 120 BPM | OXYGEN SATURATION: 98 % | DIASTOLIC BLOOD PRESSURE: 68 MMHG | BODY MASS INDEX: 37.91 KG/M2 | SYSTOLIC BLOOD PRESSURE: 118 MMHG | HEIGHT: 61 IN

## 2024-01-25 DIAGNOSIS — B37.31 VAGINAL CANDIDIASIS: Primary | ICD-10-CM

## 2024-01-25 PROCEDURE — 99213 OFFICE O/P EST LOW 20 MIN: CPT | Performed by: NURSE PRACTITIONER

## 2024-01-25 RX ORDER — CLOTRIMAZOLE 1 %
CREAM WITH APPLICATOR VAGINAL DAILY
Qty: 7 EACH | Refills: 0 | Status: SHIPPED | OUTPATIENT
Start: 2024-01-25

## 2024-01-25 NOTE — PROGRESS NOTES
"Chief Complaint  Vaginal Itching (Uncomfortable and itching since sunday)    Subjective        Padma Kenia Younger presents to [facility]  History of Present Illness  Pt is here today with concerns of vaginal itching. She states symptoms are consistent with previous yeast infection. She is currently almost 32 weeks pregnant. Will treat with clotrimazole cream for 7 days.   Vaginal Itching        Objective   Vital Signs:  /68   Pulse 120   Ht 154.9 cm (60.98\")   Wt 91.1 kg (200 lb 12.8 oz)   SpO2 98%   BMI 37.96 kg/m²   Estimated body mass index is 37.96 kg/m² as calculated from the following:    Height as of this encounter: 154.9 cm (60.98\").    Weight as of this encounter: 91.1 kg (200 lb 12.8 oz).       Physical Exam  Vitals reviewed.   Constitutional:       Appearance: Normal appearance.   HENT:      Nose: Nose normal.      Mouth/Throat:      Pharynx: Oropharynx is clear.   Eyes:      Conjunctiva/sclera: Conjunctivae normal.   Cardiovascular:      Rate and Rhythm: Normal rate and regular rhythm.      Heart sounds: Normal heart sounds.   Pulmonary:      Effort: Pulmonary effort is normal.      Breath sounds: Normal breath sounds.   Musculoskeletal:         General: Normal range of motion.      Cervical back: Normal range of motion.   Skin:     General: Skin is warm.   Neurological:      Mental Status: She is alert and oriented to person, place, and time.   Psychiatric:         Mood and Affect: Mood normal.         Behavior: Behavior normal.         Thought Content: Thought content normal.        Result Review :                   Assessment and Plan     Diagnoses and all orders for this visit:    1. Vaginal candidiasis (Primary)  -     clotrimazole (Clotrimazole-7) 1 % vaginal cream; Insert  into the vagina Daily.  Dispense: 7 each; Refill: 0             Follow Up     No follow-ups on file.  Patient was given instructions and counseling regarding her condition or for health " maintenance advice. Please see specific information pulled into the AVS if appropriate.

## 2024-02-01 ENCOUNTER — HOSPITAL ENCOUNTER (EMERGENCY)
Facility: HOSPITAL | Age: 34
Discharge: HOME | End: 2024-02-01
Attending: EMERGENCY MEDICINE | Admitting: EMERGENCY MEDICINE
Payer: MEDICAID

## 2024-02-01 VITALS
HEIGHT: 61 IN | RESPIRATION RATE: 18 BRPM | DIASTOLIC BLOOD PRESSURE: 57 MMHG | HEART RATE: 129 BPM | BODY MASS INDEX: 37.57 KG/M2 | OXYGEN SATURATION: 96 % | SYSTOLIC BLOOD PRESSURE: 96 MMHG | WEIGHT: 199 LBS | TEMPERATURE: 98.3 F

## 2024-02-01 DIAGNOSIS — Z3A.32 32 WEEKS GESTATION OF PREGNANCY: ICD-10-CM

## 2024-02-01 DIAGNOSIS — J06.9 UPPER RESPIRATORY TRACT INFECTION, UNSPECIFIED TYPE: Primary | ICD-10-CM

## 2024-02-01 LAB
ALBUMIN SERPL-MCNC: 3.7 G/DL (ref 3.5–5.2)
ALBUMIN/GLOB SERPL: 1.2 G/DL
ALP SERPL-CCNC: 128 U/L (ref 39–117)
ALT SERPL W P-5'-P-CCNC: 6 U/L (ref 1–33)
ANION GAP SERPL CALCULATED.3IONS-SCNC: 10 MMOL/L (ref 5–15)
AST SERPL-CCNC: 13 U/L (ref 1–32)
B PARAPERT DNA SPEC QL NAA+PROBE: NOT DETECTED
B PERT DNA SPEC QL NAA+PROBE: NOT DETECTED
B-HCG UR QL: POSITIVE
BACTERIA UR QL AUTO: ABNORMAL /HPF
BASOPHILS # BLD AUTO: 0.02 10*3/MM3 (ref 0–0.2)
BASOPHILS NFR BLD AUTO: 0.2 % (ref 0–1.5)
BILIRUB SERPL-MCNC: 0.5 MG/DL (ref 0–1.2)
BILIRUB UR QL STRIP: NEGATIVE
BUN SERPL-MCNC: 4 MG/DL (ref 6–20)
BUN/CREAT SERPL: 5.6 (ref 7–25)
C PNEUM DNA NPH QL NAA+NON-PROBE: NOT DETECTED
CALCIUM SPEC-SCNC: 9.3 MG/DL (ref 8.6–10.5)
CHLORIDE SERPL-SCNC: 100 MMOL/L (ref 98–107)
CLARITY UR: CLEAR
CO2 SERPL-SCNC: 24 MMOL/L (ref 22–29)
COLOR UR: YELLOW
CREAT SERPL-MCNC: 0.72 MG/DL (ref 0.57–1)
DEPRECATED RDW RBC AUTO: 43.5 FL (ref 37–54)
EGFRCR SERPLBLD CKD-EPI 2021: 112.7 ML/MIN/1.73
EOSINOPHIL # BLD AUTO: 0.13 10*3/MM3 (ref 0–0.4)
EOSINOPHIL NFR BLD AUTO: 1.4 % (ref 0.3–6.2)
ERYTHROCYTE [DISTWIDTH] IN BLOOD BY AUTOMATED COUNT: 13.4 % (ref 12.3–15.4)
EXPIRATION DATE: ABNORMAL
FLUAV SUBTYP SPEC NAA+PROBE: NOT DETECTED
FLUBV RNA ISLT QL NAA+PROBE: NOT DETECTED
GLOBULIN UR ELPH-MCNC: 3.2 GM/DL
GLUCOSE SERPL-MCNC: 96 MG/DL (ref 65–99)
GLUCOSE UR STRIP-MCNC: NEGATIVE MG/DL
HADV DNA SPEC NAA+PROBE: NOT DETECTED
HCOV 229E RNA SPEC QL NAA+PROBE: NOT DETECTED
HCOV HKU1 RNA SPEC QL NAA+PROBE: NOT DETECTED
HCOV NL63 RNA SPEC QL NAA+PROBE: DETECTED
HCOV OC43 RNA SPEC QL NAA+PROBE: NOT DETECTED
HCT VFR BLD AUTO: 32.8 % (ref 34–46.6)
HGB BLD-MCNC: 10.7 G/DL (ref 12–15.9)
HGB UR QL STRIP.AUTO: NEGATIVE
HMPV RNA NPH QL NAA+NON-PROBE: NOT DETECTED
HOLD SPECIMEN: NORMAL
HPIV1 RNA ISLT QL NAA+PROBE: NOT DETECTED
HPIV2 RNA SPEC QL NAA+PROBE: NOT DETECTED
HPIV3 RNA NPH QL NAA+PROBE: NOT DETECTED
HPIV4 P GENE NPH QL NAA+PROBE: NOT DETECTED
HYALINE CASTS UR QL AUTO: ABNORMAL /LPF
IMM GRANULOCYTES # BLD AUTO: 0.03 10*3/MM3 (ref 0–0.05)
IMM GRANULOCYTES NFR BLD AUTO: 0.3 % (ref 0–0.5)
INTERNAL NEGATIVE CONTROL: POSITIVE
INTERNAL POSITIVE CONTROL: POSITIVE
KETONES UR QL STRIP: ABNORMAL
LEUKOCYTE ESTERASE UR QL STRIP.AUTO: NEGATIVE
LIPASE SERPL-CCNC: 18 U/L (ref 13–60)
LYMPHOCYTES # BLD AUTO: 1.01 10*3/MM3 (ref 0.7–3.1)
LYMPHOCYTES NFR BLD AUTO: 11.3 % (ref 19.6–45.3)
Lab: ABNORMAL
M PNEUMO IGG SER IA-ACNC: NOT DETECTED
MCH RBC QN AUTO: 28.6 PG (ref 26.6–33)
MCHC RBC AUTO-ENTMCNC: 32.6 G/DL (ref 31.5–35.7)
MCV RBC AUTO: 87.7 FL (ref 79–97)
MONOCYTES # BLD AUTO: 0.57 10*3/MM3 (ref 0.1–0.9)
MONOCYTES NFR BLD AUTO: 6.4 % (ref 5–12)
NEUTROPHILS NFR BLD AUTO: 7.21 10*3/MM3 (ref 1.7–7)
NEUTROPHILS NFR BLD AUTO: 80.4 % (ref 42.7–76)
NITRITE UR QL STRIP: NEGATIVE
NRBC BLD AUTO-RTO: 0 /100 WBC (ref 0–0.2)
PH UR STRIP.AUTO: 6 [PH] (ref 5–8)
PLATELET # BLD AUTO: 221 10*3/MM3 (ref 140–450)
PMV BLD AUTO: 10.7 FL (ref 6–12)
POTASSIUM SERPL-SCNC: 3.8 MMOL/L (ref 3.5–5.2)
PROT SERPL-MCNC: 6.9 G/DL (ref 6–8.5)
PROT UR QL STRIP: ABNORMAL
RBC # BLD AUTO: 3.74 10*6/MM3 (ref 3.77–5.28)
RBC # UR STRIP: ABNORMAL /HPF
REF LAB TEST METHOD: ABNORMAL
RHINOVIRUS RNA SPEC NAA+PROBE: NOT DETECTED
RSV RNA NPH QL NAA+NON-PROBE: NOT DETECTED
SARS-COV-2 RNA NPH QL NAA+NON-PROBE: NOT DETECTED
SODIUM SERPL-SCNC: 134 MMOL/L (ref 136–145)
SP GR UR STRIP: 1.03 (ref 1–1.03)
SQUAMOUS #/AREA URNS HPF: ABNORMAL /HPF
UROBILINOGEN UR QL STRIP: ABNORMAL
WBC # UR STRIP: ABNORMAL /HPF
WBC NRBC COR # BLD AUTO: 8.97 10*3/MM3 (ref 3.4–10.8)
WHOLE BLOOD HOLD COAG: NORMAL
WHOLE BLOOD HOLD SPECIMEN: NORMAL

## 2024-02-01 PROCEDURE — 0202U NFCT DS 22 TRGT SARS-COV-2: CPT | Performed by: NURSE PRACTITIONER

## 2024-02-01 PROCEDURE — 81025 URINE PREGNANCY TEST: CPT | Performed by: EMERGENCY MEDICINE

## 2024-02-01 PROCEDURE — 25810000003 SODIUM CHLORIDE 0.9 % SOLUTION: Performed by: NURSE PRACTITIONER

## 2024-02-01 PROCEDURE — 83690 ASSAY OF LIPASE: CPT | Performed by: EMERGENCY MEDICINE

## 2024-02-01 PROCEDURE — 80053 COMPREHEN METABOLIC PANEL: CPT | Performed by: EMERGENCY MEDICINE

## 2024-02-01 PROCEDURE — 85025 COMPLETE CBC W/AUTO DIFF WBC: CPT | Performed by: EMERGENCY MEDICINE

## 2024-02-01 PROCEDURE — 99283 EMERGENCY DEPT VISIT LOW MDM: CPT

## 2024-02-01 PROCEDURE — 81001 URINALYSIS AUTO W/SCOPE: CPT | Performed by: EMERGENCY MEDICINE

## 2024-02-01 PROCEDURE — 87637 SARSCOV2&INF A&B&RSV AMP PRB: CPT | Performed by: NURSE PRACTITIONER

## 2024-02-01 RX ORDER — SODIUM CHLORIDE 0.9 % (FLUSH) 0.9 %
10 SYRINGE (ML) INJECTION AS NEEDED
Status: DISCONTINUED | OUTPATIENT
Start: 2024-02-01 | End: 2024-02-01 | Stop reason: HOSPADM

## 2024-02-01 RX ORDER — SODIUM CHLORIDE 9 MG/ML
10 INJECTION, SOLUTION INTRAMUSCULAR; INTRAVENOUS; SUBCUTANEOUS AS NEEDED
Status: DISCONTINUED | OUTPATIENT
Start: 2024-02-01 | End: 2024-02-01 | Stop reason: HOSPADM

## 2024-02-01 RX ADMIN — SODIUM CHLORIDE 1000 ML: 9 INJECTION, SOLUTION INTRAVENOUS at 17:47

## 2024-02-01 NOTE — ED PROVIDER NOTES
EMERGENCY DEPARTMENT ENCOUNTER    Pt Name: Padma Younger  MRN: [id number]  Pt :   1990  Room Number:  RW5/R5  Date of encounter:  2024  PCP: Lanette Gregory APRN  ED Provider: MOLLY Wilkerson    Historian: Patient    HPI:  Chief Complaint: Flulike symptoms.    Context: Padma Younger is a 33 y.o. female who presents to the ED c/o flulike symptoms.  Patient complains of feeling dizzy and lightheaded.  She reports headaches, body aches.  She complains of fever and chills.  She complains of nausea but denies vomiting.  Patient had congestion.  Patient is 32 weeks pregnant.  Patient tested negative for flu and COVID today.  HPI     REVIEW OF SYSTEMS  A chief complaint appropriate review of systems was completed and is negative except as noted in the HPI.     PAST MEDICAL HISTORY  Past Medical History:   Diagnosis Date    Abnormal Pap smear of cervix     Anxiety     Asthma     Chlamydia         Chlamydia contact, treated     Depression     Gonorrhea         Urinary tract infection     Visual impairment     Kertacanis       PAST SURGICAL HISTORY  Past Surgical History:   Procedure Laterality Date    CHOLECYSTECTOMY  2023    CHOLECYSTECTOMY OPEN      CYST REMOVAL      D & C WITH SUCTION N/A 2017    Procedure: DILATATION AND CURETTAGE WITH SUCTION;  Surgeon: Kelley Dodge DO;  Location: [facility] OR;  Service:     DENTAL PROCEDURE      DILATATION AND CURETTAGE  2017    EYE SURGERY  2018    Intact Surgery    KNEE SURGERY      VAGINAL DELIVERY      x1    WISDOM TOOTH EXTRACTION         FAMILY HISTORY  Family History   Problem Relation Age of Onset    Arthritis Mother     COPD Mother     Liver disease Maternal Uncle     Breast cancer Maternal Grandmother     Cancer Maternal Grandmother     Diabetes Maternal Grandfather     Cancer Maternal Grandfather     Breast cancer Paternal Grandmother     Ovarian cancer Paternal Grandmother     Diabetes Paternal Grandmother     Heart  attack Paternal Grandmother     Hypertension Paternal Grandmother     Stroke Paternal Grandmother     Cancer Paternal Grandmother     Colon cancer Paternal Grandfather        SOCIAL HISTORY  Social History     Socioeconomic History    Marital status: Single   Tobacco Use    Smoking status: Never    Smokeless tobacco: Never   Vaping Use    Vaping Use: Never used   Substance and Sexual Activity    Alcohol use: Yes     Alcohol/week: 2.0 standard drinks of alcohol     Types: 2 Glasses of wine per week     Comment: OCCASIONAL    Drug use: No    Sexual activity: Yes     Partners: Male     Birth control/protection: Condom, Abstinence       ALLERGIES  Oxycodone    PHYSICAL EXAM  Physical Exam  Vitals and nursing note reviewed.   Constitutional:       General: She is not in acute distress.     Appearance: Normal appearance. She is ill-appearing.   HENT:      Head: Normocephalic and atraumatic.      Nose: Rhinorrhea present.      Mouth/Throat:      Mouth: Mucous membranes are moist.   Eyes:      Extraocular Movements: Extraocular movements intact.      Pupils: Pupils are equal, round, and reactive to light.   Cardiovascular:      Rate and Rhythm: Regular rhythm. Tachycardia present.   Pulmonary:      Effort: Pulmonary effort is normal. No respiratory distress.      Breath sounds: Normal breath sounds.   Abdominal:      General: Bowel sounds are normal. There is no distension.   Musculoskeletal:         General: Normal range of motion.      Cervical back: Normal range of motion and neck supple.   Skin:     General: Skin is warm and dry.   Neurological:      General: No focal deficit present.      Mental Status: She is alert and oriented to person, place, and time.   Psychiatric:         Mood and Affect: Mood normal.         Behavior: Behavior normal.           LAB RESULTS  Results for orders placed or performed during the hospital encounter of 02/01/24   Respiratory Panel PCR w/COVID-19(SARS-CoV-2) VIANEY/PHILLIP/SNEHA/PAD/COR/SUMAYA  In-House, NP Swab in UTM/VTM, 2 HR TAT - Swab, Nasopharynx    Specimen: Nasopharynx; Swab   Result Value Ref Range    ADENOVIRUS, PCR Not Detected Not Detected    Coronavirus 229E Not Detected Not Detected    Coronavirus HKU1 Not Detected Not Detected    Coronavirus NL63 Detected (A) Not Detected    Coronavirus OC43 Not Detected Not Detected    COVID19 Not Detected Not Detected - Ref. Range    Human Metapneumovirus Not Detected Not Detected    Human Rhinovirus/Enterovirus Not Detected Not Detected    Influenza A PCR Not Detected Not Detected    Influenza B PCR Not Detected Not Detected    Parainfluenza Virus 1 Not Detected Not Detected    Parainfluenza Virus 2 Not Detected Not Detected    Parainfluenza Virus 3 Not Detected Not Detected    Parainfluenza Virus 4 Not Detected Not Detected    RSV, PCR Not Detected Not Detected    Bordetella pertussis pcr Not Detected Not Detected    Bordetella parapertussis PCR Not Detected Not Detected    Chlamydophila pneumoniae PCR Not Detected Not Detected    Mycoplasma pneumo by PCR Not Detected Not Detected   Comprehensive Metabolic Panel    Specimen: Blood   Result Value Ref Range    Glucose 96 65 - 99 mg/dL    BUN 4 (L) 6 - 20 mg/dL    Creatinine 0.72 0.57 - 1.00 mg/dL    Sodium 134 (L) 136 - 145 mmol/L    Potassium 3.8 3.5 - 5.2 mmol/L    Chloride 100 98 - 107 mmol/L    CO2 24.0 22.0 - 29.0 mmol/L    Calcium 9.3 8.6 - 10.5 mg/dL    Total Protein 6.9 6.0 - 8.5 g/dL    Albumin 3.7 3.5 - 5.2 g/dL    ALT (SGPT) 6 1 - 33 U/L    AST (SGOT) 13 1 - 32 U/L    Alkaline Phosphatase 128 (H) 39 - 117 U/L    Total Bilirubin 0.5 0.0 - 1.2 mg/dL    Globulin 3.2 gm/dL    A/G Ratio 1.2 g/dL    BUN/Creatinine Ratio 5.6 (L) 7.0 - 25.0    Anion Gap 10.0 5.0 - 15.0 mmol/L    eGFR 112.7 >60.0 mL/min/1.73   Lipase    Specimen: Blood   Result Value Ref Range    Lipase 18 13 - 60 U/L   Urinalysis With Microscopic If Indicated (No Culture) - Urine, Clean Catch    Specimen: Urine, Clean Catch   Result  Value Ref Range    Color, UA Yellow Yellow, Straw    Appearance, UA Clear Clear    pH, UA 6.0 5.0 - 8.0    Specific Gravity, UA 1.029 1.001 - 1.030    Glucose, UA Negative Negative    Ketones, UA 15 mg/dL (1+) (A) Negative    Bilirubin, UA Negative Negative    Blood, UA Negative Negative    Protein, UA 30 mg/dL (1+) (A) Negative    Leuk Esterase, UA Negative Negative    Nitrite, UA Negative Negative    Urobilinogen, UA 1.0 E.U./dL 0.2 - 1.0 E.U./dL   CBC Auto Differential    Specimen: Blood   Result Value Ref Range    WBC 8.97 3.40 - 10.80 10*3/mm3    RBC 3.74 (L) 3.77 - 5.28 10*6/mm3    Hemoglobin 10.7 (L) 12.0 - 15.9 g/dL    Hematocrit 32.8 (L) 34.0 - 46.6 %    MCV 87.7 79.0 - 97.0 fL    MCH 28.6 26.6 - 33.0 pg    MCHC 32.6 31.5 - 35.7 g/dL    RDW 13.4 12.3 - 15.4 %    RDW-SD 43.5 37.0 - 54.0 fl    MPV 10.7 6.0 - 12.0 fL    Platelets 221 140 - 450 10*3/mm3    Neutrophil % 80.4 (H) 42.7 - 76.0 %    Lymphocyte % 11.3 (L) 19.6 - 45.3 %    Monocyte % 6.4 5.0 - 12.0 %    Eosinophil % 1.4 0.3 - 6.2 %    Basophil % 0.2 0.0 - 1.5 %    Immature Grans % 0.3 0.0 - 0.5 %    Neutrophils, Absolute 7.21 (H) 1.70 - 7.00 10*3/mm3    Lymphocytes, Absolute 1.01 0.70 - 3.10 10*3/mm3    Monocytes, Absolute 0.57 0.10 - 0.90 10*3/mm3    Eosinophils, Absolute 0.13 0.00 - 0.40 10*3/mm3    Basophils, Absolute 0.02 0.00 - 0.20 10*3/mm3    Immature Grans, Absolute 0.03 0.00 - 0.05 10*3/mm3    nRBC 0.0 0.0 - 0.2 /100 WBC   Urinalysis, Microscopic Only - Urine, Clean Catch    Specimen: Urine, Clean Catch   Result Value Ref Range    RBC, UA 0-2 None Seen, 0-2 /HPF    WBC, UA 0-2 None Seen, 0-2 /HPF    Bacteria, UA None Seen None Seen, Trace /HPF    Squamous Epithelial Cells, UA 7-12 (A) None Seen, 0-2 /HPF    Hyaline Casts, UA 7-12 0 - 6 /LPF    Methodology Automated Microscopy    POC Urine Pregnancy    Specimen: Urine   Result Value Ref Range    HCG, Urine, QL Positive (A) Negative    Lot Number [id number]     Internal Positive Control Positive  Positive, Passed    Internal Negative Control Positive (A) Negative, Passed    Expiration Date 20,250,502    Green Top (Gel)   Result Value Ref Range    Extra Tube Hold for add-ons.    Lavender Top   Result Value Ref Range    Extra Tube hold for add-on    Gold Top - SST   Result Value Ref Range    Extra Tube Hold for add-ons.    Light Blue Top   Result Value Ref Range    Extra Tube Hold for add-ons.        If labs were ordered, I independently reviewed the results and considered them in treating the patient.    RADIOLOGY  No orders to display     [] Radiologist's Report Reviewed:  I ordered and independently interpreted the above noted radiographic studies.  See radiologist's dictation for official interpretation.      PROCEDURES    Procedures    No orders to display       MEDICATIONS GIVEN IN ER    Medications   Sodium Chloride (PF) 0.9 % 10 mL (has no administration in time range)   sodium chloride 0.9 % flush 10 mL (has no administration in time range)   sodium chloride 0.9 % bolus 1,000 mL (0 mL Intravenous Stopped 2/1/24 1909)       MEDICAL DECISION MAKING, PROGRESS, and CONSULTS   Medical Decision Making  Padma Kenia Younger is a 33 y.o. female who presents to the ED c/o flulike symptoms.  Patient complains of feeling dizzy and lightheaded.  She reports headaches, body aches.  She complains of fever and chills.  She complains of nausea but denies vomiting.  Patient had congestion.  Patient is 32 weeks pregnant.  Patient tested negative for flu and COVID today.    Problems Addressed:  32 weeks gestation of pregnancy: self-limited or minor problem  Upper respiratory tract infection, unspecified type: complicated acute illness or injury     Details: Patient is positive for coronavirus NL 63.  Supportive care discussed with patient.  Patient to take Robitussin, Tylenol.  Patient to increase fluids and rest.    Amount and/or Complexity of Data Reviewed  Labs: ordered. Decision-making details documented in ED  Course.    Risk  Prescription drug management.        All labs have been independently reviewed by me.  All radiology studies have been interpreted by me and the radiologist dictating the report.  All EKG's have been independently interpreted by me as well as and overseeing attending physician.    [] Discussed with radiology regarding test interpretation:    Discussion below represents my analysis of pertinent findings related to patient's condition, differential diagnosis, treatment plan and final disposition.    Differential diagnosis:  The differential diagnosis associated with the patient's presentation includes: COVID, influenza, viral illness.    Additional sources  Discussed/ obtained information from independent historians:   [] Spouse  [] Parent  [] Family member  [] Friend  [] EMS   [] Other:  External (non-ED) record review:   [] Inpatient record:   [] Office record:   [] Outpatient record:   [x] Prior Outpatient labs:   [x] Prior Outpatient radiology:   [] Primary Care record:   [] Outside ED record:   [] Other:   Patient's care impacted by:   [] Diabetes  [] Hypertension  [] Hyperlipidemia  [] Hypothyroidism   [] Coronary Artery Disease   [] COPD   [] Cancer   [] Obesity  [] GERD   [] Tobacco Abuse   [] Substance Abuse    [] Anxiety   [] Depression   [x] Other: 32 weeks pregnant  Care significantly affected by Social Determinants of Health (housing and economic circumstances, unemployment)    [] Yes     [x] No   If yes, Patient's care significantly limited by  Social Determinants of Health including:   [] Inadequate housing   [] Low income   [] Alcoholism and drug addiction in family   [] Problems related to primary support group   [] Unemployment   [] Problems related to employment   [] Other Social Determinants of Health:     Shared decision making: Shared decision making with patient.  Patient is positive for the coronavirus NL 63.  We will discharge the patient home.  We discussed supportive care.   Patient to take Tylenol, Robitussin, Vicks to the chest wall.  Patient to increase fluids and rest.    Orders placed during this visit:  Orders Placed This Encounter   Procedures    COVID PRE-OP / PRE-PROCEDURE SCREENING ORDER (NO ISOLATION) - Swab, Nasopharynx    Respiratory Panel PCR w/COVID-19(SARS-CoV-2) VIANEY/PHILLIP/SNEHA/PAD/COR/SUMAAY In-House, NP Swab in UTM/VTM, 2 HR TAT - Swab, Nasopharynx    [place] Draw    Comprehensive Metabolic Panel    Lipase    Urinalysis With Microscopic If Indicated (No Culture) - Urine, Clean Catch    CBC Auto Differential    Urinalysis, Microscopic Only - Urine, Clean Catch    NPO Diet NPO Type: Strict NPO    Undress & Gown    POC Urine Pregnancy    Insert Peripheral IV    Insert Peripheral IV    CBC & Differential    Green Top (Gel)    Lavender Top    Gold Top - SST    Gray Top    Light Blue Top       I considered prescription management  with:   [] Pain medication  [] Antiviral  [] Antibiotic   [] Other:   Rationale:  Additional orders considered but not ordered:  The following testing was considered but ultimately not selected after discussion with patient/family:    ED Course:    ED Course as of 02/01/24 1949 Thu Feb 01, 2024 1841 Coronavirus NL63(!): Detected  Respiratory panel positive [KG]   1842 Sodium(!): 134 [KG]   1842 Creatinine: 0.72 [KG]   1842 Potassium: 3.8 [KG]   1842 Chloride: 100 [KG]   1842 WBC: 8.97 [KG]   1842 Hemoglobin(!): 10.7 [KG]   1842 Hematocrit(!): 32.8 [KG]   1901 Ketones, UA(!): 15 mg/dL (1+) [KG]   1901 Protein, UA(!): 30 mg/dL (1+) [KG]      ED Course User Index  [KG] Soheila Esquivel, MOLLY            DIAGNOSIS  Final diagnoses:   Upper respiratory tract infection, unspecified type   32 weeks gestation of pregnancy       DISPOSITION    DISCHARGE    Patient discharged in stable condition.    Reviewed implications of results, diagnosis, meds, responsibility to follow up, warning signs and symptoms of possible worsening, potential complications and  reasons to return to ER.    Patient/Family voiced understanding of above instructions.    Discussed plan for discharge, as there is no emergent indication for admission.  Pt/family is agreeable and understands need for follow up and possible repeat testing.  Pt/family is aware that discharge does not mean that nothing is wrong but that it indicates no emergency is currently present that requires admission and they must continue care with follow-up as given below or with a physician of their choice.     FOLLOW-UP  Lanette Gregory, APRN  [address] [place] [id number]  [phone]               Medication List      No changes were made to your prescriptions during this visit.          ED Disposition       ED Disposition   Discharge    Condition   Stable    Comment   --               Please note that portions of this document were completed with voice recognition software.       Soheila Esquivel, APRN  02/01/24 1950

## 2024-02-01 NOTE — Clinical Note
[facility] EMERGENCY DEPARTMENT  [address]  [facility] [address]  Phone: [phone]    Padma Younger was seen and treated in our emergency department on 2/1/2024.  She may return to work on 02/05/2024.         Thank you for choosing [facility].    Soheila Esquivel, APRN

## 2024-02-14 ENCOUNTER — HOSPITAL ENCOUNTER (OUTPATIENT)
Facility: HOSPITAL | Age: 34
Discharge: HOME | End: 2024-02-14
Attending: ADVANCED PRACTICE MIDWIFE | Admitting: OBSTETRICS & GYNECOLOGY
Payer: MEDICAID

## 2024-02-14 VITALS
HEART RATE: 106 BPM | RESPIRATION RATE: 18 BRPM | DIASTOLIC BLOOD PRESSURE: 60 MMHG | OXYGEN SATURATION: 97 % | BODY MASS INDEX: 38.33 KG/M2 | WEIGHT: 203 LBS | HEIGHT: 61 IN | SYSTOLIC BLOOD PRESSURE: 95 MMHG | TEMPERATURE: 97.4 F

## 2024-02-14 PROBLEM — O47.00 PRETERM UTERINE CONTRACTIONS, ANTEPARTUM: Status: ACTIVE | Noted: 2024-02-14

## 2024-02-14 PROBLEM — O23.43 RECURRENT UTI (URINARY TRACT INFECTION) COMPLICATING PREGNANCY, THIRD TRIMESTER: Status: ACTIVE | Noted: 2024-02-14

## 2024-02-14 LAB
ABO GROUP BLD: NORMAL
BACTERIA UR QL AUTO: ABNORMAL /HPF
BILIRUB UR QL STRIP: NEGATIVE
BLD GP AB SCN SERPL QL: NEGATIVE
CLARITY UR: CLEAR
COLOR UR: YELLOW
DEPRECATED RDW RBC AUTO: 42.2 FL (ref 37–54)
ERYTHROCYTE [DISTWIDTH] IN BLOOD BY AUTOMATED COUNT: 13.3 % (ref 12.3–15.4)
GLUCOSE UR STRIP-MCNC: NEGATIVE MG/DL
HCT VFR BLD AUTO: 31.2 % (ref 34–46.6)
HGB BLD-MCNC: 10 G/DL (ref 12–15.9)
HGB UR QL STRIP.AUTO: NEGATIVE
HYALINE CASTS UR QL AUTO: ABNORMAL /LPF
KETONES UR QL STRIP: NEGATIVE
LEUKOCYTE ESTERASE UR QL STRIP.AUTO: ABNORMAL
MCH RBC QN AUTO: 27.9 PG (ref 26.6–33)
MCHC RBC AUTO-ENTMCNC: 32.1 G/DL (ref 31.5–35.7)
MCV RBC AUTO: 86.9 FL (ref 79–97)
NITRITE UR QL STRIP: NEGATIVE
PH UR STRIP.AUTO: 8.5 [PH] (ref 5–8)
PLATELET # BLD AUTO: 246 10*3/MM3 (ref 140–450)
PMV BLD AUTO: 11.1 FL (ref 6–12)
PROT UR QL STRIP: ABNORMAL
RBC # BLD AUTO: 3.59 10*6/MM3 (ref 3.77–5.28)
RBC # UR STRIP: ABNORMAL /HPF
REF LAB TEST METHOD: ABNORMAL
RH BLD: POSITIVE
SP GR UR STRIP: 1.02 (ref 1–1.03)
SQUAMOUS #/AREA URNS HPF: ABNORMAL /HPF
T PALLIDUM IGG SER QL: NORMAL
T&S EXPIRATION DATE: NORMAL
UROBILINOGEN UR QL STRIP: ABNORMAL
WBC # UR STRIP: ABNORMAL /HPF
WBC NRBC COR # BLD AUTO: 7.77 10*3/MM3 (ref 3.4–10.8)

## 2024-02-14 PROCEDURE — 96360 HYDRATION IV INFUSION INIT: CPT

## 2024-02-14 PROCEDURE — G0463 HOSPITAL OUTPT CLINIC VISIT: HCPCS

## 2024-02-14 PROCEDURE — 86901 BLOOD TYPING SEROLOGIC RH(D): CPT | Performed by: OBSTETRICS & GYNECOLOGY

## 2024-02-14 PROCEDURE — 25810000003 LACTATED RINGERS PER 1000 ML: Performed by: OBSTETRICS & GYNECOLOGY

## 2024-02-14 PROCEDURE — 81001 URINALYSIS AUTO W/SCOPE: CPT | Performed by: OBSTETRICS & GYNECOLOGY

## 2024-02-14 PROCEDURE — 99214 OFFICE O/P EST MOD 30 MIN: CPT | Performed by: OBSTETRICS & GYNECOLOGY

## 2024-02-14 PROCEDURE — 36415 COLL VENOUS BLD VENIPUNCTURE: CPT | Performed by: OBSTETRICS & GYNECOLOGY

## 2024-02-14 PROCEDURE — 59025 FETAL NON-STRESS TEST: CPT

## 2024-02-14 PROCEDURE — 85027 COMPLETE CBC AUTOMATED: CPT | Performed by: OBSTETRICS & GYNECOLOGY

## 2024-02-14 PROCEDURE — 86850 RBC ANTIBODY SCREEN: CPT | Performed by: OBSTETRICS & GYNECOLOGY

## 2024-02-14 PROCEDURE — 86780 TREPONEMA PALLIDUM: CPT | Performed by: OBSTETRICS & GYNECOLOGY

## 2024-02-14 PROCEDURE — 25010000002 CEFTRIAXONE PER 250 MG: Performed by: OBSTETRICS & GYNECOLOGY

## 2024-02-14 PROCEDURE — 59025 FETAL NON-STRESS TEST: CPT | Performed by: OBSTETRICS & GYNECOLOGY

## 2024-02-14 PROCEDURE — 86900 BLOOD TYPING SEROLOGIC ABO: CPT | Performed by: OBSTETRICS & GYNECOLOGY

## 2024-02-14 RX ORDER — SODIUM CHLORIDE, SODIUM LACTATE, POTASSIUM CHLORIDE, CALCIUM CHLORIDE 600; 310; 30; 20 MG/100ML; MG/100ML; MG/100ML; MG/100ML
1000 INJECTION, SOLUTION INTRAVENOUS CONTINUOUS
Status: DISCONTINUED | OUTPATIENT
Start: 2024-02-14 | End: 2024-02-14 | Stop reason: HOSPADM

## 2024-02-14 RX ADMIN — SODIUM CHLORIDE, POTASSIUM CHLORIDE, SODIUM LACTATE AND CALCIUM CHLORIDE 1000 ML/HR: 600; 310; 30; 20 INJECTION, SOLUTION INTRAVENOUS at 14:43

## 2024-02-14 RX ADMIN — SODIUM CHLORIDE, POTASSIUM CHLORIDE, SODIUM LACTATE AND CALCIUM CHLORIDE 1000 ML/HR: 600; 310; 30; 20 INJECTION, SOLUTION INTRAVENOUS at 13:46

## 2024-02-14 RX ADMIN — SODIUM CHLORIDE 1000 MG: 900 INJECTION INTRAVENOUS at 15:46

## 2024-02-14 NOTE — H&P
Triage H&P    Patient Name: Padma Younger  : 1990  MRN: [id number]  Date of Service: 2024  Referring Provider: Yamile Matson     ID: 33 y.o.  at 33w1d    Chief complaint: contractions since 10 am 24    Hpi:  Pt with irregular contractions increasing in pain and frequency since 10 am this morning, last drank liquids at 9 am, has had two UTIs in this pregnancy denies urinary urgency and pain with urination.       uterine contractions, antepartum    Recurrent UTI (urinary tract infection) complicating pregnancy, third trimester          Pregnancy course significant for:    Abdominal pain and nausea and vomiting in this pregnancy     Her previous obstetric/gynecological history is noted for  prior  and Prior D&C .    The following portions of the patients history were reviewed and updated as appropriate: current medications, allergies, past medical history, past surgical history, past family history, past social history, and problem list.       REVIEW OF SYSTEMS  irregular contractions   Patient reports good fetal movement.  She denies any vaginal bleeding, leakage of fluid, or contractions.  She denies headache, visual changes, or right upper quadrant pain.  All other systems were reviewed and were negative.    Prenatal Labs  Lab Results   Component Value Date    HGB 10.0 (L) 2024    HEPBSAG Non-Reactive 2023    ABO O 2024    RH Positive 2024    ABSCRN Negative 2024    LZS1MCV2 Non-Reactive 2023    HEPCVIRUSABY Non-Reactive 2023    URINECX >100,000 CFU/mL Normal Urogenital Diamond 2023       OB HISTORY    OB History          3    Para   1    Term   1       0    AB   1    Living   1         SAB   0    IAB   0    Ectopic   0    Molar   0    Multiple   0    Live Births   1              PAST MEDICAL HISTORY    Past Medical History:   Diagnosis Date    Abnormal Pap smear of cervix     Anxiety     Asthma     Chlamydia          Chlamydia contact, treated     Depression     Gonorrhea     2013    Urinary tract infection     Visual impairment     Kertacanis     PAST SURGICAL HISTORY     has a past surgical history that includes Knee surgery; Dental surgery; Cyst Removal; d & c with suction (N/A, 09/18/2017); Dilation and curettage of uterus (09/18/2017); Eye surgery (12/2018); [place] tooth extraction; Vaginal delivery; Cholecystectomy (11/27/2023); and Cholecystectomy open.  CURRENT MEDICATIONS    No current facility-administered medications on file prior to encounter.     Current Outpatient Medications on File Prior to Encounter   Medication Sig Dispense Refill    buPROPion XL (WELLBUTRIN XL) 150 MG 24 hr tablet Take 1 tablet by mouth Daily. 30 tablet 5    cetirizine (zyrTEC) 10 MG tablet TAKE ONE TABLET BY MOUTH DAILY 30 tablet 5    clotrimazole (Clotrimazole-7) 1 % vaginal cream Insert  into the vagina Daily. 7 each 0    famotidine (Pepcid) 20 MG tablet Take 1 tablet by mouth Daily. 30 tablet 3    Loratadine (Claritin) 10 MG capsule Take 1 capsule by mouth Daily.      montelukast (SINGULAIR) 10 MG tablet TAKE ONE TABLET BY MOUTH ONCE NIGHTLY 90 tablet 0    ondansetron ODT (ZOFRAN-ODT) 4 MG disintegrating tablet 1 tablet.      vitamin C (ASCORBIC ACID) 500 MG tablet TAKE 2 TABLETS BY MOUTH DAILY 60 tablet 1    vitamin D3 125 MCG (5000 UT) capsule capsule Take 1 capsule by mouth Daily. 30 capsule 3    acetaminophen (TYLENOL) 325 MG tablet Take 2 tablets by mouth Every 4 (Four) Hours. 90 tablet 2    albuterol sulfate  (90 Base) MCG/ACT inhaler Inhale 2 puffs Every 4 (Four) Hours As Needed for Wheezing. 18 g 0    amoxicillin (AMOXIL) 875 MG tablet Take 1 tablet by mouth 2 (Two) Times a Day. 20 tablet 0    docusate sodium 100 MG capsule Take 1 capsule by mouth 2 (Two) Times a Day As Needed for Constipation (constipation). 30 capsule 2     ALLERGIES    Oxycodone  SOCIAL HISTORY    Social History     Tobacco Use   Smoking Status Never    Smokeless Tobacco Never     Social History     Substance and Sexual Activity   Alcohol Use Not Currently    Alcohol/week: 2.0 standard drinks of alcohol    Types: 2 Glasses of wine per week    Comment: OCCASIONAL     Social History     Substance and Sexual Activity   Drug Use No     FAMILY HISTORY  The patient has a family history of    PHYSICAL EXAMINATION    Vital Signs Range for the last 24 hours  Temperature: Temp:  [97.4 °F (36.3 °C)] 97.4 °F (36.3 °C)   Temp Source: Temp src: Oral   BP: BP: (95)/(60) 95/60   Pulse: Heart Rate:  [106] 106   Respirations: Resp:  [18] 18   Weight: 92.1 kg (203 lb)     Constitutional:  Well developed, well nourished, no acute distress, well-groomed.  HEENT: Grossly normal.  Respiratory:  Unlabored, normal breath sounds.   Cardiovascular:  Normal rate and rhythm  Abdomen:  Soft, gravid, nontender.  Uterus: Soft, nontender. Fundus appropriate for dates.  Neurologic:  Alert & oriented x 4,  no focal deficits noted.   Psychiatric:  Speech and behavior appropriate.   Extremities: no cyanosis, clubbing or edema, no evidence of DVT.      External Fetal Monitoring:  Fetal monitoring: indication contractions , onset 1307 , offset 1337 , baseline 145 , moderate variability , multiple accels (15 X 15), no decels, irregular contractions, interpretation category 1    Fetal Heart Rate Assessment   Method: Fetal HR Assessment Method: external   Beats/min: Fetal HR (beats/min): 150   Baseline: Fetal HR Baseline: normal range   Varibility: Fetal HR Variability: moderate (amplitude range 6 to 25 bpm)   Accels: Fetal HR Accelerations: greater than/equal to 15 bpm, lasting at least 15 seconds   Decels: Fetal HR Decelerations: absent   Tracing Category:       Uterine Assessment   Method: Method: external tocotransducer   Frequency (min): Contraction Frequency (Minutes): x1   Ctx Count in 10 min:     Duration:     Intensity: Contraction Intensity: no contractions   Intensity by IUPC:     Resting  Tone: Uterine Resting Tone: soft by palpation   Resting Tone by IUPC:     [place] Units:       Cervix: Exam by: Method: sterile exam per physician   Dilation:  FT   Effacement: Cervical Effacement: 50%   Station:  -3         Labs:  Results from last 7 days   Lab Units 24   ABO TYPING  O   RH TYPING  Positive   ANTIBODY SCREEN  Negative     Lab Results (last 24 hours)       Procedure Component Value Units Date/Time    CBC (No Diff) [[id number]]  (Abnormal) Collected: 24    Specimen: Blood Updated: 24 1420     WBC 7.77 10*3/mm3      RBC 3.59 10*6/mm3      Hemoglobin 10.0 g/dL      Hematocrit 31.2 %      MCV 86.9 fL      MCH 27.9 pg      MCHC 32.1 g/dL      RDW 13.3 %      RDW-SD 42.2 fl      MPV 11.1 fL      Platelets 246 10*3/mm3     Urinalysis With Microscopic If Indicated (No Culture) - Urine, Clean Catch [[id number]]  (Abnormal) Collected: 24    Specimen: Urine, Clean Catch Updated: 24 1419     Color, UA Yellow     Appearance, UA Clear     pH, UA 8.5     Specific Gravity, UA 1.017     Glucose, UA Negative     Ketones, UA Negative     Bilirubin, UA Negative     Blood, UA Negative     Protein, UA Trace     Leuk Esterase, UA Trace     Nitrite, UA Negative     Urobilinogen, UA 1.0 E.U./dL    Urinalysis, Microscopic Only - Urine, Clean Catch [[id number]]  (Abnormal) Collected: 24    Specimen: Urine, Clean Catch Updated: 24 1419     RBC, UA 0-2 /HPF      WBC, UA 6-10 /HPF      Bacteria, UA None Seen /HPF      Squamous Epithelial Cells, UA 7-12 /HPF      Hyaline Casts, UA 0-6 /LPF      Methodology Automated Microscopy    T Pallidum Antibody w/ reflex RPR [[id number]] Collected: 24    Specimen: Blood Updated: 24 140            ASSESSMENT/PLAN:  33 y.o. female  at 33w1d with  uterine contractions, antepartum.       uterine contractions, antepartum    Recurrent UTI (urinary tract infection) complicating pregnancy, third  trimester    Check UA  CBC, T&S, labs  IVFH x 2 liters  Watch for PTL    Approximately 25 minutes minutes floor time was spent in care of this patient, of which greater than 50% was spent in face-to-face consultation and coordination of care.  Rocephin 1 gm IV     labor precautions given and patient told to RTC if contractions continue, increase in pain and/or frequency  Suzy Vera MD  2024  16:23 EST

## 2024-02-23 ENCOUNTER — HOSPITAL ENCOUNTER (OUTPATIENT)
Facility: HOSPITAL | Age: 34
Discharge: HOME | End: 2024-02-24
Attending: OBSTETRICS & GYNECOLOGY | Admitting: OBSTETRICS & GYNECOLOGY
Payer: MEDICAID

## 2024-02-23 VITALS
SYSTOLIC BLOOD PRESSURE: 98 MMHG | OXYGEN SATURATION: 98 % | WEIGHT: 203 LBS | HEART RATE: 102 BPM | BODY MASS INDEX: 38.33 KG/M2 | HEIGHT: 61 IN | RESPIRATION RATE: 18 BRPM | DIASTOLIC BLOOD PRESSURE: 48 MMHG | TEMPERATURE: 98.3 F

## 2024-02-23 LAB
ABO GROUP BLD: NORMAL
ALP SERPL-CCNC: 145 U/L (ref 39–117)
ALT SERPL W P-5'-P-CCNC: 9 U/L (ref 1–33)
AST SERPL-CCNC: 19 U/L (ref 1–32)
BILIRUB SERPL-MCNC: 0.3 MG/DL (ref 0–1.2)
BLD GP AB SCN SERPL QL: NEGATIVE
CREAT SERPL-MCNC: 0.69 MG/DL (ref 0.57–1)
DEPRECATED RDW RBC AUTO: 43 FL (ref 37–54)
ERYTHROCYTE [DISTWIDTH] IN BLOOD BY AUTOMATED COUNT: 14 % (ref 12.3–15.4)
HCT VFR BLD AUTO: 32.2 % (ref 34–46.6)
HGB BLD-MCNC: 10.2 G/DL (ref 12–15.9)
LDH SERPL-CCNC: 181 U/L (ref 135–214)
MCH RBC QN AUTO: 27 PG (ref 26.6–33)
MCHC RBC AUTO-ENTMCNC: 31.7 G/DL (ref 31.5–35.7)
MCV RBC AUTO: 85.2 FL (ref 79–97)
PLATELET # BLD AUTO: 239 10*3/MM3 (ref 140–450)
PMV BLD AUTO: 11 FL (ref 6–12)
RBC # BLD AUTO: 3.78 10*6/MM3 (ref 3.77–5.28)
RH BLD: POSITIVE
T&S EXPIRATION DATE: NORMAL
URATE SERPL-MCNC: 4.2 MG/DL (ref 2.4–5.7)
WBC NRBC COR # BLD AUTO: 6.76 10*3/MM3 (ref 3.4–10.8)

## 2024-02-23 PROCEDURE — 25810000003 LACTATED RINGERS SOLUTION: Performed by: OBSTETRICS & GYNECOLOGY

## 2024-02-23 PROCEDURE — 25810000003 LACTATED RINGERS PER 1000 ML: Performed by: OBSTETRICS & GYNECOLOGY

## 2024-02-23 PROCEDURE — 85027 COMPLETE CBC AUTOMATED: CPT | Performed by: OBSTETRICS & GYNECOLOGY

## 2024-02-23 PROCEDURE — G0463 HOSPITAL OUTPT CLINIC VISIT: HCPCS

## 2024-02-23 PROCEDURE — 86901 BLOOD TYPING SEROLOGIC RH(D): CPT | Performed by: OBSTETRICS & GYNECOLOGY

## 2024-02-23 PROCEDURE — 82565 ASSAY OF CREATININE: CPT | Performed by: OBSTETRICS & GYNECOLOGY

## 2024-02-23 PROCEDURE — 82247 BILIRUBIN TOTAL: CPT | Performed by: OBSTETRICS & GYNECOLOGY

## 2024-02-23 PROCEDURE — 86850 RBC ANTIBODY SCREEN: CPT | Performed by: OBSTETRICS & GYNECOLOGY

## 2024-02-23 PROCEDURE — 86780 TREPONEMA PALLIDUM: CPT | Performed by: OBSTETRICS & GYNECOLOGY

## 2024-02-23 PROCEDURE — 84460 ALANINE AMINO (ALT) (SGPT): CPT | Performed by: OBSTETRICS & GYNECOLOGY

## 2024-02-23 PROCEDURE — 83615 LACTATE (LD) (LDH) ENZYME: CPT | Performed by: OBSTETRICS & GYNECOLOGY

## 2024-02-23 PROCEDURE — 84450 TRANSFERASE (AST) (SGOT): CPT | Performed by: OBSTETRICS & GYNECOLOGY

## 2024-02-23 PROCEDURE — 36415 COLL VENOUS BLD VENIPUNCTURE: CPT | Performed by: OBSTETRICS & GYNECOLOGY

## 2024-02-23 PROCEDURE — 86900 BLOOD TYPING SEROLOGIC ABO: CPT | Performed by: OBSTETRICS & GYNECOLOGY

## 2024-02-23 PROCEDURE — 84550 ASSAY OF BLOOD/URIC ACID: CPT | Performed by: OBSTETRICS & GYNECOLOGY

## 2024-02-23 PROCEDURE — 84075 ASSAY ALKALINE PHOSPHATASE: CPT | Performed by: OBSTETRICS & GYNECOLOGY

## 2024-02-23 RX ORDER — SODIUM CHLORIDE, SODIUM LACTATE, POTASSIUM CHLORIDE, CALCIUM CHLORIDE 600; 310; 30; 20 MG/100ML; MG/100ML; MG/100ML; MG/100ML
125 INJECTION, SOLUTION INTRAVENOUS CONTINUOUS
Status: DISCONTINUED | OUTPATIENT
Start: 2024-02-23 | End: 2024-02-24 | Stop reason: HOSPADM

## 2024-02-23 RX ADMIN — SODIUM CHLORIDE, POTASSIUM CHLORIDE, SODIUM LACTATE AND CALCIUM CHLORIDE 125 ML/HR: 600; 310; 30; 20 INJECTION, SOLUTION INTRAVENOUS at 22:38

## 2024-02-23 RX ADMIN — SODIUM CHLORIDE, POTASSIUM CHLORIDE, SODIUM LACTATE AND CALCIUM CHLORIDE 1000 ML: 600; 310; 30; 20 INJECTION, SOLUTION INTRAVENOUS at 22:00

## 2024-02-24 LAB — T PALLIDUM IGG SER QL: NORMAL

## 2024-02-24 PROCEDURE — 59025 FETAL NON-STRESS TEST: CPT | Performed by: OBSTETRICS & GYNECOLOGY

## 2024-02-24 PROCEDURE — 99213 OFFICE O/P EST LOW 20 MIN: CPT | Performed by: OBSTETRICS & GYNECOLOGY

## 2024-02-24 NOTE — H&P
[facility]  Obstetric History and Physical    Chief Complaint   Patient presents with    Contractions       HPI:      Patient is a 33 y.o. female  currently at 35w5d, who presents with contractions.  She said she started oc regularly about hour and a half from her arrival.  She was contractions every 2-3 minutes and seemed uncomfortable.    Her cervix was initially called -.  She was taken to [facility] for observation and received IV hydration.  Her contractions spaced out and became less uncomfortable.    She had no vaginal leaking or bleeding.   +FM.    After a period of observation she was unchanged.    She was given the options of therapeutic rests here versus going home.   She decided that she wanted to go home.          The following portions of the patients history were reviewed and updated as appropriate: current medications, allergies, past medical history, past surgical history, past family history, past social history and problem list .       Prenatal Information:   Prenatal Labs  Lab Results   Component Value Date    HEPBSAG Non-Reactive 2023    ABO O 2024    RH Positive 2024    ABSCRN Negative 2024    HEPCVIRUSABY Non-Reactive 2023         External Prenatal Results       Pregnancy Outside Results - Transcribed From Office Records - See Scanned Records For Details       Test Value Date Time    ABO  O  24    Rh  Positive  24    Antibody Screen  Negative  24 2206       Negative  24 1344       Negative  23 1448    Varicella IgG       Rubella  3.09 index 23 1448    Hgb  10.2 g/dL 24 2206       10.0 g/dL 24 1344       10.7 g/dL 24 1738      ^ 10.8 g/dL 23 0323      ^ 10.8 g/dL 23 0720      ^ 11.9 g/dL 10/27/23 0728       10.5 g/dL 10/23/23 0747       10.1 g/dL 10/22/23 0947       11.4 g/dL 10/21/23 2043       12.7 g/dL 10/21/23 0731       12.4 g/dL 23 1448       12.2 g/dL  09/14/23 0234       12.4 g/dL 09/07/23 0839       12.8 g/dL 08/15/23 1347    Hct  32.2 % 02/23/24 2206       31.2 % 02/14/24 1344       32.8 % 02/01/24 1738      ^ 33.6 % 11/28/23 0323      ^ 33.1 % 11/27/23 0720      ^ 35.2 % 10/27/23 0728       31.8 % 10/23/23 0747       30.9 % 10/22/23 0947       33.5 % 10/21/23 2043       37.7 % 10/21/23 0731       36.5 % 09/19/23 1448       36.9 % 09/14/23 0234       37.9 % 09/07/23 0839       39.6 % 08/15/23 1347    Glucose Fasting GTT       Glucose Tolerance Test 1 hour       Glucose Tolerance Test 3 hour       Gonorrhea (discrete)  Negative  08/22/23 1416    Chlamydia (discrete)  Negative  08/22/23 1416    RPR  Non-Reactive  04/24/19 1131    VDRL       Syphilis Antibody       HBsAg  Non-Reactive  09/19/23 1448       Negative  08/22/23 1422    Herpes Simplex Virus PCR       Herpes Simplex VIrus Culture       HIV  Non-Reactive  09/19/23 1448       Non-Reactive  08/22/23 1422    Hep C RNA Quant PCR       Hep C Antibody  Non-Reactive  09/19/23 1448       Non-Reactive  08/22/23 1422    AFP       Group B Strep ^ Negative  11/06/19     GBS Susceptibility to Clindamycin       GBS Susceptibility to Erythromycin       Fetal Fibronectin       Genetic Testing, Maternal Blood                 Drug Screening       Test Value Date Time    Urine Drug Screen       Amphetamine Screen  Negative  09/19/23 1448    Barbiturate Screen  Negative  09/19/23 1448    Benzodiazepine Screen  Negative  09/19/23 1448    Methadone Screen  Negative  09/19/23 1448    Phencyclidine Screen  Negative  09/19/23 1448    Opiates Screen  Negative  09/19/23 1448    THC Screen  Negative  09/19/23 1448    Cocaine Screen       Propoxyphene Screen  Negative  09/19/23 1448    Buprenorphine Screen  Negative  09/19/23 1448    Methamphetamine Screen       Oxycodone Screen  Negative  09/19/23 1448    Tricyclic Antidepressants Screen  Negative  09/19/23 1448              Legend    ^: Historical                              Past  OB History:       OB History    Para Term  AB Living   3 1 1 0 1 1   SAB IAB Ectopic Molar Multiple Live Births   0 0 0 0 0 1      # Outcome Date GA Lbr Conrad/2nd Weight Sex Delivery Anes PTL Lv   3 Current            2 Term 19 39w0d 12:24 / 01:45 2745 g (6 lb 0.8 oz) F Vag-Spont EPI N DAILY      Name: LINDSEY BRO      Apgar1: 7  Apgar5: 9   1 AB 2017 9w0d   U          Birth Comments: D&C       Past Medical History: Past Medical History:   Diagnosis Date    Abnormal Pap smear of cervix     Anxiety     Asthma     Chlamydia         Chlamydia contact, treated     Depression     Gonorrhea         Urinary tract infection     Visual impairment     Kertacanis      Past Surgical History Past Surgical History:   Procedure Laterality Date    CHOLECYSTECTOMY  2023    CHOLECYSTECTOMY OPEN      CYST REMOVAL      D & C WITH SUCTION N/A 2017    Procedure: DILATATION AND CURETTAGE WITH SUCTION;  Surgeon: Kelley Dodge DO;  Location: [facility];  Service:     DENTAL PROCEDURE      DILATATION AND CURETTAGE  2017    EYE SURGERY  2018    Intact Surgery    KNEE SURGERY      VAGINAL DELIVERY      x1    WISDOM TOOTH EXTRACTION        Family History: Family History   Problem Relation Age of Onset    Arthritis Mother     COPD Mother     Liver disease Maternal Uncle     Breast cancer Maternal Grandmother     Cancer Maternal Grandmother     Diabetes Maternal Grandfather     Cancer Maternal Grandfather     Breast cancer Paternal Grandmother     Ovarian cancer Paternal Grandmother     Diabetes Paternal Grandmother     Heart attack Paternal Grandmother     Hypertension Paternal Grandmother     Stroke Paternal Grandmother     Cancer Paternal Grandmother     Colon cancer Paternal Grandfather       Social History:  reports that she has never smoked. She has never used smokeless tobacco.   reports that she does not currently use alcohol after a past usage of about 2.0 standard drinks of alcohol per  week.   reports no history of drug use.        General ROS: Denies fever, HA, shortness of air, muscle weakness, and rashes    Objective       Vital Signs Range for the last 24 hours  Temperature: Temp:  [98.3 °F (36.8 °C)] 98.3 °F (36.8 °C)   Temp Source: Temp src: Oral   BP: BP: (98)/(48) 98/48   Pulse: Heart Rate:  [102] 102   Respirations: Resp:  [18] 18   SPO2: SpO2:  [98 %] 98 %   O2 Amount (l/min):     O2 Devices Device (Oxygen Therapy): room air   Weight: Weight:  [92.1 kg (203 lb)] 92.1 kg (203 lb)     Physical Examination: Alert and oriented X 3  Chest - CTAB   Heart - Regular rate   Abdomen - no rebound tenderness noted  Gravid uterus, No RUQ pain, No guarding  Extremities - Non-tender bilaterally        Cervix: Exam by:  EVERETT    Dilation:  1-2   Effacement:  50   Station:  -2       Fetal Heart Rate Assessment   Method:     Beats/min:     Baseline:  130s   Varibility:  mod   Accels:  y   Decels:  n   Tracing Category:  1     Uterine Assessment   Method:     Frequency (min):  Irregular    Ctx Count in 10 min:     Duration:     Intensity:  Mild    Intensity by IUPC:     Resting Tone:     Resting Tone by IUPC:     [place] Units:       maxi Hoang  at 35w5d with an MAHESH of 3/25/2024, by Patient Reported,  Non stress test.    Indication:  contractions   Start time: 2330  End time:  0030  15 x 15 accelerations x 2  Yes  No decelerations  Baseline   130s  Reactive NST  Yes            Assessment:  1.  Intrauterine pregnancy at 35w5d weeks gestation with reactive fetal status.    2.  False labour not ruptured      Plan:  1. FHTs  Reactive NST  2. No cervical change or signs and symptoms of SROM or labour  3. Reviewed labour guidelines.  Offered in-house therapeutic rest, but she felt like                     going home.   4. All questions have been answered.  5. Discharge home with routine OB follow-up      Sterling Lewis MD  2024  00:43 EST

## 2024-02-26 ENCOUNTER — HOSPITAL ENCOUNTER (OUTPATIENT)
Facility: HOSPITAL | Age: 34
Discharge: HOME | End: 2024-02-26
Attending: ADVANCED PRACTICE MIDWIFE | Admitting: OBSTETRICS & GYNECOLOGY
Payer: MEDICAID

## 2024-02-26 ENCOUNTER — APPOINTMENT (OUTPATIENT)
Dept: WOMENS IMAGING | Facility: HOSPITAL | Age: 34
End: 2024-02-26
Payer: MEDICAID

## 2024-02-26 VITALS
TEMPERATURE: 97.8 F | SYSTOLIC BLOOD PRESSURE: 103 MMHG | OXYGEN SATURATION: 97 % | DIASTOLIC BLOOD PRESSURE: 69 MMHG | RESPIRATION RATE: 18 BRPM | HEART RATE: 108 BPM

## 2024-02-26 LAB
BACTERIA UR QL AUTO: ABNORMAL /HPF
BILIRUB UR QL STRIP: NEGATIVE
CLARITY UR: CLEAR
COLOR UR: YELLOW
GLUCOSE UR STRIP-MCNC: NEGATIVE MG/DL
HGB UR QL STRIP.AUTO: NEGATIVE
HYALINE CASTS UR QL AUTO: ABNORMAL /LPF
KETONES UR QL STRIP: ABNORMAL
LEUKOCYTE ESTERASE UR QL STRIP.AUTO: ABNORMAL
NITRITE UR QL STRIP: NEGATIVE
PH UR STRIP.AUTO: 7 [PH] (ref 5–8)
PROT UR QL STRIP: NEGATIVE
RBC # UR STRIP: ABNORMAL /HPF
REF LAB TEST METHOD: ABNORMAL
SP GR UR STRIP: 1.02 (ref 1–1.03)
SQUAMOUS #/AREA URNS HPF: ABNORMAL /HPF
UROBILINOGEN UR QL STRIP: ABNORMAL
WBC # UR STRIP: ABNORMAL /HPF

## 2024-02-26 PROCEDURE — 59025 FETAL NON-STRESS TEST: CPT

## 2024-02-26 PROCEDURE — 25010000002 TERBUTALINE PER 1 MG: Performed by: OBSTETRICS & GYNECOLOGY

## 2024-02-26 PROCEDURE — 76811 OB US DETAILED SNGL FETUS: CPT

## 2024-02-26 PROCEDURE — 76811 OB US DETAILED SNGL FETUS: CPT | Performed by: OBSTETRICS & GYNECOLOGY

## 2024-02-26 PROCEDURE — G0463 HOSPITAL OUTPT CLINIC VISIT: HCPCS

## 2024-02-26 PROCEDURE — 76820 UMBILICAL ARTERY ECHO: CPT | Performed by: OBSTETRICS & GYNECOLOGY

## 2024-02-26 PROCEDURE — 76819 FETAL BIOPHYS PROFIL W/O NST: CPT | Performed by: OBSTETRICS & GYNECOLOGY

## 2024-02-26 PROCEDURE — 76819 FETAL BIOPHYS PROFIL W/O NST: CPT

## 2024-02-26 PROCEDURE — 96372 THER/PROPH/DIAG INJ SC/IM: CPT

## 2024-02-26 PROCEDURE — 81001 URINALYSIS AUTO W/SCOPE: CPT | Performed by: OBSTETRICS & GYNECOLOGY

## 2024-02-26 RX ORDER — PROMETHAZINE HYDROCHLORIDE 25 MG/1
25 TABLET ORAL ONCE
Status: DISCONTINUED | OUTPATIENT
Start: 2024-02-26 | End: 2024-02-26 | Stop reason: HOSPADM

## 2024-02-26 RX ORDER — DIPHENHYDRAMINE HCL 50 MG
50 CAPSULE ORAL EVERY 4 HOURS PRN
OUTPATIENT
Start: 2024-02-26

## 2024-02-26 RX ORDER — TERBUTALINE SULFATE 1 MG/ML
0.25 INJECTION, SOLUTION SUBCUTANEOUS ONCE
Status: COMPLETED | OUTPATIENT
Start: 2024-02-26 | End: 2024-02-26

## 2024-02-26 RX ADMIN — TERBUTALINE SULFATE 0.25 MG: 1 INJECTION SUBCUTANEOUS at 15:36

## 2024-02-26 NOTE — L&D DELIVERY NOTE
Patient requested to talk to her primary provider.  Yamile Matson is on vacation but Narcisa Mccall CNM on call and will come down and talk with her. Narcisa discusses all options with patient and will move forward with Terbutaline.  Patient okay with plan and will follow up with Yamile tomorrow. Patient instructed to come back if water breaks, decreased fetal movement, bleeding like a period, or regular contractions occur.

## 2024-02-26 NOTE — H&P
Triage H&P    Patient Name: Padma Younger  : 1990  MRN: [id number]  Date of Service: 2024  Referring Provider: Yamile Matson     ID: 33 y.o.  at 36w0d    Chief complaint: contractions    HPI:  Pt arrived via EMS for uterine contractions with cervical exam /-3, pt denies vaginal bleeding, no LOF, normal fetal movement, pt is tolerating PO and has no other complaints    Pregnancy course significant for:    Patient Active Problem List    Diagnosis      uterine contractions, antepartum [O47.00]     Recurrent UTI (urinary tract infection) complicating pregnancy, third trimester [O23.43]     Abdominal pain in pregnancy [O26.899, R10.9]     Nausea and vomiting in pregnancy [O21.9]      (normal spontaneous vaginal delivery) [O80]        Her previous obstetric/gynecological history is noted for  normal fullterm vaginal delivery .    The following portions of the patients history were reviewed and updated as appropriate: current medications, allergies, past medical history, past surgical history, past family history, past social history, and problem list.       REVIEW OF SYSTEMS  irregular contractions   Patient reports good fetal movement.  She denies any vaginal bleeding, leakage of fluid  She denies headache, visual changes, or right upper quadrant pain.  All other systems were reviewed and were negative.    Prenatal Labs  Lab Results   Component Value Date    HGB 10.2 (L) 2024    HEPBSAG Non-Reactive 2023    ABO O 2024    RH Positive 2024    ABSCRN Negative 2024    [id number] Non-Reactive 2023    HEPCVIRUSABY Non-Reactive 2023    URINECX >100,000 CFU/mL Normal Urogenital Diamond 2023       OB HISTORY    OB History          3    Para   1    Term   1       0    AB   1    Living   1         SAB   0    IAB   0    Ectopic   0    Molar   0    Multiple   0    Live Births   1              PAST MEDICAL HISTORY    Past Medical History:    Diagnosis Date    Abnormal Pap smear of cervix     Anxiety     Asthma     Chlamydia     2013    Chlamydia contact, treated     Depression     Gonorrhea     2013    Urinary tract infection     Visual impairment     Kertacanis     PAST SURGICAL HISTORY     has a past surgical history that includes Knee surgery; Dental surgery; Cyst Removal; d & c with suction (N/A, 09/18/2017); Dilation and curettage of uterus (09/18/2017); Eye surgery (12/2018); [place] tooth extraction; Vaginal delivery; Cholecystectomy (11/27/2023); and Cholecystectomy open.  CURRENT MEDICATIONS    No current facility-administered medications on file prior to encounter.     Current Outpatient Medications on File Prior to Encounter   Medication Sig Dispense Refill    albuterol sulfate  (90 Base) MCG/ACT inhaler Inhale 2 puffs Every 4 (Four) Hours As Needed for Wheezing. 18 g 0    buPROPion XL (WELLBUTRIN XL) 150 MG 24 hr tablet Take 1 tablet by mouth Daily. 30 tablet 5    cetirizine (zyrTEC) 10 MG tablet TAKE ONE TABLET BY MOUTH DAILY 30 tablet 5    Loratadine (Claritin) 10 MG capsule Take 1 capsule by mouth Daily.      montelukast (SINGULAIR) 10 MG tablet TAKE ONE TABLET BY MOUTH ONCE NIGHTLY 90 tablet 0    ondansetron ODT (ZOFRAN-ODT) 4 MG disintegrating tablet 1 tablet.      vitamin C (ASCORBIC ACID) 500 MG tablet TAKE 2 TABLETS BY MOUTH DAILY 60 tablet 1    vitamin D3 125 MCG (5000 UT) capsule capsule Take 1 capsule by mouth Daily. 30 capsule 3    acetaminophen (TYLENOL) 325 MG tablet Take 2 tablets by mouth Every 4 (Four) Hours. 90 tablet 2    docusate sodium 100 MG capsule Take 1 capsule by mouth 2 (Two) Times a Day As Needed for Constipation (constipation). 30 capsule 2    famotidine (Pepcid) 20 MG tablet Take 1 tablet by mouth Daily. 30 tablet 3     ALLERGIES    Oxycodone  SOCIAL HISTORY    Social History     Tobacco Use   Smoking Status Never   Smokeless Tobacco Never     Social History     Substance and Sexual Activity   Alcohol  Use Not Currently    Alcohol/week: 2.0 standard drinks of alcohol    Types: 2 Glasses of wine per week    Comment: OCCASIONAL     Social History     Substance and Sexual Activity   Drug Use No     FAMILY HISTORY  The patient has a family history of    PHYSICAL EXAMINATION    Vital Signs Range for the last 24 hours  Temperature: Temp:  [97.8 °F (36.6 °C)] 97.8 °F (36.6 °C)   Temp Source: Temp src: Oral   BP: BP: (103)/(69) 103/69   Pulse: Heart Rate:  [91] 91   Respirations: Resp:  [18] 18   Weight:       Constitutional:  Well developed, well nourished, no acute distress, well-groomed.  HEENT: Grossly normal.  Respiratory: Unlabored, normal breath sounds.   Cardiovascular:  Normal rate and rhythm  Abdomen:  Soft, gravid, nontender.  Uterus: Soft, nontender. Fundus appropriate for dates.  Neurologic:  Alert & oriented x 4,  no focal deficits noted.   Psychiatric:  Speech and behavior appropriate.   Extremities: no cyanosis, clubbing or edema, no evidence of DVT.      External Fetal Monitoring:  Fetal monitoring: indication contractions , onset 0920 , offset 1100 , baseline 150s , moderate variability , multiple accels (15 X 15), 3 variable decels, irregular contractions, interpretation reactive reassuring needs evaluation for variable decels    Fetal Heart Rate Assessment   Method: Fetal HR Assessment Method: external   Beats/min: Fetal HR (beats/min): 140   Baseline: Fetal HR Baseline: normal range   Varibility: Fetal HR Variability: moderate (amplitude range 6 to 25 bpm)   Accels: Fetal HR Accelerations: absent   Decels: Fetal HR Decelerations: variable   Tracing Category:       Uterine Assessment   Method: Method: external tocotransducer   Frequency (min): Contraction Frequency (Minutes): 4-6   Ctx Count in 10 min:     Duration:     Intensity: Contraction Intensity: mild by palpation   Intensity by IUPC:     Resting Tone: Uterine Resting Tone: soft by palpation   Resting Tone by IUPC:     [place] Units:        Cervix: Exam by: Method: sterile exam per RN   Dilation:  1   Effacement: Cervical Effacement: 50-60%   Station:  -3         Labs:  Results from last 7 days   Lab Units 24   ABO TYPING  O   RH TYPING  Positive   ANTIBODY SCREEN  Negative     Lab Results (last 24 hours)       Procedure Component Value Units Date/Time    Urinalysis With Microscopic If Indicated (No Culture) - Urine, Clean Catch [[id number]]  (Abnormal) Collected: 24    Specimen: Urine, Clean Catch Updated: 24 1027     Color, UA Yellow     Appearance, UA Clear     pH, UA 7.0     Specific Gravity, UA 1.020     Glucose, UA Negative     Ketones, UA Trace     Bilirubin, UA Negative     Blood, UA Negative     Protein, UA Negative     Leuk Esterase, UA Trace     Nitrite, UA Negative     Urobilinogen, UA 0.2 E.U./dL    Urinalysis, Microscopic Only - Urine, Clean Catch [[id number]]  (Abnormal) Collected: 24    Specimen: Urine, Clean Catch Updated: 24 1027     RBC, UA 0-2 /HPF      WBC, UA 3-5 /HPF      Bacteria, UA None Seen /HPF      Squamous Epithelial Cells, UA 3-6 /HPF      Hyaline Casts, UA 0-6 /LPF      Methodology Automated Microscopy            ASSESSMENT/PLAN:  33 y.o. female  at 36w0d with false labor     PDC scan for variable decelerations    Disposition after scan  Approximately 25 minutes minutes floor time was spent in care of this patient, of which greater than 50% was spent in face-to-face consultation and coordination of care.    Suzy Vera MD  2024  10:38 EST    Normal ultrasound with BPP   Discharge home  RTC as needed

## 2024-02-26 NOTE — LETTER
February 26, 2024     Patient: Padma Younger   YOB: 1990   Date of Visit: 2/26/2024       To Whom It May Concern:    Please excuse Padma Younger from work on 2/26/2024 for she was in the hospital concerning her pregnancy.            Sincerely,        Narcisa Cheng CNM

## 2024-03-05 LAB — EXTERNAL GROUP B STREP ANTIGEN: NEGATIVE

## 2024-03-08 ENCOUNTER — HOSPITAL ENCOUNTER (OUTPATIENT)
Facility: HOSPITAL | Age: 34
Discharge: HOME | End: 2024-03-09
Attending: ADVANCED PRACTICE MIDWIFE | Admitting: OBSTETRICS & GYNECOLOGY
Payer: MEDICAID

## 2024-03-08 VITALS
DIASTOLIC BLOOD PRESSURE: 74 MMHG | SYSTOLIC BLOOD PRESSURE: 114 MMHG | RESPIRATION RATE: 16 BRPM | HEART RATE: 112 BPM | OXYGEN SATURATION: 95 % | TEMPERATURE: 97.7 F

## 2024-03-08 PROCEDURE — G0463 HOSPITAL OUTPT CLINIC VISIT: HCPCS

## 2024-03-08 PROCEDURE — 59025 FETAL NON-STRESS TEST: CPT

## 2024-03-09 PROCEDURE — 63710000001 PROMETHAZINE PER 25 MG: Performed by: OBSTETRICS & GYNECOLOGY

## 2024-03-09 PROCEDURE — 59025 FETAL NON-STRESS TEST: CPT | Performed by: OBSTETRICS & GYNECOLOGY

## 2024-03-09 PROCEDURE — 25010000002 MORPHINE PER 10 MG: Performed by: OBSTETRICS & GYNECOLOGY

## 2024-03-09 PROCEDURE — 99213 OFFICE O/P EST LOW 20 MIN: CPT | Performed by: OBSTETRICS & GYNECOLOGY

## 2024-03-09 PROCEDURE — 96374 THER/PROPH/DIAG INJ IV PUSH: CPT

## 2024-03-09 RX ORDER — MORPHINE SULFATE 10 MG/ML
10 INJECTION INTRAMUSCULAR; INTRAVENOUS; SUBCUTANEOUS ONCE
Status: COMPLETED | OUTPATIENT
Start: 2024-03-09 | End: 2024-03-09

## 2024-03-09 RX ORDER — PROMETHAZINE HYDROCHLORIDE 25 MG/1
25 TABLET ORAL ONCE
Status: COMPLETED | OUTPATIENT
Start: 2024-03-09 | End: 2024-03-09

## 2024-03-09 RX ADMIN — PROMETHAZINE HYDROCHLORIDE 25 MG: 25 TABLET ORAL at 01:21

## 2024-03-09 RX ADMIN — MORPHINE SULFATE 10 MG: 10 INJECTION INTRAVENOUS at 01:24

## 2024-03-09 NOTE — H&P
"[facility]  Obstetric History and Physical    Chief Complaint   Patient presents with    Contractions       HPI:      Patient is a 34 y.o. female  currently at 37w5d, who presents with contractions.  She has been oc for the last few days, but they seemed to be a little worse tonight.    She was 2 cm in the office last week and was 3/70/-2 on admission.   She denies vaginal leaking and bleeding.   +FM.    After an hour and a half she was unchanged with mild contractions.         The following portions of the patients history were reviewed and updated as appropriate: current medications, allergies, past medical history, past surgical history, past family history, past social history and problem list .       Prenatal Information:   Maternal Prenatal Labs  Blood Type No results found for: \"ABO\"   Rh Status No results found for: \"RH\"   Antibody Screen No results found for: \"ABSCRN\"   Gonnorhea No results found for: \"GCCX\"   Chlamydia No results found for: \"CLAMYDCU\"   RPR No results found for: \"RPR\"   Syphilis Antibody No results found for: \"SYPHILIS\"   Rubella No results found for: \"RUBELLAIGGIN\"   Hepatitis B Surface Antigen No results found for: \"HEPBSAG\"   HIV-1 Antibody No results found for: \"LABHIV1\"   Hepatitis C Antibody No results found for: \"HEPCAB\"   Rapid Urin Drug Screen No results found for: \"AMPMETHU\", \"BARBITSCNUR\", \"LABBENZSCN\", \"LABMETHSCN\", \"LABOPIASCN\", \"THCURSCR\", \"COCAINEUR\", \"AMPHETSCREEN\", \"PROPOXSCN\", \"BUPRENORSCNU\", \"METAMPSCNUR\", \"OXYCODONESCN\", \"TRICYCLICSCN\"   Group B Strep Culture No results found for: \"GBSANTIGEN\"           External Prenatal Results       Pregnancy Outside Results - Transcribed From Office Records - See Scanned Records For Details       Test Value Date Time    ABO  O  24    Rh  Positive  24 2206    Antibody Screen  Negative  24 2206       Negative  24 1344       Negative  23 1448    Varicella IgG       Rubella  3.09 index " 09/19/23 1448    Hgb  10.2 g/dL 02/23/24 2206       10.0 g/dL 02/14/24 1344       10.7 g/dL 02/01/24 1738      ^ 10.8 g/dL 11/28/23 0323      ^ 10.8 g/dL 11/27/23 0720      ^ 11.9 g/dL 10/27/23 0728       10.5 g/dL 10/23/23 0747       10.1 g/dL 10/22/23 0947       11.4 g/dL 10/21/23 2043       12.7 g/dL 10/21/23 0731       12.4 g/dL 09/19/23 1448       12.2 g/dL 09/14/23 0234       12.4 g/dL 09/07/23 0839       12.8 g/dL 08/15/23 1347    Hct  32.2 % 02/23/24 2206       31.2 % 02/14/24 1344       32.8 % 02/01/24 1738      ^ 33.6 % 11/28/23 0323      ^ 33.1 % 11/27/23 0720      ^ 35.2 % 10/27/23 0728       31.8 % 10/23/23 0747       30.9 % 10/22/23 0947       33.5 % 10/21/23 2043       37.7 % 10/21/23 0731       36.5 % 09/19/23 1448       36.9 % 09/14/23 0234       37.9 % 09/07/23 0839       39.6 % 08/15/23 1347    Glucose Fasting GTT       Glucose Tolerance Test 1 hour       Glucose Tolerance Test 3 hour       Gonorrhea (discrete)  Negative  08/22/23 1416    Chlamydia (discrete)  Negative  08/22/23 1416    RPR  Non-Reactive  04/24/19 1131    VDRL       Syphilis Antibody       HBsAg  Non-Reactive  09/19/23 1448       Negative  08/22/23 1422    Herpes Simplex Virus PCR       Herpes Simplex VIrus Culture       HIV  Non-Reactive  09/19/23 1448       Non-Reactive  08/22/23 1422    Hep C RNA Quant PCR       Hep C Antibody  Non-Reactive  09/19/23 1448       Non-Reactive  08/22/23 1422    AFP       Group B Strep ^ Negative  11/06/19     GBS Susceptibility to Clindamycin       GBS Susceptibility to Erythromycin       Fetal Fibronectin       Genetic Testing, Maternal Blood                 Drug Screening       Test Value Date Time    Urine Drug Screen       Amphetamine Screen  Negative  09/19/23 1448    Barbiturate Screen  Negative  09/19/23 1448    Benzodiazepine Screen  Negative  09/19/23 1448    Methadone Screen  Negative  09/19/23 1448    Phencyclidine Screen  Negative  09/19/23 1448    Opiates Screen  Negative  09/19/23  1448    THC Screen  Negative  23 1448    Cocaine Screen       Propoxyphene Screen  Negative  23 1448    Buprenorphine Screen  Negative  23 1448    Methamphetamine Screen       Oxycodone Screen  Negative  23 1448    Tricyclic Antidepressants Screen  Negative  23 1448              Legend    ^: Historical                              Past OB History:     OB History    Para Term  AB Living   3 1 1 0 1 1   SAB IAB Ectopic Molar Multiple Live Births   0 0 0 0 0 1      # Outcome Date GA Lbr Conrad/2nd Weight Sex Type Anes PTL Lv   3 Current            2 Term 19 39w0d 12:24 / 01:45 2745 g (6 lb 0.8 oz) F Vag-Spont EPI N DAILY      Name: LINDSEY BRO      Apgar1: 7  Apgar5: 9   1 AB 2017 9w0d   U          Birth Comments: D&C       Past Medical History: Past Medical History:   Diagnosis Date    Abnormal Pap smear of cervix     Anxiety     Asthma     Chlamydia         Chlamydia contact, treated     Depression     Gonorrhea         Urinary tract infection     Visual impairment     Kertacanis      Past Surgical History Past Surgical History:   Procedure Laterality Date    CHOLECYSTECTOMY  2023    CHOLECYSTECTOMY OPEN      CYST REMOVAL      D & C WITH SUCTION N/A 2017    Procedure: DILATATION AND CURETTAGE WITH SUCTION;  Surgeon: Kelley Dodge DO;  Location: [facility] OR;  Service:     DENTAL PROCEDURE      DILATATION AND CURETTAGE  2017    EYE SURGERY  2018    Intact Surgery    KNEE SURGERY      VAGINAL DELIVERY      x1    WISDOM TOOTH EXTRACTION        Family History: Family History   Problem Relation Age of Onset    Arthritis Mother     COPD Mother     Liver disease Maternal Uncle     Breast cancer Maternal Grandmother     Cancer Maternal Grandmother     Diabetes Maternal Grandfather     Cancer Maternal Grandfather     Breast cancer Paternal Grandmother     Ovarian cancer Paternal Grandmother     Diabetes Paternal Grandmother     Heart attack Paternal  Grandmother     Hypertension Paternal Grandmother     Stroke Paternal Grandmother     Cancer Paternal Grandmother     Colon cancer Paternal Grandfather       Social History:  reports that she has never smoked. She has never used smokeless tobacco.   reports that she does not currently use alcohol after a past usage of about 2.0 standard drinks of alcohol per week.   reports no history of drug use.        Review of Systems  Denies fever, HA, CP, Shortness of air, muscle weakness, and rashes      Objective     Vital Signs Range for the last 24 hours  Temperature: Temp:  [97.7 °F (36.5 °C)] 97.7 °F (36.5 °C)   Temp Source: Temp src: Oral   BP: BP: (114)/(74) 114/74   Pulse: Heart Rate:  [112] 112   Respirations: Resp:  [16] 16   SPO2: SpO2:  [95 %] 95 %   O2 Amount (l/min):     O2 Devices Device (Oxygen Therapy): room air   Weight:       Physical Examination: General appearance - alert, well appearing, and in no distress and oriented to person, place, and time  Chest -   Breathing is unlaboured  Heart - Slight tachycardia   Abdomen - soft, nontender, nondistended,   no rebound tenderness noted  No guarding, No RUQ pain  Extremities - pedal edema  - +1        Cervix: Exam by: Method: sterile exam per RN   Dilation:  3   Effacement: Cervical Effacement: 60%   Station:  -2     Fetal Heart Rate Assessment   Method: Fetal HR Assessment Method: external   Beats/min: Fetal HR (beats/min): 155   Baseline: Fetal HR Baseline: normal range   Varibility: Fetal HR Variability: moderate (amplitude range 6 to 25 bpm)   Accels: Fetal HR Accelerations: greater than/equal to 15 bpm, lasting at least 15 seconds   Decels: Fetal HR Decelerations: absent   Tracing Category:       Uterine Assessment   Method: Method: external tocotransducer, palpation   Frequency (min): Contraction Frequency (Minutes): 5-6   Ctx Count in 10 min:     Duration:     Intensity: Contraction Intensity: mild by palpation   Intensity by IUPC:     Resting Tone:  Uterine Resting Tone: soft by palpation   Resting Tone by IUPC:     Delaney Units:      NST                     Indication:  false labour   Start time 0025                 End time 0100  15 x 15 accels x 2  Yes  No decels  Baseline 150s   Reactive NST - Yes           Assessment/Plan  1. Intrauterine pregnancy at 37w5d weeks gestation with reactive fetal status  2.  Contractions without evidence of labour   3.  Offered home therapeutic therapy - she received IM Morphine and                         PO Phenergan   4.  Given labour guidelines   5. Questions answered.   6.  D/C home.       Sterling Lewis MD  3/9/2024  02:11 EST

## 2024-03-10 ENCOUNTER — HOSPITAL ENCOUNTER (EMERGENCY)
Facility: HOSPITAL | Age: 34
Discharge: HOME | End: 2024-03-11
Attending: EMERGENCY MEDICINE | Admitting: EMERGENCY MEDICINE
Payer: MEDICAID

## 2024-03-10 VITALS
DIASTOLIC BLOOD PRESSURE: 65 MMHG | SYSTOLIC BLOOD PRESSURE: 111 MMHG | WEIGHT: 199 LBS | TEMPERATURE: 97.9 F | HEART RATE: 114 BPM | HEIGHT: 61 IN | RESPIRATION RATE: 18 BRPM | BODY MASS INDEX: 37.57 KG/M2 | OXYGEN SATURATION: 100 %

## 2024-03-10 DIAGNOSIS — Z76.89 ENCOUNTER FOR INCISION AND DRAINAGE PROCEDURE: ICD-10-CM

## 2024-03-10 DIAGNOSIS — Z34.93 THIRD TRIMESTER PREGNANCY: ICD-10-CM

## 2024-03-10 DIAGNOSIS — F41.9 ANXIETY AND DEPRESSION: ICD-10-CM

## 2024-03-10 DIAGNOSIS — Z87.440 HISTORY OF UTI: ICD-10-CM

## 2024-03-10 DIAGNOSIS — N61.1 ABSCESS OF LEFT BREAST: Primary | ICD-10-CM

## 2024-03-10 DIAGNOSIS — F32.A ANXIETY AND DEPRESSION: ICD-10-CM

## 2024-03-10 PROCEDURE — 99283 EMERGENCY DEPT VISIT LOW MDM: CPT

## 2024-03-10 RX ORDER — LIDOCAINE HYDROCHLORIDE 10 MG/ML
10 INJECTION, SOLUTION EPIDURAL; INFILTRATION; INTRACAUDAL; PERINEURAL ONCE
Status: COMPLETED | OUTPATIENT
Start: 2024-03-10 | End: 2024-03-11

## 2024-03-10 RX ORDER — CEPHALEXIN 250 MG/1
500 CAPSULE ORAL ONCE
Status: COMPLETED | OUTPATIENT
Start: 2024-03-10 | End: 2024-03-11

## 2024-03-11 PROCEDURE — 87070 CULTURE OTHR SPECIMN AEROBIC: CPT | Performed by: PHYSICIAN ASSISTANT

## 2024-03-11 PROCEDURE — 87076 CULTURE ANAEROBE IDENT EACH: CPT | Performed by: PHYSICIAN ASSISTANT

## 2024-03-11 PROCEDURE — 87205 SMEAR GRAM STAIN: CPT | Performed by: PHYSICIAN ASSISTANT

## 2024-03-11 RX ADMIN — LIDOCAINE HYDROCHLORIDE 10 ML: 10 INJECTION, SOLUTION EPIDURAL; INFILTRATION; INTRACAUDAL; PERINEURAL at 00:17

## 2024-03-11 RX ADMIN — CEPHALEXIN 500 MG: 250 CAPSULE ORAL at 00:11

## 2024-03-11 NOTE — DISCHARGE INSTRUCTIONS
Patient had skin abscess to the left breast.  We performed incision and drainage and we expelled copious amounts of purulent drainage during the ER evaluation.  Wound culture is in process.  Patient was given prescription for Keflex earlier today for urinary tract infection.  This is the same antibiotic we were going to prescribe, so she needs to take the previous prescription as directed.  Recommend close follow-up with routine OB/GYN, Dr. Musa, within 48 hours for recheck on left breast abscess and wound culture results.  Tylenol every 4-6 hours as needed for pain.  Keep wound clean with Dove soap or yellow antibacterial Dial.  Return to the ER for any worsening symptoms.

## 2024-03-11 NOTE — ED PROVIDER NOTES
Subjective   History of Present Illness  This is a 34-year-old female that presents the ER with left breast abscess to the inferior portion of the left breast x 1 week which has progressively worsened.  Patient says she has been using heat and warm compresses and now abscess is fluctuant.  Patient has not had any luck in expressing any purulent drainage.  Last abscess with incision and drainage was .  Patient denies history of diabetes mellitus or MRSA infection.  Patient actually was seen at [facility] ER earlier this same day and diagnosed with urinary tract infection.  Urinalysis revealed  white blood cells, 4+ bacteria patient also had 4+ calcium oxalate crystals.  Urine culture does not appear to be in process in epic.  Patient denies any abdominal pain or flank/CVA pain.  She denies fever, chills, or nausea/vomiting.  Patient is 38 weeks gestation and [facility].  She is  2 para 1 miscarriage 0.  She mentioned at [facility] ER assessment that she needed incision and drainage of left breast abscess but the provider did not feel comfortable.  Past medical history is also significant for asthma, anxiety/depression, and history of gonorrhea and chlamydia.    History provided by:  Patient  Abscess  Location:  Torso  Torso abscess location:  L breast  Size:  2.5  Abscess quality: fluctuance, painful and redness    Red streaking: no    Duration:  1 week  Progression:  Worsening  Pain details:     Duration:  1 week  Chronicity:  Recurrent (History of cutaneous abscesses in the past)  Context: not diabetes, not immunosuppression, not injected drug use, not insect bite/sting and not skin injury    Relieved by:  Nothing  Worsened by:  Warm compresses and warm water soaks  Ineffective treatments:  None tried  Associated symptoms: no anorexia, no fatigue, no fever, no headaches, no nausea and no vomiting    Risk factors: prior abscess    Risk factors: no  hx of MRSA        Review of Systems   Constitutional: Negative.  Negative for activity change, appetite change, chills, diaphoresis, fatigue and fever.   HENT: Negative.  Negative for congestion.    Respiratory: Negative.     Cardiovascular: Negative.    Gastrointestinal: Negative.  Negative for abdominal pain, anorexia, nausea and vomiting.   Genitourinary:  Negative for dysuria, flank pain, hematuria, pelvic pain, urgency, vaginal bleeding and vaginal discharge.        Patient is 38 weeks gestation.  Positive fetal movement.  No vaginal bleeding or discharge.  Diagnosed with UTI earlier this same day at [facility] ER and prescribed Keflex.  No urine culture noted in epic.   Musculoskeletal: Negative.  Negative for back pain.   Skin:  Positive for color change (Localized erythema around abscess to inferior left breast) and wound (Abscess to inferior portion of left breast at around 6:00.  Localized erythema.  No erythematous streaking).   Neurological: Negative.  Negative for headaches.   All other systems reviewed and are negative.      Past Medical History:   Diagnosis Date    Abnormal Pap smear of cervix     Anxiety     Asthma     Chlamydia     2013    Chlamydia contact, treated     Depression     Gonorrhea     2013    Urinary tract infection     Visual impairment     Kertacanis       Allergies   Allergen Reactions    Oxycodone Itching       Past Surgical History:   Procedure Laterality Date    CHOLECYSTECTOMY  11/27/2023    CHOLECYSTECTOMY OPEN      CYST REMOVAL      D & C WITH SUCTION N/A 09/18/2017    Procedure: DILATATION AND CURETTAGE WITH SUCTION;  Surgeon: Kelley Dodge DO;  Location: [facility];  Service:     DENTAL PROCEDURE      DILATATION AND CURETTAGE  09/18/2017    EYE SURGERY  12/2018    Intact Surgery    KNEE SURGERY      VAGINAL DELIVERY      x1    WISDOM TOOTH EXTRACTION         Family History   Problem Relation Age of Onset    Arthritis Mother     COPD Mother     Liver disease Maternal Uncle      Breast cancer Maternal Grandmother     Cancer Maternal Grandmother     Diabetes Maternal Grandfather     Cancer Maternal Grandfather     Breast cancer Paternal Grandmother     Ovarian cancer Paternal Grandmother     Diabetes Paternal Grandmother     Heart attack Paternal Grandmother     Hypertension Paternal Grandmother     Stroke Paternal Grandmother     Cancer Paternal Grandmother     Colon cancer Paternal Grandfather        Social History     Socioeconomic History    Marital status: Single    Number of children: 1   Tobacco Use    Smoking status: Never    Smokeless tobacco: Never   Vaping Use    Vaping status: Never Used   Substance and Sexual Activity    Alcohol use: Not Currently     Alcohol/week: 2.0 standard drinks of alcohol     Types: 2 Glasses of wine per week     Comment: OCCASIONAL    Drug use: No    Sexual activity: Yes     Partners: Male     Birth control/protection: Condom, Abstinence           Objective   Physical Exam  Vitals and nursing note reviewed.   Constitutional:       General: She is not in acute distress.     Appearance: Normal appearance. She is not ill-appearing, toxic-appearing or diaphoretic.      Comments: Friendly and talkative.  No acute sign of pain or distress.  Nontoxic   HENT:      Head: Normocephalic and atraumatic.      Nose: Nose normal.      Mouth/Throat:      Mouth: Mucous membranes are moist.      Pharynx: Oropharynx is clear.      Comments: Oral mucous membranes are moist  Eyes:      Extraocular Movements: Extraocular movements intact.      Conjunctiva/sclera: Conjunctivae normal.      Pupils: Pupils are equal, round, and reactive to light.   Cardiovascular:      Rate and Rhythm: Regular rhythm. Tachycardia present. No extrasystoles are present.     Pulses: Normal pulses.      Heart sounds: Normal heart sounds.      Comments: Mild tachycardia.  No ectopy.  No pedal edema to lower extremities  Pulmonary:      Effort: Pulmonary effort is normal.      Breath sounds: Normal  breath sounds.      Comments: Lungs are clear to auscultation bilaterally  Chest:   Breasts:     Left: Skin change and tenderness present. No swelling.      Comments: Patient has 2.5 cm fluctuant cutaneous abscess to the inferior portion of the left breast at around 6:00.  Mild localized surrounding erythema but no erythematous streaking.  No soft tissue swelling to the left breast.  Abdominal:      General: Bowel sounds are normal. There is no distension.      Palpations: Abdomen is soft.      Tenderness: There is no abdominal tenderness. There is no right CVA tenderness, left CVA tenderness, guarding or rebound.      Comments: Gravid uterus at 38 weeks gestation.  Soft and nontender.  Active bowel sounds in all 4 quadrants   Musculoskeletal:         General: Normal range of motion.      Cervical back: Normal range of motion and neck supple.      Right lower leg: No edema.      Left lower leg: No edema.   Skin:     General: Skin is warm and dry.      Findings: Abscess and erythema present.      Comments: 2.5 cm fluctuant abscess to the inferior portion of the left breast with localized erythema.  No erythematous streaking.   Neurological:      General: No focal deficit present.      Mental Status: She is alert and oriented to person, place, and time.      Cranial Nerves: Cranial nerves 2-12 are intact.      Sensory: Sensation is intact.      Motor: Motor function is intact.      Coordination: Coordination is intact.      Comments: Neuro intact and nonfocal         Incision & Drainage    Date/Time: 3/11/2024 12:53 AM    Performed by: Liya Barber PA-C  Authorized by: Yuval Jimenez MD    Consent:     Consent obtained:  Verbal    Consent given by:  Patient    Risks, benefits, and alternatives were discussed: yes      Risks discussed:  Bleeding, incomplete drainage and infection  Universal protocol:     Patient identity confirmed:  Verbally with patient  Location:     Type:  Abscess    Size:  2.5    Location:   Trunk    Trunk location:  L breast  Pre-procedure details:     Skin preparation:  Povidone-iodine  Sedation:     Sedation type:  None  Anesthesia:     Anesthesia method:  Local infiltration    Local anesthetic:  Lidocaine 1% w/o epi  Procedure type:     Complexity:  Complex  Procedure details:     Ultrasound guidance: no      Needle aspiration: no      Incision types:  Stab incision    Incision depth:  Subcutaneous    Drainage:  Purulent    Drainage amount:  Copious    Wound treatment:  Wound left open    Packing materials:  None  Post-procedure details:     Procedure completion:  Tolerated well, no immediate complications             ED Course  ED Course as of 03/11/24 0058   Mon Mar 11, 2024   0054 Patient was afebrile and all other vital signs are stable.  She had some mild tachycardia which improved throughout the ER course.  Heart rate was 114.  Patient has fluctuant left breast abscess to the inferior left breast around 6:00.  See incision and drainage procedure note for details.  I performed superficial stab incision with 11 blade scalpel and there was copious purulent, creamy, malodorous drainage expelled.  Aerobic wound cultures in process.  There was localized surrounding erythema but no erythematous streaking or soft tissue swelling.  Patient was seen at [facility] ER earlier this same day and diagnosed with UTI and prescribed Keflex 500 mg by mouth x 7 days.  We gave a dose of Keflex here which will also cover abscess of the left breast.  Recommend close follow-up with routine OB/GYN within 48 hours for recheck on her breast abscess as well as wound culture results.  We will not prescribe any further Keflex since patient received this antibiotic earlier this same day.  Return to the ER for any worsening symptoms.  Patient is 38 weeks gestation but she reports positive fetal movement and denies any abdominal pain or cramping or vaginal discharge or bleeding. [FC]      ED Course User Index  [FC] Liya Barber,  "NATALIE                                      No results found for this or any previous visit (from the past 24 hour(s)).  Note: In addition to lab results from this visit, the labs listed above may include labs taken at another facility or during a different encounter within the last 24 hours. Please correlate lab times with ED admission and discharge times for further clarification of the services performed during this visit.    No orders to display     Vitals:    03/10/24 2157 03/10/24 2202 03/10/24 2226 03/10/24 2230   BP: 102/73 103/68  111/65   BP Location: Left arm      Patient Position: Sitting      Pulse: (!) 127  117 114   Resp: 18      Temp: 97.9 °F (36.6 °C)      TempSrc: Oral      SpO2: 98%  100% 100%   Weight: 90.3 kg (199 lb)      Height: 154.9 cm (61\")        Medications   lidocaine PF 1% (XYLOCAINE) injection 10 mL (10 mL Infiltration Given by Other 3/11/24 0017)   cephalexin (KEFLEX) capsule 500 mg (500 mg Oral Given 3/11/24 0011)     ECG/EMG Results (last 24 hours)       ** No results found for the last 24 hours. **          No orders to display              Medical Decision Making  Amount and/or Complexity of Data Reviewed  Labs: ordered.    Risk  Prescription drug management.        Final diagnoses:   Abscess of left breast   Encounter for incision and drainage procedure   History of UTI   Third trimester pregnancy   Anxiety and depression       ED Disposition  ED Disposition       ED Disposition   Discharge    Condition   Stable    Comment   --               Mey Musa MD  [address] [facility] [address]  [place] [id number]  [phone]    Call today  Call today for first available recheck within 48 hours    [facility] EMERGENCY DEPARTMENT  [address] [address] [phone]    If symptoms worsen         Medication List      No changes were made to your prescriptions during this visit.            Liya Barber PA-C  03/11/24 0058    "

## 2024-03-13 LAB
BACTERIA SPEC AEROBE CULT: ABNORMAL
GRAM STN SPEC: ABNORMAL

## 2024-03-18 ENCOUNTER — HOSPITAL ENCOUNTER (OUTPATIENT)
Dept: LABOR AND DELIVERY | Facility: HOSPITAL | Age: 34
Discharge: HOME | End: 2024-03-18
Payer: MEDICAID

## 2024-03-18 ENCOUNTER — HOSPITAL ENCOUNTER (INPATIENT)
Facility: HOSPITAL | Age: 34
LOS: 3 days | Discharge: HOME | End: 2024-03-21
Attending: ADVANCED PRACTICE MIDWIFE | Admitting: OBSTETRICS & GYNECOLOGY
Payer: MEDICAID

## 2024-03-18 LAB
ABO GROUP BLD: NORMAL
ALP SERPL-CCNC: 145 U/L (ref 39–117)
ALT SERPL W P-5'-P-CCNC: 7 U/L (ref 1–33)
AST SERPL-CCNC: 16 U/L (ref 1–32)
BILIRUB SERPL-MCNC: 0.4 MG/DL (ref 0–1.2)
BLD GP AB SCN SERPL QL: NEGATIVE
CREAT SERPL-MCNC: 0.6 MG/DL (ref 0.57–1)
DEPRECATED RDW RBC AUTO: 41.8 FL (ref 37–54)
ERYTHROCYTE [DISTWIDTH] IN BLOOD BY AUTOMATED COUNT: 14.2 % (ref 12.3–15.4)
HCT VFR BLD AUTO: 30.9 % (ref 34–46.6)
HGB BLD-MCNC: 9.9 G/DL (ref 12–15.9)
LDH SERPL-CCNC: 172 U/L (ref 135–214)
MCH RBC QN AUTO: 25.9 PG (ref 26.6–33)
MCHC RBC AUTO-ENTMCNC: 32 G/DL (ref 31.5–35.7)
MCV RBC AUTO: 80.9 FL (ref 79–97)
PLATELET # BLD AUTO: 275 10*3/MM3 (ref 140–450)
PMV BLD AUTO: 10.9 FL (ref 6–12)
RBC # BLD AUTO: 3.82 10*6/MM3 (ref 3.77–5.28)
RH BLD: POSITIVE
T&S EXPIRATION DATE: NORMAL
URATE SERPL-MCNC: 4 MG/DL (ref 2.4–5.7)
WBC NRBC COR # BLD AUTO: 8.39 10*3/MM3 (ref 3.4–10.8)

## 2024-03-18 PROCEDURE — 86780 TREPONEMA PALLIDUM: CPT | Performed by: ADVANCED PRACTICE MIDWIFE

## 2024-03-18 PROCEDURE — 84550 ASSAY OF BLOOD/URIC ACID: CPT | Performed by: OBSTETRICS & GYNECOLOGY

## 2024-03-18 PROCEDURE — 86850 RBC ANTIBODY SCREEN: CPT | Performed by: ADVANCED PRACTICE MIDWIFE

## 2024-03-18 PROCEDURE — 82565 ASSAY OF CREATININE: CPT | Performed by: OBSTETRICS & GYNECOLOGY

## 2024-03-18 PROCEDURE — 25810000003 LACTATED RINGERS PER 1000 ML: Performed by: ADVANCED PRACTICE MIDWIFE

## 2024-03-18 PROCEDURE — 86901 BLOOD TYPING SEROLOGIC RH(D): CPT | Performed by: ADVANCED PRACTICE MIDWIFE

## 2024-03-18 PROCEDURE — 25810000003 LACTATED RINGERS SOLUTION: Performed by: ADVANCED PRACTICE MIDWIFE

## 2024-03-18 PROCEDURE — 84450 TRANSFERASE (AST) (SGOT): CPT | Performed by: OBSTETRICS & GYNECOLOGY

## 2024-03-18 PROCEDURE — 82247 BILIRUBIN TOTAL: CPT | Performed by: OBSTETRICS & GYNECOLOGY

## 2024-03-18 PROCEDURE — 83615 LACTATE (LD) (LDH) ENZYME: CPT | Performed by: OBSTETRICS & GYNECOLOGY

## 2024-03-18 PROCEDURE — 84075 ASSAY ALKALINE PHOSPHATASE: CPT | Performed by: OBSTETRICS & GYNECOLOGY

## 2024-03-18 PROCEDURE — 86900 BLOOD TYPING SEROLOGIC ABO: CPT | Performed by: ADVANCED PRACTICE MIDWIFE

## 2024-03-18 PROCEDURE — 84460 ALANINE AMINO (ALT) (SGPT): CPT | Performed by: OBSTETRICS & GYNECOLOGY

## 2024-03-18 PROCEDURE — 85027 COMPLETE CBC AUTOMATED: CPT | Performed by: ADVANCED PRACTICE MIDWIFE

## 2024-03-18 RX ORDER — FAMOTIDINE 20 MG/1
20 TABLET, FILM COATED ORAL EVERY 12 HOURS PRN
Status: CANCELLED | OUTPATIENT
Start: 2024-03-18

## 2024-03-18 RX ORDER — ONDANSETRON 2 MG/ML
4 INJECTION INTRAMUSCULAR; INTRAVENOUS EVERY 6 HOURS PRN
Status: DISCONTINUED | OUTPATIENT
Start: 2024-03-18 | End: 2024-03-19 | Stop reason: HOSPADM

## 2024-03-18 RX ORDER — OXYTOCIN/0.9 % SODIUM CHLORIDE 30/500 ML
999 PLASTIC BAG, INJECTION (ML) INTRAVENOUS ONCE
Status: CANCELLED | OUTPATIENT
Start: 2024-03-18 | End: 2024-03-18

## 2024-03-18 RX ORDER — ONDANSETRON 4 MG/1
4 TABLET, ORALLY DISINTEGRATING ORAL EVERY 6 HOURS PRN
Status: CANCELLED | OUTPATIENT
Start: 2024-03-18

## 2024-03-18 RX ORDER — SODIUM CHLORIDE 9 MG/ML
40 INJECTION, SOLUTION INTRAVENOUS AS NEEDED
Status: CANCELLED | OUTPATIENT
Start: 2024-03-18

## 2024-03-18 RX ORDER — FENTANYL CITRATE 50 UG/ML
50 INJECTION, SOLUTION INTRAMUSCULAR; INTRAVENOUS
Status: DISCONTINUED | OUTPATIENT
Start: 2024-03-18 | End: 2024-03-19 | Stop reason: HOSPADM

## 2024-03-18 RX ORDER — DIPHENHYDRAMINE HYDROCHLORIDE 50 MG/ML
25 INJECTION INTRAMUSCULAR; INTRAVENOUS NIGHTLY PRN
Status: CANCELLED | OUTPATIENT
Start: 2024-03-18

## 2024-03-18 RX ORDER — LIDOCAINE HYDROCHLORIDE 10 MG/ML
0.5 INJECTION, SOLUTION EPIDURAL; INFILTRATION; INTRACAUDAL; PERINEURAL ONCE AS NEEDED
Status: CANCELLED | OUTPATIENT
Start: 2024-03-18

## 2024-03-18 RX ORDER — FENTANYL CITRATE 50 UG/ML
50 INJECTION, SOLUTION INTRAMUSCULAR; INTRAVENOUS
Status: CANCELLED | OUTPATIENT
Start: 2024-03-18 | End: 2024-03-25

## 2024-03-18 RX ORDER — METHYLERGONOVINE MALEATE 0.2 MG/ML
200 INJECTION INTRAVENOUS ONCE AS NEEDED
Status: CANCELLED | OUTPATIENT
Start: 2024-03-18

## 2024-03-18 RX ORDER — OXYTOCIN/0.9 % SODIUM CHLORIDE 30/500 ML
2-4 PLASTIC BAG, INJECTION (ML) INTRAVENOUS
Status: CANCELLED | OUTPATIENT
Start: 2024-03-18

## 2024-03-18 RX ORDER — ONDANSETRON 2 MG/ML
4 INJECTION INTRAMUSCULAR; INTRAVENOUS EVERY 6 HOURS PRN
Status: CANCELLED | OUTPATIENT
Start: 2024-03-18

## 2024-03-18 RX ORDER — SODIUM CHLORIDE 0.9 % (FLUSH) 0.9 %
10 SYRINGE (ML) INJECTION AS NEEDED
Status: CANCELLED | OUTPATIENT
Start: 2024-03-18

## 2024-03-18 RX ORDER — SODIUM CHLORIDE 0.9 % (FLUSH) 0.9 %
10 SYRINGE (ML) INJECTION AS NEEDED
Status: DISCONTINUED | OUTPATIENT
Start: 2024-03-18 | End: 2024-03-19 | Stop reason: HOSPADM

## 2024-03-18 RX ORDER — OXYTOCIN/0.9 % SODIUM CHLORIDE 30/500 ML
2-24 PLASTIC BAG, INJECTION (ML) INTRAVENOUS
Status: DISCONTINUED | OUTPATIENT
Start: 2024-03-19 | End: 2024-03-18

## 2024-03-18 RX ORDER — MAGNESIUM CARB/ALUMINUM HYDROX 105-160MG
30 TABLET,CHEWABLE ORAL ONCE
Status: CANCELLED | OUTPATIENT
Start: 2024-03-18 | End: 2024-03-18

## 2024-03-18 RX ORDER — SODIUM CHLORIDE 0.9 % (FLUSH) 0.9 %
10 SYRINGE (ML) INJECTION EVERY 12 HOURS SCHEDULED
Status: CANCELLED | OUTPATIENT
Start: 2024-03-18

## 2024-03-18 RX ORDER — SODIUM CHLORIDE, SODIUM LACTATE, POTASSIUM CHLORIDE, CALCIUM CHLORIDE 600; 310; 30; 20 MG/100ML; MG/100ML; MG/100ML; MG/100ML
125 INJECTION, SOLUTION INTRAVENOUS CONTINUOUS
Status: DISCONTINUED | OUTPATIENT
Start: 2024-03-18 | End: 2024-03-21 | Stop reason: HOSPADM

## 2024-03-18 RX ORDER — TERBUTALINE SULFATE 1 MG/ML
0.25 INJECTION, SOLUTION SUBCUTANEOUS AS NEEDED
Status: DISCONTINUED | OUTPATIENT
Start: 2024-03-18 | End: 2024-03-19 | Stop reason: HOSPADM

## 2024-03-18 RX ORDER — SODIUM CHLORIDE 0.9 % (FLUSH) 0.9 %
10 SYRINGE (ML) INJECTION EVERY 12 HOURS SCHEDULED
Status: DISCONTINUED | OUTPATIENT
Start: 2024-03-18 | End: 2024-03-19 | Stop reason: HOSPADM

## 2024-03-18 RX ORDER — LIDOCAINE HYDROCHLORIDE 10 MG/ML
0.5 INJECTION, SOLUTION EPIDURAL; INFILTRATION; INTRACAUDAL; PERINEURAL ONCE AS NEEDED
Status: DISCONTINUED | OUTPATIENT
Start: 2024-03-18 | End: 2024-03-19 | Stop reason: HOSPADM

## 2024-03-18 RX ORDER — ACETAMINOPHEN 325 MG/1
650 TABLET ORAL EVERY 4 HOURS PRN
Status: DISCONTINUED | OUTPATIENT
Start: 2024-03-18 | End: 2024-03-19 | Stop reason: HOSPADM

## 2024-03-18 RX ORDER — CARBOPROST TROMETHAMINE 250 UG/ML
250 INJECTION, SOLUTION INTRAMUSCULAR AS NEEDED
Status: CANCELLED | OUTPATIENT
Start: 2024-03-18

## 2024-03-18 RX ORDER — DIPHENHYDRAMINE HYDROCHLORIDE 50 MG/ML
25 INJECTION INTRAMUSCULAR; INTRAVENOUS NIGHTLY PRN
Status: DISCONTINUED | OUTPATIENT
Start: 2024-03-18 | End: 2024-03-19 | Stop reason: HOSPADM

## 2024-03-18 RX ORDER — SODIUM CHLORIDE 9 MG/ML
40 INJECTION, SOLUTION INTRAVENOUS AS NEEDED
Status: DISCONTINUED | OUTPATIENT
Start: 2024-03-18 | End: 2024-03-19 | Stop reason: HOSPADM

## 2024-03-18 RX ORDER — IBUPROFEN 600 MG/1
600 TABLET ORAL EVERY 6 HOURS PRN
Status: CANCELLED | OUTPATIENT
Start: 2024-03-18

## 2024-03-18 RX ORDER — OXYTOCIN/0.9 % SODIUM CHLORIDE 30/500 ML
2-4 PLASTIC BAG, INJECTION (ML) INTRAVENOUS
Status: DISCONTINUED | OUTPATIENT
Start: 2024-03-18 | End: 2024-03-18 | Stop reason: SDUPTHER

## 2024-03-18 RX ORDER — ONDANSETRON 4 MG/1
4 TABLET, ORALLY DISINTEGRATING ORAL EVERY 6 HOURS PRN
Status: DISCONTINUED | OUTPATIENT
Start: 2024-03-18 | End: 2024-03-19 | Stop reason: HOSPADM

## 2024-03-18 RX ORDER — DIPHENHYDRAMINE HCL 25 MG
25 CAPSULE ORAL NIGHTLY PRN
Status: CANCELLED | OUTPATIENT
Start: 2024-03-18

## 2024-03-18 RX ORDER — MAGNESIUM CARB/ALUMINUM HYDROX 105-160MG
30 TABLET,CHEWABLE ORAL ONCE
Status: DISCONTINUED | OUTPATIENT
Start: 2024-03-18 | End: 2024-03-19 | Stop reason: HOSPADM

## 2024-03-18 RX ORDER — TERBUTALINE SULFATE 1 MG/ML
0.25 INJECTION, SOLUTION SUBCUTANEOUS AS NEEDED
Status: CANCELLED | OUTPATIENT
Start: 2024-03-18

## 2024-03-18 RX ORDER — OXYTOCIN/0.9 % SODIUM CHLORIDE 30/500 ML
2-24 PLASTIC BAG, INJECTION (ML) INTRAVENOUS
Status: CANCELLED | OUTPATIENT
Start: 2024-03-19

## 2024-03-18 RX ORDER — FENTANYL CITRATE 50 UG/ML
100 INJECTION, SOLUTION INTRAMUSCULAR; INTRAVENOUS
Status: CANCELLED | OUTPATIENT
Start: 2024-03-18 | End: 2024-03-25

## 2024-03-18 RX ORDER — ACETAMINOPHEN 325 MG/1
650 TABLET ORAL EVERY 4 HOURS PRN
Status: CANCELLED | OUTPATIENT
Start: 2024-03-18

## 2024-03-18 RX ORDER — MISOPROSTOL 200 UG/1
800 TABLET ORAL AS NEEDED
Status: CANCELLED | OUTPATIENT
Start: 2024-03-18

## 2024-03-18 RX ORDER — DIPHENHYDRAMINE HCL 25 MG
25 CAPSULE ORAL NIGHTLY PRN
Status: DISCONTINUED | OUTPATIENT
Start: 2024-03-18 | End: 2024-03-19 | Stop reason: HOSPADM

## 2024-03-18 RX ORDER — FAMOTIDINE 20 MG/1
20 TABLET, FILM COATED ORAL EVERY 12 HOURS PRN
Status: DISCONTINUED | OUTPATIENT
Start: 2024-03-18 | End: 2024-03-19 | Stop reason: HOSPADM

## 2024-03-18 RX ORDER — FENTANYL CITRATE 50 UG/ML
100 INJECTION, SOLUTION INTRAMUSCULAR; INTRAVENOUS
Status: DISCONTINUED | OUTPATIENT
Start: 2024-03-18 | End: 2024-03-19 | Stop reason: HOSPADM

## 2024-03-18 RX ORDER — FAMOTIDINE 10 MG/ML
20 INJECTION, SOLUTION INTRAVENOUS EVERY 12 HOURS PRN
Status: DISCONTINUED | OUTPATIENT
Start: 2024-03-18 | End: 2024-03-19 | Stop reason: HOSPADM

## 2024-03-18 RX ORDER — OXYTOCIN/0.9 % SODIUM CHLORIDE 30/500 ML
2-24 PLASTIC BAG, INJECTION (ML) INTRAVENOUS
Status: DISCONTINUED | OUTPATIENT
Start: 2024-03-19 | End: 2024-03-19 | Stop reason: HOSPADM

## 2024-03-18 RX ORDER — SODIUM CHLORIDE, SODIUM LACTATE, POTASSIUM CHLORIDE, CALCIUM CHLORIDE 600; 310; 30; 20 MG/100ML; MG/100ML; MG/100ML; MG/100ML
125 INJECTION, SOLUTION INTRAVENOUS CONTINUOUS
Status: CANCELLED | OUTPATIENT
Start: 2024-03-18

## 2024-03-18 RX ORDER — OXYTOCIN/0.9 % SODIUM CHLORIDE 30/500 ML
250 PLASTIC BAG, INJECTION (ML) INTRAVENOUS CONTINUOUS
Status: CANCELLED | OUTPATIENT
Start: 2024-03-18 | End: 2024-03-18

## 2024-03-18 RX ADMIN — SODIUM CHLORIDE, POTASSIUM CHLORIDE, SODIUM LACTATE AND CALCIUM CHLORIDE 125 ML/HR: 600; 310; 30; 20 INJECTION, SOLUTION INTRAVENOUS at 23:26

## 2024-03-18 RX ADMIN — SODIUM CHLORIDE, POTASSIUM CHLORIDE, SODIUM LACTATE AND CALCIUM CHLORIDE 1000 ML: 600; 310; 30; 20 INJECTION, SOLUTION INTRAVENOUS at 22:20

## 2024-03-18 RX ADMIN — Medication 2 MILLI-UNITS/MIN: at 23:27

## 2024-03-19 ENCOUNTER — ANESTHESIA EVENT (OUTPATIENT)
Dept: LABOR AND DELIVERY | Facility: HOSPITAL | Age: 34
End: 2024-03-19
Payer: MEDICAID

## 2024-03-19 ENCOUNTER — ANESTHESIA (OUTPATIENT)
Dept: LABOR AND DELIVERY | Facility: HOSPITAL | Age: 34
End: 2024-03-19
Payer: MEDICAID

## 2024-03-19 PROBLEM — O47.00 PRETERM UTERINE CONTRACTIONS, ANTEPARTUM: Status: RESOLVED | Noted: 2024-02-14 | Resolved: 2024-03-19

## 2024-03-19 PROBLEM — O21.9 NAUSEA AND VOMITING IN PREGNANCY: Status: RESOLVED | Noted: 2023-10-21 | Resolved: 2024-03-19

## 2024-03-19 PROBLEM — Z3A.39 39 WEEKS GESTATION OF PREGNANCY: Status: RESOLVED | Noted: 2024-03-19 | Resolved: 2024-03-19

## 2024-03-19 PROBLEM — R10.9 ABDOMINAL PAIN IN PREGNANCY: Status: RESOLVED | Noted: 2023-10-23 | Resolved: 2024-03-19

## 2024-03-19 PROBLEM — Z3A.39 39 WEEKS GESTATION OF PREGNANCY: Status: ACTIVE | Noted: 2024-03-19

## 2024-03-19 PROBLEM — O26.899 ABDOMINAL PAIN IN PREGNANCY: Status: RESOLVED | Noted: 2023-10-23 | Resolved: 2024-03-19

## 2024-03-19 LAB
BASOPHILS # BLD AUTO: 0.02 10*3/MM3 (ref 0–0.2)
BASOPHILS NFR BLD AUTO: 0.1 % (ref 0–1.5)
DEPRECATED RDW RBC AUTO: 43.4 FL (ref 37–54)
EOSINOPHIL # BLD AUTO: 0 10*3/MM3 (ref 0–0.4)
EOSINOPHIL NFR BLD AUTO: 0 % (ref 0.3–6.2)
ERYTHROCYTE [DISTWIDTH] IN BLOOD BY AUTOMATED COUNT: 14.1 % (ref 12.3–15.4)
HCT VFR BLD AUTO: 32.2 % (ref 34–46.6)
HGB BLD-MCNC: 10.1 G/DL (ref 12–15.9)
IMM GRANULOCYTES # BLD AUTO: 0.06 10*3/MM3 (ref 0–0.05)
IMM GRANULOCYTES NFR BLD AUTO: 0.4 % (ref 0–0.5)
LYMPHOCYTES # BLD AUTO: 0.82 10*3/MM3 (ref 0.7–3.1)
LYMPHOCYTES NFR BLD AUTO: 5.1 % (ref 19.6–45.3)
MCH RBC QN AUTO: 26.6 PG (ref 26.6–33)
MCHC RBC AUTO-ENTMCNC: 31.4 G/DL (ref 31.5–35.7)
MCV RBC AUTO: 84.7 FL (ref 79–97)
MONOCYTES # BLD AUTO: 0.67 10*3/MM3 (ref 0.1–0.9)
MONOCYTES NFR BLD AUTO: 4.2 % (ref 5–12)
NEUTROPHILS NFR BLD AUTO: 14.57 10*3/MM3 (ref 1.7–7)
NEUTROPHILS NFR BLD AUTO: 90.2 % (ref 42.7–76)
NRBC BLD AUTO-RTO: 0 /100 WBC (ref 0–0.2)
PLATELET # BLD AUTO: 236 10*3/MM3 (ref 140–450)
PMV BLD AUTO: 11.4 FL (ref 6–12)
RBC # BLD AUTO: 3.8 10*6/MM3 (ref 3.77–5.28)
T PALLIDUM IGG SER QL: NORMAL
WBC NRBC COR # BLD AUTO: 16.14 10*3/MM3 (ref 3.4–10.8)

## 2024-03-19 PROCEDURE — 25010000002 FENTANYL CITRATE (PF) 50 MCG/ML SOLUTION: Performed by: ANESTHESIOLOGY

## 2024-03-19 PROCEDURE — 25010000002 ROPIVACAINE PER 1 MG: Performed by: ANESTHESIOLOGY

## 2024-03-19 PROCEDURE — C1755 CATHETER, INTRASPINAL: HCPCS

## 2024-03-19 PROCEDURE — 25010000002 DIPHENHYDRAMINE PER 50 MG: Performed by: ADVANCED PRACTICE MIDWIFE

## 2024-03-19 PROCEDURE — 51703 INSERT BLADDER CATH COMPLEX: CPT

## 2024-03-19 PROCEDURE — 3E033VJ INTRODUCTION OF OTHER HORMONE INTO PERIPHERAL VEIN, PERCUTANEOUS APPROACH: ICD-10-PCS | Performed by: ADVANCED PRACTICE MIDWIFE

## 2024-03-19 PROCEDURE — 59025 FETAL NON-STRESS TEST: CPT

## 2024-03-19 PROCEDURE — 25810000003 LACTATED RINGERS PER 1000 ML: Performed by: ADVANCED PRACTICE MIDWIFE

## 2024-03-19 PROCEDURE — 25010000002 ONDANSETRON PER 1 MG: Performed by: ADVANCED PRACTICE MIDWIFE

## 2024-03-19 PROCEDURE — 25810000003 LACTATED RINGERS SOLUTION: Performed by: ANESTHESIOLOGY

## 2024-03-19 PROCEDURE — C1755 CATHETER, INTRASPINAL: HCPCS | Performed by: ANESTHESIOLOGY

## 2024-03-19 PROCEDURE — 85025 COMPLETE CBC W/AUTO DIFF WBC: CPT | Performed by: OBSTETRICS & GYNECOLOGY

## 2024-03-19 PROCEDURE — 10907ZC DRAINAGE OF AMNIOTIC FLUID, THERAPEUTIC FROM PRODUCTS OF CONCEPTION, VIA NATURAL OR ARTIFICIAL OPENING: ICD-10-PCS | Performed by: ADVANCED PRACTICE MIDWIFE

## 2024-03-19 PROCEDURE — 25010000002 BUPIVACAINE (PF) 0.25 % SOLUTION: Performed by: ANESTHESIOLOGY

## 2024-03-19 RX ORDER — HYDROCODONE BITARTRATE AND ACETAMINOPHEN 10; 325 MG/1; MG/1
1 TABLET ORAL EVERY 4 HOURS PRN
Status: DISCONTINUED | OUTPATIENT
Start: 2024-03-19 | End: 2024-03-21 | Stop reason: HOSPADM

## 2024-03-19 RX ORDER — HYDROCORTISONE 25 MG/G
1 CREAM TOPICAL AS NEEDED
Status: DISCONTINUED | OUTPATIENT
Start: 2024-03-19 | End: 2024-03-21 | Stop reason: HOSPADM

## 2024-03-19 RX ORDER — DOCUSATE SODIUM 100 MG/1
100 CAPSULE, LIQUID FILLED ORAL 2 TIMES DAILY
Status: DISCONTINUED | OUTPATIENT
Start: 2024-03-20 | End: 2024-03-21 | Stop reason: HOSPADM

## 2024-03-19 RX ORDER — IBUPROFEN 600 MG/1
600 TABLET ORAL EVERY 6 HOURS PRN
Status: DISCONTINUED | OUTPATIENT
Start: 2024-03-19 | End: 2024-03-21 | Stop reason: HOSPADM

## 2024-03-19 RX ORDER — ROPIVACAINE HYDROCHLORIDE 2 MG/ML
15 INJECTION, SOLUTION EPIDURAL; INFILTRATION; PERINEURAL CONTINUOUS
Status: DISCONTINUED | OUTPATIENT
Start: 2024-03-19 | End: 2024-03-21 | Stop reason: HOSPADM

## 2024-03-19 RX ORDER — BUPIVACAINE HYDROCHLORIDE 2.5 MG/ML
INJECTION, SOLUTION EPIDURAL; INFILTRATION; INTRACAUDAL AS NEEDED
Status: DISCONTINUED | OUTPATIENT
Start: 2024-03-19 | End: 2024-03-19 | Stop reason: SURG

## 2024-03-19 RX ORDER — CITRIC ACID/SODIUM CITRATE 334-500MG
SOLUTION, ORAL ORAL
Status: DISCONTINUED
Start: 2024-03-19 | End: 2024-03-21 | Stop reason: HOSPADM

## 2024-03-19 RX ORDER — ACETAMINOPHEN 325 MG/1
650 TABLET ORAL EVERY 6 HOURS PRN
Status: DISCONTINUED | OUTPATIENT
Start: 2024-03-19 | End: 2024-03-21 | Stop reason: HOSPADM

## 2024-03-19 RX ORDER — METOCLOPRAMIDE HYDROCHLORIDE 5 MG/ML
10 INJECTION INTRAMUSCULAR; INTRAVENOUS ONCE AS NEEDED
Status: DISCONTINUED | OUTPATIENT
Start: 2024-03-19 | End: 2024-03-19 | Stop reason: HOSPADM

## 2024-03-19 RX ORDER — EPHEDRINE SULFATE 5 MG/ML
INJECTION INTRAVENOUS
Status: COMPLETED
Start: 2024-03-19 | End: 2024-03-19

## 2024-03-19 RX ORDER — FENTANYL CITRATE 50 UG/ML
INJECTION, SOLUTION INTRAMUSCULAR; INTRAVENOUS AS NEEDED
Status: DISCONTINUED | OUTPATIENT
Start: 2024-03-19 | End: 2024-03-19 | Stop reason: SURG

## 2024-03-19 RX ORDER — LIDOCAINE HYDROCHLORIDE AND EPINEPHRINE 15; 5 MG/ML; UG/ML
INJECTION, SOLUTION EPIDURAL AS NEEDED
Status: DISCONTINUED | OUTPATIENT
Start: 2024-03-19 | End: 2024-03-19 | Stop reason: SURG

## 2024-03-19 RX ORDER — CARBOPROST TROMETHAMINE 250 UG/ML
250 INJECTION, SOLUTION INTRAMUSCULAR AS NEEDED
Status: DISCONTINUED | OUTPATIENT
Start: 2024-03-19 | End: 2024-03-19 | Stop reason: HOSPADM

## 2024-03-19 RX ORDER — IBUPROFEN 600 MG/1
600 TABLET ORAL EVERY 6 HOURS PRN
Status: DISCONTINUED | OUTPATIENT
Start: 2024-03-19 | End: 2024-03-19 | Stop reason: HOSPADM

## 2024-03-19 RX ORDER — EPHEDRINE SULFATE 5 MG/ML
10 INJECTION INTRAVENOUS
Status: DISCONTINUED | OUTPATIENT
Start: 2024-03-19 | End: 2024-03-19 | Stop reason: HOSPADM

## 2024-03-19 RX ORDER — OXYTOCIN/0.9 % SODIUM CHLORIDE 30/500 ML
125 PLASTIC BAG, INJECTION (ML) INTRAVENOUS ONCE AS NEEDED
Status: DISCONTINUED | OUTPATIENT
Start: 2024-03-19 | End: 2024-03-21 | Stop reason: HOSPADM

## 2024-03-19 RX ORDER — LIDOCAINE HCL/EPINEPHRINE/PF 2%-1:200K
VIAL (ML) INJECTION AS NEEDED
Status: DISCONTINUED | OUTPATIENT
Start: 2024-03-19 | End: 2024-03-19 | Stop reason: SURG

## 2024-03-19 RX ORDER — ONDANSETRON 2 MG/ML
4 INJECTION INTRAMUSCULAR; INTRAVENOUS ONCE AS NEEDED
Status: DISCONTINUED | OUTPATIENT
Start: 2024-03-19 | End: 2024-03-19 | Stop reason: HOSPADM

## 2024-03-19 RX ORDER — BISACODYL 10 MG
10 SUPPOSITORY, RECTAL RECTAL DAILY PRN
Status: DISCONTINUED | OUTPATIENT
Start: 2024-03-20 | End: 2024-03-21 | Stop reason: HOSPADM

## 2024-03-19 RX ORDER — METHYLERGONOVINE MALEATE 0.2 MG/ML
200 INJECTION INTRAVENOUS ONCE AS NEEDED
Status: DISCONTINUED | OUTPATIENT
Start: 2024-03-19 | End: 2024-03-19 | Stop reason: HOSPADM

## 2024-03-19 RX ORDER — HYDROCODONE BITARTRATE AND ACETAMINOPHEN 5; 325 MG/1; MG/1
1 TABLET ORAL EVERY 4 HOURS PRN
Status: DISCONTINUED | OUTPATIENT
Start: 2024-03-19 | End: 2024-03-21 | Stop reason: HOSPADM

## 2024-03-19 RX ORDER — OXYTOCIN/0.9 % SODIUM CHLORIDE 30/500 ML
999 PLASTIC BAG, INJECTION (ML) INTRAVENOUS ONCE
Status: DISCONTINUED | OUTPATIENT
Start: 2024-03-19 | End: 2024-03-19 | Stop reason: HOSPADM

## 2024-03-19 RX ORDER — CITRIC ACID/SODIUM CITRATE 334-500MG
30 SOLUTION, ORAL ORAL ONCE
Status: DISCONTINUED | OUTPATIENT
Start: 2024-03-19 | End: 2024-03-19 | Stop reason: HOSPADM

## 2024-03-19 RX ORDER — MISOPROSTOL 200 UG/1
800 TABLET ORAL AS NEEDED
Status: DISCONTINUED | OUTPATIENT
Start: 2024-03-19 | End: 2024-03-19 | Stop reason: HOSPADM

## 2024-03-19 RX ORDER — ONDANSETRON 2 MG/ML
4 INJECTION INTRAMUSCULAR; INTRAVENOUS EVERY 6 HOURS PRN
Status: DISCONTINUED | OUTPATIENT
Start: 2024-03-19 | End: 2024-03-21 | Stop reason: HOSPADM

## 2024-03-19 RX ORDER — OXYTOCIN/0.9 % SODIUM CHLORIDE 30/500 ML
250 PLASTIC BAG, INJECTION (ML) INTRAVENOUS CONTINUOUS
Status: ACTIVE | OUTPATIENT
Start: 2024-03-19 | End: 2024-03-19

## 2024-03-19 RX ORDER — SODIUM CHLORIDE 0.9 % (FLUSH) 0.9 %
1-10 SYRINGE (ML) INJECTION AS NEEDED
Status: DISCONTINUED | OUTPATIENT
Start: 2024-03-19 | End: 2024-03-21 | Stop reason: HOSPADM

## 2024-03-19 RX ADMIN — WITCH HAZEL: 500 SOLUTION RECTAL; TOPICAL at 23:23

## 2024-03-19 RX ADMIN — SODIUM CHLORIDE, POTASSIUM CHLORIDE, SODIUM LACTATE AND CALCIUM CHLORIDE 125 ML/HR: 600; 310; 30; 20 INJECTION, SOLUTION INTRAVENOUS at 10:10

## 2024-03-19 RX ADMIN — SODIUM CHLORIDE, POTASSIUM CHLORIDE, SODIUM LACTATE AND CALCIUM CHLORIDE 999 ML/HR: 600; 310; 30; 20 INJECTION, SOLUTION INTRAVENOUS at 18:11

## 2024-03-19 RX ADMIN — ONDANSETRON 4 MG: 2 INJECTION INTRAMUSCULAR; INTRAVENOUS at 07:32

## 2024-03-19 RX ADMIN — DIPHENHYDRAMINE HYDROCHLORIDE 25 MG: 50 INJECTION INTRAMUSCULAR; INTRAVENOUS at 02:37

## 2024-03-19 RX ADMIN — ONDANSETRON 4 MG: 2 INJECTION INTRAMUSCULAR; INTRAVENOUS at 17:49

## 2024-03-19 RX ADMIN — ROPIVACAINE HYDROCHLORIDE 14 ML/HR: 2 INJECTION, SOLUTION EPIDURAL; INFILTRATION at 09:54

## 2024-03-19 RX ADMIN — FENTANYL CITRATE 100 MCG: 50 INJECTION, SOLUTION INTRAMUSCULAR; INTRAVENOUS at 09:48

## 2024-03-19 RX ADMIN — LIDOCAINE HYDROCHLORIDE AND EPINEPHRINE 2 ML: 15; 5 INJECTION, SOLUTION EPIDURAL at 09:47

## 2024-03-19 RX ADMIN — LIDOCAINE HYDROCHLORIDE AND EPINEPHRINE 6 ML: 20; 5 INJECTION, SOLUTION EPIDURAL; INFILTRATION; INTRACAUDAL; PERINEURAL at 13:04

## 2024-03-19 RX ADMIN — LIDOCAINE HYDROCHLORIDE AND EPINEPHRINE 3 ML: 15; 5 INJECTION, SOLUTION EPIDURAL at 09:46

## 2024-03-19 RX ADMIN — BUPIVACAINE HYDROCHLORIDE 8 ML: 2.5 INJECTION, SOLUTION EPIDURAL; INFILTRATION; INTRACAUDAL; PERINEURAL at 09:49

## 2024-03-19 RX ADMIN — SODIUM CHLORIDE, POTASSIUM CHLORIDE, SODIUM LACTATE AND CALCIUM CHLORIDE 1000 ML: 600; 310; 30; 20 INJECTION, SOLUTION INTRAVENOUS at 09:24

## 2024-03-19 RX ADMIN — Medication 1 APPLICATION: at 23:23

## 2024-03-19 RX ADMIN — SODIUM CHLORIDE, POTASSIUM CHLORIDE, SODIUM LACTATE AND CALCIUM CHLORIDE 125 ML/HR: 600; 310; 30; 20 INJECTION, SOLUTION INTRAVENOUS at 07:23

## 2024-03-19 RX ADMIN — Medication: at 23:23

## 2024-03-19 RX ADMIN — IBUPROFEN 600 MG: 600 TABLET, FILM COATED ORAL at 23:23

## 2024-03-19 RX ADMIN — EPHEDRINE SULFATE 10 MG: 5 INJECTION INTRAVENOUS at 13:04

## 2024-03-19 RX ADMIN — EPHEDRINE SULFATE 10 MG: 5 INJECTION INTRAVENOUS at 09:57

## 2024-03-19 RX ADMIN — SODIUM CHLORIDE, POTASSIUM CHLORIDE, SODIUM LACTATE AND CALCIUM CHLORIDE 999 ML/HR: 600; 310; 30; 20 INJECTION, SOLUTION INTRAVENOUS at 22:25

## 2024-03-19 NOTE — H&P
"[address] [place] [address]  Obstetric History and Physical    Induction of labor.      Subjective     Patient is a 34 y.o. female  currently at 39w1d, who presents for induction of labor  for elective  with favorable cervix. She voices good fetal movement. She denies vaginal bleeding and leaking of fluid. She plans epidural for labor and birth.     Her prenatal care is complicated by  advanced maternal age  genetic screening was normal and gallbladder diease with removal at 22 weeks gestation.  Her previous obstetric/gynecological history is non-contributory. She has had  x1.    The following portions of the patients history were reviewed and updated as appropriate: current medications, allergies, past medical history, past surgical history, past family history, past social history, and problem list .       Prenatal Information:   Maternal Prenatal Labs  Blood Type ABO Type   Date Value Ref Range Status   2024 O  Final      Rh Status RH type   Date Value Ref Range Status   2024 Positive  Final      Antibody Screen Antibody Screen   Date Value Ref Range Status   2024 Negative  Final      Gonnorhea No results found for: \"GCCX\"   Chlamydia No results found for: \"CLAMYDCU\"   RPR No results found for: \"RPR\"   Syphilis Antibody No results found for: \"SYPHILIS\"   Rubella No results found for: \"RUBELLAIGGIN\"   Hepatitis B Surface Antigen No results found for: \"HEPBSAG\"   HIV-1 Antibody No results found for: \"LABHIV1\"   Hepatitis C Antibody No results found for: \"HEPCAB\"   Rapid Urin Drug Screen No results found for: \"AMPMETHU\", \"BARBITSCNUR\", \"LABBENZSCN\", \"LABMETHSCN\", \"LABOPIASCN\", \"THCURSCR\", \"COCAINEUR\", \"AMPHETSCREEN\", \"PROPOXSCN\", \"BUPRENORSCNU\", \"METAMPSCNUR\", \"OXYCODONESCN\", \"TRICYCLICSCN\"   Group B Strep Culture Negative                 Past OB History:       OB History    Para Term  AB Living   3 1 1 0 1 1   SAB IAB Ectopic Molar Multiple Live Births   0 0 0 0 0 1      # Outcome Date " GA Lbr Conrad/2nd Weight Sex Type Anes PTL Lv   3 Current            2 Term 11/25/19 39w0d 12:24 / 01:45 2745 g (6 lb 0.8 oz) F Vag-Spont EPI N DAILY      Name: LINDSEY BRO      Apgar1: 7  Apgar5: 9   1 AB 2017 9w0d   U          Birth Comments: D&C       Past Medical History: Past Medical History:   Diagnosis Date    Abnormal Pap smear of cervix     Anxiety     Asthma     Chlamydia     2013    Chlamydia contact, treated     Depression     Gonorrhea     2013    Urinary tract infection     Visual impairment     Kertacanis      Past Surgical History Past Surgical History:   Procedure Laterality Date    CHOLECYSTECTOMY  11/27/2023    CHOLECYSTECTOMY OPEN      CYST REMOVAL      D & C WITH SUCTION N/A 09/18/2017    Procedure: DILATATION AND CURETTAGE WITH SUCTION;  Surgeon: Kelley Dodge DO;  Location: [facility];  Service:     DENTAL PROCEDURE      DILATATION AND CURETTAGE  09/18/2017    EYE SURGERY  12/2018    Intact Surgery    KNEE SURGERY      VAGINAL DELIVERY      x1    WISDOM TOOTH EXTRACTION        Family History: Family History   Problem Relation Age of Onset    Arthritis Mother     COPD Mother     Liver disease Maternal Uncle     Breast cancer Maternal Grandmother     Cancer Maternal Grandmother     Diabetes Maternal Grandfather     Cancer Maternal Grandfather     Breast cancer Paternal Grandmother     Ovarian cancer Paternal Grandmother     Diabetes Paternal Grandmother     Heart attack Paternal Grandmother     Hypertension Paternal Grandmother     Stroke Paternal Grandmother     Cancer Paternal Grandmother     Colon cancer Paternal Grandfather       Social History:  reports that she has never smoked. She has never used smokeless tobacco.   reports that she does not currently use alcohol after a past usage of about 2.0 standard drinks of alcohol per week.   reports no history of drug use.        REVIEW OF SYSTEMS              Reports fetal movement is normal             Denies leakage of amniotic fluid.              Denies vaginal bleeding             She reports Regular contractions, frequency: Every 4-5 minutes, intensity moderate             All other systems reviewed and are negative    Objective       Vital Signs Range for the last 24 hours  Temperature: Temp:  [98.3 °F (36.8 °C)-98.6 °F (37 °C)] 98.6 °F (37 °C)   Temp Source: Temp src: Oral   BP: BP: ()/(54-64) 98/54   Pulse: Heart Rate:  [101-109] 102   Respirations: Resp:  [16] 16   SPO2:     O2 Amount (l/min):     O2 Devices     Weight: Weight:  [92.1 kg (203 lb)] 92.1 kg (203 lb)       Presentation: vertex   Cervix: Exam by:  ANDI Matson CNM   Dilation:  3-4   Effacement: Cervical Effacement: 60%   Station:  -2       Fetal Heart Rate Assessment   Method: Fetal HR Assessment Method: external   Beats/min: Fetal HR (beats/min): 135   Baseline: Fetal HR Baseline: normal range   Varibility: Fetal HR Variability: moderate (amplitude range 6 to 25 bpm)   Accels: Fetal HR Accelerations: greater than/equal to 15 bpm, lasting at least 15 seconds   Decels: Fetal HR Decelerations: variable   Tracing Category:  2    NST: REACTIVE     Uterine Assessment   Method: Method: external tocotransducer   Frequency (min): Contraction Frequency (Minutes): 2-5   Ctx Count in 10 min:     Duration:     Intensity: Contraction Intensity: moderate by palpation   Intensity by IUPC:     Resting Tone: Uterine Resting Tone: soft by palpation   Resting Tone by IUPC:     [place] Units:             Constitutional:  Well developed, well nourished, no acute distress, well-groomed.   Respiratory:  Resp e/e/u, normal breath sounds.   Cardiovascular:  Normal rate and rhythm, no murmurs.   Gastrointestinal:  Soft, gravid, nontender.  Uterus: Soft, nontender. Fundus appropriate for dates.  Neurologic:  Alert & oriented x 3,  no focal deficits noted.   Psychiatric:  Speech and behavior appropriate.   Extremities: no cyanosis, clubbing or edema, no evidence of DVT.        Labs:  Lab Results   Component  Value Date    WBC 8.39 03/18/2024    HGB 9.9 (L) 03/18/2024    HCT 30.9 (L) 03/18/2024    MCV 80.9 03/18/2024     03/18/2024    GLU 95 12/25/2021    POCGLU 88 01/04/2023    CREATININE 0.60 03/18/2024    URICACID 4.0 03/18/2024    AST 16 03/18/2024    ALT 7 03/18/2024     03/18/2024     Results from last 7 days   Lab Units 03/18/24 2222   ABO TYPING  O   RH TYPING  Positive   ANTIBODY SCREEN  Negative           Assessment & Plan       39 weeks gestation of pregnancy        Assessment:  1.  Intrauterine pregnancy at 39w1d weeks gestation with variable decelerations present fetal status.    2.   induction of labor  for elective  with favorable cervix  3.  Obstetrical history is non-contributory.  4.  GBS status: Negative    Plan:  1.Oxytocin induction  2. AROM, clear fluid  3. IUPC paced without difficulty. Amnioinfuse with 300ml bolus  4. Plan of care has been reviewed with patient and questions answered.  5.  Risks, benefits of treatment plan have been discussed.  6.  All questions have been answered.        Yamile Matson CNM  3/19/2024  08:38 EDT

## 2024-03-19 NOTE — PROGRESS NOTES
"03/19/24  13:11 EDT  Padma Younger      ASSESSMENTS: Padma is comfortable with epidural. She is resting without complaint.     /55   Pulse 99   Temp 98.3 °F (36.8 °C) (Oral)   Resp 18   Ht 154.9 cm (61\")   Wt 92.1 kg (203 lb)   LMP 06/26/2023   SpO2 98%   Breastfeeding Yes   BMI 38.36 kg/m²     Fetal Heart Rate Assessment   Method: Fetal HR Assessment Method: fetal spiral electrode   Beats/min: Fetal HR (beats/min): 120   Baseline: Fetal HR Baseline: normal range   Varibility: Fetal HR Variability: moderate (amplitude range 6 to 25 bpm)   Accels: Fetal HR Accelerations: greater than/equal to 15 bpm, lasting at least 15 seconds   Decels: Fetal HR Decelerations: variable   Tracing Category:  2     Uterine Assessment   Method: Method: IUPC (intrauterine pressure catheter)   Frequency (min): Contraction Frequency (Minutes): 2-3   Ctx Count in 10 min:     Duration:     Intensity: Contraction Intensity: mild by palpation   Intensity by IUPC: Contraction Intensity (mmHg) by IUPC: 45-60   Resting Tone: Uterine Resting Tone: soft by palpation   Resting Tone by IUPC: Uterine Resting Tone (mmHg) by IUPC: 15-20   [place] Units:                                Presentation: vertex   Cervix: Exam by: Method: sterile exam per CNM   Dilation: Cervical Dilation (cm): 4-5   Effacement: Cervical Effacement: 70%   Station: Fetal Station: -2            Lab Results   Component Value Date    WBC 8.39 03/18/2024    HGB 9.9 (L) 03/18/2024    HCT 30.9 (L) 03/18/2024    MCV 80.9 03/18/2024     03/18/2024    GLU 95 12/25/2021    POCGLU 88 01/04/2023    CREATININE 0.60 03/18/2024    URICACID 4.0 03/18/2024    AST 16 03/18/2024    ALT 7 03/18/2024     03/18/2024     Results from last 7 days   Lab Units 03/18/24  2222   ABO TYPING  O   RH TYPING  Positive   ANTIBODY SCREEN  Negative       PLAN: Continue with PPP as tolerated by fetus. Recheck cervix in 3-4 hours and as indicated by maternal-fetal status.     Yamile" SALLIE Matson CNM  13:11 EDT  03/19/24

## 2024-03-19 NOTE — ANESTHESIA PREPROCEDURE EVALUATION
Anesthesia Evaluation     Patient summary reviewed and Nursing notes reviewed                Airway   Mallampati: II  TM distance: >3 FB  Neck ROM: full  No difficulty expected  Dental      Pulmonary    (+) asthma,  Cardiovascular - negative cardio ROS        Neuro/Psych  (+) psychiatric history Anxiety and Depression  GI/Hepatic/Renal/Endo - negative ROS     Musculoskeletal (-) negative ROS    Abdominal    Substance History - negative use     OB/GYN    (+) Pregnant        Other                    Anesthesia Plan    ASA 2     epidural       Anesthetic plan, risks, benefits, and alternatives have been provided, discussed and informed consent has been obtained with: patient.    CODE STATUS:    Level Of Support Discussed With: Patient  Code Status (Patient has no pulse and is not breathing): CPR (Attempt to Resuscitate)  Medical Interventions (Patient has pulse or is breathing): Full Support

## 2024-03-19 NOTE — ANESTHESIA PROCEDURE NOTES
Labor Epidural      Patient reassessed immediately prior to procedure    Patient location during procedure: OB  Performed By  Anesthesiologist: Chema Benoit DO  Preanesthetic Checklist  Completed: patient identified, IV checked, site marked, risks and benefits discussed, surgical consent, monitors and equipment checked, pre-op evaluation and timeout performed  Prep:  Pt Position:sitting  Sterile Tech:gloves, mask, sterile barrier and cap  Prep:chlorhexidine gluconate and isopropyl alcohol  Monitoring:blood pressure monitoring and continuous pulse oximetry  Epidural Block Procedure:  Approach:midline  Guidance:landmark technique and palpation technique  Location:L3-L4  Needle Type:Tuohy  Needle Gauge:17 G  Loss of Resistance Medium: air  Loss of Resistance: 6cm  Cath Depth at skin:12 cm  Paresthesia: none  Aspiration:negative  Test Dose:negative  Number of Attempts: 1  Post Assessment:  Dressing:secured with tape and occlusive dressing applied (Tegaderm Placed)  Pt Tolerance:patient tolerated the procedure well with no apparent complications  Complications:no

## 2024-03-19 NOTE — PROGRESS NOTES
"03/19/24  16:55 EDT  Padma Younger      ASSESSMENTS: Padma is comfortable with epidural.     BP 91/51   Pulse 100   Temp 97.8 °F (36.6 °C) (Oral)   Resp 16   Ht 154.9 cm (61\")   Wt 92.1 kg (203 lb)   LMP 06/26/2023   SpO2 98%   Breastfeeding Yes   BMI 38.36 kg/m²     Fetal Heart Rate Assessment   Method: Fetal HR Assessment Method: fetal spiral electrode   Beats/min: Fetal HR (beats/min): 135   Baseline: Fetal HR Baseline: normal range   Varibility: Fetal HR Variability: moderate (amplitude range 6 to 25 bpm)   Accels: Fetal HR Accelerations: greater than/equal to 15 bpm, lasting at least 15 seconds   Decels: Fetal HR Decelerations: early   Tracing Category:  1     Uterine Assessment   Method: Method: IUPC (intrauterine pressure catheter)   Frequency (min): Contraction Frequency (Minutes): 2-5   Ctx Count in 10 min:     Duration:     Intensity: Contraction Intensity: moderate by palpation   Intensity by IUPC: Contraction Intensity (mmHg) by IUPC: 55-70   Resting Tone: Uterine Resting Tone: soft by palpation   Resting Tone by IUPC: Uterine Resting Tone (mmHg) by IUPC: 15-20   [place] Units:                                Presentation: vertex   Cervix: Exam by: Method: sterile exam per RN   Dilation: Cervical Dilation (cm): 4   Effacement: Cervical Effacement: 70-80%   Station: Fetal Station: -2            Lab Results   Component Value Date    WBC 8.39 03/18/2024    HGB 9.9 (L) 03/18/2024    HCT 30.9 (L) 03/18/2024    MCV 80.9 03/18/2024     03/18/2024    GLU 95 12/25/2021    POCGLU 88 01/04/2023    CREATININE 0.60 03/18/2024    URICACID 4.0 03/18/2024    AST 16 03/18/2024    ALT 7 03/18/2024     03/18/2024     Results from last 7 days   Lab Units 03/18/24  2222   ABO TYPING  O   RH TYPING  Positive   ANTIBODY SCREEN  Negative       PLAN: Continue PPP increasing to achieve and maintain adequate MVUs of 180-220.     Yamile Matson CNM  16:55 EDT  03/19/24  "

## 2024-03-20 LAB
BASOPHILS # BLD AUTO: 0.03 10*3/MM3 (ref 0–0.2)
BASOPHILS NFR BLD AUTO: 0.2 % (ref 0–1.5)
DEPRECATED RDW RBC AUTO: 43.3 FL (ref 37–54)
EOSINOPHIL # BLD AUTO: 0.03 10*3/MM3 (ref 0–0.4)
EOSINOPHIL NFR BLD AUTO: 0.2 % (ref 0.3–6.2)
ERYTHROCYTE [DISTWIDTH] IN BLOOD BY AUTOMATED COUNT: 14.1 % (ref 12.3–15.4)
HCT VFR BLD AUTO: 27.3 % (ref 34–46.6)
HGB BLD-MCNC: 8.7 G/DL (ref 12–15.9)
IMM GRANULOCYTES # BLD AUTO: 0.06 10*3/MM3 (ref 0–0.05)
IMM GRANULOCYTES NFR BLD AUTO: 0.5 % (ref 0–0.5)
LYMPHOCYTES # BLD AUTO: 1.66 10*3/MM3 (ref 0.7–3.1)
LYMPHOCYTES NFR BLD AUTO: 12.6 % (ref 19.6–45.3)
MCH RBC QN AUTO: 26.7 PG (ref 26.6–33)
MCHC RBC AUTO-ENTMCNC: 31.9 G/DL (ref 31.5–35.7)
MCV RBC AUTO: 83.7 FL (ref 79–97)
MONOCYTES # BLD AUTO: 0.7 10*3/MM3 (ref 0.1–0.9)
MONOCYTES NFR BLD AUTO: 5.3 % (ref 5–12)
NEUTROPHILS NFR BLD AUTO: 10.7 10*3/MM3 (ref 1.7–7)
NEUTROPHILS NFR BLD AUTO: 81.2 % (ref 42.7–76)
NRBC BLD AUTO-RTO: 0 /100 WBC (ref 0–0.2)
PLATELET # BLD AUTO: 244 10*3/MM3 (ref 140–450)
PMV BLD AUTO: 11.6 FL (ref 6–12)
RBC # BLD AUTO: 3.26 10*6/MM3 (ref 3.77–5.28)
WBC NRBC COR # BLD AUTO: 13.18 10*3/MM3 (ref 3.4–10.8)

## 2024-03-20 PROCEDURE — 85025 COMPLETE CBC W/AUTO DIFF WBC: CPT | Performed by: ADVANCED PRACTICE MIDWIFE

## 2024-03-20 PROCEDURE — 25010000002 KETOROLAC TROMETHAMINE PER 15 MG: Performed by: OBSTETRICS & GYNECOLOGY

## 2024-03-20 PROCEDURE — 25010000002 HYDROMORPHONE 1 MG/ML SOLUTION: Performed by: OBSTETRICS & GYNECOLOGY

## 2024-03-20 RX ORDER — FERROUS SULFATE 325(65) MG
325 TABLET ORAL
Status: DISCONTINUED | OUTPATIENT
Start: 2024-03-20 | End: 2024-03-21 | Stop reason: HOSPADM

## 2024-03-20 RX ORDER — KETOROLAC TROMETHAMINE 15 MG/ML
15 INJECTION, SOLUTION INTRAMUSCULAR; INTRAVENOUS ONCE
Status: COMPLETED | OUTPATIENT
Start: 2024-03-20 | End: 2024-03-20

## 2024-03-20 RX ADMIN — ACETAMINOPHEN 650 MG: 325 TABLET ORAL at 16:43

## 2024-03-20 RX ADMIN — DOCUSATE SODIUM 100 MG: 100 CAPSULE, LIQUID FILLED ORAL at 08:00

## 2024-03-20 RX ADMIN — ACETAMINOPHEN 650 MG: 325 TABLET ORAL at 03:35

## 2024-03-20 RX ADMIN — HYDROMORPHONE HYDROCHLORIDE 1 MG: 1 INJECTION, SOLUTION INTRAMUSCULAR; INTRAVENOUS; SUBCUTANEOUS at 00:41

## 2024-03-20 RX ADMIN — DOCUSATE SODIUM 100 MG: 100 CAPSULE, LIQUID FILLED ORAL at 20:49

## 2024-03-20 RX ADMIN — HYDROCODONE BITARTRATE AND ACETAMINOPHEN 1 TABLET: 10; 325 TABLET ORAL at 00:12

## 2024-03-20 RX ADMIN — IBUPROFEN 600 MG: 600 TABLET, FILM COATED ORAL at 11:24

## 2024-03-20 RX ADMIN — IBUPROFEN 600 MG: 600 TABLET, FILM COATED ORAL at 17:52

## 2024-03-20 RX ADMIN — ACETAMINOPHEN 650 MG: 325 TABLET ORAL at 23:07

## 2024-03-20 RX ADMIN — KETOROLAC TROMETHAMINE 15 MG: 15 INJECTION, SOLUTION INTRAMUSCULAR; INTRAVENOUS at 05:11

## 2024-03-20 RX ADMIN — HYDROCODONE BITARTRATE AND ACETAMINOPHEN 1 TABLET: 10; 325 TABLET ORAL at 12:56

## 2024-03-20 RX ADMIN — FERROUS SULFATE TAB 325 MG (65 MG ELEMENTAL FE) 325 MG: 325 (65 FE) TAB at 09:54

## 2024-03-20 NOTE — LACTATION NOTE
03/20/24 1325   Maternal Information   Date of Referral 03/20/24   Person Making Referral lactation consultant  (courtesy consult)   Maternal Reason for Referral milk supply concern  (first baby-- and pumped x6 months but never made enough milk.)   Infant Reason for Referral   (mom reports baby is breastfeeding well and following up with formula.)   Milk Expression/Equipment   Breast Pump Type double electric, personal  (mom has Lansinoh pump that she is using. Gave QR code for instructional video)   Breast Pump Flange Type hard   Breast Pump Flange Size 24 mm   Breast Pumping   Breast Pumping Interventions post-feed pumping encouraged  (Encouraged to pump every 3 hours to get best milk supply possible. Discussed possibility of not making full supply. But pumping after feedings gives her the best chance ot get best supply possible.)     Teaching documented under Education. Encouraged to call lactation services, if there are questions or concerns. Has gotten drops so far with pumping. Gave syringes and discussed how to pull up colostrum and finger/syringe feed to baby.

## 2024-03-20 NOTE — L&D DELIVERY NOTE
MIMI Alvarado  Vaginal Delivery Note  03/19/24  20:16 EDT      Delivery     Delivery: Vaginal, Spontaneous     YOB: 2025    Time of Birth: 7:46 PM   39w0d      Anesthesia: Epidural     Delivering clinician: Silvia Horn                Delivery narrative:    Patient at 39w1d pushed to deliver a viable female infant over an intact perineum with epidural anesthesia. Infant's head, anterior shoulder, and body delivered atraumatically. Vigorous infant was placed on mom's abdomen where the cord was allowed to stop pulsating and was clamped x2 and cut by FOB. Placenta delivered spontaneously and appeared to be intact. Pitocin to IV after delivery of baby. . Mother and infant stable, bonding      Infant    Findings: female  infant     Infant observations: Weight: 3335 g (7 lb 5.6 oz)   Length: 20.25  in  Observations/Comments:        Apgars: 7  @ 1 minute /    9  @ 5 minutes         Placenta, Cord, and Fluid    Placenta delivered  Spontaneous  at  3/19/2024  7:51 PM     Cord: 3 vessels  present.   Nuchal Cord?  no   Cord blood obtained:  yes   Cord gases obtained:   no             Repair    Episiotomy: No       Estimated Blood Loss:  250 mls.   Suture used for repair: None needed     Complications  none    Disposition  Mother to Mother Baby/Postpartum  in stable condition currently.  Baby to remains with mom  in stable condition currently.      Silvia Horn CNM  03/19/24  20:16 EDT

## 2024-03-20 NOTE — PROGRESS NOTES
3/20/2024  PPD #1    Subjective   Padma feels well.  Patient describes her lochia as less than menses.  Pain is well controlled  She is breastfeeding.      Objective   Temp: Temp:  [97.4 °F (36.3 °C)-98.3 °F (36.8 °C)] 98 °F (36.7 °C) Temp src: Oral   BP: BP: ()/(35-73) 103/55        Pulse: Heart Rate:  [] 101  RR: Resp:  [14-20] 18    General:  No acute distress   Abdomen: Fundus firm and beneath umbilicus   Pelvis: deferred     Lab Results   Component Value Date    WBC 13.18 (H) 03/20/2024    HGB 8.7 (L) 03/20/2024    HCT 27.3 (L) 03/20/2024    MCV 83.7 03/20/2024     03/20/2024    GLU 95 12/25/2021    POCGLU 88 01/04/2023    CREATININE 0.60 03/18/2024    URICACID 4.0 03/18/2024    AST 16 03/18/2024    ALT 7 03/18/2024     03/18/2024    HEPBSAG Non-Reactive 09/19/2023     Results from last 7 days   Lab Units 03/18/24 2222   ABO TYPING  O   RH TYPING  Positive   ANTIBODY SCREEN  Negative       Assessment  PPD# 1 after vaginal delivery  2.   Anemia, POA    Plan  Supportive care, anticipate d/c in am.  2.   Ferrous sulfate 325mg PO daily    This note has been electronically signed.    Yamile Matson CNM  08:37 EDT  March 20, 2024

## 2024-03-20 NOTE — ANESTHESIA POSTPROCEDURE EVALUATION
Patient: Padma Younger    Procedure Summary       Date: 03/19/24 Room / Location:     Anesthesia Start: 0933 Anesthesia Stop: 1951    Procedure: LABOR ANALGESIA Diagnosis:     Scheduled Providers:  Provider: Chema Benoit DO    Anesthesia Type: epidural ASA Status: 2            Anesthesia Type: epidural    Vitals  Vitals Value Taken Time   /56 03/20/24 0730   Temp 98 °F (36.7 °C) 03/20/24 0730   Pulse 101 03/20/24 0730   Resp 16 03/20/24 0730   SpO2 99 % 03/19/24 2242   Vitals shown include unfiled device data.        Post Anesthesia Care and Evaluation    Patient location during evaluation: bedside  Patient participation: complete - patient participated  Level of consciousness: awake and alert  Pain management: adequate    Airway patency: patent  Anesthetic complications: No anesthetic complications    Cardiovascular status: acceptable  Respiratory status: acceptable  Hydration status: acceptable  Post Neuraxial Block status: Motor and sensory function returned to baseline and No signs or symptoms of PDPH

## 2024-03-21 VITALS
RESPIRATION RATE: 18 BRPM | HEART RATE: 95 BPM | SYSTOLIC BLOOD PRESSURE: 111 MMHG | HEIGHT: 61 IN | OXYGEN SATURATION: 99 % | TEMPERATURE: 98.2 F | WEIGHT: 203 LBS | BODY MASS INDEX: 38.33 KG/M2 | DIASTOLIC BLOOD PRESSURE: 59 MMHG

## 2024-03-21 RX ORDER — FERROUS SULFATE 325(65) MG
325 TABLET ORAL
Qty: 30 TABLET | Refills: 1 | Status: SHIPPED | OUTPATIENT
Start: 2024-03-22

## 2024-03-21 RX ORDER — PSEUDOEPHEDRINE HCL 30 MG
100 TABLET ORAL 2 TIMES DAILY PRN
Qty: 60 CAPSULE | Refills: 1 | Status: SHIPPED | OUTPATIENT
Start: 2024-03-21

## 2024-03-21 RX ORDER — DIPHENHYDRAMINE HCL 50 MG
50 CAPSULE ORAL EVERY 4 HOURS PRN
Status: DISCONTINUED | OUTPATIENT
Start: 2024-03-21 | End: 2024-03-21 | Stop reason: HOSPADM

## 2024-03-21 RX ORDER — IBUPROFEN 600 MG/1
600 TABLET ORAL EVERY 6 HOURS PRN
Qty: 60 TABLET | Refills: 1 | Status: SHIPPED | OUTPATIENT
Start: 2024-03-21

## 2024-03-21 RX ADMIN — HYDROCODONE BITARTRATE AND ACETAMINOPHEN 1 TABLET: 5; 325 TABLET ORAL at 00:30

## 2024-03-21 RX ADMIN — IBUPROFEN 600 MG: 600 TABLET, FILM COATED ORAL at 02:20

## 2024-03-21 RX ADMIN — ACETAMINOPHEN 650 MG: 325 TABLET ORAL at 10:16

## 2024-03-21 RX ADMIN — ACETAMINOPHEN 650 MG: 325 TABLET ORAL at 05:40

## 2024-03-21 RX ADMIN — FERROUS SULFATE TAB 325 MG (65 MG ELEMENTAL FE) 325 MG: 325 (65 FE) TAB at 08:36

## 2024-03-21 RX ADMIN — DOCUSATE SODIUM 100 MG: 100 CAPSULE, LIQUID FILLED ORAL at 08:36

## 2024-03-21 RX ADMIN — IBUPROFEN 600 MG: 600 TABLET, FILM COATED ORAL at 08:35

## 2024-03-21 NOTE — DISCHARGE SUMMARY
[facility]  Vaginal delivery discharge summary      Patient: Padma Younger      MR#:[id number]  Admission  Diagnosis: Present on Admission:   Postpartum anemia      Discharge Diagnosis: Active and Resolved Problems  Active Hospital Problems    Diagnosis  POA    Postpartum anemia [O90.81]  Yes     (normal spontaneous vaginal delivery) [O80]  Not Applicable      Resolved Hospital Problems    Diagnosis Date Resolved POA    39 weeks gestation of pregnancy [Z3A.39] 2024 Not Applicable            Date of Admission: 3/18/2024  Date of Discharge:  3/21/2024    Procedures:  Vaginal, Spontaneous     3/19/2024    7:46 PM      Service:  Obstetrics    Hospital Course:  Patient underwent vaginal delivery and remained in the hospital for 3 days.  During that time she remained afebrile and hemodynamically stable.  On the day of discharge, she was eating, ambulating and voiding without difficulty.  She is breastfeeding.    Labs:    Lab Results   Component Value Date    WBC 13.18 (H) 2024    HGB 8.7 (L) 2024    HCT 27.3 (L) 2024    MCV 83.7 2024     2024    GLU 95 2021    POCGLU 88 2023    CREATININE 0.60 2024    URICACID 4.0 2024    AST 16 2024    ALT 7 2024     2024     Results from last 7 days   Lab Units 24  2222   ABO TYPING  O   RH TYPING  Positive   ANTIBODY SCREEN  Negative            Discharge Medications        New Medications        Instructions Start Date   docusate sodium 100 MG capsule   100 mg, Oral, 2 Times Daily PRN      ferrous sulfate 325 (65 FE) MG tablet   325 mg, Oral, Daily With Breakfast   Start Date: 2024     ibuprofen 600 MG tablet  Commonly known as: ADVIL,MOTRIN   600 mg, Oral, Every 6 Hours PRN             Continue These Medications        Instructions Start Date   albuterol sulfate  (90 Base) MCG/ACT inhaler  Commonly known as: PROVENTIL HFA;VENTOLIN HFA;PROAIR HFA   2 puffs,  Inhalation, Every 4 Hours PRN      buPROPion  MG 24 hr tablet  Commonly known as: WELLBUTRIN XL   150 mg, Oral, Daily             Stop These Medications      acetaminophen 325 MG tablet  Commonly known as: TYLENOL     cetirizine 10 MG tablet  Commonly known as: zyrTEC     Claritin 10 MG capsule  Generic drug: Loratadine     famotidine 20 MG tablet  Commonly known as: Pepcid     montelukast 10 MG tablet  Commonly known as: SINGULAIR     ondansetron ODT 4 MG disintegrating tablet  Commonly known as: ZOFRAN-ODT     vitamin C 500 MG tablet  Commonly known as: ASCORBIC ACID     vitamin D3 125 MCG (5000 UT) capsule capsule              Discharge Disposition:  To Home    Discharge Condition:  Stable. PP anemia taking ferrous sulfate.    Discharge Diet: Regular    Activity at Discharge: Pelvic rest    Follow-up Appointments  Follow up with LW in 6 weeks.     Yamile Matson CNM  03/21/24  09:26 EDT

## 2024-03-21 NOTE — PROGRESS NOTES
3/21/2024  PPD #2    Subjective   Padma feels well.  Patient describes her lochia as less than menses.  Pain is well controlled  She is breastfeeding.      Objective   Temp: Temp:  [97.8 °F (36.6 °C)-98.2 °F (36.8 °C)] 98.2 °F (36.8 °C) Temp src: Oral   BP: BP: (102-111)/(50-68) 111/59        Pulse: Heart Rate:  [92-99] 95  RR: Resp:  [16-18] 18    General:  No acute distress   Abdomen: Fundus firm and beneath umbilicus   Pelvis: deferred     Lab Results   Component Value Date    WBC 13.18 (H) 03/20/2024    HGB 8.7 (L) 03/20/2024    HCT 27.3 (L) 03/20/2024    MCV 83.7 03/20/2024     03/20/2024    HEPBSAG Non-Reactive 09/19/2023    AST 16 03/18/2024    ALT 7 03/18/2024    URICACID 4.0 03/18/2024    GLU 95 12/25/2021       Assessment  PPD# 2 after vaginal delivery  2.   Anemia, POA     Plan  Discharge to home  Follow up with LW  in 6 weeks  Prescriptions for Motrin, ferrous sulfate, and Colace prescribed and advised on weaning.  Advised no tampons, intercourse, or tub baths for 6 weeks.       This note has been electronically signed.    Yamile Matson CNM  09:27 EDT  March 21, 2024

## 2024-03-23 ENCOUNTER — APPOINTMENT (OUTPATIENT)
Dept: ULTRASOUND IMAGING | Facility: HOSPITAL | Age: 34
End: 2024-03-23
Payer: MEDICAID

## 2024-03-23 ENCOUNTER — HOSPITAL ENCOUNTER (EMERGENCY)
Facility: HOSPITAL | Age: 34
Discharge: HOME | End: 2024-03-24
Attending: EMERGENCY MEDICINE
Payer: MEDICAID

## 2024-03-23 ENCOUNTER — APPOINTMENT (OUTPATIENT)
Dept: GENERAL RADIOLOGY | Facility: HOSPITAL | Age: 34
End: 2024-03-23
Payer: MEDICAID

## 2024-03-23 DIAGNOSIS — R60.0 PERIPHERAL EDEMA: Primary | ICD-10-CM

## 2024-03-23 LAB
ALBUMIN SERPL-MCNC: 3.2 G/DL (ref 3.5–5.2)
ALBUMIN/GLOB SERPL: 1 G/DL
ALP SERPL-CCNC: 124 U/L (ref 39–117)
ALT SERPL W P-5'-P-CCNC: 25 U/L (ref 1–33)
ANION GAP SERPL CALCULATED.3IONS-SCNC: 7 MMOL/L (ref 5–15)
AST SERPL-CCNC: 34 U/L (ref 1–32)
B PARAPERT DNA SPEC QL NAA+PROBE: NOT DETECTED
B PERT DNA SPEC QL NAA+PROBE: NOT DETECTED
BACTERIA UR QL AUTO: ABNORMAL /HPF
BASOPHILS # BLD AUTO: 0.01 10*3/MM3 (ref 0–0.2)
BASOPHILS NFR BLD AUTO: 0.2 % (ref 0–1.5)
BILIRUB SERPL-MCNC: 0.3 MG/DL (ref 0–1.2)
BILIRUB UR QL STRIP: NEGATIVE
BUN SERPL-MCNC: 6 MG/DL (ref 6–20)
BUN/CREAT SERPL: 8.5 (ref 7–25)
C PNEUM DNA NPH QL NAA+NON-PROBE: NOT DETECTED
CALCIUM SPEC-SCNC: 8.5 MG/DL (ref 8.6–10.5)
CHLORIDE SERPL-SCNC: 104 MMOL/L (ref 98–107)
CLARITY UR: ABNORMAL
CO2 SERPL-SCNC: 24 MMOL/L (ref 22–29)
COLOR UR: YELLOW
CREAT SERPL-MCNC: 0.71 MG/DL (ref 0.57–1)
D-LACTATE SERPL-SCNC: 0.8 MMOL/L (ref 0.5–2)
DEPRECATED RDW RBC AUTO: 43.9 FL (ref 37–54)
EGFRCR SERPLBLD CKD-EPI 2021: 113.9 ML/MIN/1.73
EOSINOPHIL # BLD AUTO: 0.05 10*3/MM3 (ref 0–0.4)
EOSINOPHIL NFR BLD AUTO: 0.9 % (ref 0.3–6.2)
ERYTHROCYTE [DISTWIDTH] IN BLOOD BY AUTOMATED COUNT: 14.5 % (ref 12.3–15.4)
FLUAV SUBTYP SPEC NAA+PROBE: NOT DETECTED
FLUBV RNA ISLT QL NAA+PROBE: NOT DETECTED
GLOBULIN UR ELPH-MCNC: 3.2 GM/DL
GLUCOSE SERPL-MCNC: 103 MG/DL (ref 65–99)
GLUCOSE UR STRIP-MCNC: NEGATIVE MG/DL
HADV DNA SPEC NAA+PROBE: NOT DETECTED
HCOV 229E RNA SPEC QL NAA+PROBE: NOT DETECTED
HCOV HKU1 RNA SPEC QL NAA+PROBE: NOT DETECTED
HCOV NL63 RNA SPEC QL NAA+PROBE: NOT DETECTED
HCOV OC43 RNA SPEC QL NAA+PROBE: NOT DETECTED
HCT VFR BLD AUTO: 28.1 % (ref 34–46.6)
HGB BLD-MCNC: 8.4 G/DL (ref 12–15.9)
HGB UR QL STRIP.AUTO: ABNORMAL
HMPV RNA NPH QL NAA+NON-PROBE: NOT DETECTED
HPIV1 RNA ISLT QL NAA+PROBE: NOT DETECTED
HPIV2 RNA SPEC QL NAA+PROBE: NOT DETECTED
HPIV3 RNA NPH QL NAA+PROBE: NOT DETECTED
HPIV4 P GENE NPH QL NAA+PROBE: NOT DETECTED
HYALINE CASTS UR QL AUTO: ABNORMAL /LPF
IMM GRANULOCYTES # BLD AUTO: 0.03 10*3/MM3 (ref 0–0.05)
IMM GRANULOCYTES NFR BLD AUTO: 0.6 % (ref 0–0.5)
KETONES UR QL STRIP: NEGATIVE
LEUKOCYTE ESTERASE UR QL STRIP.AUTO: ABNORMAL
LYMPHOCYTES # BLD AUTO: 0.84 10*3/MM3 (ref 0.7–3.1)
LYMPHOCYTES NFR BLD AUTO: 15.8 % (ref 19.6–45.3)
M PNEUMO IGG SER IA-ACNC: NOT DETECTED
MCH RBC QN AUTO: 25.5 PG (ref 26.6–33)
MCHC RBC AUTO-ENTMCNC: 29.9 G/DL (ref 31.5–35.7)
MCV RBC AUTO: 85.4 FL (ref 79–97)
MONOCYTES # BLD AUTO: 0.32 10*3/MM3 (ref 0.1–0.9)
MONOCYTES NFR BLD AUTO: 6 % (ref 5–12)
NEUTROPHILS NFR BLD AUTO: 4.07 10*3/MM3 (ref 1.7–7)
NEUTROPHILS NFR BLD AUTO: 76.5 % (ref 42.7–76)
NITRITE UR QL STRIP: NEGATIVE
NRBC BLD AUTO-RTO: 0 /100 WBC (ref 0–0.2)
NT-PROBNP SERPL-MCNC: 320.1 PG/ML (ref 0–450)
PH UR STRIP.AUTO: 8 [PH] (ref 5–8)
PLATELET # BLD AUTO: 254 10*3/MM3 (ref 140–450)
PMV BLD AUTO: 10.4 FL (ref 6–12)
POTASSIUM SERPL-SCNC: 4 MMOL/L (ref 3.5–5.2)
PROT SERPL-MCNC: 6.4 G/DL (ref 6–8.5)
PROT UR QL STRIP: ABNORMAL
RBC # BLD AUTO: 3.29 10*6/MM3 (ref 3.77–5.28)
RBC # UR STRIP: ABNORMAL /HPF
REF LAB TEST METHOD: ABNORMAL
RHINOVIRUS RNA SPEC NAA+PROBE: NOT DETECTED
RSV RNA NPH QL NAA+NON-PROBE: NOT DETECTED
SARS-COV-2 RNA NPH QL NAA+NON-PROBE: NOT DETECTED
SODIUM SERPL-SCNC: 135 MMOL/L (ref 136–145)
SP GR UR STRIP: 1.01 (ref 1–1.03)
SQUAMOUS #/AREA URNS HPF: ABNORMAL /HPF
TROPONIN T SERPL HS-MCNC: 12 NG/L
UROBILINOGEN UR QL STRIP: ABNORMAL
WBC # UR STRIP: ABNORMAL /HPF
WBC NRBC COR # BLD AUTO: 5.32 10*3/MM3 (ref 3.4–10.8)

## 2024-03-23 PROCEDURE — 76830 TRANSVAGINAL US NON-OB: CPT

## 2024-03-23 PROCEDURE — 81001 URINALYSIS AUTO W/SCOPE: CPT | Performed by: EMERGENCY MEDICINE

## 2024-03-23 PROCEDURE — 83605 ASSAY OF LACTIC ACID: CPT | Performed by: EMERGENCY MEDICINE

## 2024-03-23 PROCEDURE — 0202U NFCT DS 22 TRGT SARS-COV-2: CPT | Performed by: EMERGENCY MEDICINE

## 2024-03-23 PROCEDURE — 36415 COLL VENOUS BLD VENIPUNCTURE: CPT

## 2024-03-23 PROCEDURE — 80053 COMPREHEN METABOLIC PANEL: CPT | Performed by: EMERGENCY MEDICINE

## 2024-03-23 PROCEDURE — 84484 ASSAY OF TROPONIN QUANT: CPT | Performed by: EMERGENCY MEDICINE

## 2024-03-23 PROCEDURE — 85025 COMPLETE CBC W/AUTO DIFF WBC: CPT | Performed by: EMERGENCY MEDICINE

## 2024-03-23 PROCEDURE — 83880 ASSAY OF NATRIURETIC PEPTIDE: CPT | Performed by: EMERGENCY MEDICINE

## 2024-03-23 PROCEDURE — 99284 EMERGENCY DEPT VISIT MOD MDM: CPT

## 2024-03-23 PROCEDURE — 71046 X-RAY EXAM CHEST 2 VIEWS: CPT

## 2024-03-23 PROCEDURE — 93005 ELECTROCARDIOGRAM TRACING: CPT | Performed by: EMERGENCY MEDICINE

## 2024-03-23 PROCEDURE — 87040 BLOOD CULTURE FOR BACTERIA: CPT | Performed by: EMERGENCY MEDICINE

## 2024-03-23 RX ORDER — SODIUM CHLORIDE 0.9 % (FLUSH) 0.9 %
10 SYRINGE (ML) INJECTION AS NEEDED
Status: DISCONTINUED | OUTPATIENT
Start: 2024-03-23 | End: 2024-03-24 | Stop reason: HOSPADM

## 2024-03-24 VITALS
OXYGEN SATURATION: 99 % | RESPIRATION RATE: 22 BRPM | SYSTOLIC BLOOD PRESSURE: 122 MMHG | HEART RATE: 75 BPM | BODY MASS INDEX: 37.84 KG/M2 | TEMPERATURE: 98.2 F | WEIGHT: 200.4 LBS | DIASTOLIC BLOOD PRESSURE: 67 MMHG | HEIGHT: 61 IN

## 2024-03-24 LAB
QT INTERVAL: 356 MS
QTC INTERVAL: 410 MS

## 2024-03-24 RX ORDER — FUROSEMIDE 20 MG/1
20 TABLET ORAL DAILY PRN
Qty: 5 TABLET | Refills: 0 | Status: SHIPPED | OUTPATIENT
Start: 2024-03-24

## 2024-03-24 NOTE — DISCHARGE INSTRUCTIONS
Keep your feet elevated when possible.  You can continue Tylenol as needed for fever.  Continue to treat breast discomfort with warm compresses or hot shower, pumping of breast milk, and breast-feeding.  Return to the emergency department for any new, concerning, or worsening symptoms, especially if you develop severe headache or acute changes in your vision.

## 2024-03-24 NOTE — ED PROVIDER NOTES
Subjective   History of Present Illness  34-year-old G3, P2 postpartum day 4 status post vaginal delivery at 39-1/7 weeks gestation with epidural, presents to the emergency department accompanied by her sister with conc      Review of Systems    Past Medical History:   Diagnosis Date    Abnormal Pap smear of cervix     Anxiety     Asthma     Chlamydia     2013    Chlamydia contact, treated     Depression     Gonorrhea     2013    Urinary tract infection     Visual impairment     Kertacanis       Allergies   Allergen Reactions    Oxycodone Itching       Past Surgical History:   Procedure Laterality Date    CHOLECYSTECTOMY  11/27/2023    CHOLECYSTECTOMY OPEN      CYST REMOVAL      D & C WITH SUCTION N/A 09/18/2017    Procedure: DILATATION AND CURETTAGE WITH SUCTION;  Surgeon: Kelley Dodge DO;  Location: [facility] OR;  Service:     DENTAL PROCEDURE      DILATATION AND CURETTAGE  09/18/2017    EYE SURGERY  12/2018    Intact Surgery    KNEE SURGERY      VAGINAL DELIVERY      x1    WISDOM TOOTH EXTRACTION         Family History   Problem Relation Age of Onset    Arthritis Mother     COPD Mother     Liver disease Maternal Uncle     Breast cancer Maternal Grandmother     Cancer Maternal Grandmother     Diabetes Maternal Grandfather     Cancer Maternal Grandfather     Breast cancer Paternal Grandmother     Ovarian cancer Paternal Grandmother     Diabetes Paternal Grandmother     Heart attack Paternal Grandmother     Hypertension Paternal Grandmother     Stroke Paternal Grandmother     Cancer Paternal Grandmother     Colon cancer Paternal Grandfather        Social History     Socioeconomic History    Marital status: Single    Number of children: 1   Tobacco Use    Smoking status: Never    Smokeless tobacco: Never   Vaping Use    Vaping status: Never Used   Substance and Sexual Activity    Alcohol use: Not Currently     Alcohol/week: 2.0 standard drinks of alcohol     Types: 2 Glasses of wine per week     Comment: OCCASIONAL     Drug use: No    Sexual activity: Yes     Partners: Male     Birth control/protection: Condom, Abstinence           Objective   Physical Exam    Procedures           ED Course  ED Course as of 03/24/24 0058   Sun Mar 24, 2024   0010 Hemoglobin(!): 8.4  Similar to four days ago. [LD]   0012 Dr. Lencho RICK paged through [facility] call center. [LD]   0053 Unremarkable ED course.  Results and plan discussed with patient and her sister at bedside.  All questions addressed.  Case discussed with OB/GYN Dr. Musa on-call for the patient's OB/GYN group.  Patient stable for discharge at this time, and return precautions were discussed with the patient, follow-up next week with OB/GYN recommended within a week for any concerns. [LD]      ED Course User Index  [LD] Xin Vergara MD                                             Medical Decision Making  Problems Addressed:  Breast feeding status of mother: complicated acute illness or injury  Peripheral edema: complicated acute illness or injury    Amount and/or Complexity of Data Reviewed  Labs: ordered. Decision-making details documented in ED Course.  Radiology: ordered.  ECG/medicine tests: ordered.    Risk  Prescription drug management.      Recent Results (from the past 24 hour(s))   Urinalysis With Microscopic If Indicated (No Culture) - Urine, Clean Catch    Collection Time: 03/23/24  9:22 PM    Specimen: Urine, Clean Catch   Result Value Ref Range    Color, UA Yellow Yellow, Straw    Appearance, UA Cloudy (A) Clear    pH, UA 8.0 5.0 - 8.0    Specific Gravity, UA 1.015 1.001 - 1.030    Glucose, UA Negative Negative    Ketones, UA Negative Negative    Bilirubin, UA Negative Negative    Blood, UA Moderate (2+) (A) Negative    Protein, UA Trace (A) Negative    Leuk Esterase, UA Moderate (2+) (A) Negative    Nitrite, UA Negative Negative    Urobilinogen, UA 0.2 E.U./dL 0.2 - 1.0 E.U./dL   Respiratory Panel PCR w/COVID-19(SARS-CoV-2) VIANEY/PHILLIP/SNEHA/PAD/COR/SUMAYA In-House,  NP Swab in UTM/VTM, 2 HR TAT - Swab, Nasopharynx    Collection Time: 03/23/24  9:22 PM    Specimen: Nasopharynx; Swab   Result Value Ref Range    ADENOVIRUS, PCR Not Detected Not Detected    Coronavirus 229E Not Detected Not Detected    Coronavirus HKU1 Not Detected Not Detected    Coronavirus NL63 Not Detected Not Detected    Coronavirus OC43 Not Detected Not Detected    COVID19 Not Detected Not Detected - Ref. Range    Human Metapneumovirus Not Detected Not Detected    Human Rhinovirus/Enterovirus Not Detected Not Detected    Influenza A PCR Not Detected Not Detected    Influenza B PCR Not Detected Not Detected    Parainfluenza Virus 1 Not Detected Not Detected    Parainfluenza Virus 2 Not Detected Not Detected    Parainfluenza Virus 3 Not Detected Not Detected    Parainfluenza Virus 4 Not Detected Not Detected    RSV, PCR Not Detected Not Detected    Bordetella pertussis pcr Not Detected Not Detected    Bordetella parapertussis PCR Not Detected Not Detected    Chlamydophila pneumoniae PCR Not Detected Not Detected    Mycoplasma pneumo by PCR Not Detected Not Detected   Urinalysis, Microscopic Only - Urine, Clean Catch    Collection Time: 03/23/24  9:22 PM    Specimen: Urine, Clean Catch   Result Value Ref Range    RBC, UA 21-50 (A) None Seen, 0-2 /HPF    WBC, UA 11-20 (A) None Seen, 0-2 /HPF    Bacteria, UA None Seen None Seen, Trace /HPF    Squamous Epithelial Cells, UA 3-6 (A) None Seen, 0-2 /HPF    Hyaline Casts, UA 0-6 0 - 6 /LPF    Methodology Automated Microscopy    Comprehensive Metabolic Panel    Collection Time: 03/23/24  9:31 PM    Specimen: Blood   Result Value Ref Range    Glucose 103 (H) 65 - 99 mg/dL    BUN 6 6 - 20 mg/dL    Creatinine 0.71 0.57 - 1.00 mg/dL    Sodium 135 (L) 136 - 145 mmol/L    Potassium 4.0 3.5 - 5.2 mmol/L    Chloride 104 98 - 107 mmol/L    CO2 24.0 22.0 - 29.0 mmol/L    Calcium 8.5 (L) 8.6 - 10.5 mg/dL    Total Protein 6.4 6.0 - 8.5 g/dL    Albumin 3.2 (L) 3.5 - 5.2 g/dL    ALT  (SGPT) 25 1 - 33 U/L    AST (SGOT) 34 (H) 1 - 32 U/L    Alkaline Phosphatase 124 (H) 39 - 117 U/L    Total Bilirubin 0.3 0.0 - 1.2 mg/dL    Globulin 3.2 gm/dL    A/G Ratio 1.0 g/dL    BUN/Creatinine Ratio 8.5 7.0 - 25.0    Anion Gap 7.0 5.0 - 15.0 mmol/L    eGFR 113.9 >60.0 mL/min/1.73   BNP    Collection Time: 03/23/24  9:31 PM    Specimen: Blood   Result Value Ref Range    proBNP 320.1 0.0 - 450.0 pg/mL   Single High Sensitivity Troponin T    Collection Time: 03/23/24  9:31 PM    Specimen: Blood   Result Value Ref Range    HS Troponin T 12 <14 ng/L   Lactic Acid, Plasma    Collection Time: 03/23/24  9:31 PM    Specimen: Blood   Result Value Ref Range    Lactate 0.8 0.5 - 2.0 mmol/L   CBC Auto Differential    Collection Time: 03/23/24  9:31 PM    Specimen: Blood   Result Value Ref Range    WBC 5.32 3.40 - 10.80 10*3/mm3    RBC 3.29 (L) 3.77 - 5.28 10*6/mm3    Hemoglobin 8.4 (L) 12.0 - 15.9 g/dL    Hematocrit 28.1 (L) 34.0 - 46.6 %    MCV 85.4 79.0 - 97.0 fL    MCH 25.5 (L) 26.6 - 33.0 pg    MCHC 29.9 (L) 31.5 - 35.7 g/dL    RDW 14.5 12.3 - 15.4 %    RDW-SD 43.9 37.0 - 54.0 fl    MPV 10.4 6.0 - 12.0 fL    Platelets 254 140 - 450 10*3/mm3    Neutrophil % 76.5 (H) 42.7 - 76.0 %    Lymphocyte % 15.8 (L) 19.6 - 45.3 %    Monocyte % 6.0 5.0 - 12.0 %    Eosinophil % 0.9 0.3 - 6.2 %    Basophil % 0.2 0.0 - 1.5 %    Immature Grans % 0.6 (H) 0.0 - 0.5 %    Neutrophils, Absolute 4.07 1.70 - 7.00 10*3/mm3    Lymphocytes, Absolute 0.84 0.70 - 3.10 10*3/mm3    Monocytes, Absolute 0.32 0.10 - 0.90 10*3/mm3    Eosinophils, Absolute 0.05 0.00 - 0.40 10*3/mm3    Basophils, Absolute 0.01 0.00 - 0.20 10*3/mm3    Immature Grans, Absolute 0.03 0.00 - 0.05 10*3/mm3    nRBC 0.0 0.0 - 0.2 /100 WBC   ECG 12 Lead Other; leg swelling post partum day 4    Collection Time: 03/23/24  9:42 PM   Result Value Ref Range    QT Interval 356 ms    QTC Interval 410 ms     Note: In addition to lab results from this visit, the labs listed above may  "include labs taken at another facility or during a different encounter within the last 24 hours. Please correlate lab times with ED admission and discharge times for further clarification of the services performed during this visit.    XR Chest 2 View   Final Result   Impression:   No acute cardiopulmonary abnormality.            Electronically Signed: Deyvi Ayala MD     3/23/2024 11:46 PM EDT     Workstation ID: [id number]      US Non-ob Transvaginal   Final Result   Impression:   Postpartum appearance of the uterus and endometrium without obvious retained products of conception. Ovaries are not seen.            Electronically Signed: Deyvi Ayala MD     3/23/2024 10:21 PM EDT     Workstation ID: [id number]        Vitals:    03/23/24 2047 03/23/24 2230 03/23/24 2300 03/23/24 2301   BP: 119/73 103/68 115/71    Pulse: 96 82  72   Resp: 22      Temp: 98.2 °F (36.8 °C)      SpO2: 97% 99%  98%   Weight: 90.9 kg (200 lb 6.4 oz)      Height: 154.9 cm (61\")        Medications   sodium chloride 0.9 % flush 10 mL (has no administration in time range)     ECG/EMG Results (last 24 hours)       ** No results found for the last 24 hours. **          ECG 12 Lead Other; leg swelling post partum day 4   Preliminary Result   Test Reason : Other~   Blood Pressure :   */*   mmHG   Vent. Rate :  80 BPM     Atrial Rate :  80 BPM      P-R Int : 140 ms          QRS Dur :  76 ms       QT Int : 356 ms       P-R-T Axes :  56  11   7 degrees      QTc Int : 410 ms      Normal sinus rhythm   Cannot rule out Anterior infarct (cited on or before 23-MAR-2024)   Abnormal ECG   When compared with ECG of 14-SEP-2023 04:31,   No significant change was found      Referred By: EDMD           Confirmed By:               Final diagnoses:   Peripheral edema   Breast feeding status of mother       ED Disposition  ED Disposition       ED Disposition   Discharge    Condition   Stable    Comment   --               Lanette Gregory, APRN  [address]  [address]  [place] [address] [phone]    Schedule an appointment as soon as possible for a visit in 3 days      Mey Musa MD  [address] [facility] [address]  [place] [address]  [phone]      Follow up with your OBGYN/midwife group within a week if you have any further concerns.         Medication List        New Prescriptions      furosemide 20 MG tablet  Commonly known as: LASIX  Take 1 tablet by mouth Daily As Needed (swelling).               Where to Get Your Medications        These medications were sent to [facility] [id number] - [place], [place] - [facility] AT [facility] - [phone]  - [phone]   [address], [facility] [address]      Phone: [phone]   furosemide 20 MG tablet          "correlate lab times with ED admission and discharge times for further clarification of the services performed during this visit.    XR Chest 2 View   Final Result   Impression:   No acute cardiopulmonary abnormality.            Electronically Signed: Deyvi Ayala MD     3/23/2024 11:46 PM EDT     Workstation ID: [id number]      US Non-ob Transvaginal   Final Result   Impression:   Postpartum appearance of the uterus and endometrium without obvious retained products of conception. Ovaries are not seen.            Electronically Signed: Deyvi Ayala MD     3/23/2024 10:21 PM EDT     Workstation ID: [id number]        Vitals:    03/23/24 2047 03/23/24 2230 03/23/24 2300 03/23/24 2301   BP: 119/73 103/68 115/71    Pulse: 96 82  72   Resp: 22      Temp: 98.2 °F (36.8 °C)      SpO2: 97% 99%  98%   Weight: 90.9 kg (200 lb 6.4 oz)      Height: 154.9 cm (61\")        Medications   sodium chloride 0.9 % flush 10 mL (has no administration in time range)     ECG/EMG Results (last 24 hours)       ** No results found for the last 24 hours. **          ECG 12 Lead Other; leg swelling post partum day 4   Preliminary Result   Test Reason : Other~   Blood Pressure :   */*   mmHG   Vent. Rate :  80 BPM     Atrial Rate :  80 BPM      P-R Int : 140 ms          QRS Dur :  76 ms       QT Int : 356 ms       P-R-T Axes :  56  11   7 degrees      QTc Int : 410 ms      Normal sinus rhythm   Cannot rule out Anterior infarct (cited on or before 23-MAR-2024)   Abnormal ECG   When compared with ECG of 14-SEP-2023 04:31,   No significant change was found      Referred By: EDMD           Confirmed By:               Final diagnoses:   Peripheral edema   Breast feeding status of mother       ED Disposition  ED Disposition       ED Disposition   Discharge    Condition   Stable    Comment   --               Lanette Gregory, APRN  [address] [address]  [phone]    Schedule an appointment as soon as possible for a visit in 3 " days      Mey Musa MD  [address]  [address]  [place] [id number] [phone]      Follow up with your OBGYN/midwife group within a week if you have any further concerns.         Medication List        New Prescriptions      furosemide 20 MG tablet  Commonly known as: LASIX  Take 1 tablet by mouth Daily As Needed (swelling).               Where to Get Your Medications        These medications were sent to [facility] [id number] - [place] [address] [address] AT [facility] - [phone] [facility] [phone]   [address], [facility] [address]      Phone: [phone]   furosemide 20 MG tablet            Xin Vergara MD  03/27/24 2235       Xin Vergara MD  03/27/24 2232

## 2024-03-28 LAB
BACTERIA SPEC AEROBE CULT: NORMAL
BACTERIA SPEC AEROBE CULT: NORMAL

## 2024-04-01 ENCOUNTER — MATERNAL SCREENING (OUTPATIENT)
Dept: CALL CENTER | Facility: HOSPITAL | Age: 34
End: 2024-04-01
Payer: MEDICAID

## 2024-04-01 NOTE — OUTREACH NOTE
Maternal Screening Survey      Flowsheet Row Responses   Facility patient discharged from? [facility]   Attempt successful? Yes   Call start time    Call end time    EPD Scale: Able to Laugh 0-->as much as she always could   EPD Scale: Looked Forward 0-->as much as she ever did   EPD Scale: Blamed Self 0-->no, never   EPD Scale: Been Anxious 0-->no, not at all   EPD Scale: Felt Panicky 0-->no, not at all   EPD Scale: Things Getting on Top 0-->no, has been coping as well as ever   EPD Scale: Difficulty Sleeping 0-->no, not at all   EPD Scale: Sad or Miserable 0-->no, not at all   EPD Scale: Crying 0-->no, never   EPD Scale: Thought of Harming Self 0-->never   [place]  Depression Score 0   Did any of your parents have problems with alcohol or drug use? No   Do any of your peers have problems with alcohol or drug use? No   Does your partner have problems with alcohol or drug use? No   Before you were pregnant did you have problems with alcohol or drug use? (past) No   In the past month, did you drink beer, wine, liquor or use any other drugs? (pregnancy) No   Maternal Screening call completed Yes   Call end time 142              Keren HALE - Registered Nurse

## 2024-04-01 NOTE — OUTREACH NOTE
Maternal Screening Survey      Flowsheet Row Responses   Facility patient discharged from? [facility]   Attempt successful? No   Unsuccessful attempts Attempt 1              Keren HALE - Registered Nurse

## 2024-04-05 DIAGNOSIS — F41.1 GAD (GENERALIZED ANXIETY DISORDER): ICD-10-CM

## 2024-04-05 RX ORDER — BUPROPION HYDROCHLORIDE 150 MG/1
150 TABLET ORAL DAILY
Qty: 30 TABLET | Refills: 5 | Status: SHIPPED | OUTPATIENT
Start: 2024-04-05

## 2024-04-05 NOTE — TELEPHONE ENCOUNTER
Rx Refill Note  Requested Prescriptions     Pending Prescriptions Disp Refills    buPROPion XL (WELLBUTRIN XL) 150 MG 24 hr tablet 30 tablet 5     Sig: Take 1 tablet by mouth Daily.      Last office visit with prescribing clinician: 1/25/2024   Last telemedicine visit with prescribing clinician: Visit date not found   Next office visit with prescribing clinician: Visit date not found                         Would you like a call back once the refill request has been completed: [] Yes [] No    If the office needs to give you a call back, can they leave a voicemail: [] Yes [] No    Candie Perez MA  04/05/24, 08:10 EDT

## 2024-04-06 DIAGNOSIS — F41.1 GAD (GENERALIZED ANXIETY DISORDER): ICD-10-CM

## 2024-04-07 DIAGNOSIS — Z86.39 HISTORY OF VITAMIN D DEFICIENCY: ICD-10-CM

## 2024-04-08 RX ORDER — BUPROPION HYDROCHLORIDE 150 MG/1
150 TABLET ORAL DAILY
Qty: 30 TABLET | Refills: 5 | OUTPATIENT
Start: 2024-04-08

## 2024-04-08 RX ORDER — ASCORBIC ACID 500 MG
1000 TABLET ORAL DAILY
Qty: 60 TABLET | Refills: 1 | OUTPATIENT
Start: 2024-04-08

## 2024-04-08 NOTE — TELEPHONE ENCOUNTER
vitamin C (ASCORBIC ACID) 500 MG tablet     The original prescription was discontinued on 3/21/2024 by Yamile Matson CNM. Renewing this prescription may not be appropriate.      vitamin D3 125 MCG (5000 UT) capsule capsule     The original prescription was discontinued on 3/21/2024 by Yamile Matson CNM. Renewing this prescription may not be appropriate.

## 2024-05-14 NOTE — ED PROVIDER NOTES
EMERGENCY DEPARTMENT ENCOUNTER    Pt Name: Padma Younger  MRN: [id number]  Pt :   1990  Room Number:    Date of encounter:  2023  PCP: Lanette Gregory APRN  ED Provider: ONDINA Tsang    Historian: Patient    HPI:  Chief Complaint: Chest Wall Pain    Context: Padma Younger is a 33 y.o. female who presents to the ED c/o a pain and tightness in her chest. Patient shares the today she has experienced a pain and tightness sensation in her back and chest. She reports no fever, cough or shortness of breath. She has had some nausea but denies vomiting. No diarrhea, constipation or any urinary complaints. Patient is 11 weeks pregnant. She has not taken anything for her symptoms and reports no additional associated symptoms on exam.   HPI     REVIEW OF SYSTEMS  A chief complaint appropriate review of systems was completed and is negative except as noted in the HPI.     PAST MEDICAL HISTORY  Past Medical History:   Diagnosis Date    Abnormal Pap smear of cervix     Anxiety     Asthma     Chlamydia         Chlamydia contact, treated     Depression     Gonorrhea         Urinary tract infection     Visual impairment        PAST SURGICAL HISTORY  Past Surgical History:   Procedure Laterality Date    CYST REMOVAL      D & C WITH SUCTION N/A 2017    Procedure: DILATATION AND CURETTAGE WITH SUCTION;  Surgeon: Kelley Dodge DO;  Location: [facility] OR;  Service:     DENTAL PROCEDURE      DILATATION AND CURETTAGE  2017    EYE SURGERY  2018    Intact Surgery    KNEE SURGERY      VAGINAL DELIVERY      x1    WISDOM TOOTH EXTRACTION         FAMILY HISTORY  Family History   Problem Relation Age of Onset    Arthritis Mother     COPD Mother     Liver disease Maternal Uncle     Breast cancer Maternal Grandmother     Cancer Maternal Grandmother     Diabetes Maternal Grandfather     Cancer Maternal Grandfather     Breast cancer Paternal Grandmother     Ovarian cancer Paternal Grandmother     Diabetes  Discharge instructions explained by ED provider. Patient verbalized understanding and denies any other concerns or complaints at this time. Patient vital signs stable and no acute signs or symptoms of distress noted at discharge. Patient deemed clinically stable. Patient d/c home.     Paternal Grandmother     Heart attack Paternal Grandmother     Hypertension Paternal Grandmother     Stroke Paternal Grandmother     Cancer Paternal Grandmother     Colon cancer Paternal Grandfather        SOCIAL HISTORY  Social History     Socioeconomic History    Marital status: Single   Tobacco Use    Smoking status: Never    Smokeless tobacco: Never   Vaping Use    Vaping Use: Never used   Substance and Sexual Activity    Alcohol use: Yes     Alcohol/week: 2.0 standard drinks     Types: 2 Glasses of wine per week     Comment: OCCASIONAL    Drug use: No    Sexual activity: Yes     Partners: Male     Birth control/protection: Condom, Abstinence       ALLERGIES  Oxycodone    PHYSICAL EXAM  Physical Exam  GENERAL:   Appears in no acute distress.   HENT: Nares patent.  EYES: No scleral icterus.  CV: Regular rhythm, regular rate.  RESPIRATORY: Normal effort.  No audible wheezes, rales or rhonchi.  ABDOMEN: Soft, nontender  MUSCULOSKELETAL: No deformities.   NEURO: Alert, moves all extremities, follows commands.  SKIN: Warm, dry, no rash visualized.  PSYCH: Anxious in appearance   I have reviewed the triage vital signs and nursing notes.     LAB RESULTS  Results for orders placed or performed during the hospital encounter of 09/14/23   High Sensitivity Troponin T    Specimen: Blood   Result Value Ref Range    HS Troponin T <6 <10 ng/L   Comprehensive Metabolic Panel    Specimen: Blood   Result Value Ref Range    Glucose 93 65 - 99 mg/dL    BUN 8 6 - 20 mg/dL    Creatinine 0.77 0.57 - 1.00 mg/dL    Sodium 137 136 - 145 mmol/L    Potassium 3.8 3.5 - 5.2 mmol/L    Chloride 103 98 - 107 mmol/L    CO2 21.0 (L) 22.0 - 29.0 mmol/L    Calcium 9.4 8.6 - 10.5 mg/dL    Total Protein 7.2 6.0 - 8.5 g/dL    Albumin 3.8 3.5 - 5.2 g/dL    ALT (SGPT) 7 1 - 33 U/L    AST (SGOT) 19 1 - 32 U/L    Alkaline Phosphatase 61 39 - 117 U/L    Total Bilirubin 0.5 0.0 - 1.2 mg/dL    Globulin 3.4 gm/dL    A/G Ratio 1.1 g/dL    BUN/Creatinine Ratio  10.4 7.0 - 25.0    Anion Gap 13.0 5.0 - 15.0 mmol/L    eGFR 104.0 >60.0 mL/min/1.73   Lipase    Specimen: Blood   Result Value Ref Range    Lipase 20 13 - 60 U/L   BNP    Specimen: Blood   Result Value Ref Range    proBNP <36.0 0.0 - 450.0 pg/mL   CBC Auto Differential    Specimen: Blood   Result Value Ref Range    WBC 10.79 3.40 - 10.80 10*3/mm3    RBC 4.16 3.77 - 5.28 10*6/mm3    Hemoglobin 12.2 12.0 - 15.9 g/dL    Hematocrit 36.9 34.0 - 46.6 %    MCV 88.7 79.0 - 97.0 fL    MCH 29.3 26.6 - 33.0 pg    MCHC 33.1 31.5 - 35.7 g/dL    RDW 12.3 12.3 - 15.4 %    RDW-SD 39.6 37.0 - 54.0 fl    MPV 9.9 6.0 - 12.0 fL    Platelets 277 140 - 450 10*3/mm3    Neutrophil % 72.3 42.7 - 76.0 %    Lymphocyte % 21.9 19.6 - 45.3 %    Monocyte % 4.8 (L) 5.0 - 12.0 %    Eosinophil % 0.2 (L) 0.3 - 6.2 %    Basophil % 0.3 0.0 - 1.5 %    Immature Grans % 0.5 0.0 - 0.5 %    Neutrophils, Absolute 7.81 (H) 1.70 - 7.00 10*3/mm3    Lymphocytes, Absolute 2.36 0.70 - 3.10 10*3/mm3    Monocytes, Absolute 0.52 0.10 - 0.90 10*3/mm3    Eosinophils, Absolute 0.02 0.00 - 0.40 10*3/mm3    Basophils, Absolute 0.03 0.00 - 0.20 10*3/mm3    Immature Grans, Absolute 0.05 0.00 - 0.05 10*3/mm3    nRBC 0.0 0.0 - 0.2 /100 WBC   High Sensitivity Troponin T 2Hr    Specimen: Blood   Result Value Ref Range    HS Troponin T <6 <10 ng/L    Troponin T Delta     ECG 12 Lead ED Triage Standing Order; Chest Pain   Result Value Ref Range    QT Interval 340 ms    QTC Interval 429 ms   ECG 12 Lead ED Triage Standing Order; Chest Pain   Result Value Ref Range    QT Interval 380 ms    QTC Interval 430 ms   Green Top (Gel)   Result Value Ref Range    Extra Tube Hold for add-ons.    Lavender Top   Result Value Ref Range    Extra Tube hold for add-on    Gold Top - SST   Result Value Ref Range    Extra Tube Hold for add-ons.    Gray Top   Result Value Ref Range    Extra Tube Hold for add-ons.    Light Blue Top   Result Value Ref Range    Extra Tube Hold for add-ons.        If labs  were ordered, I independently reviewed the results and considered them in treating the patient.    RADIOLOGY  No orders to display     [] Radiologist's Report Reviewed:  I ordered and independently reviewed the above noted radiographic studies.  See radiologist's dictation for official interpretation.      PROCEDURES    Procedures    ECG 12 Lead ED Triage Standing Order; Chest Pain   Final Result   Test Reason : ED Triage Standing Order~   Blood Pressure :   */*   mmHG   Vent. Rate :  77 BPM     Atrial Rate :  77 BPM      P-R Int : 154 ms          QRS Dur :  84 ms       QT Int : 380 ms       P-R-T Axes :  35  25   8 degrees      QTc Int : 430 ms      Normal sinus rhythm   Normal ECG      When compared with ECG of 14-SEP-2023 02:07, (Unconfirmed)   No significant change was found   Confirmed by SHANTE COOLEY ([id number]) on 9/14/2023 12:22:31 PM      Referred By: BRIT           Confirmed By: SHANTE COOLEY      ECG 12 Lead ED Triage Standing Order; Chest Pain   Final Result   Test Reason : ED Triage Standing Order~   Blood Pressure :   */*   mmHG   Vent. Rate :  96 BPM     Atrial Rate :  96 BPM      P-R Int : 148 ms          QRS Dur :  76 ms       QT Int : 340 ms       P-R-T Axes :  29  25   5 degrees      QTc Int : 429 ms      Normal sinus rhythm   Cannot rule out Anterior infarct , age undetermined   Abnormal ECG   When compared with ECG of 15-AUG-2023 12:45,   No significant change was found   Confirmed by SHANTE COOLEY ([id number]) on 9/14/2023 12:19:49 PM      Referred By: BRIT           Confirmed By: SHANTE COOLEY          MEDICATIONS GIVEN IN ER    Medications   acetaminophen (TYLENOL) tablet 1,000 mg (1,000 mg Oral Given 9/14/23 0320)   ondansetron (ZOFRAN) injection 4 mg (4 mg Intravenous Given 9/14/23 0331)   sodium chloride 0.9 % bolus 500 mL (0 mL Intravenous Stopped 9/14/23 0420)       MEDICAL DECISION MAKING, PROGRESS, and CONSULTS   Medical Decision Making  Problems Addressed:  Chest wall pain: complicated acute  illness or injury  History of anxiety: chronic illness or injury  History of asthma: chronic illness or injury  History of depression: chronic illness or injury    Amount and/or Complexity of Data Reviewed  Labs: ordered.  ECG/medicine tests: ordered.    Risk  OTC drugs.  Prescription drug management.        All labs have been independently reviewed by me.  All radiology studies have been reviewed by me and the radiologist dictating the report.  All EKG's have been independently viewed by me.    [] Discussed with radiology regarding test interpretation:    Discussion below represents my analysis of pertinent findings related to patient's condition, differential diagnosis, treatment plan and final disposition.    Differential diagnosis:  The differential diagnosis associated with the patient's presentation includes: Mediastinitis, rib contusions and fractures, intercostal muscle strains, costochondritis, pneumonia, URI, myocarditis and GERD.     Additional sources  Discussed/ obtained information from independent historians:   [] Spouse  [] Parent  [] Family member  [] Friend  [] EMS   [] Other:  External (non-ED) record review:   [] Inpatient record:   [] Office record:   [] Outpatient record:   [] Prior Outpatient labs:   [] Prior Outpatient radiology:   [x] Primary Care record: Primary care visit from August 22 22,023 where patient diagnosed with vitamin D deficiency we will screen for STDs and also the presence of morning sickness in pregnancy.   [] Outside ED record:   [] Other:   Patient's care impacted by:   [] Diabetes  [] Hypertension  [] Hyperlipidemia  [] Hypothyroidism   [] Coronary Artery Disease   [] COPD   [] Cancer   [] Obesity  [] GERD   [] Tobacco Abuse   [] Substance Abuse    [x] Anxiety   [x] Depression   [x] Other: History of asthma  Care significantly affected by Social Determinants of Health (housing and economic circumstances, unemployment)    [] Yes     [x] No   If yes, Patient's care  significantly limited by  Social Determinants of Health including:   [] Inadequate housing   [] Low income   [] Alcoholism and drug addiction in family   [] Problems related to primary support group   [] Unemployment   [] Problems related to employment   [] Other Social Determinants of Health:     Shared decision making:  I had a discussion with the patient/family regarding diagnosis, diagnostic results, treatment plan, and medications.  The patient/family indicated understanding of these instructions.  I spent adequate time at the bedside preceding discharge necessary to personally discuss the aftercare instructions, giving patient education, providing explanations of the results of our evaluations/findings, and my decision making to assure that the patient/family understand the plan of care.  Time was allotted to answer questions at that time and throughout the ED course.  Emphasis was placed on timely follow-up after discharge.  I also discussed the potential for the development of an acute emergent condition requiring further evaluation, admission, or even surgical intervention. I discussed that we found nothing during the visit today indicating the need for further workup, admission, or the presence of an unstable medical condition.  I encouraged the patient to return to the emergency department immediately for ANY concerns, worsening, new complaints, or if symptoms persist and unable to seek follow-up in a timely fashion.  The patient/family expressed understanding and agreement with this plan.  The patient will follow-up with primary care and OBGYN for reevaluation.      Orders placed during this visit:  Orders Placed This Encounter   Procedures    [place] Draw    High Sensitivity Troponin T    Comprehensive Metabolic Panel    Lipase    BNP    CBC Auto Differential    High Sensitivity Troponin T 2Hr    Undress & Gown    Continuous Pulse Oximetry    ECG 12 Lead ED Triage Standing Order; Chest Pain    CBC &  Differential    Green Top (Gel)    Lavender Top    Gold Top - SST    Gray Top    Light Blue Top       I considered prescription management  with:   [x] Pain medication: The use of prescription pain medication was considered in this patient however not implemented as risks outweigh benefits of prescription pain management as patient is pregnant. Patient's symptoms can be managed with OTC anti-inflammatory medications such as Tylenol.    [] Antiviral  [x] Antibiotic: Clinical presentation and ER workup without evidence of bacterial infection requiring treatment with antibiotics.     [] Other:   Rationale:    ED Course:    ED Course as of 09/21/23 2012   Thu Sep 14, 2023   9726 This patient presents with chest pain, with symptoms suggestive of noncardiac chest pain. History without high risk features. Minimal CAD risk factors. Exam without evidence of volume overload, BNP normal. EKG without signs of active ischemia. Initial and two hour troponin negative. Labs obtained and reviewed demonstrate no acute or emergent abnormalities.  HEART score: 1. Symptomatic and supportive care provided in ED with Tylenol and antiemetics along with IV fluids.      At time of discharge disposition patient resting comfortable, no acute distress, afebrile, nontoxic in appearance, vital signs stable and able to maintain oxygen saturation of 99% on room air.   [JG]      ED Course User Index  [JG] Orlando Santos PA            DIAGNOSIS  Final diagnoses:   Chest wall pain   History of asthma   History of anxiety   History of depression       DISPOSITION    ED Disposition       ED Disposition   Discharge    Condition   Stable    Comment   --               Please note that portions of this document were completed with voice recognition software.        Orlando Santos PA  09/21/23 2012

## 2024-05-31 ENCOUNTER — LAB REQUISITION (OUTPATIENT)
Dept: LAB | Facility: HOSPITAL | Age: 34
End: 2024-05-31
Payer: MEDICAID

## 2024-05-31 DIAGNOSIS — Z30.2 ENCOUNTER FOR STERILIZATION: ICD-10-CM

## 2024-05-31 PROCEDURE — 88302 TISSUE EXAM BY PATHOLOGIST: CPT | Performed by: OBSTETRICS & GYNECOLOGY

## 2024-06-03 LAB
CYTO UR: NORMAL
LAB AP CASE REPORT: NORMAL
LAB AP CLINICAL INFORMATION: NORMAL
PATH REPORT.FINAL DX SPEC: NORMAL
PATH REPORT.GROSS SPEC: NORMAL

## 2024-06-07 ENCOUNTER — OFFICE VISIT (OUTPATIENT)
Dept: INTERNAL MEDICINE | Facility: CLINIC | Age: 34
End: 2024-06-07
Payer: MEDICAID

## 2024-06-07 VITALS
SYSTOLIC BLOOD PRESSURE: 116 MMHG | WEIGHT: 191.4 LBS | OXYGEN SATURATION: 98 % | HEART RATE: 100 BPM | HEIGHT: 63 IN | DIASTOLIC BLOOD PRESSURE: 68 MMHG | BODY MASS INDEX: 33.91 KG/M2 | TEMPERATURE: 96.8 F

## 2024-06-07 DIAGNOSIS — T78.40XA ALLERGY, INITIAL ENCOUNTER: ICD-10-CM

## 2024-06-07 DIAGNOSIS — L73.2 HIDRADENITIS SUPPURATIVA: ICD-10-CM

## 2024-06-07 DIAGNOSIS — F32.A DEPRESSION, UNSPECIFIED DEPRESSION TYPE: ICD-10-CM

## 2024-06-07 PROCEDURE — 1160F RVW MEDS BY RX/DR IN RCRD: CPT | Performed by: STUDENT IN AN ORGANIZED HEALTH CARE EDUCATION/TRAINING PROGRAM

## 2024-06-07 PROCEDURE — 1126F AMNT PAIN NOTED NONE PRSNT: CPT | Performed by: STUDENT IN AN ORGANIZED HEALTH CARE EDUCATION/TRAINING PROGRAM

## 2024-06-07 PROCEDURE — 1159F MED LIST DOCD IN RCRD: CPT | Performed by: STUDENT IN AN ORGANIZED HEALTH CARE EDUCATION/TRAINING PROGRAM

## 2024-06-07 PROCEDURE — 99214 OFFICE O/P EST MOD 30 MIN: CPT | Performed by: STUDENT IN AN ORGANIZED HEALTH CARE EDUCATION/TRAINING PROGRAM

## 2024-06-07 RX ORDER — AMITRIPTYLINE HYDROCHLORIDE 25 MG/1
25 TABLET, FILM COATED ORAL NIGHTLY
Qty: 30 TABLET | Refills: 2 | Status: SHIPPED | OUTPATIENT
Start: 2024-06-07

## 2024-06-07 RX ORDER — CETIRIZINE HYDROCHLORIDE 10 MG/1
10 TABLET ORAL DAILY
COMMUNITY
Start: 2024-04-07

## 2024-06-07 RX ORDER — MELATONIN
1000 DAILY
COMMUNITY
End: 2024-06-07 | Stop reason: SDUPTHER

## 2024-06-07 RX ORDER — FERROUS SULFATE 325(65) MG
325 TABLET ORAL
Qty: 30 TABLET | Refills: 1 | Status: SHIPPED | OUTPATIENT
Start: 2024-06-07

## 2024-06-07 RX ORDER — MULTIVIT WITH MINERALS/LUTEIN
500 TABLET ORAL DAILY
COMMUNITY
End: 2024-06-07 | Stop reason: SDUPTHER

## 2024-06-07 RX ORDER — MONTELUKAST SODIUM 10 MG/1
10 TABLET ORAL NIGHTLY
Qty: 90 TABLET | Refills: 1 | Status: SHIPPED | OUTPATIENT
Start: 2024-06-07

## 2024-06-07 RX ORDER — MONTELUKAST SODIUM 10 MG/1
10 TABLET ORAL NIGHTLY
COMMUNITY
Start: 2024-04-07 | End: 2024-06-07 | Stop reason: SDUPTHER

## 2024-06-07 RX ORDER — MELATONIN
1000 DAILY
Qty: 90 TABLET | Refills: 2 | Status: SHIPPED | OUTPATIENT
Start: 2024-06-07

## 2024-06-07 RX ORDER — MULTIVIT WITH MINERALS/LUTEIN
500 TABLET ORAL DAILY
Qty: 90 TABLET | Refills: 1 | Status: SHIPPED | OUTPATIENT
Start: 2024-06-07

## 2024-06-11 ENCOUNTER — LAB (OUTPATIENT)
Dept: INTERNAL MEDICINE | Facility: CLINIC | Age: 34
End: 2024-06-11
Payer: MEDICAID

## 2024-06-13 ENCOUNTER — LAB (OUTPATIENT)
Dept: INTERNAL MEDICINE | Facility: CLINIC | Age: 34
End: 2024-06-13
Payer: MEDICAID

## 2024-06-13 DIAGNOSIS — F32.A DEPRESSION, UNSPECIFIED DEPRESSION TYPE: ICD-10-CM

## 2024-06-13 LAB
25(OH)D3 SERPL-MCNC: 45.2 NG/ML (ref 30–100)
ALBUMIN SERPL-MCNC: 4.3 G/DL (ref 3.5–5.2)
ALBUMIN/GLOB SERPL: 1.3 G/DL
ALP SERPL-CCNC: 114 U/L (ref 39–117)
ALT SERPL W P-5'-P-CCNC: 11 U/L (ref 1–33)
ANION GAP SERPL CALCULATED.3IONS-SCNC: 11.4 MMOL/L (ref 5–15)
AST SERPL-CCNC: 16 U/L (ref 1–32)
BILIRUB SERPL-MCNC: 0.6 MG/DL (ref 0–1.2)
BUN SERPL-MCNC: 13 MG/DL (ref 6–20)
BUN/CREAT SERPL: 14.1 (ref 7–25)
CALCIUM SPEC-SCNC: 10 MG/DL (ref 8.6–10.5)
CHLORIDE SERPL-SCNC: 104 MMOL/L (ref 98–107)
CHOLEST SERPL-MCNC: 232 MG/DL (ref 0–200)
CO2 SERPL-SCNC: 22.6 MMOL/L (ref 22–29)
CREAT SERPL-MCNC: 0.92 MG/DL (ref 0.57–1)
CRP SERPL-MCNC: 0.74 MG/DL (ref 0–0.5)
DEPRECATED RDW RBC AUTO: 42.7 FL (ref 37–54)
EGFRCR SERPLBLD CKD-EPI 2021: 83.4 ML/MIN/1.73
ERYTHROCYTE [DISTWIDTH] IN BLOOD BY AUTOMATED COUNT: 14.2 % (ref 12.3–15.4)
GLOBULIN UR ELPH-MCNC: 3.4 GM/DL
GLUCOSE SERPL-MCNC: 74 MG/DL (ref 65–99)
HBA1C MFR BLD: 4.8 % (ref 4.8–5.6)
HCT VFR BLD AUTO: 39.7 % (ref 34–46.6)
HDLC SERPL-MCNC: 54 MG/DL (ref 40–60)
HGB BLD-MCNC: 12.9 G/DL (ref 12–15.9)
IRON 24H UR-MRATE: 75 MCG/DL (ref 37–145)
IRON SATN MFR SERPL: 19 % (ref 20–50)
LDLC SERPL CALC-MCNC: 158 MG/DL (ref 0–100)
LDLC/HDLC SERPL: 2.88 {RATIO}
MCH RBC QN AUTO: 26.9 PG (ref 26.6–33)
MCHC RBC AUTO-ENTMCNC: 32.5 G/DL (ref 31.5–35.7)
MCV RBC AUTO: 82.7 FL (ref 79–97)
PLATELET # BLD AUTO: 363 10*3/MM3 (ref 140–450)
PMV BLD AUTO: 10.1 FL (ref 6–12)
POTASSIUM SERPL-SCNC: 4 MMOL/L (ref 3.5–5.2)
PROT SERPL-MCNC: 7.7 G/DL (ref 6–8.5)
RBC # BLD AUTO: 4.8 10*6/MM3 (ref 3.77–5.28)
SODIUM SERPL-SCNC: 138 MMOL/L (ref 136–145)
TIBC SERPL-MCNC: 393 MCG/DL (ref 298–536)
TRANSFERRIN SERPL-MCNC: 264 MG/DL (ref 200–360)
TRIGL SERPL-MCNC: 113 MG/DL (ref 0–150)
TSH SERPL DL<=0.05 MIU/L-ACNC: 0.86 UIU/ML (ref 0.27–4.2)
VLDLC SERPL-MCNC: 20 MG/DL (ref 5–40)
WBC NRBC COR # BLD AUTO: 6.98 10*3/MM3 (ref 3.4–10.8)

## 2024-06-13 PROCEDURE — 80053 COMPREHEN METABOLIC PANEL: CPT | Performed by: STUDENT IN AN ORGANIZED HEALTH CARE EDUCATION/TRAINING PROGRAM

## 2024-06-13 PROCEDURE — 83540 ASSAY OF IRON: CPT | Performed by: STUDENT IN AN ORGANIZED HEALTH CARE EDUCATION/TRAINING PROGRAM

## 2024-06-13 PROCEDURE — 85027 COMPLETE CBC AUTOMATED: CPT | Performed by: STUDENT IN AN ORGANIZED HEALTH CARE EDUCATION/TRAINING PROGRAM

## 2024-06-13 PROCEDURE — 36415 COLL VENOUS BLD VENIPUNCTURE: CPT | Performed by: STUDENT IN AN ORGANIZED HEALTH CARE EDUCATION/TRAINING PROGRAM

## 2024-06-13 PROCEDURE — 80061 LIPID PANEL: CPT | Performed by: STUDENT IN AN ORGANIZED HEALTH CARE EDUCATION/TRAINING PROGRAM

## 2024-06-13 PROCEDURE — 83036 HEMOGLOBIN GLYCOSYLATED A1C: CPT | Performed by: STUDENT IN AN ORGANIZED HEALTH CARE EDUCATION/TRAINING PROGRAM

## 2024-06-13 PROCEDURE — 82306 VITAMIN D 25 HYDROXY: CPT | Performed by: STUDENT IN AN ORGANIZED HEALTH CARE EDUCATION/TRAINING PROGRAM

## 2024-06-13 PROCEDURE — 84466 ASSAY OF TRANSFERRIN: CPT | Performed by: STUDENT IN AN ORGANIZED HEALTH CARE EDUCATION/TRAINING PROGRAM

## 2024-06-13 PROCEDURE — 86140 C-REACTIVE PROTEIN: CPT | Performed by: STUDENT IN AN ORGANIZED HEALTH CARE EDUCATION/TRAINING PROGRAM

## 2024-06-13 PROCEDURE — 84443 ASSAY THYROID STIM HORMONE: CPT | Performed by: STUDENT IN AN ORGANIZED HEALTH CARE EDUCATION/TRAINING PROGRAM

## 2024-07-10 DIAGNOSIS — Z86.39 HISTORY OF VITAMIN D DEFICIENCY: ICD-10-CM

## 2024-07-10 DIAGNOSIS — R05.1 ACUTE COUGH: ICD-10-CM

## 2024-07-11 NOTE — TELEPHONE ENCOUNTER
Rx Refill Note  Requested Prescriptions     Pending Prescriptions Disp Refills    Ventolin  (90 Base) MCG/ACT inhaler [Pharmacy Med Name: VENTOLIN HFA 90 MCG INHALER] 18 g 0     Sig: INHALE 2 PUFFS BY MOUTH EVERY 4 HOURS AS NEEDED FOR WHEEZING    vitamin D3 125 MCG (5000 UT) capsule capsule [Pharmacy Med Name: VITAMIN D3 5,000 UNIT SOFTGEL] 30 capsule 3     Sig: TAKE 1 CAPSULE BY MOUTH DAILY    vitamin C (ASCORBIC ACID) 500 MG tablet [Pharmacy Med Name: VITAMIN C 500 MG TABLET] 60 tablet 1     Sig: TAKE 2 TABLETS BY MOUTH DAILY      Last office visit with prescribing clinician: 1/25/2024   Last telemedicine visit with prescribing clinician: Visit date not found   Next office visit with prescribing clinician: 7/18/2024      Carmenza Membreno MA  07/11/24, 10:21 EDT

## 2024-07-11 NOTE — TELEPHONE ENCOUNTER
Rx Refill Note  Requested Prescriptions     Pending Prescriptions Disp Refills    Ventolin  (90 Base) MCG/ACT inhaler [Pharmacy Med Name: VENTOLIN HFA 90 MCG INHALER] 18 g 0     Sig: INHALE 2 PUFFS BY MOUTH EVERY 4 HOURS AS NEEDED FOR WHEEZING    vitamin D3 125 MCG (5000 UT) capsule capsule [Pharmacy Med Name: VITAMIN D3 5,000 UNIT SOFTGEL] 30 capsule 3     Sig: TAKE 1 CAPSULE BY MOUTH DAILY    vitamin C (ASCORBIC ACID) 500 MG tablet [Pharmacy Med Name: VITAMIN C 500 MG TABLET] 60 tablet 1     Sig: TAKE 2 TABLETS BY MOUTH DAILY      Last office visit with prescribing clinician: 1/25/2024   Last telemedicine visit with prescribing clinician: Visit date not found   Next office visit with prescribing clinician: Visit date not found     The original prescription was discontinued on 3/21/2024 by Yamile Matson CNM. Renewing this prescription may not be appropriate       Blas Bose Rep  07/11/24, 07:43 EDT

## 2024-07-12 RX ORDER — ASCORBIC ACID 500 MG
1000 TABLET ORAL DAILY
Qty: 60 TABLET | Refills: 1 | Status: SHIPPED | OUTPATIENT
Start: 2024-07-12

## 2024-07-12 RX ORDER — ALBUTEROL SULFATE 90 UG/1
2 AEROSOL, METERED RESPIRATORY (INHALATION) EVERY 4 HOURS PRN
Qty: 18 G | Refills: 0 | Status: SHIPPED | OUTPATIENT
Start: 2024-07-12

## 2024-07-18 ENCOUNTER — OFFICE VISIT (OUTPATIENT)
Dept: INTERNAL MEDICINE | Facility: CLINIC | Age: 34
End: 2024-07-18
Payer: MEDICAID

## 2024-07-18 VITALS
TEMPERATURE: 96.2 F | BODY MASS INDEX: 35.44 KG/M2 | WEIGHT: 200 LBS | SYSTOLIC BLOOD PRESSURE: 100 MMHG | HEART RATE: 101 BPM | HEIGHT: 63 IN | OXYGEN SATURATION: 100 % | DIASTOLIC BLOOD PRESSURE: 62 MMHG

## 2024-07-18 DIAGNOSIS — F32.A DEPRESSION, UNSPECIFIED DEPRESSION TYPE: Primary | ICD-10-CM

## 2024-07-18 DIAGNOSIS — E66.09 CLASS 2 OBESITY DUE TO EXCESS CALORIES WITHOUT SERIOUS COMORBIDITY WITH BODY MASS INDEX (BMI) OF 35.0 TO 35.9 IN ADULT: ICD-10-CM

## 2024-07-18 DIAGNOSIS — E78.00 ELEVATED LDL CHOLESTEROL LEVEL: ICD-10-CM

## 2024-07-18 PROCEDURE — 1160F RVW MEDS BY RX/DR IN RCRD: CPT | Performed by: STUDENT IN AN ORGANIZED HEALTH CARE EDUCATION/TRAINING PROGRAM

## 2024-07-18 PROCEDURE — 99214 OFFICE O/P EST MOD 30 MIN: CPT | Performed by: STUDENT IN AN ORGANIZED HEALTH CARE EDUCATION/TRAINING PROGRAM

## 2024-07-18 PROCEDURE — 1126F AMNT PAIN NOTED NONE PRSNT: CPT | Performed by: STUDENT IN AN ORGANIZED HEALTH CARE EDUCATION/TRAINING PROGRAM

## 2024-07-18 PROCEDURE — 1159F MED LIST DOCD IN RCRD: CPT | Performed by: STUDENT IN AN ORGANIZED HEALTH CARE EDUCATION/TRAINING PROGRAM

## 2024-07-18 RX ORDER — AMITRIPTYLINE HYDROCHLORIDE 25 MG/1
25 TABLET, FILM COATED ORAL NIGHTLY
Qty: 90 TABLET | Refills: 3 | Status: SHIPPED | OUTPATIENT
Start: 2024-07-18

## 2024-07-18 RX ORDER — BUPROPION HYDROCHLORIDE 300 MG/1
300 TABLET ORAL DAILY
Qty: 90 TABLET | Refills: 3 | Status: SHIPPED | OUTPATIENT
Start: 2024-07-18

## 2024-07-18 NOTE — PROGRESS NOTES
Office Note     Name: Padma Younger    : 1990     MRN: [id number]     Chief Complaint  Anxiety (F/u )    Subjective     History of Present Illness:  Padma Younger is a 34 y.o. female who presents today for    Follow up for anxiety and depression  She is currently on wellbutrin 300mg and elavil 25mg daily  Feels better since starting elavil. Improved sleep, aniety has decreased, and feels less depressed  Has 2 daughters, 4 year old and 4 months old. Has family helping at home with her family, the father of th baby is not in the picture.  She will be starting new job next week with [email or web address].    Wants to improve her weight  Current weight 200lb  Will meet with dietician next week.        Review of Systems:   Review of Systems   All other systems reviewed and are negative.      Past Medical History:   Past Medical History:   Diagnosis Date    Abnormal Pap smear of cervix     Allergic     Anxiety     Asthma     Chlamydia         Chlamydia contact, treated     Depression     Gallstones     Gonorrhea         Urinary tract infection     Visual impairment     Kertacanis       Past Surgical History:   Past Surgical History:   Procedure Laterality Date    CHOLECYSTECTOMY  2023    CHOLECYSTECTOMY OPEN      CYST REMOVAL      D & C WITH SUCTION N/A 2017    Procedure: DILATATION AND CURETTAGE WITH SUCTION;  Surgeon: Kelley Dodge DO;  Location: [facility] OR;  Service:     DENTAL PROCEDURE      DILATATION AND CURETTAGE  2017    EYE SURGERY  2018    Intact Surgery    KNEE SURGERY      TUBAL ABDOMINAL LIGATION      VAGINAL DELIVERY      x1    WISDOM TOOTH EXTRACTION         Family History:   Family History   Problem Relation Age of Onset    Alcohol abuse Mother     Arthritis Mother     COPD Mother     Hyperlipidemia Father     Hypertension Father     Multiple sclerosis Sister     Liver disease Maternal Uncle     Breast cancer Maternal Grandmother     Cancer Maternal Grandmother     Diabetes  Maternal Grandfather     Cancer Maternal Grandfather     Breast cancer Paternal Grandmother     Ovarian cancer Paternal Grandmother     Diabetes Paternal Grandmother     Heart attack Paternal Grandmother     Hypertension Paternal Grandmother     Stroke Paternal Grandmother     Cancer Paternal Grandmother     Colon cancer Paternal Grandfather        Social History:   Social History     Socioeconomic History    Marital status: Single    Number of children: 1   Tobacco Use    Smoking status: Never    Smokeless tobacco: Never   Vaping Use    Vaping status: Never Used   Substance and Sexual Activity    Alcohol use: Not Currently     Alcohol/week: 2.0 standard drinks of alcohol     Types: 2 Glasses of wine per week     Comment: OCCASIONAL    Drug use: No    Sexual activity: Yes     Partners: Male     Birth control/protection: Condom, Abstinence, Tubal ligation       Immunizations:   Immunization History   Administered Date(s) Administered    31-influenza Vac Quardvalent Preservativ 11/20/2023    ABRYSVO (RSV, 60+ or pregnant women 32-36 wks) 01/29/2024    COVID-19 (ELLI) 03/08/2021    COVID-19 (PFIZER) Purple Cap Monovalent 11/03/2021    Fluzone (or Fluarix & Flulaval for VFC) >6mos 10/18/2017, 10/28/2020    Hep B, Adolescent or Pediatric 02/23/1999    Influenza, Unspecified 03/21/2017, 11/20/2023    MMR 09/20/2000    Td (TDVAX) 09/20/2000    Tdap 02/10/2009, 01/26/2017, 03/21/2017, 01/29/2024        Medications:     Current Outpatient Medications:     cetirizine (zyrTEC) 10 MG tablet, Take 1 tablet by mouth Daily., Disp: , Rfl:     montelukast (SINGULAIR) 10 MG tablet, Take 1 tablet by mouth Every Night., Disp: 90 tablet, Rfl: 1    Ventolin  (90 Base) MCG/ACT inhaler, INHALE 2 PUFFS BY MOUTH EVERY 4 HOURS AS NEEDED FOR WHEEZING, Disp: 18 g, Rfl: 0    vitamin C (ASCORBIC ACID) 500 MG tablet, TAKE 2 TABLETS BY MOUTH DAILY, Disp: 60 tablet, Rfl: 1    vitamin D3 125 MCG (5000 UT) capsule capsule, TAKE 1 CAPSULE BY  "MOUTH DAILY, Disp: 30 capsule, Rfl: 3    amitriptyline (ELAVIL) 25 MG tablet, Take 1 tablet by mouth Every Night., Disp: 90 tablet, Rfl: 3    buPROPion XL (Wellbutrin XL) 300 MG 24 hr tablet, Take 1 tablet by mouth Daily., Disp: 90 tablet, Rfl: 3    docusate sodium 100 MG capsule, Take 1 capsule by mouth 2 (Two) Times a Day As Needed for Constipation. (Patient not taking: Reported on 6/7/2024), Disp: 60 capsule, Rfl: 1    Allergies:   Allergies   Allergen Reactions    Oxycodone Itching       Objective     Vital Signs  /62   Pulse 101   Temp 96.2 °F (35.7 °C) (Temporal)   Ht 159.4 cm (62.76\")   Wt 90.7 kg (200 lb)   SpO2 100%   BMI 35.70 kg/m²   Estimated body mass index is 35.7 kg/m² as calculated from the following:    Height as of this encounter: 159.4 cm (62.76\").    Weight as of this encounter: 90.7 kg (200 lb).    Class 2 Severe Obesity (BMI >=35 and <=39.9). Obesity-related health conditions include the following: none. Obesity is unchanged. BMI is is above average; BMI management plan is completed. We discussed low calorie, low carb based diet program, portion control, and increasing exercise.      Physical Exam  Constitutional:       Appearance: Normal appearance.   Cardiovascular:      Rate and Rhythm: Normal rate and regular rhythm.      Pulses: Normal pulses.   Pulmonary:      Effort: Pulmonary effort is normal.      Breath sounds: Normal breath sounds.   Neurological:      Mental Status: She is alert.          Result Review :     Common labs          3/20/2024    05:55 3/23/2024    21:31 6/13/2024    09:42   Common Labs   Glucose  103  74    BUN  6  13    Creatinine  0.71  0.92    Sodium  135  138    Potassium  4.0  4.0    Chloride  104  104    Calcium  8.5  10.0    Albumin  3.2  4.3    Total Bilirubin  0.3  0.6    Alkaline Phosphatase  124  114    AST (SGOT)  34  16    ALT (SGPT)  25  11    WBC 13.18  5.32  6.98    Hemoglobin 8.7  8.4  12.9    Hematocrit 27.3  28.1  39.7    Platelets 244  " 254  363    Total Cholesterol   232    Triglycerides   113    HDL Cholesterol   54    LDL Cholesterol    158    Hemoglobin A1C   4.80                 Assessment and Plan     1. Depression, unspecified depression type  Stable, continue with medications as prescribed  - buPROPion XL (Wellbutrin XL) 300 MG 24 hr tablet; Take 1 tablet by mouth Daily.  Dispense: 90 tablet; Refill: 3  - amitriptyline (ELAVIL) 25 MG tablet; Take 1 tablet by mouth Every Night.  Dispense: 90 tablet; Refill: 3    2. Class 2 obesity due to excess calories without serious comorbidity with body mass index (BMI) of 35.0 to 35.9 in adult  Discussed risk associated with obesity. she was given instructions on calorie counting as well as on decreasing carbohydrate intake. she was also encouraged to start exercise regimen.  Instructed patient to download fitness kali on her smart phone to aid in calorie counting and exercise tracking.      3. Elevated LDL cholesterol level  Total chol: 132, , , HDL 54  Will hold off on medication, focus on lifestyle/exercise.   I would recommend diet and lifestyle modifications which include avoidance of foods that are high in fat, sugar, and carbohydrates.  trying to eat leafy green vegetables, foods that are high in fiber, and lean meats.  Patient should also try to get at least 150 minutes of cardiovascular exercise weekly.         Follow Up  Return in about 6 months (around 1/18/2025).    Gildardo Marino MD  [facility] [address]  [facility]  [address]  [address] [address]  [phone]

## 2024-08-29 RX ORDER — FERROUS SULFATE 325(65) MG
325 TABLET ORAL
Qty: 30 TABLET | Refills: 1 | OUTPATIENT
Start: 2024-08-29

## 2024-10-05 DIAGNOSIS — F41.1 GAD (GENERALIZED ANXIETY DISORDER): ICD-10-CM

## 2024-10-07 ENCOUNTER — OFFICE VISIT (OUTPATIENT)
Dept: INTERNAL MEDICINE | Facility: CLINIC | Age: 34
End: 2024-10-07
Payer: MEDICAID

## 2024-10-07 VITALS
BODY MASS INDEX: 35.97 KG/M2 | HEIGHT: 63 IN | RESPIRATION RATE: 20 BRPM | SYSTOLIC BLOOD PRESSURE: 88 MMHG | HEART RATE: 103 BPM | DIASTOLIC BLOOD PRESSURE: 62 MMHG | OXYGEN SATURATION: 97 % | TEMPERATURE: 98 F | WEIGHT: 203 LBS

## 2024-10-07 DIAGNOSIS — M79.672 LEFT FOOT PAIN: Primary | ICD-10-CM

## 2024-10-07 DIAGNOSIS — L73.2 HIDRADENITIS SUPPURATIVA: ICD-10-CM

## 2024-10-07 DIAGNOSIS — M67.431 GANGLION CYST OF WRIST, RIGHT: ICD-10-CM

## 2024-10-07 DIAGNOSIS — Z23 NEED FOR VACCINATION: ICD-10-CM

## 2024-10-07 PROCEDURE — 99214 OFFICE O/P EST MOD 30 MIN: CPT | Performed by: PHYSICIAN ASSISTANT

## 2024-10-07 PROCEDURE — 90471 IMMUNIZATION ADMIN: CPT | Performed by: PHYSICIAN ASSISTANT

## 2024-10-07 PROCEDURE — 90656 IIV3 VACC NO PRSV 0.5 ML IM: CPT | Performed by: PHYSICIAN ASSISTANT

## 2024-10-07 PROCEDURE — 1125F AMNT PAIN NOTED PAIN PRSNT: CPT | Performed by: PHYSICIAN ASSISTANT

## 2024-10-07 RX ORDER — SULFAMETHOXAZOLE/TRIMETHOPRIM 800-160 MG
1 TABLET ORAL 2 TIMES DAILY
Qty: 20 TABLET | Refills: 0 | Status: SHIPPED | OUTPATIENT
Start: 2024-10-07

## 2024-10-07 RX ORDER — CLINDAMYCIN PHOSPHATE 10 MG/G
1 GEL TOPICAL 2 TIMES DAILY
COMMUNITY

## 2024-10-07 RX ORDER — BUPROPION HYDROCHLORIDE 150 MG/1
150 TABLET ORAL DAILY
Qty: 90 TABLET | Refills: 0 | Status: SHIPPED | OUTPATIENT
Start: 2024-10-07

## 2024-10-07 RX ORDER — FAMOTIDINE 20 MG/1
20 TABLET, FILM COATED ORAL NIGHTLY PRN
COMMUNITY
Start: 2024-08-06

## 2024-10-07 NOTE — ASSESSMENT & PLAN NOTE
Unknown cause, will check xray, rest, motrin prn and diclofenac gel prn. Refer to ortho if sx persist.

## 2024-10-07 NOTE — TELEPHONE ENCOUNTER
Rx Refill Note  Requested Prescriptions     Pending Prescriptions Disp Refills    buPROPion XL (WELLBUTRIN XL) 150 MG 24 hr tablet [Pharmacy Med Name: buPROPion HCL  MG TABLET] 90 tablet      Sig: TAKE 1 TABLET BY MOUTH DAILY      Last office visit with prescribing clinician: 1/25/2024   Last telemedicine visit with prescribing clinician: Visit date not found   Next office visit with prescribing clinician: Visit date not found                         Would you like a call back once the refill request has been completed: [] Yes [] No    If the office needs to give you a call back, can they leave a voicemail: [] Yes [] No    Aren Gomez MA  10/07/24, 11:55 EDT

## 2024-10-07 NOTE — PROGRESS NOTES
Chief Complaint  Foot Pain (Pt does not know how she hurt her foot. Hard to walk. Has been constant pain for approx 2 weeks. ) and knot on wrist (Pt states she woke up and noticed the knot on her wrist. Very painful. )    Subjective          History of Present Illness  Padma Younger presents to [facility] for     History of Present Illness  The patient is a 34-year-old female who presents for evaluation of multiple medical concerns.    She reports the sudden appearance of a cyst on her right wrist, which she noticed after applying lotion due to the onset of pain.     She has a history of hidradenitis with spots under her arm and in the groin area. She has had surgical removal Feb 2022, has to get this done every few years. Doesn't have a dermatologist. She uses clindamycin prn and sometimes takes abx when sx flare up. She is having a flare in her left axilla.     She has been experiencing intermittent pain on the bottom and side of her left foot for about a month. Recently, the pain has become constant, causing her to walk on the front or side of her foot to avoid discomfort. The pain is more pronounced at the end of the day. No significant pain upon standing in the morning. She works from home and tries to avoid wearing shoes and elevate her foot, but this has not been helping anymore. She has been taking Tylenol for relief.    She consistently has low blood pressure, typically around 100/60. She occasionally experiences dizziness upon standing but does not pass out, has not been dizzy lately, feels well today.         The following portions of the patient's history were reviewed and updated as appropriate: allergies, current medications, past family history, past medical history, past social history, past surgical history and problem list.  Allergies   Allergen Reactions    Oxycodone Itching       Current Outpatient Medications:     amitriptyline (ELAVIL) 25 MG tablet, Take 1  tablet by mouth Every Night., Disp: 90 tablet, Rfl: 3    buPROPion XL (Wellbutrin XL) 300 MG 24 hr tablet, Take 1 tablet by mouth Daily., Disp: 90 tablet, Rfl: 3    cetirizine (zyrTEC) 10 MG tablet, Take 1 tablet by mouth Daily., Disp: , Rfl:     docusate sodium 100 MG capsule, Take 1 capsule by mouth 2 (Two) Times a Day As Needed for Constipation., Disp: 60 capsule, Rfl: 1    famotidine (PEPCID) 20 MG tablet, Take 1 tablet by mouth At Night As Needed., Disp: , Rfl:     montelukast (SINGULAIR) 10 MG tablet, Take 1 tablet by mouth Every Night., Disp: 90 tablet, Rfl: 1    Ventolin  (90 Base) MCG/ACT inhaler, INHALE 2 PUFFS BY MOUTH EVERY 4 HOURS AS NEEDED FOR WHEEZING, Disp: 18 g, Rfl: 0    vitamin C (ASCORBIC ACID) 500 MG tablet, TAKE 2 TABLETS BY MOUTH DAILY, Disp: 60 tablet, Rfl: 1    vitamin D3 125 MCG (5000 UT) capsule capsule, TAKE 1 CAPSULE BY MOUTH DAILY, Disp: 30 capsule, Rfl: 3    clindamycin 1 % gel, Apply 1 Application topically to the appropriate area as directed 2 (Two) Times a Day., Disp: , Rfl:     Diclofenac Sodium (VOLTAREN) 1 % gel gel, Apply 4 g topically to the appropriate area as directed 4 (Four) Times a Day As Needed (pain)., Disp: 150 g, Rfl: 0    sulfamethoxazole-trimethoprim (Bactrim DS) 800-160 MG per tablet, Take 1 tablet by mouth 2 (Two) Times a Day., Disp: 20 tablet, Rfl: 0  New Medications Ordered This Visit   Medications    sulfamethoxazole-trimethoprim (Bactrim DS) 800-160 MG per tablet     Sig: Take 1 tablet by mouth 2 (Two) Times a Day.     Dispense:  20 tablet     Refill:  0    Diclofenac Sodium (VOLTAREN) 1 % gel gel     Sig: Apply 4 g topically to the appropriate area as directed 4 (Four) Times a Day As Needed (pain).     Dispense:  150 g     Refill:  0     Social History     Tobacco Use   Smoking Status Never   Smokeless Tobacco Never        Objective   Vital Signs:   Vitals:    10/07/24 1055   BP: (!) 88/62   Pulse: 103   Resp: 20   Temp: 98 °F (36.7 °C)   SpO2: 97%  "  Weight: 92.1 kg (203 lb)   Height: 159.4 cm (62.75\")   PainSc:   8   PainLoc: Wrist      Body mass index is 36.25 kg/m².  Physical Exam  Vitals reviewed.   Constitutional:       General: She is not in acute distress.     Appearance: Normal appearance.   HENT:      Head: Normocephalic and atraumatic.   Eyes:      General: No scleral icterus.     Extraocular Movements: Extraocular movements intact.      Conjunctiva/sclera: Conjunctivae normal.   Cardiovascular:      Rate and Rhythm: Normal rate and regular rhythm.      Heart sounds: Normal heart sounds. No murmur heard.  Pulmonary:      Effort: Pulmonary effort is normal. No respiratory distress.      Breath sounds: Normal breath sounds. No stridor. No wheezing or rhonchi.   Musculoskeletal:         General: Tenderness (left lateral foot) present. Normal range of motion.      Cervical back: Normal range of motion and neck supple.      Right lower leg: No edema.      Left lower leg: No edema.   Skin:     General: Skin is warm and dry.      Coloration: Skin is not jaundiced.      Findings: Lesion (marble sized ganglion cyst right wrist) present.      Comments: HS lesions bilat axilla    Neurological:      General: No focal deficit present.      Mental Status: She is alert and oriented to person, place, and time.      Gait: Gait normal.   Psychiatric:         Mood and Affect: Mood normal.         Behavior: Behavior normal.        Patient's last menstrual period was 09/13/2024 (exact date).    Result Review :           Results               Assessment and Plan    Diagnoses and all orders for this visit:    1. Left foot pain (Primary)  Assessment & Plan:  Unknown cause, will check xray, rest, motrin prn and diclofenac gel prn. Refer to ortho if sx persist.    Orders:  -     XR Foot 3+ View Left; Future  -     Diclofenac Sodium (VOLTAREN) 1 % gel gel; Apply 4 g topically to the appropriate area as directed 4 (Four) Times a Day As Needed (pain).  Dispense: 150 g; Refill: " 0    2. Need for vaccination  -     Fluzone >6mos (5811-7075)    3. Hidradenitis suppurativa  Assessment & Plan:  Chronic, uncontrolled, Treat flare with bactrim, refer to derm, clindamycin topical daily to prevent lesions.    Orders:  -     sulfamethoxazole-trimethoprim (Bactrim DS) 800-160 MG per tablet; Take 1 tablet by mouth 2 (Two) Times a Day.  Dispense: 20 tablet; Refill: 0  -     Ambulatory Referral to Dermatology    4. Ganglion cyst of wrist, right  Assessment & Plan:  Refer to surgery     Orders:  -     Ambulatory Referral to General Surgery  -     Ambulatory Referral to Orthopedic Surgery        Assessment & Plan      Follow Up   Return if symptoms worsen or fail to improve.    Follow up if symptoms worsen or persist or has new or concerning symptoms, go to ER for severe symptoms.   Reviewed common medication effects and side effects and advised to report side effects immediately.  Encouraged medication compliance and the importance of keeping scheduled follow up appointments with me and any other providers.  If a referral was made please contact our office if you have not heard about an appointment in the next 2 weeks.   If labs or images are ordered we will contact you with the results within the next week.  If you have not heard from us after a week please call our office to inquire about the results.   Patient was given instructions and counseling regarding her condition or for health maintenance advice. Please see specific information pulled into the AVS if appropriate.     Leidy Faulkner PA-C     Patient or patient representative verbalized consent for the use of Ambient Listening during the visit with  Leidy Faulkner PA-C for chart documentation. 10/7/2024  13:17 EDT  Answers submitted by the patient for this visit:  Other (Submitted on 10/7/2024)  Please describe your symptoms.: Had a cyst in left arm pit., Pain at the bottom of left foot., Knot on the back of right hand.  Have you had these  symptoms before?: Yes  How long have you been having these symptoms?: Greater than 2 weeks  Please list any medications you are currently taking for this condition.: None  Please describe any probable cause for these symptoms. : Hidrinitus  Primary Reason for Visit (Submitted on 10/7/2024)  What is the primary reason for your visit?: Problem Not Listed

## 2024-10-07 NOTE — ASSESSMENT & PLAN NOTE
Chronic, uncontrolled, Treat flare with bactrim, refer to derm, clindamycin topical daily to prevent lesions.

## 2024-10-09 ENCOUNTER — HOSPITAL ENCOUNTER (OUTPATIENT)
Dept: GENERAL RADIOLOGY | Facility: HOSPITAL | Age: 34
Discharge: HOME | End: 2024-10-09
Admitting: PHYSICIAN ASSISTANT
Payer: COMMERCIAL

## 2024-10-09 DIAGNOSIS — M79.672 LEFT FOOT PAIN: ICD-10-CM

## 2024-10-09 PROCEDURE — 73630 X-RAY EXAM OF FOOT: CPT

## 2024-10-11 DIAGNOSIS — M79.672 LEFT FOOT PAIN: Primary | ICD-10-CM

## 2024-10-14 ENCOUNTER — OFFICE VISIT (OUTPATIENT)
Dept: ORTHOPEDIC SURGERY | Facility: CLINIC | Age: 34
End: 2024-10-14
Payer: COMMERCIAL

## 2024-10-14 VITALS
HEIGHT: 63 IN | SYSTOLIC BLOOD PRESSURE: 126 MMHG | DIASTOLIC BLOOD PRESSURE: 70 MMHG | WEIGHT: 203 LBS | BODY MASS INDEX: 35.97 KG/M2

## 2024-10-14 DIAGNOSIS — M25.531 RIGHT WRIST PAIN: ICD-10-CM

## 2024-10-14 DIAGNOSIS — M67.431 GANGLION OF RIGHT WRIST: Primary | ICD-10-CM

## 2024-10-14 PROCEDURE — 99204 OFFICE O/P NEW MOD 45 MIN: CPT | Performed by: STUDENT IN AN ORGANIZED HEALTH CARE EDUCATION/TRAINING PROGRAM

## 2024-10-14 NOTE — PROGRESS NOTES
[facility]     Office Visit       Date: 10/14/2024   Patient Name: Padma Younger  MRN: [id number]  YOB: 1990    Referring Physician: Leidy Faulkner PA-C     Chief Complaint:   Chief Complaint   Patient presents with    Right Wrist - Pain     History of Present Illness:   Padma Younger is a 34 y.o. female right-hand-dominant female who presents to clinic with complaints of right dorsal wrist swelling.  She reports that last Monday she noticed a large mass to the dorsum of her right wrist.  This is not painful at rest but is painful when bumped or hit.  No numbness or tingling.  She has not attempted any prior bracing's, aspiration, or advanced imaging.  No other complaints or concerns.    Subjective   Review of Systems:   Review of Systems   Constitutional:  Negative for chills, fever, unexpected weight gain and unexpected weight loss.   HENT:  Negative for congestion, postnasal drip and rhinorrhea.    Eyes:  Negative for blurred vision.   Respiratory:  Negative for shortness of breath.    Cardiovascular:  Negative for leg swelling.   Gastrointestinal:  Negative for abdominal pain, nausea and vomiting.   Genitourinary:  Negative for difficulty urinating.   Musculoskeletal:  Positive for arthralgias. Negative for gait problem, joint swelling and myalgias.   Skin:  Negative for skin lesions and wound.   Neurological:  Negative for dizziness, weakness, light-headedness and numbness.   Hematological:  Does not bruise/bleed easily.   Psychiatric/Behavioral:  Negative for depressed mood.       Pertinent review of systems per HPI    I reviewed the patient's chief complaint, history of present illness, review of systems, past medical history, surgical history, family history, social history, medications and allergy list in the EMR on 10/14/2024 and agree with the findings above.    Objective    Vital Signs:   Vitals:     "10/14/24 0818   BP: 126/70   Weight: 92.1 kg (203 lb)   Height: 159.4 cm (62.75\")     BMI:      General: No acute distress. Alert and oriented.   Cardiovascular: Palpable radial pulse.   Respiratory: Breathing is nonlabored.     Ortho Exam:  Examination of the right upper extremity demonstrates a 1.5 x 1.5 cm palpable cyst along the dorsum of the wrist just radial to the SL interval.  This is nonpainful to palpation.  Nontender over the SL interval and dorsal radiocarpal joint.  No excursion of the mass with the underlying extensor tendons.  She is able to make a composite fist and has full wrist range of motion.  Sensations intact light touch throughout the hand.  Warm well-perfused distally.    Imaging / Studies:    Imaging Results (Last 24 Hours)       Procedure Component Value Units Date/Time    XR Wrist 3+ View Right [[id number]] Resulted: 10/14/24 0832     Updated: 10/14/24 0832    Narrative:      Right Wrist X-Ray    Indication: Pain    Views:  PA, Lateral, and Oblique     Comparison:  None    Findings:  No fractures or dislocation. No bony lesions.  Normal joint spaces.    Increased dorsal soft tissue window.    Impression:   No evidence of bony abnormalities.              Assessment / Plan    Assessment/Plan:   Padma Younger is a 34 y.o. female with right wrist dorsal ganglion cyst.    I discussed with the patient their clinical and radiographic findings demonstrate a wrist dorsal ganglion cyst  We had a lengthy discussion regarding the pathophysiology of their diagnosis. The benign nature of a ganglion cyst was stressed.  It was also discussed that while these cysts may cause some local mass effect which may result in minor symptoms of discomfort, these cysts are typically painless.  Both conservative and surgical options were discussed.  Conservative treatments in the form of: observation, aspiration (which is a simple office procedure but has high recurrence rates, typically greater than 50%), and " bracing were presented.  Operative treatments in the form of surgical excision, which has a much lower recurrence rate but does require a trip to operating/procedure room, was presented. Common reasons for surgery were also explained to be due to local discomfort and functional deficits, or to obtain tissue for a definitive histologic diagnosis. After expressing understanding of all options, the patient elects to proceed with initiation of wrist splinting and anti-inflammatory use.  I will see her back in 6 weeks for reevaluation.  She would like to consider surgical excision if not improved.  They were agreeable with the plan.  All questions and concerns were addressed.        ICD-10-CM ICD-9-CM   1. Ganglion of right wrist  M67.431 727.41   2. Right wrist pain  M25.531 719.43     Follow Up:   Return in about 6 weeks (around 11/25/2024) for Follow Up.      Alicia Keyes MD  [facility]

## 2024-10-17 ENCOUNTER — OFFICE VISIT (OUTPATIENT)
Dept: ORTHOPEDIC SURGERY | Facility: CLINIC | Age: 34
End: 2024-10-17
Payer: COMMERCIAL

## 2024-10-17 VITALS
BODY MASS INDEX: 35.98 KG/M2 | SYSTOLIC BLOOD PRESSURE: 126 MMHG | HEIGHT: 63 IN | WEIGHT: 203.04 LBS | DIASTOLIC BLOOD PRESSURE: 78 MMHG

## 2024-10-17 DIAGNOSIS — M79.672 LEFT FOOT PAIN: Primary | ICD-10-CM

## 2024-10-17 NOTE — PROGRESS NOTES
[facility]     Office Visit       Date: 10/17/2024   Patient Name: Padma Younger  MRN: [id number]  YOB: 1990    Referring Physician: Leidy Faulkner PA-C     Chief Complaint:   Chief Complaint   Patient presents with    Left Foot - Pain       History of Present Illness: Padma Younger is a 34 y.o. female who is here today left foot pain.  States pain started at around beginning of September and of August.  States denies memory of any injury at that time.  States pain is improved with having issue on or not walking.  It is worse with activity and walking barefoot.  Pain described about the lateral border of foot.  Denies any issues with her feet before.  Denies smoking.   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  Reports she does a hip-hop exercise class on a weekly basis and has been doing that for about 3 years.  Denies any injury at that class to her memory.   right now is not doing the class because of her pain.    Subjective   Review of Systems: Review of Systems   Constitutional: Negative.  Negative for chills, fatigue and fever.   HENT: Negative.  Negative for congestion and dental problem.    Eyes: Negative.  Negative for blurred vision.   Respiratory: Negative.  Negative for shortness of breath.    Cardiovascular: Negative.  Negative for leg swelling.   Gastrointestinal: Negative.  Negative for abdominal pain.   Endocrine: Negative.  Negative for polyuria.   Genitourinary: Negative.  Negative for difficulty urinating.   Musculoskeletal:  Positive for arthralgias.   Skin: Negative.    Allergic/Immunologic: Negative.    Neurological: Negative.    Hematological: Negative.  Negative for adenopathy.   Psychiatric/Behavioral: Negative.  Negative for behavioral problems.         Past Medical History:   Past Medical History:   Diagnosis Date    Abnormal Pap smear of cervix      Allergic     Anxiety     Asthma     Chlamydia     2013    Chlamydia contact, treated     Depression     Gallstones     Gonorrhea     2013    Tear of meniscus of knee 11/2015    Partial meniscus removal right knee    Urinary tract infection     Visual impairment     Kertacanis       Past Surgical History:   Past Surgical History:   Procedure Laterality Date    CHOLECYSTECTOMY  11/27/2023    CHOLECYSTECTOMY OPEN      CYST REMOVAL      D & C WITH SUCTION N/A 09/18/2017    Procedure: DILATATION AND CURETTAGE WITH SUCTION;  Surgeon: Kelley Dodge DO;  Location: [facility];  Service:     DENTAL PROCEDURE      DILATATION AND CURETTAGE  09/18/2017    EYE SURGERY  12/2018    Intact Surgery    KNEE SURGERY      TUBAL ABDOMINAL LIGATION      VAGINAL DELIVERY      x1    WISDOM TOOTH EXTRACTION         Family History:   Family History   Problem Relation Age of Onset    Alcohol abuse Mother     Arthritis Mother     COPD Mother     Hyperlipidemia Father     Hypertension Father     Multiple sclerosis Sister     Liver disease Maternal Uncle     Breast cancer Maternal Grandmother     Cancer Maternal Grandmother     Diabetes Maternal Grandfather     Cancer Maternal Grandfather     Breast cancer Paternal Grandmother     Ovarian cancer Paternal Grandmother     Diabetes Paternal Grandmother     Heart attack Paternal Grandmother     Hypertension Paternal Grandmother     Stroke Paternal Grandmother     Cancer Paternal Grandmother     Colon cancer Paternal Grandfather        Social History:   Social History     Socioeconomic History    Marital status: Single    Number of children: 1   Tobacco Use    Smoking status: Never    Smokeless tobacco: Never   Vaping Use    Vaping status: Never Used   Substance and Sexual Activity    Alcohol use: Not Currently     Alcohol/week: 1.0 standard drink of alcohol     Types: 1 Glasses of wine per week     Comment: OCCASIONAL    Drug use: No    Sexual activity: Not Currently     Partners: Male     Birth  control/protection: Condom, Abstinence, Tubal ligation       Medications:   Current Outpatient Medications:     amitriptyline (ELAVIL) 25 MG tablet, Take 1 tablet by mouth Every Night., Disp: 90 tablet, Rfl: 3    buPROPion XL (WELLBUTRIN XL) 150 MG 24 hr tablet, TAKE 1 TABLET BY MOUTH DAILY, Disp: 90 tablet, Rfl: 0    buPROPion XL (Wellbutrin XL) 300 MG 24 hr tablet, Take 1 tablet by mouth Daily., Disp: 90 tablet, Rfl: 3    cetirizine (zyrTEC) 10 MG tablet, Take 1 tablet by mouth Daily., Disp: , Rfl:     clindamycin 1 % gel, Apply 1 Application topically to the appropriate area as directed 2 (Two) Times a Day., Disp: , Rfl:     Diclofenac Sodium (VOLTAREN) 1 % gel gel, Apply 4 g topically to the appropriate area as directed 4 (Four) Times a Day As Needed (pain)., Disp: 150 g, Rfl: 0    docusate sodium 100 MG capsule, Take 1 capsule by mouth 2 (Two) Times a Day As Needed for Constipation., Disp: 60 capsule, Rfl: 1    famotidine (PEPCID) 20 MG tablet, Take 1 tablet by mouth At Night As Needed., Disp: , Rfl:     montelukast (SINGULAIR) 10 MG tablet, Take 1 tablet by mouth Every Night., Disp: 90 tablet, Rfl: 1    sulfamethoxazole-trimethoprim (Bactrim DS) 800-160 MG per tablet, Take 1 tablet by mouth 2 (Two) Times a Day., Disp: 20 tablet, Rfl: 0    Ventolin  (90 Base) MCG/ACT inhaler, INHALE 2 PUFFS BY MOUTH EVERY 4 HOURS AS NEEDED FOR WHEEZING, Disp: 18 g, Rfl: 0    vitamin C (ASCORBIC ACID) 500 MG tablet, TAKE 2 TABLETS BY MOUTH DAILY, Disp: 60 tablet, Rfl: 1    vitamin D3 125 MCG (5000 UT) capsule capsule, TAKE 1 CAPSULE BY MOUTH DAILY, Disp: 30 capsule, Rfl: 3    Allergies:   Allergies   Allergen Reactions    Oxycodone Itching       I reviewed the patient's chief complaint, history of present illness, review of systems, past medical history, surgical history, family history, social history, medications and allergy list.     Objective    Vital Signs:   Vitals:    10/17/24 0810   BP: 126/78   Weight: 92.1 kg  "(203 lb 0.7 oz)   Height: 159.4 cm (62.76\")     Body mass index is 36.25 kg/m².    Ortho Exam:  left LE Foot and Ankle Exam:   Ambulates with a slow gait in clinic barefoot that is nonantalgic..  Plantigrade foot.   There is good perfusion to the toes.   The skin is intact throughout the foot and ankle.  Range of motion of ankle, foot and toes is within normal limits.   There is tenderness to palpation most focally over the proximal fifth metatarsal.  She seems to be less tender more proximally in the soft tissues at the peroneal insertion as well as over the course of the peroneal tendons posterior to the lateral malleolus as well as plantarly on the foot.  She is also tender slightly more distally along the shaft of the metatarsal over the lateral plantar border of the foot.  Seems to have a normal foot posture on standing exam with no rodney cavus or cavovarus.    Results Review:   XR FOOT 3+ VW LEFT     Date of Exam: 10/9/2024 8:35 AM EDT     Indication: left foot pain     Comparison: 1/29/2013     Findings:  The bony structures appear intact and in normal alignment. The joint spaces and articular surfaces are preserved. There are small ossicles proximal to the base of the fifth metatarsal which could reflect prior avulsion injuries. They are not clearly   present on the previous films. Bony structures appear otherwise unremarkable.     IMPRESSION:  Impression:     1. Small accessory ossicles proximal to the base of the fifth metatarsal which could be related to a prior avulsion injury. There are no acute findings.        Electronically Signed: Tanner Ghotra MD    10/10/2024 10:42 AM EDT    Workstation ID: [id number]    10/17/24 I have personally reviewed and interpreted the images from outside facility with the documented findings, no acute osseous abnormality, accessory ossicles proximal to the fifth metatarsal      Assessment / Plan    Assessment/Plan:   Diagnoses and all orders for this visit:    1. Left foot " pain (Primary)      Discussed acute left foot pain/injury (undiagnosed new problem with uncertain prognosis) with patient as well as treatment options at length. Decision regarding surgical intervention considered. Patient is not a candidate due to trial of nonoperative management. Also reviewed external note and imaging studies from October 10. Plan for non-operative management in a CAM walking boot. Patient can WBAT in boot using assistive devices PRN. Encouraged to ice and elevate during recovery. Recommend Tylenol for pain. Provided patient with some light exercises she can do.  She can come out of boot for exercises.  Will plan to see patient back in 4 weeks for reevaluation.  If symptoms persist or worsen we will likely consider advanced imaging.    Follow Up:   Return in about 4 weeks (around 11/14/2024) for F/u Recheck with images.      Audie Cassidy MD  [facility]

## 2024-10-17 NOTE — PATIENT INSTRUCTIONS
Normal Ankle Function Restorative Exercises    Range of Motion Exercises:   Perform these exercises to help regain normal ankle motion.    Foot hold:  Sit with the knee straight and hold the foot in it's natural position for as long as possible. Perform as frequently as possible for the first 3-10 days.     Pullback:  Sit with the knee straight and flew your foot back toward your body. Perform 10 repetitions and 3 sets.        Flexibility (Stretching) Exercises:  The goal of these exercises is to loosen tight leg muscles. Tightness makes it difficult to climb stairs, walk, run and jump.   Technique: Hold each exercise for 20 seconds for a gentle stretch.  Frequency: 6-10 repetitions, 5-7 days per week for each exercise.    Basic Calf Stretch:  Sit with the knee straightened and loop a towel around the ball of your foot. Slowly pull back until you feel our upper calf stretch.        Advanced Calf Stretch:  Once able to stand, stretch with hands on a wall.  Place one foot behind the other with toes pointing forward.  Keep heels down and back leg straight.  Slowly bend your front knee until you feel the calf stretch in the back leg.        Basic Heel Stretch:  Sit with the knee slightly bent and loop a towel around the ball of the outstretched foot. Slowly pull back until you feel a stretch in the lower calf and heel.        Advanced Heel Stretch:  Once able to stand, try placing injured foot behind the other foot while keeping the toes forward. While keeping the heels down, slowly bend the back knee until you feel a heel stretch in the back leg.        Strengthening Exercises:  Strong leg muscles are vital to help the ligaments hold the ankle together. Perform 3 sets of 20 repetitions, 5-7 days per week for these exercises.    Basic Front of Shin:   With foot flat on the floor, push outward against a wall, file cabinet or book case. Hold for three seconds.        Advanced Front of Shin:  Use a band and tie one end to  a desk or dresser. Sit with foot and knee in line and loop the band over the outside of the foot. Push foot out against the band.      Basic Inner Shin:  With foot flat on the floor, push inward against the other foot and hold for three seconds.      Advanced Inner Shin:  Tie a band to a desk or dresser. Sit with foot and knee in line and loop band over inside of the foot. Push foot against the band.    Basic Front of Shin:  Place the heel of uninjured foot on top of the injured foot. Push down with the top heel while trying to push up with the injured foot. Hold for three seconds.      Advanced Front of Shin:  Tie a band to a desk or dresser. Sit with leg straight and loop band over the top of the foot. Slowly pull foot back against the band.      References  1. [employer]. Ankle Range of Motion Exercises. Available from: http://[email or web address].com/ankle/  2. Jamil OROZCO, Chelsea F, Kristine N. Ankle instability. Sports Med Arthrosc. 2009;17(2):139-145.  3. Ibis MCGEE. Relationship between balance ability, training and sports injury risk. Sports Med. 2007;37(6):547-56.

## 2024-11-07 ENCOUNTER — OFFICE VISIT (OUTPATIENT)
Dept: INTERNAL MEDICINE | Facility: CLINIC | Age: 34
End: 2024-11-07
Payer: COMMERCIAL

## 2024-11-07 VITALS
BODY MASS INDEX: 36.96 KG/M2 | TEMPERATURE: 97.1 F | DIASTOLIC BLOOD PRESSURE: 64 MMHG | SYSTOLIC BLOOD PRESSURE: 112 MMHG | HEIGHT: 63 IN | OXYGEN SATURATION: 98 % | HEART RATE: 111 BPM | WEIGHT: 208.6 LBS

## 2024-11-07 DIAGNOSIS — F32.A DEPRESSION, UNSPECIFIED DEPRESSION TYPE: ICD-10-CM

## 2024-11-07 DIAGNOSIS — G47.9 SLEEP DIFFICULTIES: ICD-10-CM

## 2024-11-07 DIAGNOSIS — E66.09 CLASS 2 OBESITY DUE TO EXCESS CALORIES WITHOUT SERIOUS COMORBIDITY WITH BODY MASS INDEX (BMI) OF 35.0 TO 35.9 IN ADULT: ICD-10-CM

## 2024-11-07 DIAGNOSIS — R09.81 NASAL CONGESTION: ICD-10-CM

## 2024-11-07 DIAGNOSIS — E66.812 CLASS 2 OBESITY DUE TO EXCESS CALORIES WITHOUT SERIOUS COMORBIDITY WITH BODY MASS INDEX (BMI) OF 35.0 TO 35.9 IN ADULT: ICD-10-CM

## 2024-11-07 DIAGNOSIS — J06.9 UPPER RESPIRATORY TRACT INFECTION, UNSPECIFIED TYPE: Primary | ICD-10-CM

## 2024-11-07 LAB
EXPIRATION DATE: NORMAL
FLUAV AG UPPER RESP QL IA.RAPID: NOT DETECTED
FLUBV AG UPPER RESP QL IA.RAPID: NOT DETECTED
INTERNAL CONTROL: NORMAL
Lab: NORMAL
SARS-COV-2 AG UPPER RESP QL IA.RAPID: NOT DETECTED

## 2024-11-07 PROCEDURE — 87428 SARSCOV & INF VIR A&B AG IA: CPT | Performed by: STUDENT IN AN ORGANIZED HEALTH CARE EDUCATION/TRAINING PROGRAM

## 2024-11-07 PROCEDURE — 99214 OFFICE O/P EST MOD 30 MIN: CPT | Performed by: STUDENT IN AN ORGANIZED HEALTH CARE EDUCATION/TRAINING PROGRAM

## 2024-11-07 RX ORDER — AZITHROMYCIN 250 MG/1
TABLET, FILM COATED ORAL
Qty: 6 TABLET | Refills: 0 | Status: SHIPPED | OUTPATIENT
Start: 2024-11-07

## 2024-11-07 RX ORDER — IBUPROFEN 600 MG/1
1 TABLET, FILM COATED ORAL EVERY 6 HOURS
COMMUNITY
Start: 2024-10-29

## 2024-11-07 RX ORDER — PHENTERMINE HYDROCHLORIDE 37.5 MG/1
37.5 TABLET ORAL
Qty: 90 TABLET | Refills: 0 | Status: SHIPPED | OUTPATIENT
Start: 2024-11-07

## 2024-11-07 RX ORDER — METHYLPREDNISOLONE 4 MG/1
TABLET ORAL
Qty: 21 TABLET | Refills: 0 | Status: SHIPPED | OUTPATIENT
Start: 2024-11-07

## 2024-11-07 NOTE — PROGRESS NOTES
Office Note     Name: Padma Younger    : 1990     MRN: [id number]     Chief Complaint  Cough and Nasal Congestion (MUCUS /)    Subjective     History of Present Illness:  Padma Younger is a 34 y.o. female who presents today for \    Here complaining of upper respiratory symptoms of nasal congestion cough that is productive and fatigue.  Her youngest child is also sick with similar symptoms started this past week.  No fever no shortness of breath she does have some sinus pressure.  Follow-up on depression she is currently taking Wellbutrin she is doing well and    Follow-up on depression she is currently taking Wellbutrin.  She has been doing well on this dose.  Feels motivated denies any worsening depressive symptoms.  She is currently taking Elavil to help her sleep at night and this is helping her sleep.         Review of Systems:   Review of Systems   All other systems reviewed and are negative.      Past Medical History:   Past Medical History:   Diagnosis Date    Abnormal Pap smear of cervix     Allergic     Anxiety     Asthma     Chlamydia         Chlamydia contact, treated     Depression     Gallstones     Gonorrhea         Tear of meniscus of knee 2015    Partial meniscus removal right knee    Urinary tract infection     Visual impairment     Kertacanis       Past Surgical History:   Past Surgical History:   Procedure Laterality Date    CHOLECYSTECTOMY  2023    CHOLECYSTECTOMY OPEN      CYST REMOVAL      D & C WITH SUCTION N/A 2017    Procedure: DILATATION AND CURETTAGE WITH SUCTION;  Surgeon: Kelley Dodge DO;  Location: [facility] OR;  Service:     DENTAL PROCEDURE      DILATATION AND CURETTAGE  2017    EYE SURGERY  2018    Intact Surgery    KNEE SURGERY      TUBAL ABDOMINAL LIGATION      VAGINAL DELIVERY      x1    WISDOM TOOTH EXTRACTION         Family History:   Family History   Problem Relation Age of Onset    Alcohol abuse Mother     Arthritis Mother      COPD Mother     Hyperlipidemia Father     Hypertension Father     Multiple sclerosis Sister     Liver disease Maternal Uncle     Breast cancer Maternal Grandmother     Cancer Maternal Grandmother     Diabetes Maternal Grandfather     Cancer Maternal Grandfather     Breast cancer Paternal Grandmother     Ovarian cancer Paternal Grandmother     Diabetes Paternal Grandmother     Heart attack Paternal Grandmother     Hypertension Paternal Grandmother     Stroke Paternal Grandmother     Cancer Paternal Grandmother     Colon cancer Paternal Grandfather        Social History:   Social History     Socioeconomic History    Marital status: Single    Number of children: 1   Tobacco Use    Smoking status: Never    Smokeless tobacco: Never   Vaping Use    Vaping status: Never Used   Substance and Sexual Activity    Alcohol use: Not Currently     Alcohol/week: 1.0 standard drink of alcohol     Types: 1 Glasses of wine per week     Comment: OCCASIONAL    Drug use: No    Sexual activity: Not Currently     Partners: Male     Birth control/protection: Condom, Abstinence, Tubal ligation       Immunizations:   Immunization History   Administered Date(s) Administered    31-influenza Vac Quardvalent Preservativ 11/20/2023    ABRYSVO (RSV, 60+ or pregnant women 32-36 wks) 01/29/2024    COVID-19 ([employer]) 03/08/2021    COVID-19 (PFIZER) Purple Cap Monovalent 11/03/2021    Fluzone  >6mos 10/07/2024    Fluzone (or Fluarix & Flulaval for VFC) >6mos 10/18/2017, 10/28/2020    Hep B, Adolescent or Pediatric 02/23/1999    Influenza, Unspecified 03/21/2017, 11/20/2023    MMR 09/20/2000    Td (TDVAX) 09/20/2000    Tdap 02/10/2009, 01/26/2017, 03/21/2017, 01/29/2024        Medications:     Current Outpatient Medications:     amitriptyline (ELAVIL) 25 MG tablet, Take 1 tablet by mouth Every Night., Disp: 90 tablet, Rfl: 3    buPROPion XL (WELLBUTRIN XL) 150 MG 24 hr tablet, TAKE 1 TABLET BY MOUTH DAILY, Disp: 90 tablet, Rfl: 0    buPROPion XL  "(Wellbutrin XL) 300 MG 24 hr tablet, Take 1 tablet by mouth Daily., Disp: 90 tablet, Rfl: 3    Calcium Carbonate (CALCIUM 500 PO), Take 500 mg by mouth Daily., Disp: , Rfl:     cetirizine (zyrTEC) 10 MG tablet, Take 1 tablet by mouth Daily., Disp: , Rfl:     clindamycin 1 % gel, Apply 1 Application topically to the appropriate area as directed 2 (Two) Times a Day., Disp: , Rfl:     Diclofenac Sodium (VOLTAREN) 1 % gel gel, Apply 4 g topically to the appropriate area as directed 4 (Four) Times a Day As Needed (pain)., Disp: 150 g, Rfl: 0    famotidine (PEPCID) 20 MG tablet, Take 1 tablet by mouth At Night As Needed., Disp: , Rfl:     ibuprofen (ADVIL,MOTRIN) 600 MG tablet, Take 1 tablet by mouth Every 6 (Six) Hours., Disp: , Rfl:     montelukast (SINGULAIR) 10 MG tablet, Take 1 tablet by mouth Every Night., Disp: 90 tablet, Rfl: 1    Ventolin  (90 Base) MCG/ACT inhaler, INHALE 2 PUFFS BY MOUTH EVERY 4 HOURS AS NEEDED FOR WHEEZING, Disp: 18 g, Rfl: 0    vitamin C (ASCORBIC ACID) 500 MG tablet, TAKE 2 TABLETS BY MOUTH DAILY, Disp: 60 tablet, Rfl: 1    vitamin D3 125 MCG (5000 UT) capsule capsule, TAKE 1 CAPSULE BY MOUTH DAILY, Disp: 30 capsule, Rfl: 3    azithromycin (Zithromax Z-Abel) 250 MG tablet, Take 2 tablets by mouth on day 1, then 1 tablet daily on days 2-5, Disp: 6 tablet, Rfl: 0    methylPREDNISolone (MEDROL) 4 MG dose pack, Take as directed on package instructions., Disp: 21 tablet, Rfl: 0    phentermine (Adipex-P) 37.5 MG tablet, Take 1 tablet by mouth Every Morning Before Breakfast., Disp: 90 tablet, Rfl: 0    Allergies:   Allergies   Allergen Reactions    Oxycodone Itching       Objective     Vital Signs  /64   Pulse 111   Temp 97.1 °F (36.2 °C) (Temporal)   Ht 159.4 cm (62.76\")   Wt 94.6 kg (208 lb 9.6 oz)   SpO2 98%   BMI 37.24 kg/m²   Estimated body mass index is 37.24 kg/m² as calculated from the following:    Height as of this encounter: 159.4 cm (62.76\").    Weight as of this " encounter: 94.6 kg (208 lb 9.6 oz).            Physical Exam  Constitutional:       Appearance: Normal appearance. She is obese.   HENT:      Nose: Congestion present.      Mouth/Throat:      Pharynx: Posterior oropharyngeal erythema present.   Cardiovascular:      Rate and Rhythm: Normal rate and regular rhythm.      Pulses: Normal pulses.   Pulmonary:      Effort: Pulmonary effort is normal.      Breath sounds: Normal breath sounds.   Neurological:      Mental Status: She is alert.          Result Review :              Results for orders placed or performed in visit on 11/07/24   POCT SARS-CoV-2 Antigen TRICIA + Flu    Collection Time: 11/07/24  8:46 AM    Specimen: Swab   Result Value Ref Range    SARS Antigen Not Detected Not Detected, Presumptive Negative    Influenza A Antigen TRICIA Not Detected Not Detected    Influenza B Antigen TRICIA Not Detected Not Detected    Internal Control Passed Passed    Lot Number [id number]     Expiration Date 9/4/25          Assessment and Plan     1. Upper respiratory tract infection, unspecified type    - POCT SARS-CoV-2 Antigen TRICIA + Flu  - azithromycin (Zithromax Z-Abel) 250 MG tablet; Take 2 tablets by mouth on day 1, then 1 tablet daily on days 2-5  Dispense: 6 tablet; Refill: 0  - methylPREDNISolone (MEDROL) 4 MG dose pack; Take as directed on package instructions.  Dispense: 21 tablet; Refill: 0    2. Nasal congestion    - POCT SARS-CoV-2 Antigen TRICIA + Flu  - azithromycin (Zithromax Z-Abel) 250 MG tablet; Take 2 tablets by mouth on day 1, then 1 tablet daily on days 2-5  Dispense: 6 tablet; Refill: 0  - methylPREDNISolone (MEDROL) 4 MG dose pack; Take as directed on package instructions.  Dispense: 21 tablet; Refill: 0    3. Class 2 obesity due to excess calories without serious comorbidity with body mass index (BMI) of 35.0 to 35.9 in adult  Discussed risk associated with obesity. she was given instructions on calorie counting as well as on decreasing carbohydrate intake. she was  also encouraged to start exercise regimen.  Instructed patient to download fitness kali on her smart phone to aid in calorie counting and exercise tracking.    - phentermine (Adipex-P) 37.5 MG tablet; Take 1 tablet by mouth Every Morning Before Breakfast.  Dispense: 90 tablet; Refill: 0    4. Depression, unspecified depression type  Continue with wellbutrin, symptoms have improved    5. Sleep difficulties  Stable  Continue with elavil qhs.       Follow Up  Return in about 3 months (around 2/7/2025) for Recheck obesity .    MD LEFTY ReyesE [address]  [facility]  [address]  [address] [address] [phone]

## 2024-11-18 ENCOUNTER — OFFICE VISIT (OUTPATIENT)
Dept: ORTHOPEDIC SURGERY | Facility: CLINIC | Age: 34
End: 2024-11-18
Payer: COMMERCIAL

## 2024-11-18 VITALS — HEIGHT: 63 IN | BODY MASS INDEX: 36.95 KG/M2 | WEIGHT: 208.56 LBS

## 2024-11-18 DIAGNOSIS — M79.672 LEFT FOOT PAIN: Primary | ICD-10-CM

## 2024-11-18 PROCEDURE — 99213 OFFICE O/P EST LOW 20 MIN: CPT | Performed by: ORTHOPAEDIC SURGERY

## 2024-11-18 NOTE — PATIENT INSTRUCTIONS
"Properly Fitting Comfortable Shoes    \"The patient is the best  of what constitutes a comfortable shoe.\"    The following are shoe characteristics that typically make a shoe comfortable to wear:    A Well-Fitted Shoe  Shoes not only need to be the correct length to fit our foot, but they also must be the correct width. Shoe manufacturers and shoe styles often vary the width significantly. Make sure that you wear shoes that not only fit the length of your foot, but also the width. Also, make sure the shoes have a comfortable fit at the heel and the toes.    Stiff Sole  A still sole minimizes excessive loading through the forefront or midfoot. The opposite of this would be a highly flexible sole (ex. A slipper) which tends to concentrate the pressure in certain areas of the foot.    Rocker Bottom Contour  A smooth, slightly rounded contour to the sole of the shoe will help to even out force through the foot as it moves from heel strike to toe off.    Wide Toebox  In general, the front of the shoe should have enough space to accommodate the front of the foot without requiring it to be squished.    Soft, Comfortable Uppers  The material making up the upper part of the shoe, particularly that covering the toes, and midfoot should be soft and somewhat flexible enough to stretch (ex. soft leather).    Soft, Comfortable Insert  The bed that the foot rests on should be soft and accomodate to the foot. It may be necessary to buy a comfortable \"over-the-counter\" orthotic to replace the existing shoe insert.     Figure 3: Rocker Bottom  (slightly rounded contour of the sole)         HANDOUT COURTESY OF [email or web address]  The information contained in this handout is copyrighted by www.[employer].com . It may be reproduced in small quantities for  non-commercial educational purposes. This information is provided only for general education. It is not intended to provide specific  medical advice. It is expected that " individuals will consult a qualified licensed healthcare provider to provide information that is  relevant to their specific condition. Edited by Laureano Lombardi MD, Shamar Rea MD, and Clyde Moe MD     [employer]                  Online:  www.Fundation  wwwLagiar/en/us/  [email or web address]    [place], [place]:   [employer] [place]   [address], [address], [facility] [address]   [email or web address]      [employer]   [address], [facility], [address]   [email or web address]      [employer]   [address], [place] [address]   [email or web address]      [employer] [facility]   [address] , [address], [place] [address]   [email or web address]     [employer]  [address] [place] [address]  [email or web address]  P: [phone]

## 2024-11-18 NOTE — PROGRESS NOTES
"                          [facility] Orthopaedic Surgery Office Follow Up     Office Follow Up Visit     Date: 11/18/2024   Patient Name: Padma Younger  MRN: [id number]  YOB: 1990  Chief Complaint:   Chief Complaint   Patient presents with    Follow-up     1 month recheck-  Left foot pain     History of Present Illness:   Padma Younger is a 34 y.o. female who is here today for follow up for left foot pain.  Last seen in clinic on October 17.  Has been weightbearing as tolerated in tall cam walking boot and since that visit.  States not having pain when walking in boot.  States pain has improved however does still continue to have pain when ambulating out of boot.  Describes pain primarily about the pinky toe area.  No new complaints.  Has continued abstaining from her exercise class which has been helping with her symptoms.    Subjective   I reviewed the patient's chief complaint, history of present illness, review of systems, past medical history, surgical history, family history, social history, medications and allergy list   Objective    Vital Signs:   Vitals:    11/18/24 0841   Weight: 94.6 kg (208 lb 8.9 oz)   Height: 159.5 cm (62.78\")     Body mass index is 37.2 kg/m².    Ortho Exam:  left LE Foot and Ankle Exam:   Ambulates out of boot with nonantalgic gait.  Plantigrade foot.   There is good perfusion to the toes.   The skin is intact throughout the foot and ankle.  Range of motion of ankle, foot and toes is within normal limits.   Tenderness to palpation most focally about the lateral distal fifth metatarsal, lateral to the fifth metatarsal head.  Some tenderness plantarly as well but seems to be more focally tender laterally.  No appreciable swelling.  Much less tender at the proximal fifth metatarsal.  Minimal pain with fifth toe range of motion at the MTP joint.  Normal foot posture on standing exam.    Results Review:  XR Foot 3+ View Left  Left Foot X-Ray 11/18/24   Indication: " Pain  Views: 3 weight bearing , comparison to previous  Findings: xrays reviewed by me today in the office and show no acute   osseous abnormality, redemonstration os peroneum, normal alignment       Assessment / Plan    Assessment/Plan:   Diagnoses and all orders for this visit:    1. Left foot pain (Primary)  -     XR Foot 3+ View Left      Returns to clinic for continued management of her left foot pain.  Per documentation patient's pain was more proximal however today it seems as though her pain is isolated to the more distal metatarsal more consistent with bunionette type pathology.  Boot has helped with her symptoms.  We talked at length about proper wide toebox shoe wear and I provided her with some shoe options.  I also think that she is having some pain plantarly under the fifth metatarsal and I recommended custom orthotics to help offload the area, referral provided for bluegrass bracing.  Counseled patient she can now wean out of boot as her symptoms allow.  Counseled patient to slowly advance her activity as her symptoms improve.  We discussed formal physical therapy however patient elected to continue to do exercises at home.  Counseled patient to avoid activities that cause pain during her recovery.  Plan for patient to continue all these conservative treatment modalities and return to clinic on an as-needed basis if symptoms persist or worsen.  Was a pleasure seeing her today.    Patient suffers from pain and foot/ankle instability.  I am ordering bilateral custom molded foot orthoses to stabilize and reduce pain.  Custom required for tri-plane control, deformity, lifetime need.     Follow Up:   Return if symptoms worsen or fail to improve.      Audie Cassidy MD  [facility] Orthopedic Surgeon

## 2024-11-24 NOTE — PROGRESS NOTES
[facility] Orthopedic     Office Visit       Date: 11/25/2024   Patient Name: Padma Younger  MRN: [id number]  YOB: 1990    Referring Physician: No ref. provider found     Chief Complaint:   Chief Complaint   Patient presents with    Follow-up     6 week follow-up: Ganglion of right wrist     History of Present Illness:   Padma Younger is a 34 y.o. female RHD presenting to clinic for follow up of right wrist dorsal ganglion cyst. At her last visit we initiated splinting and anti inflammatories. She reports that her pain and swelling are largely unchanged.  She does feel that the cyst has become somewhat smaller in size.  She has been wearing the brace.  No other complaints or concerns.    Subjective   Review of Systems:   Review of Systems   Constitutional: Negative.  Negative for chills, fatigue and fever.   HENT: Negative.  Negative for congestion and dental problem.    Eyes: Negative.  Negative for blurred vision.   Respiratory: Negative.  Negative for shortness of breath.    Cardiovascular: Negative.  Negative for leg swelling.   Gastrointestinal: Negative.  Negative for abdominal pain.   Endocrine: Negative.  Negative for polyuria.   Genitourinary: Negative.  Negative for difficulty urinating.   Musculoskeletal:  Positive for arthralgias.   Skin: Negative.    Allergic/Immunologic: Negative.    Neurological: Negative.    Hematological: Negative.  Negative for adenopathy.   Psychiatric/Behavioral: Negative.  Negative for behavioral problems.       Pertinent review of systems per HPI    I reviewed the patient's chief complaint, history of present illness, review of systems, past medical history, surgical history, family history, social history, medications and allergy list in the EMR on 11/25/2024 and agree with the findings above.    Objective    Vital Signs:   Vitals:    11/25/24 0810   Weight: 94.3 kg (208 lb)   Height:  "159.4 cm (62.75\")     BMI:      General: No acute distress. Alert and oriented.   Cardiovascular: Palpable radial pulse.   Respiratory: Breathing is nonlabored.     Ortho Exam:  Examination of the right upper extremity demonstrates a 5mm x 5mm palpable cyst along the dorsal aspect of the wrist.  This is smaller than prior exam.  This is nontender to palpation.  She is nontender over the the dorsal SL interval and radiocarpal joint.  She has full wrist and digit range of motion.  Sensation is intact light touch throughout the hand.  Warm and well-perfused distally.    Imaging / Studies:    Imaging Results (Last 24 Hours)       ** No results found for the last 24 hours. **          Assessment / Plan    Assessment/Plan:   Padma Younger is a 34 y.o. female with right wrist dorsal ganglion cyst.     The patient has had some improvements in the size of the cyst with a course of bracing.  She does however continue to endorse pain and swelling that is worse with wrist motion.  She is most interested in definitive surgical excision.  I explained to her the risk, benefits, alternatives and prognosis, and she elects to proceed with right wrist dorsal ganglion cyst excision.    The risks and benefits of the procedure were discussed with the patient and/or appropriate guardian, which include but are not limited to: the risk of bleeding, pain, infection, wound complications, neurovascular damage, post-operative stiffness, tendon and/or ligament injury, persistent pain, need for additional surgeries in the future, and general risks from anesthesia.  Ganglion cyst excision specific risk include: Recurrence of the cyst, wrist stiffness, damage to surrounding structures, need for additional procedures.  We also discussed the post-operative rehabilitation, length of immobilization, the need for therapy, and the overall expected outcomes from the procedure. Proper time was given to answer the patient's questions regarding the " procedure. The patient expressed understanding. Knowing what the risks are and what the conservative treatment is, the patient elected to forgo any further conservative treatment options and proceed with the surgical intervention. Surgical consent was obtained in the clinic and signed by myself and the patient. She will follow up with me post operatively.       ICD-10-CM ICD-9-CM   1. Ganglion of right wrist  M67.431 727.41     Follow Up:   Return for Follow Up- After surgery.      Alicia Keyes MD  [facility] Orthopedic & Hand Surgeon

## 2024-11-25 ENCOUNTER — OFFICE VISIT (OUTPATIENT)
Dept: ORTHOPEDIC SURGERY | Facility: CLINIC | Age: 34
End: 2024-11-25
Payer: COMMERCIAL

## 2024-11-25 VITALS — BODY MASS INDEX: 36.86 KG/M2 | WEIGHT: 208 LBS | HEIGHT: 63 IN

## 2024-11-25 DIAGNOSIS — M67.431 GANGLION OF RIGHT WRIST: Primary | ICD-10-CM

## 2024-11-27 ENCOUNTER — TELEPHONE (OUTPATIENT)
Age: 34
End: 2024-11-27
Payer: COMMERCIAL

## 2024-11-27 NOTE — TELEPHONE ENCOUNTER
I called patient to get her scheduled for surgery with Dr Keyes. Patient did not answer at this time. I left a voice mail asking her to give me a call back at her convenience. At [phone]. Lily Pace CMA

## 2024-12-03 ENCOUNTER — TELEPHONE (OUTPATIENT)
Dept: ORTHOPEDIC SURGERY | Facility: CLINIC | Age: 34
End: 2024-12-03
Payer: COMMERCIAL

## 2024-12-03 NOTE — TELEPHONE ENCOUNTER
In the setting of no significant injury or trauma, she may be experiencing some tendon irritation due to the ganglion cyst. I would advise tylenol, splinting, ice and rest. If she feels her symptoms are significant, I am happy to reevaluate her in clinic.

## 2024-12-03 NOTE — TELEPHONE ENCOUNTER
I called and spoke with Ms. Younger.  She stated that her wrist popped a few times a few weeks ago.  It has been bother her more since then.  She has not had any type of injury to her wrist. She stated that since Saturday it has been throbbing.  She is taking Tylenol as needed.  She is wanting to know if there is anything she can do.  Please advise. Thanks. Zeina

## 2024-12-03 NOTE — TELEPHONE ENCOUNTER
Provider:  DR. ROLAND GODINEZ    Caller: GARY BRO    Relationship to Patient: SELF    Pharmacy:  [facility] [phone]    Phone Number: [phone]    Reason for Call: PATIENT IS SCHEDULED FOR RIGHT WRIST SURGERY ON 1/10/25. PATIENT SAYS SATURDAY NIGHT SHE BEGAN HAVING PAIN IN HER RIGHT WRIST THAT SHOOTS UP TO HER SHOULDER. PATIENT WANTS TO KNOW IF SHE NEEDS TO COME IN TO BE CHECKED?    When was the patient last seen: 11/25/24

## 2024-12-03 NOTE — TELEPHONE ENCOUNTER
I called and let her know what Dr. Keyes said.  She understood and will try those thing and give us a call back if she needs to be seen.  Zeina

## 2025-01-10 ENCOUNTER — DOCUMENTATION (OUTPATIENT)
Dept: ORTHOPEDIC SURGERY | Facility: CLINIC | Age: 35
End: 2025-01-10

## 2025-01-10 NOTE — PROGRESS NOTES
Note made in error. Patient no showed date of surgery. Contacted multiple times by surgery center with no response.

## 2025-01-26 DIAGNOSIS — F41.1 GAD (GENERALIZED ANXIETY DISORDER): ICD-10-CM

## 2025-01-28 RX ORDER — BUPROPION HYDROCHLORIDE 150 MG/1
150 TABLET ORAL DAILY
Qty: 90 TABLET | Refills: 0 | OUTPATIENT
Start: 2025-01-28

## 2025-02-05 ENCOUNTER — OFFICE VISIT (OUTPATIENT)
Dept: ORTHOPEDIC SURGERY | Facility: CLINIC | Age: 35
End: 2025-02-05
Payer: COMMERCIAL

## 2025-02-05 VITALS
HEIGHT: 63 IN | DIASTOLIC BLOOD PRESSURE: 68 MMHG | SYSTOLIC BLOOD PRESSURE: 110 MMHG | WEIGHT: 208.8 LBS | BODY MASS INDEX: 37 KG/M2

## 2025-02-05 DIAGNOSIS — M79.672 LEFT FOOT PAIN: Primary | ICD-10-CM

## 2025-02-05 NOTE — PROGRESS NOTES
"                          [facility] [place] Orthopaedic Surgery Office Follow Up     Office Follow Up Visit     Date: 02/05/2025   Patient Name: Padma Younger  MRN: [id number]  YOB: 1990  Chief Complaint:   Chief Complaint   Patient presents with    Follow-up     11 wk f/u-Left foot pain (Primary)     History of Present Illness:   Padma Younger is a 34 y.o. female who is here today for continued left lateral foot pain.  Last seen in clinic on November 18.  Comes in ambulating in a normal shoe.  Nonantalgic gait.  States continues to have pain about the foot that is worse with activity.  Continues to abstain from her hip-hop exercise class.  Does say that when she was in boot she was not having pain while walking but pain continued after she discontinued boot.  Has not tried any other orthotics.    Subjective   I reviewed the patient's chief complaint, history of present illness, review of systems, past medical history, surgical history, family history, social history, medications and allergy list   Objective    Vital Signs:   Vitals:    02/05/25 0855   BP: 110/68   Weight: 94.7 kg (208 lb 12.8 oz)   Height: 159.4 cm (62.76\")     Body mass index is 37.28 kg/m².    Ortho Exam:  left LE Foot and Ankle Exam:   Ambulates out of boot with nonantalgic gait.  Plantigrade foot.   There is good perfusion to the toes.   The skin is intact throughout the foot and ankle.  Range of motion of ankle, foot and toes is within normal limits.  Patient with tenderness to palpation most focally at about the proximal fifth metatarsal directly over the bone.  There is some slight tenderness peroneus brevis insertion proximal to the bone and there is also some tenderness laterally over the distal aspect of the lateral fifth metatarsal head however once again most focal over the proximal fifth metatarsal.  Difficult to appreciate any swelling about that area.    Results Review:  XR Foot 3+ View Left  Left Foot X-Ray 11/18/24 "   Indication: Pain  Views: 3 weight bearing , comparison to previous  Findings: xrays reviewed by me today in the office and show no acute   osseous abnormality, redemonstration os peroneum, normal alignment       Assessment / Plan    Assessment/Plan:   Diagnoses and all orders for this visit:    1. Left foot pain (Primary)  -     MRI Foot Left Without Contrast; Future      Patient returns to clinic for continued management of her left foot pain.  Comes in today wearing a normal shoe and states that her pain has been persistent.  Had a long discussion with her with regard to her symptoms.  I think that her symptoms are concerning for a stress fracture.  Provided patient with an MRI to help characterize injury.  Patient can wear boot as needed for symptom relief.  Counseled patient to avoid activities that cause pain.  Will plan to see patient back for further management after completion of MRI.    Follow Up:   Return for F/U following completion of advanced imaging.      Audie Cassidy MD  [facility] [place] Orthopedic Surgeon

## 2025-02-14 ENCOUNTER — OUTSIDE FACILITY SERVICE (OUTPATIENT)
Dept: ORTHOPEDIC SURGERY | Facility: CLINIC | Age: 35
End: 2025-02-14
Payer: COMMERCIAL

## 2025-02-14 ENCOUNTER — DOCUMENTATION (OUTPATIENT)
Dept: ORTHOPEDIC SURGERY | Facility: CLINIC | Age: 35
End: 2025-02-14

## 2025-02-14 DIAGNOSIS — M67.431 GANGLION OF RIGHT WRIST: Primary | ICD-10-CM

## 2025-02-14 PROCEDURE — 25111 REMOVE WRIST TENDON LESION: CPT | Performed by: STUDENT IN AN ORGANIZED HEALTH CARE EDUCATION/TRAINING PROGRAM

## 2025-02-14 RX ORDER — OXYCODONE HYDROCHLORIDE 5 MG/1
5 TABLET ORAL EVERY 8 HOURS PRN
Qty: 8 TABLET | Refills: 0 | Status: SHIPPED | OUTPATIENT
Start: 2025-02-14

## 2025-02-14 NOTE — PROGRESS NOTES
ORTHOPEDIC OPERATIVE REPORT    PATIENT NAME: Padma Younger     MRN: [id number]     [facility] MRN: [id number]    YOB: 1990    DATE OF SURGERY: 02/14/25     LOCATION: [facility]    PREOPERATIVE DIAGNOSIS:   Right wrist dorsal ganglion cyst    POSTOPERATIVE DIAGNOSIS:  Right wrist dorsal ganglion cyst    PROCEDURE PERFORMED:  Right wrist dorsal ganglion cyst excision    CPT CODE:  31828    SURGEON: Alicia Keyes MD     ASSISTANT: None    ANESTHESIA:  Monitored anesthesia with local 1% lidocaine and 0.5% Marcaine plain 50-50 mixture    SPECIMENS: None    TOURNIQUET TIME: 14 minutes    ESTIMATED BLOOD LOSS: Minimal    COMPLICATIONS: None    IMPLANTS: None    INDICATIONS FOR PROCEDURE:  Padma Younger is a 34-year-old right-hand-dominant female who presented to clinic with complaints of pain and cyst formation to the right dorsal wrist. Clinical examination was consistent with a dorsal ganglion cyst. Radiographs of the affected wrist demonstrated no acute findings. Conservative treatments in the form of observation, aspiration and bracing were presented. The patient expressed understanding of the nonsurgical and surgical options.  Patient failed to improve with a nonoperative course.  And was most interested in definitive surgical excision.  After explaining the risks, benefits, alternatives and prognosis, they elected to proceed with right wrist dorsal ganglion cyst excision.     DESCRIPTION OF PROCEDURE:   The patient was identified in the preoperative holding area utilizing two patient identifiers. The cyst margins were marked out for later identification. The operative extremity was marked. They were taken back to the operating room and placed supine on the operative table. A tourniquet was placed to the operative extremity which was prepped and draped in a standard sterile fashion. Monitored anesthesia was induced smoothly per the anesthesia team. A surgical time out was performed that confirmed the correct  site, procedure and laterality. Local anesthetic consisting of a 50:50 mixture of 0.5% Marcaine mixed with 1% lidocaine plain was injected in the surrounding area of the cyst. There was confirmation that preoperative antibiotics were given.     A dorsal incision was marked longitudinally overlying the cyst. A 15 blade was used to incise through the skin and subcutaneous tissue. The extensor tendons were encountered and there was a significant amount of tenosynovitis surrounding the fourth extensor compartment tendons.  A tenosynovectomy of the fourth extensor compartment was completed.  The retractor was placed deep between the second and fourth compartments revealing the underlying sessile ganglion cyst.  This was dissected off of the joint capsule dorsally.  This measured approximately 5 x 5 mm.  This appeared centered over the capitate and scaphoid. Clinically, the appearance was that of a ganglion cyst. The wound was inspected and a small capsulotomy was made into the midcarpal joint with scissors and then the edges were cauterized with bipolar. The wound was gently irrigated with normal saline and inspected to find no evidence of additional cyst contents or damage to the underling dorsal carpal ligaments. The deep tissues and skin were closed with 4-0 monocryl. Steri strips, 4x4 dressings and webroll were applied, followed by a well padded volar resting plaster splint.  The patient was awoken from anesthesia and brought to the PACU in good and stable condition.  All counts were correct at the end of the case.    POSTOPERATIVE PLAN:  Weightbearing: Non weightbearing right upper extremity.   2.  A prescription was provided for oxycodone 5 mg. Additionally, over the counter Tylenol and/or Advil/Aleve/Motrin for pain control.   3.  Splint: Do not remove or allow to become wet. Encouraged digit range of motion as allowed by the splint.   4.  Follow up in 10-14 days as scheduled.    Alicia Keyes MD  [facility] [place] Sur[place]

## 2025-02-27 ENCOUNTER — OFFICE VISIT (OUTPATIENT)
Dept: ORTHOPEDIC SURGERY | Facility: CLINIC | Age: 35
End: 2025-02-27
Payer: COMMERCIAL

## 2025-02-27 VITALS — TEMPERATURE: 97.3 F

## 2025-02-27 DIAGNOSIS — M67.431 GANGLION OF RIGHT WRIST: Primary | ICD-10-CM

## 2025-02-27 PROCEDURE — 99024 POSTOP FOLLOW-UP VISIT: CPT | Performed by: STUDENT IN AN ORGANIZED HEALTH CARE EDUCATION/TRAINING PROGRAM

## 2025-02-27 NOTE — PROGRESS NOTES
[facility] Orthopedic     Post Operative Office Visit      Date: 02/27/2025   Patient Name: Padma Younger  MRN: [id number]  YOB: 1990    Chief Complaint:   Chief Complaint   Patient presents with    Post-op     2 week S/P Right wrist dorsal ganglion cyst excision(DOS 2/14/25)     History of Present Illness:   Padma Younger is a 34 y.o. female status post right wrist dorsal ganglion cyst excision, DOS 2/14/2025.  Patient has done well since surgery.  She denies any pain.  She has been compliant with splint wear.  No other complaints or concerns.    Subjective   Review of Systems:   Review of Systems     I reviewed the patient's chief complaint, history of present illness, review of systems, past medical history, surgical history, family history, social history, medications and allergy list in the EMR on 02/27/2025 and agree with the findings above.    Objective    Vital Signs:   Vitals:    02/27/25 0836   Temp: 97.3 °F (36.3 °C)       General Appearance: No acute distress. Alert and oriented.     Ortho Exam:  Examination of the right Upper Extremity:   Skin examination: Healing surgical incision overlying the dorsal aspect of the wrist.  No erythema warmth or drainage.  Minimal surrounding swelling.  Suture tails were trimmed.  Able to make a composite fist, limited wrist range of motion  AIN/PIN/Radial/Ulnar nerves grossly motor intact  Median/radial/ulnar sensory intact to light touch   Palpable radial pulse, digits are warm and well-perfused       Imaging / Studies:    Imaging Results (Last 24 Hours)       ** No results found for the last 24 hours. **          Assessment / Plan    Assessment/Plan:   Padma Younger is a 34 y.o. female status post right wrist dorsal ganglion cyst excision, DOS 2/14/2025.    The patient has done well postoperatively.  She was transition out of her postop splint and may use her removable wrist  brace as needed for comfort.  I encouraged digit and wrist range of motion.  She would like to work on wrist range of motion at home and we will hold off on any therapy at this time.  I counseled her on gentle scar massage.  She will return to clinic in 4 weeks for reevaluation of her pain and wrist motion.  All questions and concerns were addressed.  She is agreeable.      ICD-10-CM ICD-9-CM   1. Ganglion of right wrist  M67.431 727.41     Follow Up:   Return in about 4 weeks (around 3/27/2025) for Follow Up with PA.      Alicia Keyes MD  [facility] [place] Orthopedic & Hand Surgeon

## 2025-04-07 ENCOUNTER — OFFICE VISIT (OUTPATIENT)
Dept: ORTHOPEDIC SURGERY | Facility: CLINIC | Age: 35
End: 2025-04-07
Payer: COMMERCIAL

## 2025-04-07 DIAGNOSIS — Z09 SURGERY FOLLOW-UP: Primary | ICD-10-CM

## 2025-04-07 DIAGNOSIS — M67.431 GANGLION OF RIGHT WRIST: ICD-10-CM

## 2025-04-07 PROCEDURE — 99024 POSTOP FOLLOW-UP VISIT: CPT

## 2025-04-07 NOTE — PROGRESS NOTES
[facility] [place] [facility] Office Follow Up       Office Follow Up Visit       Patient Name: Padma Younger    Chief Complaint:   Chief Complaint   Patient presents with    Post-op     5 week recheck- S/P Right wrist dorsal ganglion cyst excision(DOS 2/14/25)       Referring Physician: No ref. provider found    History of Present Illness:   Padma Younger returns to clinic today for post-op visit 7 weeks s/p right wrist ganglion cyst removal with Dr. Keyes.  She reports to be doing great.  Wrist is feeling much better compared to before surgery. No longer wearing wrist immobilizer. Incision has healed.  No new concerns or issues at this time.      Subjective     Review of Systems   Constitutional: Negative.  Negative for chills, fatigue and fever.   HENT: Negative.  Negative for congestion and dental problem.    Eyes: Negative.  Negative for blurred vision.   Respiratory: Negative.  Negative for shortness of breath.    Cardiovascular: Negative.  Negative for leg swelling.   Gastrointestinal: Negative.  Negative for abdominal pain.   Endocrine: Negative.  Negative for polyuria.   Genitourinary: Negative.  Negative for difficulty urinating.   Musculoskeletal:  Positive for arthralgias.   Skin: Negative.    Allergic/Immunologic: Negative.    Neurological: Negative.    Hematological: Negative.  Negative for adenopathy.   Psychiatric/Behavioral: Negative.  Negative for behavioral problems.         I have reviewed and updated the following portions of the patient's history and review of systems: allergies, current medications, past family history, past medical history, past social history, past surgical history and problem list.    Medications:   Current Outpatient Medications:     amitriptyline (ELAVIL) 25 MG tablet, Take 1 tablet by mouth Every Night., Disp: 90 tablet, Rfl: 3    buPROPion XL (WELLBUTRIN XL) 150 MG 24 hr tablet, TAKE 1 TABLET BY MOUTH  DAILY, Disp: 90 tablet, Rfl: 0    buPROPion XL (Wellbutrin XL) 300 MG 24 hr tablet, Take 1 tablet by mouth Daily., Disp: 90 tablet, Rfl: 3    Calcium Carbonate (CALCIUM 500 PO), Take 500 mg by mouth Daily., Disp: , Rfl:     cetirizine (zyrTEC) 10 MG tablet, Take 1 tablet by mouth Daily., Disp: , Rfl:     clindamycin 1 % gel, Apply 1 Application topically to the appropriate area as directed 2 (Two) Times a Day., Disp: , Rfl:     Diclofenac Sodium (VOLTAREN) 1 % gel gel, Apply 4 g topically to the appropriate area as directed 4 (Four) Times a Day As Needed (pain)., Disp: 150 g, Rfl: 0    famotidine (PEPCID) 20 MG tablet, Take 1 tablet by mouth At Night As Needed., Disp: , Rfl:     ibuprofen (ADVIL,MOTRIN) 600 MG tablet, Take 1 tablet by mouth Every 6 (Six) Hours., Disp: , Rfl:     montelukast (SINGULAIR) 10 MG tablet, Take 1 tablet by mouth Every Night., Disp: 90 tablet, Rfl: 1    oxyCODONE (Roxicodone) 5 MG immediate release tablet, Take 1 tablet by mouth Every 8 (Eight) Hours As Needed for Severe Pain., Disp: 8 tablet, Rfl: 0    phentermine (Adipex-P) 37.5 MG tablet, Take 1 tablet by mouth Every Morning Before Breakfast., Disp: 90 tablet, Rfl: 0    Ventolin  (90 Base) MCG/ACT inhaler, INHALE 2 PUFFS BY MOUTH EVERY 4 HOURS AS NEEDED FOR WHEEZING, Disp: 18 g, Rfl: 0    vitamin C (ASCORBIC ACID) 500 MG tablet, TAKE 2 TABLETS BY MOUTH DAILY, Disp: 60 tablet, Rfl: 1    vitamin D3 125 MCG (5000 UT) capsule capsule, TAKE 1 CAPSULE BY MOUTH DAILY, Disp: 30 capsule, Rfl: 3    Allergies:   Allergies   Allergen Reactions    Oxycodone Itching         Objective      Vital Signs: There were no vitals filed for this visit.    Ortho Exam:  General: no acute distress, comfortable  Vitals reviewed in chart    Musculoskeletal Exam:    SIDE: Right wrist  Incision well-healed    Tenderness: No focal tenderness    Range of motion measurements (degrees): Right wrist displays full range of motion without deficit.  Able to supinate  and pronate without pain.    Painful arc of motion: No  No evidence of septic joint      Results Review:  None     Assessment / Plan      Assessment:   Diagnoses and all orders for this visit:    1. Surgery follow-up (Primary)    2. Ganglion of right wrist        Quality Metrics:   BMI:   Class 2 Severe Obesity (BMI >=35 and <=39.9). Obesity-related health conditions include the following: none.     Tobacco:   Padma Younger  reports that she has never smoked. She has never been exposed to tobacco smoke. She has never used smokeless tobacco.        Plan:  Patient doing very well 7 weeks s/p right wrist ganglion cyst removal with Dr. Keyes.  Incision is well-healed.  Full range of motion on exam with much improved pain compared to prior. May continue with unrestricted use of the right wrist. She will keep us updated if she has any further issues.  I am happy to see her back as needed.    Follow Up: Return as needed.    Jennifer Hernandez PA-C  [facility] [place] [facility]    Dictated using Dragon Speech Recognition.

## 2025-04-14 ENCOUNTER — HOSPITAL ENCOUNTER (OUTPATIENT)
Dept: MRI IMAGING | Facility: HOSPITAL | Age: 35
Discharge: HOME | End: 2025-04-14
Admitting: ORTHOPAEDIC SURGERY
Payer: COMMERCIAL

## 2025-04-14 DIAGNOSIS — M79.672 LEFT FOOT PAIN: ICD-10-CM

## 2025-04-14 PROCEDURE — 73718 MRI LOWER EXTREMITY W/O DYE: CPT

## 2025-04-22 RX ORDER — MONTELUKAST SODIUM 10 MG/1
10 TABLET ORAL DAILY
Qty: 90 TABLET | Refills: 0 | Status: SHIPPED | OUTPATIENT
Start: 2025-04-22

## 2025-04-23 ENCOUNTER — OFFICE VISIT (OUTPATIENT)
Dept: ORTHOPEDIC SURGERY | Facility: CLINIC | Age: 35
End: 2025-04-23
Payer: COMMERCIAL

## 2025-04-23 VITALS
BODY MASS INDEX: 36.68 KG/M2 | HEIGHT: 63 IN | WEIGHT: 207 LBS | DIASTOLIC BLOOD PRESSURE: 84 MMHG | SYSTOLIC BLOOD PRESSURE: 128 MMHG

## 2025-04-23 DIAGNOSIS — M79.672 LEFT FOOT PAIN: Primary | ICD-10-CM

## 2025-04-23 RX ORDER — ROPIVACAINE HYDROCHLORIDE 5 MG/ML
1 INJECTION, SOLUTION EPIDURAL; INFILTRATION; PERINEURAL
Status: COMPLETED | OUTPATIENT
Start: 2025-04-23 | End: 2025-04-23

## 2025-04-23 RX ORDER — TRIAMCINOLONE ACETONIDE 40 MG/ML
40 INJECTION, SUSPENSION INTRA-ARTICULAR; INTRAMUSCULAR
Status: COMPLETED | OUTPATIENT
Start: 2025-04-23 | End: 2025-04-23

## 2025-04-23 RX ADMIN — ROPIVACAINE HYDROCHLORIDE 1 ML: 5 INJECTION, SOLUTION EPIDURAL; INFILTRATION; PERINEURAL at 11:44

## 2025-04-23 RX ADMIN — TRIAMCINOLONE ACETONIDE 40 MG: 40 INJECTION, SUSPENSION INTRA-ARTICULAR; INTRAMUSCULAR at 11:44

## 2025-04-23 NOTE — PROGRESS NOTES
Procedure   - Medium Joint Arthrocentesis: 4th TMT (Left TMT ) on 4/23/2025 11:44 AM  Indications: pain  Details: 21 G needle, ultrasound-guided anterolateral approach  Medications: 40 mg triamcinolone acetonide 40 MG/ML; 1 mL ropivacaine 0.5 %  Outcome: tolerated well, no immediate complications  Procedure, treatment alternatives, risks and benefits explained, specific risks discussed. Consent was given by the patient. Immediately prior to procedure a time out was called to verify the correct patient, procedure, equipment, support staff and site/side marked as required. Patient was prepped and draped in the usual sterile fashion.

## 2025-04-23 NOTE — PROGRESS NOTES
"                          [facility] [place] [facility] Office Follow Up     Office Follow Up Visit     Date: 04/23/2025   Patient Name: Padma Younger  MRN: [id number]  YOB: 1990  Chief Complaint:   Chief Complaint   Patient presents with    Follow-up     11 week MRI follow up - Left foot pain     History of Present Illness:   Padma Younger is a 35 y.o. female who is here today for for continued left lateral foot pain.  Last seen in clinic on February 5.  Here for MRI follow-up.  States symptoms have persisted.  May be slightly better.   wore boot couple days ago because it hurt with standing.  Still continues to abstain from her hip-hop exercise class.   does take Tylenol and ibuprofen intermittently for pain control, ibuprofen provides more relief.    Subjective   I reviewed the patient's chief complaint, history of present illness, review of systems, past medical history, surgical history, family history, social history, medications and allergy list   Objective    Vital Signs:   Vitals:    04/23/25 1055   BP: 128/84   Weight: 93.9 kg (207 lb)   Height: 159.4 cm (62.76\")     Body mass index is 36.95 kg/m².    Ortho Exam:  left LE Foot and Ankle Exam:   Ambulates out of boot with nonantalgic gait.  Plantigrade foot.   There is good perfusion to the toes.   The skin is intact throughout the foot and ankle.  Range of motion of ankle, foot and toes is within normal limits.  Patient with tenderness to palpation most focally at about the proximal fifth metatarsal directly over the bone, similar to previous.   Difficult to appreciate any swelling about that area.    Results Review:  US Guided Injection  Ultrasound-guided corticosteroid injection of the left 4th/5th TMT joint      04/23/25 I have personally reviewed and interpreted the images from outside facility with the documented findings, no signs of stress fracture, appearance of ganglion cyst between bases of 4th and 5th " metatarsals    Assessment / Plan    Assessment/Plan:   Diagnoses and all orders for this visit:    1. Left foot pain (Primary)  -     US Guided Injection    Other orders  -     - Medium Joint Arthrocentesis: 4th TMT      Patient returns to clinic for continued management of her left foot pain.  MRI findings negative for stress fracture.  Did show a ganglion cyst between bases of 4th and 5th metatarsals.  I discussed with patient MRI finding of cyst.  I do not think cyst explains patient's symptoms.  I did explain that cyst could represent some adjacent joint disease and discussed with her that it is possible that some of her symptoms are coming from some arthritis at TMT's 4 and 5.  Discussed treatment options and following discussion patient elected for steroid injection in clinic today.  I injected the fourth TMT joint.  Patient will follow-up on an as-needed basis if symptoms persist or worsen.    I discussed with the patient the potential benefits of performing a therapeutic injections as well as potential risks including but not limited to infection, swelling, pain, bleeding, bruising, nerve/vessel damage, skin color changes, transient elevation in blood glucose levels, and fat atrophy. After informed consent  a steroid injection was given, see separate procedure note.     Follow Up:   Return if symptoms worsen or fail to improve.      Audie Cassidy MD  [facility] [place] Orthopedic Surgeon

## 2025-05-28 ENCOUNTER — OFFICE VISIT (OUTPATIENT)
Dept: INTERNAL MEDICINE | Facility: CLINIC | Age: 35
End: 2025-05-28
Payer: COMMERCIAL

## 2025-05-28 VITALS
TEMPERATURE: 97.1 F | HEIGHT: 63 IN | DIASTOLIC BLOOD PRESSURE: 78 MMHG | WEIGHT: 205 LBS | OXYGEN SATURATION: 99 % | BODY MASS INDEX: 36.32 KG/M2 | SYSTOLIC BLOOD PRESSURE: 110 MMHG | HEART RATE: 94 BPM

## 2025-05-28 DIAGNOSIS — G47.9 SLEEP DIFFICULTIES: ICD-10-CM

## 2025-05-28 DIAGNOSIS — E66.09 CLASS 2 OBESITY DUE TO EXCESS CALORIES WITHOUT SERIOUS COMORBIDITY WITH BODY MASS INDEX (BMI) OF 35.0 TO 35.9 IN ADULT: ICD-10-CM

## 2025-05-28 DIAGNOSIS — E66.812 CLASS 2 OBESITY DUE TO EXCESS CALORIES WITHOUT SERIOUS COMORBIDITY WITH BODY MASS INDEX (BMI) OF 35.0 TO 35.9 IN ADULT: ICD-10-CM

## 2025-05-28 DIAGNOSIS — J06.9 UPPER RESPIRATORY TRACT INFECTION, UNSPECIFIED TYPE: Primary | ICD-10-CM

## 2025-05-28 DIAGNOSIS — F32.A DEPRESSION, UNSPECIFIED DEPRESSION TYPE: ICD-10-CM

## 2025-05-28 DIAGNOSIS — T78.40XD ALLERGY, SUBSEQUENT ENCOUNTER: ICD-10-CM

## 2025-05-28 PROCEDURE — 87428 SARSCOV & INF VIR A&B AG IA: CPT | Performed by: STUDENT IN AN ORGANIZED HEALTH CARE EDUCATION/TRAINING PROGRAM

## 2025-05-28 PROCEDURE — 99214 OFFICE O/P EST MOD 30 MIN: CPT | Performed by: STUDENT IN AN ORGANIZED HEALTH CARE EDUCATION/TRAINING PROGRAM

## 2025-05-28 RX ORDER — FLUCONAZOLE 150 MG/1
150 TABLET ORAL ONCE
Qty: 2 TABLET | Refills: 0 | Status: SHIPPED | OUTPATIENT
Start: 2025-05-28 | End: 2025-05-28

## 2025-05-28 RX ORDER — AZITHROMYCIN 250 MG/1
TABLET, FILM COATED ORAL
Qty: 6 TABLET | Refills: 0 | Status: SHIPPED | OUTPATIENT
Start: 2025-05-28

## 2025-05-28 RX ORDER — MONTELUKAST SODIUM 10 MG/1
10 TABLET ORAL DAILY
Qty: 90 TABLET | Refills: 3 | Status: SHIPPED | OUTPATIENT
Start: 2025-05-28

## 2025-05-28 RX ORDER — PHENTERMINE HYDROCHLORIDE 37.5 MG/1
37.5 TABLET ORAL
Qty: 90 TABLET | Refills: 0 | Status: SHIPPED | OUTPATIENT
Start: 2025-05-28

## 2025-05-28 RX ORDER — BROMPHENIRAMINE MALEATE, PSEUDOEPHEDRINE HYDROCHLORIDE, AND DEXTROMETHORPHAN HYDROBROMIDE 2; 30; 10 MG/5ML; MG/5ML; MG/5ML
5 SYRUP ORAL 4 TIMES DAILY PRN
Qty: 118 ML | Refills: 0 | Status: SHIPPED | OUTPATIENT
Start: 2025-05-28

## 2025-05-28 NOTE — PROGRESS NOTES
Office Note     Name: Padma Younger    : 1990     MRN: [id number]     Chief Complaint  Nasal Congestion and Cough    Subjective     History of Present Illness:  Padma Younger is a 35 y.o. female who presents today for       complains of congestion. Onset of symptoms was a few days ago. Started on . Symptoms have been gradually worsening since that time. She is drinking plenty of fluids. She feels pressure, sore throat,     Weight Management  Current weight 205lbs.  Has been on and off adipex, would like to try it again.    Follow-up on depression she is currently taking Wellbutrin.  She has been doing well on this dose.  Feels motivated denies any worsening depressive symptoms.  She is currently taking Elavil to help her sleep at night and this is helping her sleep.     Review of Systems:   Review of Systems   All other systems reviewed and are negative.      Past Medical History:   Past Medical History:   Diagnosis Date    Abnormal Pap smear of cervix     Allergic     Anxiety     Asthma     Chlamydia     2013    Chlamydia contact, treated     Depression     Gallstones     Gonorrhea     2013    Tear of meniscus of knee 2015    Partial meniscus removal right knee    Urinary tract infection     Visual impairment     Kertacanis       Past Surgical History:   Past Surgical History:   Procedure Laterality Date    CHOLECYSTECTOMY  2023    CHOLECYSTECTOMY OPEN      CYST REMOVAL      D & C WITH SUCTION N/A 2017    Procedure: DILATATION AND CURETTAGE WITH SUCTION;  Surgeon: Kelley Dodge DO;  [facility];  Service:     DENTAL PROCEDURE      DILATATION AND CURETTAGE  2017    EYE SURGERY  2018    Intact Surgery    KNEE SURGERY      TUBAL ABDOMINAL LIGATION      VAGINAL DELIVERY      x1    WISDOM TOOTH EXTRACTION      WRIST SURGERY  2025    Ganglion cyst       Family History:   Family History   Problem Relation Age of Onset    Alcohol abuse Mother     Arthritis Mother      COPD Mother     Hyperlipidemia Father     Hypertension Father     Multiple sclerosis Sister     Liver disease Maternal Uncle     Breast cancer Maternal Grandmother     Cancer Maternal Grandmother     Diabetes Maternal Grandfather     Cancer Maternal Grandfather     Breast cancer Paternal Grandmother     Ovarian cancer Paternal Grandmother     Diabetes Paternal Grandmother     Heart attack Paternal Grandmother     Hypertension Paternal Grandmother     Stroke Paternal Grandmother     Cancer Paternal Grandmother     Colon cancer Paternal Grandfather        Social History:   Social History     Socioeconomic History    Marital status: Single    Number of children: 1   Tobacco Use    Smoking status: Never     Passive exposure: Never    Smokeless tobacco: Never   Vaping Use    Vaping status: Never Used   Substance and Sexual Activity    Alcohol use: Not Currently     Alcohol/week: 1.0 standard drink of alcohol     Types: 1 Glasses of wine per week     Comment: OCCASIONAL    Drug use: No    Sexual activity: Not Currently     Partners: Male     Birth control/protection: Condom, Abstinence, Tubal ligation       Immunizations:   Immunization History   Administered Date(s) Administered    31-influenza Vac Quardvalent Preservativ 11/20/2023    ABRYSVO (RSV, 60+ or pregnant women 32-36 wks) 01/29/2024    COVID-19 (Bettyvision) 03/08/2021    COVID-19 (PFIZER) Purple Cap Monovalent 11/03/2021    Fluzone  >6mos 10/07/2024    Fluzone (or Fluarix & Flulaval for VFC) >6mos 10/18/2017, 10/28/2020    Hep B, Adolescent or Pediatric 02/23/1999    Influenza, Unspecified 03/21/2017, 11/20/2023    MMR 09/20/2000    Td (TDVAX) 09/20/2000    Tdap 02/10/2009, 01/26/2017, 03/21/2017, 01/29/2024        Medications:     Current Outpatient Medications:     amitriptyline (ELAVIL) 25 MG tablet, Take 1 tablet by mouth Every Night., Disp: 90 tablet, Rfl: 3    buPROPion XL (WELLBUTRIN XL) 150 MG 24 hr tablet, TAKE 1 TABLET BY MOUTH DAILY, Disp: 90  tablet, Rfl: 0    buPROPion XL (Wellbutrin XL) 300 MG 24 hr tablet, Take 1 tablet by mouth Daily., Disp: 90 tablet, Rfl: 3    Calcium Carbonate (CALCIUM 500 PO), Take 500 mg by mouth Daily., Disp: , Rfl:     cetirizine (zyrTEC) 10 MG tablet, Take 1 tablet by mouth Daily., Disp: , Rfl:     clindamycin 1 % gel, Apply 1 Application topically to the appropriate area as directed 2 (Two) Times a Day., Disp: , Rfl:     famotidine (PEPCID) 20 MG tablet, Take 1 tablet by mouth At Night As Needed., Disp: , Rfl:     ibuprofen (ADVIL,MOTRIN) 600 MG tablet, Take 1 tablet by mouth Every 6 (Six) Hours., Disp: , Rfl:     montelukast (SINGULAIR) 10 MG tablet, Take 1 tablet by mouth Daily., Disp: 90 tablet, Rfl: 3    oxyCODONE (Roxicodone) 5 MG immediate release tablet, Take 1 tablet by mouth Every 8 (Eight) Hours As Needed for Severe Pain., Disp: 8 tablet, Rfl: 0    phentermine (Adipex-P) 37.5 MG tablet, Take 1 tablet by mouth Every Morning Before Breakfast., Disp: 90 tablet, Rfl: 0    Ventolin  (90 Base) MCG/ACT inhaler, INHALE 2 PUFFS BY MOUTH EVERY 4 HOURS AS NEEDED FOR WHEEZING, Disp: 18 g, Rfl: 0    vitamin C (ASCORBIC ACID) 500 MG tablet, TAKE 2 TABLETS BY MOUTH DAILY, Disp: 60 tablet, Rfl: 1    vitamin D3 125 MCG (5000 UT) capsule capsule, TAKE 1 CAPSULE BY MOUTH DAILY, Disp: 30 capsule, Rfl: 3    azithromycin (Zithromax Z-Abel) 250 MG tablet, Take 2 tablets by mouth on day 1, then 1 tablet daily on days 2-5, Disp: 6 tablet, Rfl: 0    brompheniramine-pseudoephedrine-DM 30-2-10 MG/5ML syrup, Take 5 mL by mouth 4 (Four) Times a Day As Needed for Congestion., Disp: 118 mL, Rfl: 0    Diclofenac Sodium (VOLTAREN) 1 % gel gel, Apply 4 g topically to the appropriate area as directed 4 (Four) Times a Day As Needed (pain). (Patient not taking: Reported on 5/28/2025), Disp: 150 g, Rfl: 0    Allergies:   Allergies   Allergen Reactions    Oxycodone Itching       Objective     Vital Signs  /78   Pulse 94   Temp 97.1 °F (36.2  "°C) (Temporal)   Ht 159.4 cm (62.76\")   Wt 93 kg (205 lb)   SpO2 99%   BMI 36.60 kg/m²   Estimated body mass index is 36.6 kg/m² as calculated from the following:    Height as of this encounter: 159.4 cm (62.76\").    Weight as of this encounter: 93 kg (205 lb).            Physical Exam  Constitutional:       Appearance: Normal appearance. She is obese.   HENT:      Nose: Congestion present.      Mouth/Throat:      Mouth: Mucous membranes are moist.   Cardiovascular:      Rate and Rhythm: Normal rate and regular rhythm.      Pulses: Normal pulses.      Heart sounds: Normal heart sounds.   Pulmonary:      Effort: Pulmonary effort is normal.      Breath sounds: Normal breath sounds.   Neurological:      Mental Status: She is alert.          Result Review :              \]    Results for orders placed or performed in visit on 05/28/25   POCT SARS-CoV-2 Antigen TRICIA + Flu    Collection Time: 05/28/25  2:22 PM    Specimen: Swab   Result Value Ref Range    SARS Antigen Not Detected Not Detected, Presumptive Negative    Influenza A Antigen TRICIA Not Detected Not Detected    Influenza B Antigen TRICIA Not Detected Not Detected    Internal Control Passed Passed    Lot Number [id number]     Expiration Date 2/7/26          Assessment and Plan     1. Upper respiratory tract infection, unspecified type    - azithromycin (Zithromax Z-Abel) 250 MG tablet; Take 2 tablets by mouth on day 1, then 1 tablet daily on days 2-5  Dispense: 6 tablet; Refill: 0  - brompheniramine-pseudoephedrine-DM 30-2-10 MG/5ML syrup; Take 5 mL by mouth 4 (Four) Times a Day As Needed for Congestion.  Dispense: 118 mL; Refill: 0  - fluconazole (Diflucan) 150 MG tablet; Take 1 tablet by mouth 1 (One) Time for 1 dose. Repeat in 3 days if needed  Dispense: 2 tablet; Refill: 0    2. Class 2 obesity due to excess calories without serious comorbidity with body mass index (BMI) of 35.0 to 35.9 in adult  Discussed risk associated with obesity. she was given instructions " on calorie counting as well as on decreasing carbohydrate intake. she was also encouraged to start exercise regimen.  Instructed patient to download fitness kali on her smart phone to aid in calorie counting and exercise tracking.    - phentermine (Adipex-P) 37.5 MG tablet; Take 1 tablet by mouth Every Morning Before Breakfast.  Dispense: 90 tablet; Refill: 0    3. Allergy, subsequent encounter  Med refilled  - POCT SARS-CoV-2 Antigen TRICIA + Flu  - montelukast (SINGULAIR) 10 MG tablet; Take 1 tablet by mouth Daily.  Dispense: 90 tablet; Refill: 3    4. Depression, unspecified depression type  Stable  Continue wellbutrin    5. Sleep difficulties  Controlled on elavil       Follow Up  No follow-ups on file.    Gildardo Marino MD  [facility] [address]  [facility]  [address]  [address] [id number] [phone]

## 2025-05-29 ENCOUNTER — PRIOR AUTHORIZATION (OUTPATIENT)
Dept: INTERNAL MEDICINE | Facility: CLINIC | Age: 35
End: 2025-05-29
Payer: COMMERCIAL

## 2025-05-29 NOTE — TELEPHONE ENCOUNTER
PA submitted via cover my meds for KEY: [id number]     Awaiting office note completion to attach the additional documentation required.

## 2025-07-19 DIAGNOSIS — F32.A DEPRESSION, UNSPECIFIED DEPRESSION TYPE: ICD-10-CM

## 2025-07-23 RX ORDER — BUPROPION HYDROCHLORIDE 300 MG/1
300 TABLET ORAL DAILY
Qty: 90 TABLET | Refills: 2 | Status: SHIPPED | OUTPATIENT
Start: 2025-07-23

## 2025-07-23 NOTE — INTERVAL H&P NOTE
"[facility] Pre-op    Full history and physical note from office is up to date.  See office note attached.    /70 (BP Location: Right arm, Patient Position: Lying)  Pulse 87  Temp 97.7 °F (36.5 °C) (Temporal Artery )   Resp 18  Ht 61\" (154.9 cm)  Wt 176 lb (79.8 kg)  LMP 07/15/2017 (Exact Date)  SpO2 97%  BMI 33.25 kg/m2    IMM:  Influenza:  Yes 2016  Pneumococcal:  no  Tetanus:  unknown    Cancer Staging (if applicable)  Cancer Patient: __ yes _x_no __unknown__N/A; If yes, clinical stage T:__ N:__M:__, stage group or __N/A    Sylvia Ortega, APRN 9/18/2017 10:51 AM      " Detail Level: Detailed left

## 2025-08-27 PROCEDURE — 99284 EMERGENCY DEPT VISIT MOD MDM: CPT

## 2025-08-28 ENCOUNTER — APPOINTMENT (OUTPATIENT)
Dept: GENERAL RADIOLOGY | Facility: HOSPITAL | Age: 35
End: 2025-08-28
Payer: COMMERCIAL

## 2025-08-28 ENCOUNTER — APPOINTMENT (OUTPATIENT)
Dept: CT IMAGING | Facility: HOSPITAL | Age: 35
End: 2025-08-28
Payer: COMMERCIAL

## 2025-08-28 ENCOUNTER — TELEMEDICINE (OUTPATIENT)
Dept: INTERNAL MEDICINE | Facility: CLINIC | Age: 35
End: 2025-08-28
Payer: COMMERCIAL

## 2025-08-28 ENCOUNTER — HOSPITAL ENCOUNTER (EMERGENCY)
Facility: HOSPITAL | Age: 35
Discharge: HOME | End: 2025-08-28
Attending: EMERGENCY MEDICINE | Admitting: EMERGENCY MEDICINE
Payer: COMMERCIAL

## 2025-08-28 ENCOUNTER — TELEPHONE (OUTPATIENT)
Dept: INTERNAL MEDICINE | Facility: CLINIC | Age: 35
End: 2025-08-28

## 2025-08-28 VITALS
TEMPERATURE: 99.1 F | DIASTOLIC BLOOD PRESSURE: 76 MMHG | BODY MASS INDEX: 37.17 KG/M2 | HEART RATE: 92 BPM | HEIGHT: 62 IN | WEIGHT: 202 LBS | SYSTOLIC BLOOD PRESSURE: 108 MMHG | OXYGEN SATURATION: 99 % | RESPIRATION RATE: 18 BRPM

## 2025-08-28 DIAGNOSIS — R42 POSTURAL DIZZINESS WITH NEAR SYNCOPE: ICD-10-CM

## 2025-08-28 DIAGNOSIS — F41.9 ANXIETY: ICD-10-CM

## 2025-08-28 DIAGNOSIS — E66.812 CLASS 2 OBESITY DUE TO EXCESS CALORIES WITHOUT SERIOUS COMORBIDITY WITH BODY MASS INDEX (BMI) OF 36.0 TO 36.9 IN ADULT: ICD-10-CM

## 2025-08-28 DIAGNOSIS — R55 POSTURAL DIZZINESS WITH NEAR SYNCOPE: ICD-10-CM

## 2025-08-28 DIAGNOSIS — R25.1 TREMOR OF BOTH HANDS: Primary | ICD-10-CM

## 2025-08-28 DIAGNOSIS — E66.09 CLASS 2 OBESITY DUE TO EXCESS CALORIES WITHOUT SERIOUS COMORBIDITY WITH BODY MASS INDEX (BMI) OF 36.0 TO 36.9 IN ADULT: ICD-10-CM

## 2025-08-28 DIAGNOSIS — R25.1 TREMOR: ICD-10-CM

## 2025-08-28 DIAGNOSIS — F32.A DEPRESSION, UNSPECIFIED DEPRESSION TYPE: ICD-10-CM

## 2025-08-28 DIAGNOSIS — F41.1 GAD (GENERALIZED ANXIETY DISORDER): Primary | ICD-10-CM

## 2025-08-28 DIAGNOSIS — R53.83 MALAISE AND FATIGUE: ICD-10-CM

## 2025-08-28 DIAGNOSIS — R53.81 MALAISE AND FATIGUE: ICD-10-CM

## 2025-08-28 LAB
ALBUMIN SERPL-MCNC: 4.5 G/DL (ref 3.5–5.2)
ALBUMIN/GLOB SERPL: 1.5 G/DL
ALP SERPL-CCNC: 80 U/L (ref 39–117)
ALT SERPL W P-5'-P-CCNC: 11 U/L (ref 1–33)
ANION GAP SERPL CALCULATED.3IONS-SCNC: 10 MMOL/L (ref 5–15)
AST SERPL-CCNC: 17 U/L (ref 1–32)
B PARAPERT DNA SPEC QL NAA+PROBE: NOT DETECTED
B PERT DNA SPEC QL NAA+PROBE: NOT DETECTED
B-HCG UR QL: NEGATIVE
BACTERIA UR QL AUTO: ABNORMAL /HPF
BASOPHILS # BLD AUTO: 0.03 10*3/MM3 (ref 0–0.2)
BASOPHILS NFR BLD AUTO: 0.3 % (ref 0–1.5)
BILIRUB SERPL-MCNC: 0.5 MG/DL (ref 0–1.2)
BILIRUB UR QL STRIP: NEGATIVE
BUN SERPL-MCNC: 10.9 MG/DL (ref 6–20)
BUN/CREAT SERPL: 11.1 (ref 7–25)
C PNEUM DNA NPH QL NAA+NON-PROBE: NOT DETECTED
CALCIUM SPEC-SCNC: 9.7 MG/DL (ref 8.6–10.5)
CHLORIDE SERPL-SCNC: 102 MMOL/L (ref 98–107)
CLARITY UR: ABNORMAL
CO2 SERPL-SCNC: 25 MMOL/L (ref 22–29)
COLOR UR: ABNORMAL
CREAT SERPL-MCNC: 0.98 MG/DL (ref 0.57–1)
DEPRECATED RDW RBC AUTO: 41.1 FL (ref 37–54)
EGFRCR SERPLBLD CKD-EPI 2021: 76.9 ML/MIN/1.73
EOSINOPHIL # BLD AUTO: 0.05 10*3/MM3 (ref 0–0.4)
EOSINOPHIL NFR BLD AUTO: 0.6 % (ref 0.3–6.2)
ERYTHROCYTE [DISTWIDTH] IN BLOOD BY AUTOMATED COUNT: 12.8 % (ref 12.3–15.4)
EXPIRATION DATE: NORMAL
FLUAV SUBTYP SPEC NAA+PROBE: NOT DETECTED
FLUBV RNA NPH QL NAA+NON-PROBE: NOT DETECTED
GLOBULIN UR ELPH-MCNC: 3.1 GM/DL
GLUCOSE SERPL-MCNC: 79 MG/DL (ref 65–99)
GLUCOSE UR STRIP-MCNC: NEGATIVE MG/DL
HADV DNA SPEC NAA+PROBE: NOT DETECTED
HCOV 229E RNA SPEC QL NAA+PROBE: NOT DETECTED
HCOV HKU1 RNA SPEC QL NAA+PROBE: NOT DETECTED
HCOV NL63 RNA SPEC QL NAA+PROBE: NOT DETECTED
HCOV OC43 RNA SPEC QL NAA+PROBE: NOT DETECTED
HCT VFR BLD AUTO: 37.3 % (ref 34–46.6)
HGB BLD-MCNC: 12.4 G/DL (ref 12–15.9)
HGB UR QL STRIP.AUTO: ABNORMAL
HMPV RNA NPH QL NAA+NON-PROBE: NOT DETECTED
HOLD SPECIMEN: NORMAL
HPIV1 RNA ISLT QL NAA+PROBE: NOT DETECTED
HPIV2 RNA SPEC QL NAA+PROBE: NOT DETECTED
HPIV3 RNA NPH QL NAA+PROBE: NOT DETECTED
HPIV4 P GENE NPH QL NAA+PROBE: NOT DETECTED
HYALINE CASTS UR QL AUTO: ABNORMAL /LPF
IMM GRANULOCYTES # BLD AUTO: 0.02 10*3/MM3 (ref 0–0.05)
IMM GRANULOCYTES NFR BLD AUTO: 0.2 % (ref 0–0.5)
INTERNAL NEGATIVE CONTROL: NORMAL
INTERNAL POSITIVE CONTROL: NORMAL
KETONES UR QL STRIP: NEGATIVE
LEUKOCYTE ESTERASE UR QL STRIP.AUTO: ABNORMAL
LYMPHOCYTES # BLD AUTO: 2.46 10*3/MM3 (ref 0.7–3.1)
LYMPHOCYTES NFR BLD AUTO: 28.4 % (ref 19.6–45.3)
Lab: NORMAL
M PNEUMO IGG SER IA-ACNC: NOT DETECTED
MAGNESIUM SERPL-MCNC: 2.1 MG/DL (ref 1.6–2.6)
MCH RBC QN AUTO: 29.1 PG (ref 26.6–33)
MCHC RBC AUTO-ENTMCNC: 33.2 G/DL (ref 31.5–35.7)
MCV RBC AUTO: 87.6 FL (ref 79–97)
MONOCYTES # BLD AUTO: 0.42 10*3/MM3 (ref 0.1–0.9)
MONOCYTES NFR BLD AUTO: 4.9 % (ref 5–12)
NEUTROPHILS NFR BLD AUTO: 5.67 10*3/MM3 (ref 1.7–7)
NEUTROPHILS NFR BLD AUTO: 65.6 % (ref 42.7–76)
NITRITE UR QL STRIP: NEGATIVE
NRBC BLD AUTO-RTO: 0 /100 WBC (ref 0–0.2)
PH UR STRIP.AUTO: <=5 [PH] (ref 5–8)
PLATELET # BLD AUTO: 327 10*3/MM3 (ref 140–450)
PMV BLD AUTO: 9.8 FL (ref 6–12)
POTASSIUM SERPL-SCNC: 3.7 MMOL/L (ref 3.5–5.2)
PROT SERPL-MCNC: 7.6 G/DL (ref 6–8.5)
PROT UR QL STRIP: ABNORMAL
RBC # BLD AUTO: 4.26 10*6/MM3 (ref 3.77–5.28)
RBC # UR STRIP: ABNORMAL /HPF
REF LAB TEST METHOD: ABNORMAL
RHINOVIRUS RNA SPEC NAA+PROBE: NOT DETECTED
RSV RNA NPH QL NAA+NON-PROBE: NOT DETECTED
SARS-COV-2 RNA RESP QL NAA+PROBE: NOT DETECTED
SODIUM SERPL-SCNC: 137 MMOL/L (ref 136–145)
SP GR UR STRIP: 1.01 (ref 1–1.03)
SQUAMOUS #/AREA URNS HPF: ABNORMAL /HPF
TROPONIN T SERPL HS-MCNC: <6 NG/L
TSH SERPL DL<=0.05 MIU/L-ACNC: 1.53 UIU/ML (ref 0.27–4.2)
UROBILINOGEN UR QL STRIP: ABNORMAL
WBC # UR STRIP: ABNORMAL /HPF
WBC NRBC COR # BLD AUTO: 8.65 10*3/MM3 (ref 3.4–10.8)
WHOLE BLOOD HOLD COAG: NORMAL
WHOLE BLOOD HOLD SPECIMEN: NORMAL

## 2025-08-28 PROCEDURE — 84443 ASSAY THYROID STIM HORMONE: CPT | Performed by: PHYSICIAN ASSISTANT

## 2025-08-28 PROCEDURE — 93005 ELECTROCARDIOGRAM TRACING: CPT | Performed by: EMERGENCY MEDICINE

## 2025-08-28 PROCEDURE — 80053 COMPREHEN METABOLIC PANEL: CPT | Performed by: EMERGENCY MEDICINE

## 2025-08-28 PROCEDURE — 71045 X-RAY EXAM CHEST 1 VIEW: CPT

## 2025-08-28 PROCEDURE — 83735 ASSAY OF MAGNESIUM: CPT | Performed by: EMERGENCY MEDICINE

## 2025-08-28 PROCEDURE — 81025 URINE PREGNANCY TEST: CPT | Performed by: PHYSICIAN ASSISTANT

## 2025-08-28 PROCEDURE — 81001 URINALYSIS AUTO W/SCOPE: CPT | Performed by: PHYSICIAN ASSISTANT

## 2025-08-28 PROCEDURE — 70450 CT HEAD/BRAIN W/O DYE: CPT

## 2025-08-28 PROCEDURE — 25810000003 SODIUM CHLORIDE 0.9 % SOLUTION: Performed by: PHYSICIAN ASSISTANT

## 2025-08-28 PROCEDURE — 85025 COMPLETE CBC W/AUTO DIFF WBC: CPT | Performed by: EMERGENCY MEDICINE

## 2025-08-28 PROCEDURE — 84484 ASSAY OF TROPONIN QUANT: CPT | Performed by: EMERGENCY MEDICINE

## 2025-08-28 PROCEDURE — 0202U NFCT DS 22 TRGT SARS-COV-2: CPT | Performed by: PHYSICIAN ASSISTANT

## 2025-08-28 RX ORDER — SEMAGLUTIDE 0.25 MG/.5ML
0.25 INJECTION, SOLUTION SUBCUTANEOUS WEEKLY
Qty: 2 ML | Refills: 0 | Status: SHIPPED | OUTPATIENT
Start: 2025-08-28

## 2025-08-28 RX ORDER — SODIUM CHLORIDE 0.9 % (FLUSH) 0.9 %
10 SYRINGE (ML) INJECTION AS NEEDED
Status: DISCONTINUED | OUTPATIENT
Start: 2025-08-28 | End: 2025-08-28 | Stop reason: HOSPADM

## 2025-08-28 RX ORDER — PROPRANOLOL HYDROCHLORIDE 10 MG/1
10-20 TABLET ORAL 3 TIMES DAILY PRN
Qty: 90 TABLET | Refills: 1 | Status: SHIPPED | OUTPATIENT
Start: 2025-08-28

## 2025-08-28 RX ADMIN — SODIUM CHLORIDE 1000 ML: 9 INJECTION, SOLUTION INTRAVENOUS at 02:01

## 2025-08-29 ENCOUNTER — PRIOR AUTHORIZATION (OUTPATIENT)
Dept: INTERNAL MEDICINE | Facility: CLINIC | Age: 35
End: 2025-08-29
Payer: COMMERCIAL

## 2025-08-30 LAB
QT INTERVAL: 354 MS
QTC INTERVAL: 440 MS

## (undated) DEVICE — MEDI-VAC NON-CONDUCTIVE SUCTION TUBING: Brand: CARDINAL HEALTH

## (undated) DEVICE — TBG W FLTR FOR BERKELEY SYSTEM

## (undated) DEVICE — CANNULA,ADULT,SOFT-TOUCH,7TUBE,SC: Brand: MEDLINE

## (undated) DEVICE — LEX D AND C: Brand: MEDLINE INDUSTRIES, INC.

## (undated) DEVICE — TRAP TISS

## (undated) DEVICE — ST COL BERKELY TBG

## (undated) DEVICE — AIRWY 90MM NO9

## (undated) DEVICE — CANN VAC GYN VACURETTE BERKELEY CRV 8MM 1P/U STRL

## (undated) DEVICE — GLV SURG SENSICARE MICRO PF LF 6.5 STRL

## (undated) DEVICE — MEDI-VAC YANKAUER SUCTION HANDLE W/BULBOUS TIP: Brand: CARDINAL HEALTH

## (undated) DEVICE — CANSTR SXN UTER NO FLTR SURG